# Patient Record
Sex: FEMALE | Race: WHITE | NOT HISPANIC OR LATINO | Employment: UNEMPLOYED | URBAN - METROPOLITAN AREA
[De-identification: names, ages, dates, MRNs, and addresses within clinical notes are randomized per-mention and may not be internally consistent; named-entity substitution may affect disease eponyms.]

---

## 2017-01-21 ENCOUNTER — OFFICE VISIT (OUTPATIENT)
Dept: INTERNAL MEDICINE | Facility: CLINIC | Age: 57
End: 2017-01-21
Payer: COMMERCIAL

## 2017-01-21 VITALS
SYSTOLIC BLOOD PRESSURE: 171 MMHG | TEMPERATURE: 98 F | HEART RATE: 108 BPM | BODY MASS INDEX: 33.87 KG/M2 | WEIGHT: 210.75 LBS | HEIGHT: 66 IN | DIASTOLIC BLOOD PRESSURE: 83 MMHG

## 2017-01-21 DIAGNOSIS — R05.9 COUGH: ICD-10-CM

## 2017-01-21 DIAGNOSIS — R09.89 CHEST CONGESTION: ICD-10-CM

## 2017-01-21 DIAGNOSIS — J40 BRONCHITIS: Primary | ICD-10-CM

## 2017-01-21 PROCEDURE — 1159F MED LIST DOCD IN RCRD: CPT | Mod: S$GLB,,, | Performed by: FAMILY MEDICINE

## 2017-01-21 PROCEDURE — 99999 PR PBB SHADOW E&M-EST. PATIENT-LVL IV: CPT | Mod: PBBFAC,,, | Performed by: FAMILY MEDICINE

## 2017-01-21 PROCEDURE — 3077F SYST BP >= 140 MM HG: CPT | Mod: S$GLB,,, | Performed by: FAMILY MEDICINE

## 2017-01-21 PROCEDURE — 3079F DIAST BP 80-89 MM HG: CPT | Mod: S$GLB,,, | Performed by: FAMILY MEDICINE

## 2017-01-21 PROCEDURE — 99213 OFFICE O/P EST LOW 20 MIN: CPT | Mod: S$GLB,,, | Performed by: FAMILY MEDICINE

## 2017-01-21 RX ORDER — AZITHROMYCIN 250 MG/1
TABLET, FILM COATED ORAL
Qty: 6 TABLET | Refills: 0 | Status: SHIPPED | OUTPATIENT
Start: 2017-01-21 | End: 2017-01-26

## 2017-01-21 RX ORDER — PROMETHAZINE HYDROCHLORIDE 6.25 MG/5ML
6.25 SYRUP ORAL EVERY 4 HOURS PRN
Qty: 240 ML | Refills: 0 | Status: SHIPPED | OUTPATIENT
Start: 2017-01-21 | End: 2017-03-14

## 2017-01-21 RX ORDER — BENZONATATE 100 MG/1
100 CAPSULE ORAL 4 TIMES DAILY PRN
Qty: 40 CAPSULE | Refills: 1 | Status: SHIPPED | OUTPATIENT
Start: 2017-01-21 | End: 2017-01-31

## 2017-01-21 NOTE — PROGRESS NOTES
"S/  Sat  clinic. PCP is Dr. Brian  Pt c/o 10 days of chest congestion, URI, sinus pressure  Others at [facility] were sick before she was    O/  Vitals:    01/21/17 1032   BP: (!) 171/83   Pulse: 108   Temp: 98.4 °F (36.9 °C)   -given BP, advised her to stop taking the sudafed she ahs been using OTC    Pharynx erythematous, some pustules, mild swelling  Neck supple no lymphadenopathy  Cor s1s2  Lungs - initially some wheezes in SHALINI but cleared after a deep breath, otherwise CTA  Abd - benign    Christal was seen today for uri.    Diagnoses and all orders for this visit:    Bronchitis w/chest congestion x 10d  -     azithromycin (Z-SAMINA) 250 MG tablet; Take 2 tablets by mouth on day 1; Take 1 tablet by mouth on days 2-5 ["allegic to erytho, but she reports this was a nausea reaction only]    Cough  -     promethazine (PHENERGAN) 6.25 mg/5 mL syrup; Take 5 mLs (6.25 mg total) by mouth every 4 (four) hours as needed (congestion/cough).  -     benzonatate (TESSALON) 100 MG capsule; Take 1 capsule (100 mg total) by mouth 4 (four) times daily as needed for Cough.  - can continue her OTC robitussin DM; allergic to codeine    Chest congestion  -     promethazine (PHENERGAN) 6.25 mg/5 mL syrup; Take 5 mLs (6.25 mg total) by mouth every 4 (four) hours as needed (congestion/cough).  - fluids rest, f/u prn          "

## 2017-01-21 NOTE — MR AVS SNAPSHOT
[facility] Medicine  [address]  [facility] [address]  Phone: [phone]  Fax: [phone]                  Christal Posey   2017 10:40 AM   Office Visit    Description:  Female : 1960   Provider:  Abhi Young MD   Department:  [facility] Internal Medicine           Reason for Visit     URI           Diagnoses this Visit        Comments    Bronchitis    -  Primary     Cough         Chest congestion                To Do List           Goals (5 Years of Data)     None       These Medications        Disp Refills Start End    azithromycin (Z-SAMINA) 250 MG tablet 6 tablet 0 2017    Take 2 tablets by mouth on day 1; Take 1 tablet by mouth on days 2-5    Pharmacy: [facility] [address] [place]  [address] [facility] Ph #: [phone]       promethazine (PHENERGAN) 6.25 mg/5 mL syrup 240 mL 0 2017     Take 5 mLs (6.25 mg total) by mouth every 4 (four) hours as needed (congestion/cough). - Oral    Pharmacy: [facility] [address] [place] [address] [place] - [address] [facility] Ph #: [phone]       benzonatate (TESSALON) 100 MG capsule 40 capsule 1 2017    Take 1 capsule (100 mg total) by mouth 4 (four) times daily as needed for Cough. - Oral    Pharmacy: [facility] [address] [place] [address] [place] [address] [facility] AT [facility] Ph #: [phone]         [facility] On Call     [facility] On Call Nurse Care Line -  Assistance  Registered nurses in the [facility] On Call Center provide clinical advisement, health education, appointment booking, and other advisory services.  Call for this free service at [phone].             Medications           Message regarding Medications     Verify the changes and/or additions to your medication regime listed below are the same as discussed with your clinician today.  If any of these changes  or additions are incorrect, please notify your healthcare provider.        START taking these NEW medications        Refills    azithromycin (Z-SAMINA) 250 MG tablet 0    Sig: Take 2 tablets by mouth on day 1; Take 1 tablet by mouth on days 2-5    Class: Normal    promethazine (PHENERGAN) 6.25 mg/5 mL syrup 0    Sig: Take 5 mLs (6.25 mg total) by mouth every 4 (four) hours as needed (congestion/cough).    Class: Normal    Route: Oral    benzonatate (TESSALON) 100 MG capsule 1    Sig: Take 1 capsule (100 mg total) by mouth 4 (four) times daily as needed for Cough.    Class: Normal    Route: Oral           Verify that the below list of medications is an accurate representation of the medications you are currently taking.  If none reported, the list may be blank. If incorrect, please contact your healthcare provider. Carry this list with you in case of emergency.           Current Medications     alprazolam (XANAX) 0.25 MG tablet Take 1 tablet (0.25 mg total) by mouth nightly as needed for Insomnia.    baclofen (LIORESAL) 10 MG tablet TAKE 1 TABLET(10 MG) BY MOUTH EVERY EVENING    butalbital-acetaminophen-caffeine -40 mg (FIORICET, ESGIC) -40 mg per tablet Take 1 tablet by mouth every 6 to 8 hours as needed.    fluticasone (FLONASE) 50 mcg/actuation nasal spray     gabapentin (NEURONTIN) 100 MG capsule Take 800 mg by mouth 3 (three) times daily.     gabapentin (NEURONTIN) 300 MG capsule Take 800 mg by mouth 3 (three) times daily. Takes 300 mg caps and 100 mg caps to make total single dose of 800 mg; takes 800 mg TID    lansoprazole (PREVACID) 30 MG capsule Take 30 mg by mouth every morning. 1 Capsule, Delayed Release(E.C.) Oral Every day    meloxicam (MOBIC) 7.5 MG tablet TAKE 1 TABLET(7.5 MG) BY MOUTH EVERY DAY    multivitamin (MULTIVITAMIN) per tablet Take 1 tablet by mouth once daily.      ondansetron (ZOFRAN) 4 MG tablet Take 1 tablet (4 mg total) by mouth every 8 (eight) hours as needed for Nausea. 1  "Tablet Oral Every 6 hours    sumatriptan (IMITREX) 20 mg/actuation nasal spray USE 1 SPRAY IN EACH NOSTRIL AT ONSET OF HEADACHE. MAY REPEAT ONCE IN 2 HOURS IF NO RELIEF. NO MORE THAN 2 SPRAYS PER 24 HOURS    vitamin D 1000 units Tab Take 185 mg by mouth every morning.     azithromycin (Z-SAMINA) 250 MG tablet Take 2 tablets by mouth on day 1; Take 1 tablet by mouth on days 2-5    benzonatate (TESSALON) 100 MG capsule Take 1 capsule (100 mg total) by mouth 4 (four) times daily as needed for Cough.    co-enzyme Q-10 30 mg capsule Take 30 mg by mouth 3 (three) times daily.    promethazine (PHENERGAN) 6.25 mg/5 mL syrup Take 5 mLs (6.25 mg total) by mouth every 4 (four) hours as needed (congestion/cough).           Clinical Reference Information           Vital Signs - Last Recorded  Most recent update: 1/21/2017 10:34 AM by Kindra Gomez MA    BP Pulse Temp Ht Wt LMP    (!) 171/83 (BP Location: Right arm, Patient Position: Sitting) 108 98.4 °F (36.9 °C) (Oral) 5' 6" (1.676 m) 95.6 kg (210 lb 12.2 oz) (LMP Unknown)    BMI                34.02 kg/m2          Blood Pressure          Most Recent Value    BP  (!)  171/83      Allergies as of 1/21/2017     Pravastatin    Amitriptyline    Codeine    Doxycycline    Erythromycin    Iodine    Macrobid  [Nitrofurantoin Monohyd/m-cryst]    Verapamil      Immunizations Administered on Date of Encounter - 1/21/2017     None      "

## 2017-02-03 ENCOUNTER — OFFICE VISIT (OUTPATIENT)
Dept: INTERNAL MEDICINE | Facility: CLINIC | Age: 57
End: 2017-02-03
Payer: COMMERCIAL

## 2017-02-03 ENCOUNTER — HOSPITAL ENCOUNTER (OUTPATIENT)
Dept: RADIOLOGY | Facility: HOSPITAL | Age: 57
Discharge: HOME | End: 2017-02-03
Attending: INTERNAL MEDICINE
Payer: COMMERCIAL

## 2017-02-03 ENCOUNTER — TELEPHONE (OUTPATIENT)
Dept: INTERNAL MEDICINE | Facility: CLINIC | Age: 57
End: 2017-02-03

## 2017-02-03 VITALS
WEIGHT: 215.19 LBS | HEART RATE: 73 BPM | HEIGHT: 66 IN | SYSTOLIC BLOOD PRESSURE: 141 MMHG | DIASTOLIC BLOOD PRESSURE: 73 MMHG | BODY MASS INDEX: 34.58 KG/M2

## 2017-02-03 DIAGNOSIS — J42 CHRONIC BRONCHITIS, UNSPECIFIED CHRONIC BRONCHITIS TYPE: ICD-10-CM

## 2017-02-03 DIAGNOSIS — R05.9 COUGH: ICD-10-CM

## 2017-02-03 DIAGNOSIS — R05.9 COUGH: Primary | ICD-10-CM

## 2017-02-03 PROCEDURE — 99999 PR PBB SHADOW E&M-EST. PATIENT-LVL III: CPT | Mod: PBBFAC,,, | Performed by: INTERNAL MEDICINE

## 2017-02-03 PROCEDURE — 71020 XR CHEST PA AND LATERAL: CPT | Mod: 26,,, | Performed by: RADIOLOGY

## 2017-02-03 PROCEDURE — 71020 XR CHEST PA AND LATERAL: CPT | Mod: TC

## 2017-02-03 PROCEDURE — 3077F SYST BP >= 140 MM HG: CPT | Mod: S$GLB,,, | Performed by: INTERNAL MEDICINE

## 2017-02-03 PROCEDURE — 3078F DIAST BP <80 MM HG: CPT | Mod: S$GLB,,, | Performed by: INTERNAL MEDICINE

## 2017-02-03 PROCEDURE — 99213 OFFICE O/P EST LOW 20 MIN: CPT | Mod: S$GLB,,, | Performed by: INTERNAL MEDICINE

## 2017-02-03 RX ORDER — FLUTICASONE PROPIONATE 110 UG/1
1 AEROSOL, METERED RESPIRATORY (INHALATION) 2 TIMES DAILY
Qty: 12 G | Refills: 0 | Status: SHIPPED | OUTPATIENT
Start: 2017-02-03 | End: 2018-02-03

## 2017-02-03 NOTE — PROGRESS NOTES
"Subjective:       Patient ID: Christal Posey is a 57 y.o. female.    Chief Complaint: Follow-up (cough, losing voice, weezing trouble laying down to sleep at night, fatigue)    hospitals    First visit with me. Patient of Blane Brian MD, here today for Urgent Care visit. PMH of hiatal hernia, IBS, migraine, DCIS (L), Rheumatoid Arthritis. Seen by Internal Medicine for bronchitis 2 weeks ago, given antihistamine cough syrup, Tessalon, and Zpak.    SX improved initially. Still with cough. Sometimes heavy spasms of cough. Still productive of mucus. Hoarseness. At night can't lay flat, has to sit up. "I can't catch my breath laying down". At times occasionally shortness of breath. Still fatigued. No muscle aches. No fever. No nausea or vomiting. occasionally headache. Cough syrup not much help but Tessalon was good. Using Flonase and Mucinex.     Review of Systems    As per HPI    Objective:      Physical Exam   Constitutional: She is oriented to person, place, and time. No distress.    woman whose Body mass index is 34.73 kg/(m^2).    HENT:   Right Ear: Tympanic membrane normal.   Left Ear: Tympanic membrane normal.   Nose: Right sinus exhibits no maxillary sinus tenderness and no frontal sinus tenderness. Left sinus exhibits no maxillary sinus tenderness and no frontal sinus tenderness.   Mouth/Throat: Oropharynx is clear and moist. No oropharyngeal exudate.   Neck: Normal range of motion. No thyromegaly present.   Cardiovascular: Normal rate, regular rhythm and normal heart sounds.  Exam reveals no gallop and no friction rub.    No murmur heard.  Pulmonary/Chest: Effort normal and breath sounds normal. No stridor. She has no wheezes. She has no rales.   occasionally congested cough during exam   Lymphadenopathy:     She has no cervical adenopathy.   Neurological: She is alert and oriented to person, place, and time.   Skin: She is not diaphoretic.   Nursing note and vitals reviewed.      Vitals:    02/03/17 " "1333   BP: (!) 141/73   BP Location: Left arm   Patient Position: Sitting   BP Method: Manual   Pulse: 73   Weight: 97.6 kg (215 lb 2.7 oz)   Height: 5' 6" (1.676 m)     Body mass index is 34.73 kg/(m^2).    Assessment:       1. Cough    2. Chronic bronchitis, unspecified chronic bronchitis type        Plan:   Christal was seen today for follow-up.    Diagnoses and all orders for this visit:    Cough:  No apparent active infection at this time. CXR today to rule out any pneumonia. Continue Tessalon, add on over-the-counter Delsym.  -     X-Ray Chest PA And Lateral; Future    Chronic bronchitis, unspecified chronic bronchitis type:  Not resolving, avoid systemic steroids given asthma, start inhaled steroids for 1 week two times a day, then as needed.  -     fluticasone (FLOVENT HFA) 110 mcg/actuation inhaler; Inhale 1 puff into the lungs 2 (two) times daily.    Keep regular follow up appointments with Blane Brian MD.     Ministerio Quiroz MD  Internal Medicine    Portions of this note were completed using Cartesian dictation software. Please excuse typographical or syntax errors.   "

## 2017-02-03 NOTE — MR AVS SNAPSHOT
[facility] - [facility] Medicine  [address]  [facility] [address]  Phone: [phone]  Fax: [phone]                  Christal Posey   2/3/2017 1:40 PM   Office Visit    Description:  Female : 1960   Provider:  Ministerio Quiroz MD   Department:  [facility] - Internal Medicine           Reason for Visit     Follow-up           Diagnoses this Visit        Comments    Cough    -  Primary     Chronic bronchitis, unspecified chronic bronchitis type                To Do List           Future Appointments        Provider Department Dept Phone    2/3/2017 2:30 PM [facility] [id number] [facility] [phone]      Goals (5 Years of Data)     None      Follow-Up and Disposition     Follow-up and Disposition History       These Medications        Disp Refills Start End    fluticasone (FLOVENT HFA) 110 mcg/actuation inhaler 12 g 0 2/3/2017 2/3/2018    Inhale 1 puff into the lungs 2 (two) times daily. - Inhalation    Pharmacy: [facility] [address] [place] [address] [address] [facility]  [address] Ph #: [phone]         [facility]     [facility] Care [facility] -  Assistance  Registered nurses in the [facility] Center provide clinical advisement, health education, appointment booking, and other advisory services.  Call for this free service at [phone].             Medications           Message regarding Medications     Verify the changes and/or additions to your medication regime listed below are the same as discussed with your clinician today.  If any of these changes or additions are incorrect, please notify your healthcare provider.        START taking these NEW medications        Refills    fluticasone (FLOVENT HFA) 110 mcg/actuation inhaler 0    Sig: Inhale 1 puff into the lungs 2 (two) times daily.    Class: Normal    Route: Inhalation           Verify that the below list of medications is an accurate  representation of the medications you are currently taking.  If none reported, the list may be blank. If incorrect, please contact your healthcare provider. Carry this list with you in case of emergency.           Current Medications     alprazolam (XANAX) 0.25 MG tablet Take 1 tablet (0.25 mg total) by mouth nightly as needed for Insomnia.    baclofen (LIORESAL) 10 MG tablet TAKE 1 TABLET(10 MG) BY MOUTH EVERY EVENING    butalbital-acetaminophen-caffeine -40 mg (FIORICET, ESGIC) -40 mg per tablet Take 1 tablet by mouth every 6 to 8 hours as needed.    co-enzyme Q-10 30 mg capsule Take 30 mg by mouth 3 (three) times daily.    gabapentin (NEURONTIN) 100 MG capsule Take 800 mg by mouth 3 (three) times daily.     gabapentin (NEURONTIN) 300 MG capsule Take 800 mg by mouth 3 (three) times daily. Takes 300 mg caps and 100 mg caps to make total single dose of 800 mg; takes 800 mg TID    lansoprazole (PREVACID) 30 MG capsule Take 30 mg by mouth every morning. 1 Capsule, Delayed Release(E.C.) Oral Every day    meloxicam (MOBIC) 7.5 MG tablet TAKE 1 TABLET(7.5 MG) BY MOUTH EVERY DAY    multivitamin (MULTIVITAMIN) per tablet Take 1 tablet by mouth once daily.      ondansetron (ZOFRAN) 4 MG tablet Take 1 tablet (4 mg total) by mouth every 8 (eight) hours as needed for Nausea. 1 Tablet Oral Every 6 hours    promethazine (PHENERGAN) 6.25 mg/5 mL syrup Take 5 mLs (6.25 mg total) by mouth every 4 (four) hours as needed (congestion/cough).    sumatriptan (IMITREX) 20 mg/actuation nasal spray USE 1 SPRAY IN EACH NOSTRIL AT ONSET OF HEADACHE. MAY REPEAT ONCE IN 2 HOURS IF NO RELIEF. NO MORE THAN 2 SPRAYS PER 24 HOURS    vitamin D 1000 units Tab Take 185 mg by mouth every morning.     fluticasone (FLONASE) 50 mcg/actuation nasal spray     fluticasone (FLOVENT HFA) 110 mcg/actuation inhaler Inhale 1 puff into the lungs 2 (two) times daily.           Clinical Reference Information           Your Vitals Were     BP Pulse  "Height Weight Last Period BMI    141/73 (BP Location: Left arm, Patient Position: Sitting, BP Method: Manual) 73 5' 6" (1.676 m) 97.6 kg (215 lb 2.7 oz) (LMP Unknown) 34.73 kg/m2      Blood Pressure          Most Recent Value    BP  (!)  141/73      Allergies as of 2/3/2017     Pravastatin    Amitriptyline    Codeine    Doxycycline    Erythromycin    Iodine    Macrobid  [Nitrofurantoin Monohyd/m-cryst]    Verapamil      Immunizations Administered on Date of Encounter - 2/3/2017     None      Orders Placed During Today's Visit     Future Labs/Procedures Expected by Expires    X-Ray Chest PA And Lateral  2/3/2017 2/3/2018      Language Assistance Services     ATTENTION: Language assistance services are available, free of charge. Please call [phone].      ATENCIÓN: Si jamshidla milo, tiene a mckoy disposición servicios gratuitos de asistencia lingüística. Llame al [phone].     CHÚ Ý: N?u b?n nói Ti?ng Vi?t, có các d?ch v? h? tr? ngôn ng? mi?n phí dành cho b?n. G?i s? [phone].         [facility] complies with applicable Federal civil rights laws and does not discriminate on the basis of race, color, national origin, age, disability, or sex.        "

## 2017-03-14 ENCOUNTER — OFFICE VISIT (OUTPATIENT)
Dept: INTERNAL MEDICINE | Facility: CLINIC | Age: 57
End: 2017-03-14
Payer: COMMERCIAL

## 2017-03-14 VITALS
HEART RATE: 71 BPM | HEIGHT: 66 IN | WEIGHT: 212.31 LBS | BODY MASS INDEX: 34.12 KG/M2 | SYSTOLIC BLOOD PRESSURE: 124 MMHG | OXYGEN SATURATION: 97 % | DIASTOLIC BLOOD PRESSURE: 84 MMHG

## 2017-03-14 DIAGNOSIS — J45.20 ASTHMATIC BRONCHITIS, MILD INTERMITTENT, UNCOMPLICATED: Primary | ICD-10-CM

## 2017-03-14 PROCEDURE — 3079F DIAST BP 80-89 MM HG: CPT | Mod: S$GLB,,, | Performed by: INTERNAL MEDICINE

## 2017-03-14 PROCEDURE — 3074F SYST BP LT 130 MM HG: CPT | Mod: S$GLB,,, | Performed by: INTERNAL MEDICINE

## 2017-03-14 PROCEDURE — 99999 PR PBB SHADOW E&M-EST. PATIENT-LVL III: CPT | Mod: PBBFAC,,, | Performed by: INTERNAL MEDICINE

## 2017-03-14 PROCEDURE — 1160F RVW MEDS BY RX/DR IN RCRD: CPT | Mod: S$GLB,,, | Performed by: INTERNAL MEDICINE

## 2017-03-14 PROCEDURE — 99213 OFFICE O/P EST LOW 20 MIN: CPT | Mod: S$GLB,,, | Performed by: INTERNAL MEDICINE

## 2017-03-14 RX ORDER — ALBUTEROL SULFATE 90 UG/1
2 AEROSOL, METERED RESPIRATORY (INHALATION) EVERY 6 HOURS PRN
Qty: 18 G | Refills: 0 | Status: SHIPPED | OUTPATIENT
Start: 2017-03-14 | End: 2017-09-23 | Stop reason: SDUPTHER

## 2017-03-14 NOTE — MR AVS SNAPSHOT
[facility]  [address]  [facility] [address]  Phone: [phone]  Fax: [phone]                  Christal Posey   3/14/2017 8:00 AM   Office Visit    Description:  Female : 1960   Provider:  Blane Brian MD   Department:  [facility]           Reason for Visit     Cough           Diagnoses this Visit        Comments    Asthmatic bronchitis, mild intermittent, uncomplicated    -  Primary            To Do List           Future Appointments        Provider Department Dept Phone    4/10/2017 9:00 AM Adarsh Vanegas DO [facility] - Rheumatology [phone]    2017 8:30 AM [id number] [phone]    2017 9:30 AM [facility] [phone]      Goals (5 Years of Data)     None       These Medications        Disp Refills Start End    albuterol 90 mcg/actuation inhaler 18 g 0 3/14/2017 3/14/2018    Inhale 2 puffs into the lungs every 6 (six) hours as needed for Wheezing. Rescue - Inhalation    Pharmacy: [facility] [address] [place] [address] [place] [address] [facility]  [address] Ph #: [phone]         [facility]     [facility] Nurse Care Line - 24/7 Assistance  Registered nurses in the [facility] Center provide clinical advisement, health education, appointment booking, and other advisory services.  Call for this free service at [phone].             Medications           Message regarding Medications     Verify the changes and/or additions to your medication regime listed below are the same as discussed with your clinician today.  If any of these changes or additions are incorrect, please notify your healthcare provider.        START taking these NEW medications        Refills    albuterol 90 mcg/actuation inhaler 0    Sig: Inhale 2 puffs into the lungs every 6 (six) hours as needed for Wheezing. Rescue    Class:  Normal    Route: Inhalation      STOP taking these medications     co-enzyme Q-10 30 mg capsule Take 30 mg by mouth 3 (three) times daily.    promethazine (PHENERGAN) 6.25 mg/5 mL syrup Take 5 mLs (6.25 mg total) by mouth every 4 (four) hours as needed (congestion/cough).           Verify that the below list of medications is an accurate representation of the medications you are currently taking.  If none reported, the list may be blank. If incorrect, please contact your healthcare provider. Carry this list with you in case of emergency.           Current Medications     alprazolam (XANAX) 0.25 MG tablet Take 1 tablet (0.25 mg total) by mouth nightly as needed for Insomnia.    baclofen (LIORESAL) 10 MG tablet TAKE 1 TABLET(10 MG) BY MOUTH EVERY EVENING    butalbital-acetaminophen-caffeine -40 mg (FIORICET, ESGIC) -40 mg per tablet Take 1 tablet by mouth every 6 to 8 hours as needed.    fluticasone (FLONASE) 50 mcg/actuation nasal spray     fluticasone (FLOVENT HFA) 110 mcg/actuation inhaler Inhale 1 puff into the lungs 2 (two) times daily.    gabapentin (NEURONTIN) 100 MG capsule Take 800 mg by mouth 3 (three) times daily.     gabapentin (NEURONTIN) 300 MG capsule Take 800 mg by mouth 3 (three) times daily. Takes 300 mg caps and 100 mg caps to make total single dose of 800 mg; takes 800 mg TID    lansoprazole (PREVACID) 30 MG capsule Take 30 mg by mouth every morning. 1 Capsule, Delayed Release(E.C.) Oral Every day    meloxicam (MOBIC) 7.5 MG tablet TAKE 1 TABLET(7.5 MG) BY MOUTH EVERY DAY    multivitamin (MULTIVITAMIN) per tablet Take 1 tablet by mouth once daily.      ondansetron (ZOFRAN) 4 MG tablet Take 1 tablet (4 mg total) by mouth every 8 (eight) hours as needed for Nausea. 1 Tablet Oral Every 6 hours    sumatriptan (IMITREX) 20 mg/actuation nasal spray USE 1 SPRAY IN EACH NOSTRIL AT ONSET OF HEADACHE. MAY REPEAT ONCE IN 2 HOURS IF NO RELIEF. NO MORE THAN 2 SPRAYS PER 24 HOURS    vitamin D 1000  "units Tab Take 185 mg by mouth every morning.     albuterol 90 mcg/actuation inhaler Inhale 2 puffs into the lungs every 6 (six) hours as needed for Wheezing. Rescue           Clinical Reference Information           Your Vitals Were     BP Pulse Height Weight Last Period SpO2    124/84 (BP Location: Right arm, Patient Position: Sitting, BP Method: Manual) 71 5' 6" (1.676 m) 96.3 kg (212 lb 4.9 oz) (LMP Unknown) 97%    BMI                34.27 kg/m2          Blood Pressure          Most Recent Value    BP  124/84      Allergies as of 3/14/2017     Pravastatin    Amitriptyline    Codeine    Doxycycline    Erythromycin    Iodine    Macrobid  [Nitrofurantoin Monohyd/m-cryst]    Verapamil      Immunizations Administered on Date of Encounter - 3/14/2017     None      Language Assistance Services     ATTENTION: Language assistance services are available, free of charge. Please call [phone].      ATENCIÓN: Si habla español, tiene a mckoy disposición servicios gratuitos de asistencia lingüística. Llame al [phone].     SAVANNA Ý: N?u b?n nói Ti?ng Vi?t, có các d?ch v? h? tr? ngôn ng? mi?n phí dành cho b?n. G?i s? [phone].         [facility] complies with applicable Federal civil rights laws and does not discriminate on the basis of race, color, national origin, age, disability, or sex.        "

## 2017-03-15 NOTE — PROGRESS NOTES
CHIEF COMPLAINT:  Cough, plus shortness of breath.    HISTORY OF PRESENT ILLNESS:  The patient is a 57-year-old female, who we see for   reflux, hyperlipidemia, who comes in today for followup of cough.  The patient   reports she had seen Dr. Lucia in Urgent Care in January with complaints of   cough 10 days prior to presentation.  The patient at that time was treated with   Phenergan cough syrup and Tessalon Perles.  The patient reports that she was   seen again at this time by Dr. Quiroz on 02/03/2017 with complaints again of   cough, losing her voice, wheezing, trouble breathing.  At this time, the patient   was diagnosed with chronic bronchitis.  A chest x-ray had been done, which   showed no pneumonia.  She was prescribed Flovent.  The patient is here today for   followup.  The patient reports she is still having her cough, some wheezing.    Saturday, she woke up, feeling like she could not get a breath in.    REVIEW OF SYSTEMS:  The patient reports some postnasal drip.  The patient is   using Flonase.  She reports no sinus pain or pressure.    PHYSICAL EXAMINATION:  GENERAL APPEARANCE:  No acute distress.  HEENT:  Conjunctivae clear.  Pupils are equal and reactive.  TMs are clear.    Nasal septum is midline without discharge.  Oropharynx is without erythema.  PULMONARY:  Good inspiratory, expiratory breath sounds are heard.  Lungs are   clear to auscultation.  CARDIOVASCULAR:  S1, S2.  Pulse ox on room air is 97%.  EXTREMITIES:  Without edema.  ABDOMEN:  Nontender, nondistended, without hepatosplenomegaly.    Repeat blood pressure was 124/80.    Chest x-ray from 02/03/2017 was reviewed with the patient.  The pictures were   brought up, is showing to her in clinic here.    ASSESSMENT:  Asthmatic bronchitis.    PLAN:  The patient is to continue to use her Flovent.  We will also prescribe   albuterol metered-dose inhaler.  She is to use two puffs every 6 hours as   needed.  She is to contact us if her symptoms do  not improve.      ASHLEY/IN  dd: 03/15/2017 14:54:02 (CDT)  td: 03/16/2017 05:30:07 (CDT)  Doc ID   #[id number]  Job ID #[id number]    CC:

## 2017-03-28 DIAGNOSIS — J42 CHRONIC BRONCHITIS, UNSPECIFIED CHRONIC BRONCHITIS TYPE: ICD-10-CM

## 2017-03-29 RX ORDER — DEXAMETHASONE 4 MG/1
TABLET ORAL
Qty: 12 G | Refills: 0 | OUTPATIENT
Start: 2017-03-29

## 2017-04-06 ENCOUNTER — LAB VISIT (OUTPATIENT)
Dept: LAB | Facility: HOSPITAL | Age: 57
End: 2017-04-06
Attending: INTERNAL MEDICINE
Payer: COMMERCIAL

## 2017-04-06 ENCOUNTER — OFFICE VISIT (OUTPATIENT)
Dept: HEMATOLOGY/ONCOLOGY | Facility: CLINIC | Age: 57
End: 2017-04-06
Payer: COMMERCIAL

## 2017-04-06 VITALS
HEART RATE: 72 BPM | RESPIRATION RATE: 20 BRPM | TEMPERATURE: 98 F | DIASTOLIC BLOOD PRESSURE: 82 MMHG | SYSTOLIC BLOOD PRESSURE: 169 MMHG | BODY MASS INDEX: 34.02 KG/M2 | WEIGHT: 210.75 LBS | OXYGEN SATURATION: 99 %

## 2017-04-06 DIAGNOSIS — D05.12 DUCTAL CARCINOMA IN SITU (DCIS) OF LEFT BREAST: Primary | ICD-10-CM

## 2017-04-06 DIAGNOSIS — D05.12 DUCTAL CARCINOMA IN SITU (DCIS) OF LEFT BREAST: ICD-10-CM

## 2017-04-06 LAB
ALBUMIN SERPL BCP-MCNC: 3.7 G/DL
ALP SERPL-CCNC: 106 U/L
ALT SERPL W/O P-5'-P-CCNC: 20 U/L
ANION GAP SERPL CALC-SCNC: 8 MMOL/L
AST SERPL-CCNC: 19 U/L
BILIRUB SERPL-MCNC: 0.5 MG/DL
BUN SERPL-MCNC: 14 MG/DL
CALCIUM SERPL-MCNC: 9.6 MG/DL
CHLORIDE SERPL-SCNC: 105 MMOL/L
CO2 SERPL-SCNC: 29 MMOL/L
CREAT SERPL-MCNC: 1.1 MG/DL
ERYTHROCYTE [DISTWIDTH] IN BLOOD BY AUTOMATED COUNT: 13.4 %
EST. GFR  (AFRICAN AMERICAN): >60 ML/MIN/1.73 M^2
EST. GFR  (NON AFRICAN AMERICAN): 56.7 ML/MIN/1.73 M^2
GLUCOSE SERPL-MCNC: 93 MG/DL
HCT VFR BLD AUTO: 40.3 %
HGB BLD-MCNC: 13.8 G/DL
MCH RBC QN AUTO: 29 PG
MCHC RBC AUTO-ENTMCNC: 34.2 %
MCV RBC AUTO: 85 FL
NEUTROPHILS # BLD AUTO: 4.1 K/UL
PLATELET # BLD AUTO: 298 K/UL
PMV BLD AUTO: 10.4 FL
POTASSIUM SERPL-SCNC: 4 MMOL/L
PROT SERPL-MCNC: 7.7 G/DL
RBC # BLD AUTO: 4.76 M/UL
SODIUM SERPL-SCNC: 142 MMOL/L
WBC # BLD AUTO: 6.9 K/UL

## 2017-04-06 PROCEDURE — 99214 OFFICE O/P EST MOD 30 MIN: CPT | Mod: S$GLB,,, | Performed by: INTERNAL MEDICINE

## 2017-04-06 PROCEDURE — 80053 COMPREHEN METABOLIC PANEL: CPT

## 2017-04-06 PROCEDURE — 99999 PR PBB SHADOW E&M-EST. PATIENT-LVL III: CPT | Mod: PBBFAC,,, | Performed by: INTERNAL MEDICINE

## 2017-04-06 PROCEDURE — 85027 COMPLETE CBC AUTOMATED: CPT

## 2017-04-06 PROCEDURE — 36415 COLL VENOUS BLD VENIPUNCTURE: CPT

## 2017-04-06 PROCEDURE — 3077F SYST BP >= 140 MM HG: CPT | Mod: S$GLB,,, | Performed by: INTERNAL MEDICINE

## 2017-04-06 PROCEDURE — 1160F RVW MEDS BY RX/DR IN RCRD: CPT | Mod: S$GLB,,, | Performed by: INTERNAL MEDICINE

## 2017-04-06 PROCEDURE — 3079F DIAST BP 80-89 MM HG: CPT | Mod: S$GLB,,, | Performed by: INTERNAL MEDICINE

## 2017-04-06 NOTE — PROGRESS NOTES
Subjective:       Patient ID: Christal Posey is a 57 y.o. female.    Chief Complaint: Follow-up    HPI     Mrs. Posey presents for follow-up if DCIS  Monitored by surveillance- opted out of adjuvant endocrine with low volume disease  Doing well      DCIS History:  5/16/16 Screening mammography:  Calcifications in the left breast require additional evaluation.    ACR BI-RADS Category 0: Incomplete: Need Additional Imaging Evaluation  - 5/17/16 Diagnostic mammogram  Calcifications in the left breast are suspicious.  Histology using core biopsy  is recommended.  ACR BI-RADS Category 4: Suspicious Abnormality  - 5/20/16 biopsy  1, 2. LEFT BREAST, WITH CALCIFICATIONS AND WITHOUT CALCIFICATIONS, UPPER INNER QUADRANT  (NEEDLE BIOPSY):  -Low-grade ductal carcinoma in situ (DCIS)  -Nuclear grade 1 out of 3  -Cribriform pattern  -Associated with microcalcifications  %, % positive  - 6/10/2016 Lumpectomy  Pathology:  Procedure - Excision with wire guided localization  Lymph node sampling - Not sampled  Specimen laterality - Left  Tumor site - Not specified  Size of DCIS - 2mm, including findings from the biopsy report  Histologic type - Ductal carcinoma in situ  Architectural pattern - Solid and cribriforming  Nuclear grade - Low  Necrosis - Absent  Margins - Negative, inferior is 2mm  Pathologc staging - p Tis Nx Mx  Hormone receptors (See Breast Biopsy report; MS16 - [id number])  Estrogen receptor - Positive, strong, 100%  Progesterone receptors - Positive, strong, 100%    Review of Systems   Constitutional: Positive for unexpected weight change. Negative for appetite change.   HENT: Negative for congestion, dental problem, hearing loss, rhinorrhea and sore throat.    Eyes: Negative for visual disturbance.   Respiratory: Positive for cough (recent bronchitis, asthma) and shortness of breath. Negative for chest tightness.    Cardiovascular: Negative for chest pain.   Gastrointestinal: Negative for abdominal  distention, abdominal pain, blood in stool, constipation, diarrhea, nausea and vomiting.   Genitourinary: Negative for decreased urine volume, difficulty urinating, dysuria, frequency and urgency.   Musculoskeletal: Negative for arthralgias, back pain and myalgias.   Skin: Negative for rash.   Neurological: Negative for weakness, numbness and headaches.   Hematological: Negative for adenopathy.   Psychiatric/Behavioral: Negative for dysphoric mood. The patient is not nervous/anxious.    All other systems reviewed and are negative.      Objective:      Physical Exam   Constitutional: She is oriented to person, place, and time. She appears well-developed and well-nourished. No distress.   HENT:   Head: Normocephalic and atraumatic.   Right Ear: External ear normal.   Left Ear: External ear normal.   Mouth/Throat: Oropharynx is clear and moist. No oropharyngeal exudate.   Eyes: Conjunctivae and EOM are normal. Pupils are equal, round, and reactive to light. Right eye exhibits no discharge. Left eye exhibits no discharge. No scleral icterus. Right pupil is round and reactive. Left pupil is round and reactive.   Neck: Normal range of motion. Neck supple. No JVD present. No tracheal deviation present. No thyromegaly present.   Cardiovascular: Normal rate, regular rhythm, normal heart sounds and intact distal pulses.  Exam reveals no gallop and no friction rub.    No murmur heard.  Pulmonary/Chest: Effort normal and breath sounds normal. No respiratory distress. She has no wheezes. She has no rales.   Abdominal: Soft. Bowel sounds are normal. She exhibits no distension and no mass. There is no hepatosplenomegaly. There is no tenderness. There is no rebound and no guarding.   Musculoskeletal: Normal range of motion. She exhibits no edema or tenderness.   Lymphadenopathy:        Head (right side): No submandibular adenopathy present.        Head (left side): No submandibular adenopathy present.     She has no cervical  adenopathy.        Right cervical: No superficial cervical, no deep cervical and no posterior cervical adenopathy present.       Left cervical: No superficial cervical, no deep cervical and no posterior cervical adenopathy present.        Right: No inguinal and no supraclavicular adenopathy present.        Left: No inguinal and no supraclavicular adenopathy present.   Neurological: She is alert and oriented to person, place, and time. She has normal strength and normal reflexes. No cranial nerve deficit or sensory deficit. Coordination normal.   Skin: Skin is warm and dry. No bruising, no lesion, no petechiae and no rash noted. No erythema. No pallor.   Psychiatric: She has a normal mood and affect. Her behavior is normal. Judgment and thought content normal. Her mood appears not anxious. She does not exhibit a depressed mood.   Nursing note and vitals reviewed.      Assessment:       1. Ductal carcinoma in situ (DCIS) of left breast        Plan:     1. EMIR  Opted for surveillance  Next mammogram due 5/2017  Knows to call for any issues.

## 2017-04-10 ENCOUNTER — OFFICE VISIT (OUTPATIENT)
Dept: RHEUMATOLOGY | Facility: CLINIC | Age: 57
End: 2017-04-10
Payer: COMMERCIAL

## 2017-04-10 VITALS
BODY MASS INDEX: 34.05 KG/M2 | DIASTOLIC BLOOD PRESSURE: 91 MMHG | SYSTOLIC BLOOD PRESSURE: 141 MMHG | HEIGHT: 66 IN | HEART RATE: 74 BPM | WEIGHT: 211.88 LBS

## 2017-04-10 DIAGNOSIS — E66.9 OBESITY (BMI 30-39.9): ICD-10-CM

## 2017-04-10 DIAGNOSIS — M15.0 PRIMARY GENERALIZED (OSTEO)ARTHRITIS: ICD-10-CM

## 2017-04-10 DIAGNOSIS — R76.8 RHEUMATOID FACTOR POSITIVE: Primary | ICD-10-CM

## 2017-04-10 PROCEDURE — 1160F RVW MEDS BY RX/DR IN RCRD: CPT | Mod: S$GLB,,, | Performed by: INTERNAL MEDICINE

## 2017-04-10 PROCEDURE — 99999 PR PBB SHADOW E&M-EST. PATIENT-LVL IV: CPT | Mod: PBBFAC,,, | Performed by: INTERNAL MEDICINE

## 2017-04-10 PROCEDURE — 3080F DIAST BP >= 90 MM HG: CPT | Mod: S$GLB,,, | Performed by: INTERNAL MEDICINE

## 2017-04-10 PROCEDURE — 3077F SYST BP >= 140 MM HG: CPT | Mod: S$GLB,,, | Performed by: INTERNAL MEDICINE

## 2017-04-10 PROCEDURE — 99214 OFFICE O/P EST MOD 30 MIN: CPT | Mod: S$GLB,,, | Performed by: INTERNAL MEDICINE

## 2017-04-10 ASSESSMENT — ROUTINE ASSESSMENT OF PATIENT INDEX DATA (RAPID3)
AM STIFFNESS SCORE: 1, YES
PSYCHOLOGICAL DISTRESS SCORE: 1.1
PATIENT GLOBAL ASSESSMENT SCORE: 2.5
TOTAL RAPID3 SCORE: 1.72
PAIN SCORE: 2
MDHAQ FUNCTION SCORE: .2
FATIGUE SCORE: 1.5
WHEN YOU AWAKENED IN THE MORNING OVER THE LAST WEEK, PLEASE INDICATE THE AMOUNT OF TIME IT TAKES UNTIL YOU ARE AS LIMBER AS YOU WILL BE FOR THE DAY: 30 MIN

## 2017-04-10 NOTE — PROGRESS NOTES
57 year-old female with PMH of hyperlipidemia,   Migraines, left breast cancer status post lumpectomy (june 2016) here for evaluation of joint pain and +RF.  No synovitis on exam. Inflammatory markers are negative.  Patient controlled on aleve. Told her we will continue to monitor her.  She does not have RA at this time.

## 2017-04-10 NOTE — PATIENT INSTRUCTIONS
Continue aleve 2 tabs 1-2 times daily     Follow up in 1 year but call if you have any issues in the meantime

## 2017-04-10 NOTE — MR AVS SNAPSHOT
[facility] - Rheumatology  [address]  [place] [address]  Phone: [phone]  Fax: [phone]                  Christal Posey   4/10/2017 9:00 AM   Office Visit    Description:  Female : 1960   Provider:  Adarsh Vanegas DO   Department:  [facility]           Reason for Visit     Follow-up           Diagnoses this Visit        Comments    Rheumatoid arthritis involving multiple sites with positive rheumatoid factor    -  Primary     Rheumatoid factor positive         Obesity (BMI 30-39.9)         Ductal carcinoma in situ (DCIS) of left breast                To Do List           Future Appointments        Provider Department Dept Phone    5/10/2017 9:00 AM LAB, APPOINTMENT [place] [facility] [phone]    2017 8:30 AM NOM [id number] DX [facility] [phone]    2017 9:30 AM MELVA Mena [facility] [phone]      Goals (5 Years of Data)     None      Follow-Up and Disposition     Return in about 1 year (around 4/10/2018).      [facility]     [facility] -  Assistance  Unless otherwise directed by your provider, please contact [facility], our nurse care line that is available for  assistance.     Registered nurses in the [facility] provide: appointment scheduling, clinical advisement, health education, and other advisory services.  Call: [phone] (toll free)               Medications           Message regarding Medications     Verify the changes and/or additions to your medication regime listed below are the same as discussed with your clinician today.  If any of these changes or additions are incorrect, please notify your healthcare provider.             Verify that the below list of medications is an accurate representation of the medications you are currently taking.  If none reported, the list may be blank. If incorrect, please  "contact your healthcare provider. Carry this list with you in case of emergency.           Current Medications     albuterol 90 mcg/actuation inhaler Inhale 2 puffs into the lungs every 6 (six) hours as needed for Wheezing. Rescue    alprazolam (XANAX) 0.25 MG tablet Take 1 tablet (0.25 mg total) by mouth nightly as needed for Insomnia.    baclofen (LIORESAL) 10 MG tablet TAKE 1 TABLET(10 MG) BY MOUTH EVERY EVENING    butalbital-acetaminophen-caffeine -40 mg (FIORICET, ESGIC) -40 mg per tablet Take 1 tablet by mouth every 6 to 8 hours as needed.    fluticasone (FLONASE) 50 mcg/actuation nasal spray     fluticasone (FLOVENT HFA) 110 mcg/actuation inhaler Inhale 1 puff into the lungs 2 (two) times daily.    gabapentin (NEURONTIN) 100 MG capsule Take 800 mg by mouth 3 (three) times daily.     gabapentin (NEURONTIN) 300 MG capsule Take 800 mg by mouth 3 (three) times daily. Takes 300 mg caps and 100 mg caps to make total single dose of 800 mg; takes 800 mg TID    lansoprazole (PREVACID) 30 MG capsule Take 30 mg by mouth every morning. 1 Capsule, Delayed Release(E.C.) Oral Every day    meloxicam (MOBIC) 7.5 MG tablet TAKE 1 TABLET(7.5 MG) BY MOUTH EVERY DAY    multivitamin (MULTIVITAMIN) per tablet Take 1 tablet by mouth once daily.      ondansetron (ZOFRAN) 4 MG tablet Take 1 tablet (4 mg total) by mouth every 8 (eight) hours as needed for Nausea. 1 Tablet Oral Every 6 hours    sumatriptan (IMITREX) 20 mg/actuation nasal spray USE 1 SPRAY IN EACH NOSTRIL AT ONSET OF HEADACHE. MAY REPEAT ONCE IN 2 HOURS IF NO RELIEF. NO MORE THAN 2 SPRAYS PER 24 HOURS    vitamin D 1000 units Tab Take 185 mg by mouth every morning.            Clinical Reference Information           Your Vitals Were     BP Pulse Height Weight Last Period BMI    141/91 (BP Location: Left arm, Patient Position: Sitting, BP Method: Automatic) 74 5' 6" (1.676 m) 96.1 kg (211 lb 14.4 oz) (LMP Unknown) 34.2 kg/m2      Blood Pressure          Most " Recent Value    BP  (!)  141/91      Allergies as of 4/10/2017     Pravastatin    Amitriptyline    Codeine    Doxycycline    Erythromycin    Iodine    Macrobid  [Nitrofurantoin Monohyd/m-cryst]    Verapamil      Immunizations Administered on Date of Encounter - 4/10/2017     None      Orders Placed During Today's Visit     Future Labs/Procedures Expected by Expires    BASIC METABOLIC PANEL  4/10/2017 6/9/2018      Instructions    Continue aleve 2 tabs 1-2 times daily     Follow up in 1 year but call if you have any issues in the meantime         Language Assistance Services     ATTENTION: Language assistance services are available, free of charge. Please call [phone].      ATENCIÓN: Si habla español, tiene a mckoy disposición servicios gratuitos de asistencia lingüística. Llame al [phone].     CHÚ Ý: N?u b?n nói Ti?ng Vi?t, có các d?ch v? h? tr? ngôn ng? mi?n phí dành cho b?n. G?i s? [phone].         [facility] complies with applicable Federal civil rights laws and does not discriminate on the basis of race, color, national origin, age, disability, or sex.

## 2017-04-10 NOTE — PROGRESS NOTES
"Subjective:       Patient ID: Christal Posey is a 57 y.o. female.    Chief Complaint: Follow-up    HPI::   Interval history: 58 yo F with PMH Migraines, IBS, GERD, breast CA s/p lumpectomy 6/2016 seen in follow up for RF+ positive, non erosive RA. We felt she had early RA at last visit with +RF and synovitis of R 5th PIP, but also FMS, OA and sleep disturbance. We put her on meloxicam once daily however she found that it would wear off later in the day so she stopped it and is currently taking OTC aleve 2 tabs once daily and then a second time in the evening only when needed. She feels she is doing well on this regimen and denies any swelling. She feels stiff for less than 30 minutes in the AM. She has pain of the L 1st CMC joint and neck stiffness, otherwise feels well. She has no extra-articular manifestations. We also started her on baclofen at night which she has not needed because she is now sleeping well.    Her CCP is negative, and inflammatory markers have been normal. Her last CMP showed a GFR of 56.7, but all previous have been normal.    Initial history: 55 yo F with PMH Migraines, IBS, GERD, breast CA s/p lumpectomy 6/2016 seen in consult for joint pain and swelling with positive RF. She states that her hand pain began about a years ago. It is mostly in the PIPs and she does sometimes report some swelling. She feels stiff for about 1 hour in the AM. She also has pain in the joints in her feet for about the last year as well. She has some knee pain and "cracking" without swelling that is more chronic. She does not sleep well and is fatigued. She has had a dry cough for about a year as well, but no other extra-articular manifestations. She was previously on Premarin that was stopped when the breast CA was found and thinks her symptoms are worse since stopping it. She uses Aleve which helps her pain as does ibuprofen though not quite as much, tylenol does not help. She has never smoked. Her paternal uncle " "has "arthritis." She took prednisone once is the past for sciatica and it made her "sick" where she felt very hot and just "not good."    RF 49, THANH negative, ESR 11, HCV negative        Review of Systems   Constitutional: Negative for activity change, chills, fatigue and fever.   HENT: Negative for mouth sores and trouble swallowing.    Eyes: Negative for pain and redness.   Respiratory: Negative for cough, chest tightness and shortness of breath.    Cardiovascular: Negative for chest pain.   Gastrointestinal: Negative for abdominal pain, diarrhea and nausea.   Genitourinary: Negative for dysuria.   Musculoskeletal: Positive for arthralgias and neck stiffness. Negative for joint swelling, myalgias and neck pain.   Skin: Negative for color change and rash.   Neurological: Negative for weakness and numbness.   Hematological: Negative for adenopathy.   Psychiatric/Behavioral: Negative for sleep disturbance.         Objective:     BP (!) 141/91 (BP Location: Left arm, Patient Position: Sitting, BP Method: Automatic)  Pulse 74  Ht 5' 6" (1.676 m)  Wt 96.1 kg (211 lb 14.4 oz)  LMP  (LMP Unknown)  BMI 34.2 kg/m2     Physical Exam   Constitutional: She is oriented to person, place, and time and well-developed, well-nourished, and in no distress. No distress.   HENT:   Head: Normocephalic and atraumatic.   Mouth/Throat: No oropharyngeal exudate.   Eyes: Conjunctivae are normal. No scleral icterus.   Neck: Normal range of motion. Neck supple. No thyromegaly present.   Cardiovascular: Normal rate, regular rhythm and normal heart sounds.    Pulmonary/Chest: Effort normal and breath sounds normal.   Abdominal: Soft. There is no tenderness.       Right Side Rheumatological Exam     Examination finds the shoulder, elbow, wrist, knee, 1st PIP, 1st MCP, 2nd PIP, 2nd MCP, 3rd PIP, 3rd MCP, 4th PIP, 4th MCP, 5th PIP and 5th MCP normal.    Left Side Rheumatological Exam     Examination finds the shoulder, elbow, wrist, knee, 1st " PIP, 1st MCP, 2nd PIP, 2nd MCP, 3rd PIP, 3rd MCP, 4th PIP, 4th MCP, 5th PIP and 5th MCP normal.      Lymphadenopathy:     She has no cervical adenopathy.   Neurological: She is alert and oriented to person, place, and time.   Skin: Skin is warm and dry. No rash noted.     Musculoskeletal: Normal range of motion. She exhibits no edema or tenderness.           Assessment:       1. Rheumatoid arthritis involving multiple sites with positive rheumatoid factor    2. Rheumatoid factor positive    3. Obesity (BMI 30-39.9)    4. Ductal carcinoma in situ (DCIS) of left breast        56 yo F with h/o breast CA s/p lumpectomy here for follow up of RF. She may have  nonerosive early RA vs OA. Symptomswell controlled on NSAIDs. She does have some first CMC pain on the L c/w OA as well as some OA in her neck. Her last GFR was 56.7, but all previous have been normal and she has been taking NSAIDs for many years.    Plan:       1. Continue Aleve 2 tabs BID prn  2. Check BMP in 1 month  3. Take baclofen 10mg qhs prn for neck stiffness  4. Recommended weight loss  5. Follow up in 1 year with repeat labs and arthritis survey, but call for sooner appt if any issues in the meantime    Adarsh Vanegas, DO  Rheumatology fellow  [id number]

## 2017-04-25 ENCOUNTER — PATIENT MESSAGE (OUTPATIENT)
Dept: OBSTETRICS AND GYNECOLOGY | Facility: CLINIC | Age: 57
End: 2017-04-25

## 2017-04-26 ENCOUNTER — TELEPHONE (OUTPATIENT)
Dept: OBSTETRICS AND GYNECOLOGY | Facility: CLINIC | Age: 57
End: 2017-04-26

## 2017-04-26 DIAGNOSIS — N95.2 VAGINAL ATROPHY: Primary | ICD-10-CM

## 2017-05-10 ENCOUNTER — TELEPHONE (OUTPATIENT)
Dept: RHEUMATOLOGY | Facility: CLINIC | Age: 57
End: 2017-05-10

## 2017-05-10 ENCOUNTER — LAB VISIT (OUTPATIENT)
Dept: LAB | Facility: HOSPITAL | Age: 57
End: 2017-05-10
Payer: COMMERCIAL

## 2017-05-10 DIAGNOSIS — E66.9 OBESITY (BMI 30-39.9): ICD-10-CM

## 2017-05-10 DIAGNOSIS — R76.8 RHEUMATOID FACTOR POSITIVE: ICD-10-CM

## 2017-05-10 LAB
ANION GAP SERPL CALC-SCNC: 9 MMOL/L
BUN SERPL-MCNC: 15 MG/DL
CALCIUM SERPL-MCNC: 9.4 MG/DL
CHLORIDE SERPL-SCNC: 103 MMOL/L
CO2 SERPL-SCNC: 29 MMOL/L
CREAT SERPL-MCNC: 1.1 MG/DL
EST. GFR  (AFRICAN AMERICAN): >60 ML/MIN/1.73 M^2
EST. GFR  (NON AFRICAN AMERICAN): 56.7 ML/MIN/1.73 M^2
GLUCOSE SERPL-MCNC: 91 MG/DL
POTASSIUM SERPL-SCNC: 3.9 MMOL/L
SODIUM SERPL-SCNC: 141 MMOL/L

## 2017-05-10 PROCEDURE — 36415 COLL VENOUS BLD VENIPUNCTURE: CPT

## 2017-05-10 PROCEDURE — 80048 BASIC METABOLIC PNL TOTAL CA: CPT

## 2017-05-11 ENCOUNTER — TELEPHONE (OUTPATIENT)
Dept: RHEUMATOLOGY | Facility: CLINIC | Age: 57
End: 2017-05-11

## 2017-05-22 ENCOUNTER — OFFICE VISIT (OUTPATIENT)
Dept: SURGERY | Facility: CLINIC | Age: 57
End: 2017-05-22
Payer: COMMERCIAL

## 2017-05-22 ENCOUNTER — HOSPITAL ENCOUNTER (OUTPATIENT)
Dept: RADIOLOGY | Facility: HOSPITAL | Age: 57
Discharge: HOME | End: 2017-05-22
Attending: NURSE PRACTITIONER
Payer: COMMERCIAL

## 2017-05-22 ENCOUNTER — TELEPHONE (OUTPATIENT)
Dept: OBSTETRICS AND GYNECOLOGY | Facility: CLINIC | Age: 57
End: 2017-05-22

## 2017-05-22 VITALS
WEIGHT: 211 LBS | HEART RATE: 73 BPM | BODY MASS INDEX: 33.91 KG/M2 | HEIGHT: 66 IN | TEMPERATURE: 98 F | SYSTOLIC BLOOD PRESSURE: 152 MMHG | DIASTOLIC BLOOD PRESSURE: 88 MMHG

## 2017-05-22 DIAGNOSIS — Z85.3 PERSONAL HISTORY OF BREAST CANCER: Primary | ICD-10-CM

## 2017-05-22 DIAGNOSIS — Z85.3 PERSONAL HISTORY OF BREAST CANCER: ICD-10-CM

## 2017-05-22 PROCEDURE — 99213 OFFICE O/P EST LOW 20 MIN: CPT | Mod: S$GLB,,, | Performed by: NURSE PRACTITIONER

## 2017-05-22 PROCEDURE — 1160F RVW MEDS BY RX/DR IN RCRD: CPT | Mod: S$GLB,,, | Performed by: NURSE PRACTITIONER

## 2017-05-22 PROCEDURE — 99999 PR PBB SHADOW E&M-EST. PATIENT-LVL IV: CPT | Mod: PBBFAC,,, | Performed by: NURSE PRACTITIONER

## 2017-05-22 PROCEDURE — 3077F SYST BP >= 140 MM HG: CPT | Mod: S$GLB,,, | Performed by: NURSE PRACTITIONER

## 2017-05-22 PROCEDURE — 77062 BREAST TOMOSYNTHESIS BI: CPT | Mod: 26,,, | Performed by: RADIOLOGY

## 2017-05-22 PROCEDURE — 77066 DX MAMMO INCL CAD BI: CPT | Mod: 26,,, | Performed by: RADIOLOGY

## 2017-05-22 PROCEDURE — 77062 BREAST TOMOSYNTHESIS BI: CPT | Mod: TC

## 2017-05-22 PROCEDURE — 3079F DIAST BP 80-89 MM HG: CPT | Mod: S$GLB,,, | Performed by: NURSE PRACTITIONER

## 2017-05-22 RX ORDER — BENZONATATE 100 MG/1
CAPSULE ORAL
COMMUNITY
Start: 2017-03-14 | End: 2017-05-22

## 2017-05-22 NOTE — TELEPHONE ENCOUNTER
----- Message from Renetta Mcintyre MD sent at 5/22/2017 12:13 PM CDT -----  Pleases schedule in survivorship  ----- Message -----  From: MELVA Mena  Sent: 5/22/2017   9:34 AM  To: Renetta Mcintyre MD, Mary Alice Andrade MD    Good morning, could you please contact this patient to discuss vaginal dryness, history of DCIS 2016, thankyou!  Paz

## 2017-05-22 NOTE — PROGRESS NOTES
Subjective:      Patient ID: Christal Posey is a 57 y.o. female.    Chief Complaint: Breast Cancer Screening (CBE/Hx of Left Breast Cancer)      HPI: (PF, EPF - 1-3) (Detailed, Comp, - 4) patient presents today for breast cancer surveillance, previously seen by Dr Campos. Patient denies palpable breast mass, pain, nipple discharge, redness, increased warmth, unexplained weight loss, new onset bone pain. Continues to report vaginal dryness, was started on osphena but reported side effects and no improvement of vaginal dryness.        5- core biopsy left breast with low grade DCIS, ER/DE +  6- left lumpectomy with 1mm area of DCIS, negative margins. She met with Dr Guzmán who discussed XRT, small low grade DCIS, was in favor of hormonal therapy instead of XRT. Met with Dr Julian with no endocrine therapy. No adjuvant therapy       Review of Systems   Constitutional: Negative for appetite change and fatigue.   Respiratory: Negative for cough and shortness of breath.    Cardiovascular: Negative for chest pain.   Musculoskeletal: Negative for back pain.     Objective:   Physical Exam   Pulmonary/Chest: She exhibits no mass, no tenderness, no laceration, no edema, no deformity, no swelling and no retraction. Right breast exhibits no inverted nipple, no mass, no nipple discharge, no skin change and no tenderness. Left breast exhibits no inverted nipple, no mass, no nipple discharge, no skin change and no tenderness. Breasts are symmetrical. There is no breast swelling.   Lumpectomy scar left breast at 7 o'clock with no mass or skin changes   Lymphadenopathy:     She has no cervical adenopathy.     She has no axillary adenopathy.        Right: No supraclavicular adenopathy present.        Left: No supraclavicular adenopathy present.     Assessment:       1. Personal history of breast cancer        Plan:       mmg today, no changes or abnormality reported, clinically EMIR  Return in 6 months CBE only  She  does not need additional f/u with med onc, no hormonal therapy initiated  Refer to Dr Mcintyre for vaginal dryness  Call for any interval palpable breast mass, pain, nipple discharge, skin changes or other breast related concerns

## 2017-05-23 ENCOUNTER — TELEPHONE (OUTPATIENT)
Dept: FAMILY MEDICINE | Facility: CLINIC | Age: 57
End: 2017-05-23

## 2017-05-23 NOTE — TELEPHONE ENCOUNTER
----- Message from MELVA Mena sent at 5/22/2017  9:34 AM CDT -----  Good morning, could you please contact this patient to discuss vaginal dryness, history of DCIS 2016, thankyou!  Paz

## 2017-06-01 ENCOUNTER — OFFICE VISIT (OUTPATIENT)
Dept: OBSTETRICS AND GYNECOLOGY | Facility: CLINIC | Age: 57
End: 2017-06-01
Attending: OBSTETRICS & GYNECOLOGY
Payer: COMMERCIAL

## 2017-06-01 VITALS
DIASTOLIC BLOOD PRESSURE: 88 MMHG | BODY MASS INDEX: 34.58 KG/M2 | SYSTOLIC BLOOD PRESSURE: 124 MMHG | HEIGHT: 66 IN | WEIGHT: 215.19 LBS

## 2017-06-01 DIAGNOSIS — R63.5 WEIGHT GAIN: ICD-10-CM

## 2017-06-01 DIAGNOSIS — C50.919 MALIGNANT NEOPLASM OF FEMALE BREAST, UNSPECIFIED LATERALITY, UNSPECIFIED SITE OF BREAST: Primary | ICD-10-CM

## 2017-06-01 DIAGNOSIS — N95.1 MENOPAUSAL SYMPTOM: ICD-10-CM

## 2017-06-01 DIAGNOSIS — N95.2 VAGINAL ATROPHY: ICD-10-CM

## 2017-06-01 PROCEDURE — 99214 OFFICE O/P EST MOD 30 MIN: CPT | Mod: S$GLB,,, | Performed by: OBSTETRICS & GYNECOLOGY

## 2017-06-01 PROCEDURE — 99999 PR PBB SHADOW E&M-EST. PATIENT-LVL III: CPT | Mod: PBBFAC,,, | Performed by: OBSTETRICS & GYNECOLOGY

## 2017-06-01 RX ORDER — VENLAFAXINE HYDROCHLORIDE 37.5 MG/1
37.5 CAPSULE, EXTENDED RELEASE ORAL DAILY
Qty: 30 CAPSULE | Refills: 9 | Status: SHIPPED | OUTPATIENT
Start: 2017-06-01 | End: 2018-12-24

## 2017-06-02 ENCOUNTER — TELEPHONE (OUTPATIENT)
Dept: OBSTETRICS AND GYNECOLOGY | Facility: CLINIC | Age: 57
End: 2017-06-02

## 2017-06-02 NOTE — TELEPHONE ENCOUNTER
----- Message from Yvonne Ayon sent at 6/2/2017 11:24 AM CDT -----  Contact: Patient   _X1st Request  _  2nd Request  _  3rd Request      Who:GENET VILLA [[id number]]    Why:Patient is calling in reference to her prescription [place] DHEA it was supposed to have Testerone in it but per the pharmacy it was not called in that way     What Number to Call Back:[phone]    When to Expect a call back: (Before the end of the day)   -- if call after 3:00 call back will be tomorrow.

## 2017-06-02 NOTE — TELEPHONE ENCOUNTER
Pt called to verify she received the correct medication. Advised pt the DHEA does not have testosterone in it but it acts to testosterone scepters. Advised pt she received the correct medication.

## 2017-06-13 NOTE — PROGRESS NOTES
"SUBJECTIVE:   57 y.o. female   Presents for survivorship visit No LMP recorded (lmp unknown). Patient has had a hysterectomy..  Her breast cancer history is as foolows.   2016 core biopsy left breast with low grade DCIS, ER/VA +  6- left lumpectomy with 1mm area of DCIS, negative margins. She met with Dr Guzmán who discussed XRT, small low grade DCIS, was in favor of hormonal therapy instead of XRT. Met with Dr Julian with no endocrine therapy. No adjuvant therapy {   She was referred by Paz Phillips. She reports vaignla dryness.  She had been on ERT prior to her breast cancer diagnosis. When she was taken off she started having hot flashes. She was placed on Osphena which she stopped after 3 weeks  because it "made me have body aches and sweating." She also reports night sweats- "feel hot in bed, does not wake me up>" She used Premarin vaginal cream in the past. She reports having a hysterectomy for uterine prolapse.  She is on Gabapentin 800mg tid and this does not help with night sweats.   She is trying to lose weight.  She had some success on Weight Watchers but gained it back  She is riding the stationary bike 30 minutes daily and using hand weights.     Past Medical History:   Diagnosis Date    Allergy     Breast cancer 2016    Left breast low grade DCIS, ER/VA positive    GERD (gastroesophageal reflux disease)     Hiatal hernia     Hyperlipidemia     Irritable bowel syndrome     Migraine headache     Squamous cell carcinoma excised 14    in situ, R forearm     Past Surgical History:   Procedure Laterality Date    BREAST BIOPSY Left 2016    DCIS    ECTOPIC PREGNANCY SURGERY Right     HYSTERECTOMY      ovaries left intact    removal of ovarian cyst Right oct 1992    ovary left intact    TUBAL LIGATION      tubal reversal  1991     Social History     Social History    Marital status:      Spouse name: Parrish    Number of children: 2    Years of " education: N/A     Occupational History    Not on file.     Social History Main Topics    Smoking status: Never Smoker    Smokeless tobacco: Never Used    Alcohol use No    Drug use: No    Sexual activity: Yes     Partners: Male     Birth control/ protection: Surgical, None      Comment:  to Parrish      Other Topics Concern    Are You Pregnant Or Think You May Be? No    Breast-Feeding No     Social History Narrative    No narrative on file     Family History   Problem Relation Age of Onset    Migraines Sister     Migraines Brother     Seizures Brother     Migraines Maternal Grandmother     Seizures Maternal Grandmother     Cancer Father 77     Lung cancer    Melanoma Neg Hx     Breast cancer Neg Hx     Colon cancer Neg Hx     Ovarian cancer Neg Hx      OB History    Para Term  AB Living   3 2 2  1 2   SAB TAB Ectopic Multiple Live Births     1  2      # Outcome Date GA Lbr Garrett/2nd Weight Sex Delivery Anes PTL Lv   3 Ectopic     U       2 Term     F Vag-Spont   NILTON   1 Term     F Vag-Spont   NILTON            Current Outpatient Prescriptions   Medication Sig Dispense Refill    albuterol 90 mcg/actuation inhaler Inhale 2 puffs into the lungs every 6 (six) hours as needed for Wheezing. Rescue 18 g 0    alprazolam (XANAX) 0.25 MG tablet Take 1 tablet (0.25 mg total) by mouth nightly as needed for Insomnia. 30 tablet 0    baclofen (LIORESAL) 10 MG tablet TAKE 1 TABLET(10 MG) BY MOUTH EVERY EVENING 90 tablet 11    butalbital-acetaminophen-caffeine -40 mg (FIORICET, ESGIC) -40 mg per tablet Take 1 tablet by mouth every 6 to 8 hours as needed. 60 tablet 0    fluticasone (FLONASE) 50 mcg/actuation nasal spray       fluticasone (FLOVENT HFA) 110 mcg/actuation inhaler Inhale 1 puff into the lungs 2 (two) times daily. 12 g 0    gabapentin (NEURONTIN) 100 MG capsule Take 800 mg by mouth 3 (three) times daily.   3    gabapentin (NEURONTIN) 300 MG capsule Take 800 mg by mouth  3 (three) times daily. Takes 300 mg caps and 100 mg caps to make total single dose of 800 mg; takes 800 mg TID  2    lansoprazole (PREVACID) 30 MG capsule Take 30 mg by mouth every morning. 1 Capsule, Delayed Release(E.C.) Oral Every day      meloxicam (MOBIC) 7.5 MG tablet TAKE 1 TABLET(7.5 MG) BY MOUTH EVERY DAY 90 tablet 5    multivitamin (MULTIVITAMIN) per tablet Take 1 tablet by mouth once daily.        ondansetron (ZOFRAN) 4 MG tablet Take 1 tablet (4 mg total) by mouth every 8 (eight) hours as needed for Nausea. 1 Tablet Oral Every 6 hours 12 tablet 12    sumatriptan (IMITREX) 20 mg/actuation nasal spray USE 1 SPRAY IN EACH NOSTRIL AT ONSET OF HEADACHE. MAY REPEAT ONCE IN 2 HOURS IF NO RELIEF. NO MORE THAN 2 SPRAYS PER 24 HOURS 6 each 12    venlafaxine (EFFEXOR XR) 37.5 MG 24 hr capsule Take 1 capsule (37.5 mg total) by mouth once daily. 30 capsule 9    vitamin D 1000 units Tab Take 185 mg by mouth every morning.        No current facility-administered medications for this visit.      Allergies: Pravastatin; Amitriptyline; Codeine; Doxycycline; Erythromycin; Iodine; Macrobid  [nitrofurantoin monohyd/m-cryst]; and Verapamil     ROS:  Constitutional: no weight loss, +weight gain, fever, fatigue  Eyes:  No vision changes, glasses/contacts  ENT/Mouth: No ulcers, sinus problems, ears ringing, headache  Cardiovascular: No inability to lie flat, chest pain, exercise intolerance, swelling, heart palpitations  Respiratory: No wheezing, coughing blood, shortness of breath, or cough  Gastrointestinal: No diarrhea, bloody stool, nausea/vomiting, constipation, gas, hemorrhoids  Genitourinary: No blood in urine, painful urination, urgency of urination, frequency of urination, incomplete emptying, incontinence, abnormal bleeding, painful periods, heavy periods, vaginal discharge, vaginal odor, painful intercourse, sexual problems, bleeding after intercourse, +vaginal dryness.  Musculoskeletal: No muscle  weakness  Skin/Breast: No painful breasts, nipple discharge, masses, rash, ulcers  Neurological: No passing out, seizures, numbness, headache  Endocrine: No diabetes, hypothyroid, hyperthyroid,+ hot flashes, hair loss, abnormal hair growth, acne  Psychiatric: No depression, crying  Hematologic: No bruises, bleeding, swollen lymph nodes, anemia.      Physical Exam  deferred    ASSESSMENT:  History of breast cancer   Menopausal symptoms  Vaginal atrophy  Weight gain    PLAN:   45 minutes spent with patient - Face to face time- 100% spent in counseling and arranging follow up  Counseled patient on vaginal atrophy and safe medications. Discussed DHEA and its affects on estrogen and testosterone receptors vaginally  Discussed Effexor as alternative for hot flashes- will start at 37.5 mg and may need to increase dose  Recommend Sylk Lubricant and Lubrigyn Moisturizer  Discussed diet and exercise and positive impact this can have on relieving hot flashes  All of her questions were answered.   Follow up in 4-6 weeks

## 2017-08-08 ENCOUNTER — TELEPHONE (OUTPATIENT)
Dept: INTERNAL MEDICINE | Facility: CLINIC | Age: 57
End: 2017-08-08

## 2017-08-08 NOTE — TELEPHONE ENCOUNTER
----- Message from Marilyn Bernal sent at 8/8/2017  8:32 AM CDT -----  Contact: self/[phone]  Patient called in regards needing an excuse from Dr Brian for jury duty for 09/12/17. Please call and advise.       Thank you!!!

## 2017-08-17 ENCOUNTER — LAB VISIT (OUTPATIENT)
Dept: LAB | Facility: HOSPITAL | Age: 57
End: 2017-08-17
Payer: COMMERCIAL

## 2017-08-17 DIAGNOSIS — R76.8 RHEUMATOID FACTOR POSITIVE: ICD-10-CM

## 2017-08-17 LAB
ANION GAP SERPL CALC-SCNC: 12 MMOL/L
BUN SERPL-MCNC: 11 MG/DL
CALCIUM SERPL-MCNC: 9.5 MG/DL
CHLORIDE SERPL-SCNC: 105 MMOL/L
CO2 SERPL-SCNC: 28 MMOL/L
CREAT SERPL-MCNC: 1 MG/DL
EST. GFR  (AFRICAN AMERICAN): >60 ML/MIN/1.73 M^2
EST. GFR  (NON AFRICAN AMERICAN): >60 ML/MIN/1.73 M^2
GLUCOSE SERPL-MCNC: 92 MG/DL
POTASSIUM SERPL-SCNC: 4 MMOL/L
SODIUM SERPL-SCNC: 145 MMOL/L

## 2017-08-17 PROCEDURE — 36415 COLL VENOUS BLD VENIPUNCTURE: CPT

## 2017-08-17 PROCEDURE — 80048 BASIC METABOLIC PNL TOTAL CA: CPT

## 2017-08-18 ENCOUNTER — OFFICE VISIT (OUTPATIENT)
Dept: HEMATOLOGY/ONCOLOGY | Facility: CLINIC | Age: 57
End: 2017-08-18
Payer: COMMERCIAL

## 2017-08-18 VITALS
BODY MASS INDEX: 35.05 KG/M2 | SYSTOLIC BLOOD PRESSURE: 162 MMHG | HEART RATE: 89 BPM | OXYGEN SATURATION: 98 % | TEMPERATURE: 98 F | RESPIRATION RATE: 20 BRPM | DIASTOLIC BLOOD PRESSURE: 88 MMHG | WEIGHT: 217.13 LBS

## 2017-08-18 DIAGNOSIS — D05.12 DUCTAL CARCINOMA IN SITU (DCIS) OF LEFT BREAST: Primary | Chronic | ICD-10-CM

## 2017-08-18 PROCEDURE — 99999 PR PBB SHADOW E&M-EST. PATIENT-LVL III: CPT | Mod: PBBFAC,,, | Performed by: INTERNAL MEDICINE

## 2017-08-18 PROCEDURE — 3077F SYST BP >= 140 MM HG: CPT | Mod: S$GLB,,, | Performed by: INTERNAL MEDICINE

## 2017-08-18 PROCEDURE — 3008F BODY MASS INDEX DOCD: CPT | Mod: S$GLB,,, | Performed by: INTERNAL MEDICINE

## 2017-08-18 PROCEDURE — 99214 OFFICE O/P EST MOD 30 MIN: CPT | Mod: S$GLB,,, | Performed by: INTERNAL MEDICINE

## 2017-08-18 PROCEDURE — 3079F DIAST BP 80-89 MM HG: CPT | Mod: S$GLB,,, | Performed by: INTERNAL MEDICINE

## 2017-08-18 NOTE — PROGRESS NOTES
Subjective:       Patient ID: Christal Posey is a 57 y.o. female.    Chief Complaint: Follow-up    HPI     Mrs. Posey presents for follow-up if DCIS  Monitored by surveillance- opted out of adjuvant endocrine with low volume disease  Doing well      DCIS History:  5/16/16 Screening mammography:  Calcifications in the left breast require additional evaluation.    ACR BI-RADS Category 0: Incomplete: Need Additional Imaging Evaluation  - 5/17/16 Diagnostic mammogram  Calcifications in the left breast are suspicious.  Histology using core biopsy  is recommended.  ACR BI-RADS Category 4: Suspicious Abnormality  - 5/20/16 biopsy  1, 2. LEFT BREAST, WITH CALCIFICATIONS AND WITHOUT CALCIFICATIONS, UPPER INNER QUADRANT  (NEEDLE BIOPSY):  -Low-grade ductal carcinoma in situ (DCIS)  -Nuclear grade 1 out of 3  -Cribriform pattern  -Associated with microcalcifications  %, % positive  - 6/10/2016 Lumpectomy  Pathology:  Procedure - Excision with wire guided localization  Lymph node sampling - Not sampled  Specimen laterality - Left  Tumor site - Not specified  Size of DCIS - 2mm, including findings from the biopsy report  Histologic type - Ductal carcinoma in situ  Architectural pattern - Solid and cribriforming  Nuclear grade - Low  Necrosis - Absent  Margins - Negative, inferior is 2mm  Pathologc staging - p Tis Nx Mx  Hormone receptors (See Breast Biopsy report; MS16 - 04092)  Estrogen receptor - Positive, strong, 100%  Progesterone receptors - Positive, strong, 100%    Review of Systems   Constitutional: Negative for appetite change and unexpected weight change.   HENT: Negative for congestion, dental problem, hearing loss, rhinorrhea and sore throat.    Eyes: Negative for visual disturbance.   Respiratory: Positive for cough. Negative for chest tightness and shortness of breath.    Cardiovascular: Negative for chest pain.   Gastrointestinal: Negative for abdominal distention, abdominal pain, blood in stool,  constipation, diarrhea, nausea and vomiting.   Genitourinary: Negative for decreased urine volume, difficulty urinating, dysuria, frequency and urgency.        Vaginal dryness- has ?s about Premarin   Musculoskeletal: Negative for arthralgias, back pain and myalgias.   Skin: Negative for rash.   Neurological: Negative for weakness, numbness and headaches.   Hematological: Negative for adenopathy.   Psychiatric/Behavioral: Negative for dysphoric mood. The patient is not nervous/anxious.        Objective:      Physical Exam   Constitutional: She is oriented to person, place, and time. She appears well-developed and well-nourished. No distress.   HENT:   Head: Normocephalic and atraumatic.   Right Ear: External ear normal.   Left Ear: External ear normal.   Mouth/Throat: Oropharynx is clear and moist. No oropharyngeal exudate.   Eyes: Conjunctivae and EOM are normal. Pupils are equal, round, and reactive to light. Right eye exhibits no discharge. Left eye exhibits no discharge. No scleral icterus. Right pupil is round and reactive. Left pupil is round and reactive.   Neck: Normal range of motion. Neck supple. No JVD present. No tracheal deviation present. No thyromegaly present.   Cardiovascular: Normal rate, regular rhythm, normal heart sounds and intact distal pulses.  Exam reveals no gallop and no friction rub.    No murmur heard.  Pulmonary/Chest: Effort normal and breath sounds normal. No respiratory distress. She has no wheezes. She has no rales.   Abdominal: Soft. Bowel sounds are normal. She exhibits no distension and no mass. There is no hepatosplenomegaly. There is no tenderness. There is no rebound and no guarding.   Musculoskeletal: Normal range of motion. She exhibits no edema or tenderness.   Lymphadenopathy:        Head (right side): No submandibular adenopathy present.        Head (left side): No submandibular adenopathy present.     She has no cervical adenopathy.        Right cervical: No superficial  cervical, no deep cervical and no posterior cervical adenopathy present.       Left cervical: No superficial cervical, no deep cervical and no posterior cervical adenopathy present.        Right: No inguinal and no supraclavicular adenopathy present.        Left: No inguinal and no supraclavicular adenopathy present.   Neurological: She is alert and oriented to person, place, and time. She has normal strength and normal reflexes. No cranial nerve deficit or sensory deficit. Coordination normal.   Skin: Skin is warm and dry. No bruising, no lesion, no petechiae and no rash noted. No erythema. No pallor.   Psychiatric: She has a normal mood and affect. Her behavior is normal. Judgment and thought content normal. Her mood appears not anxious. She does not exhibit a depressed mood.   Nursing note and vitals reviewed.    Labs- reviewed  Assessment:       1. Ductal carcinoma in situ (DCIS) of left breast        Plan:     1. Mammogram due in 9/17  RTC 6 months  Knows to call for any issues

## 2017-08-24 ENCOUNTER — TELEPHONE (OUTPATIENT)
Dept: INTERNAL MEDICINE | Facility: CLINIC | Age: 57
End: 2017-08-24

## 2017-08-24 NOTE — TELEPHONE ENCOUNTER
----- Message from Kelly Brown sent at 8/24/2017 11:30 AM CDT -----  Contact: Self/ [phone]   Type: Returning a call    Who left a message? Thawanda    When did the practice call? Today     Comments: pt stated she miss a call from the office. Please call and advise     Thank you

## 2017-09-06 ENCOUNTER — OFFICE VISIT (OUTPATIENT)
Dept: OBSTETRICS AND GYNECOLOGY | Facility: CLINIC | Age: 57
End: 2017-09-06
Attending: OBSTETRICS & GYNECOLOGY
Payer: COMMERCIAL

## 2017-09-06 VITALS
WEIGHT: 217.13 LBS | HEIGHT: 65 IN | BODY MASS INDEX: 36.18 KG/M2 | DIASTOLIC BLOOD PRESSURE: 78 MMHG | SYSTOLIC BLOOD PRESSURE: 120 MMHG

## 2017-09-06 DIAGNOSIS — C50.919 MALIGNANT NEOPLASM OF BREAST IN FEMALE, ESTROGEN RECEPTOR POSITIVE, UNSPECIFIED LATERALITY, UNSPECIFIED SITE OF BREAST: ICD-10-CM

## 2017-09-06 DIAGNOSIS — Z17.0 MALIGNANT NEOPLASM OF BREAST IN FEMALE, ESTROGEN RECEPTOR POSITIVE, UNSPECIFIED LATERALITY, UNSPECIFIED SITE OF BREAST: ICD-10-CM

## 2017-09-06 DIAGNOSIS — N94.10 DYSPAREUNIA, FEMALE: ICD-10-CM

## 2017-09-06 DIAGNOSIS — N95.2 VAGINAL ATROPHY: Primary | ICD-10-CM

## 2017-09-06 PROCEDURE — 3008F BODY MASS INDEX DOCD: CPT | Mod: S$GLB,,, | Performed by: OBSTETRICS & GYNECOLOGY

## 2017-09-06 PROCEDURE — 99213 OFFICE O/P EST LOW 20 MIN: CPT | Mod: S$GLB,,, | Performed by: OBSTETRICS & GYNECOLOGY

## 2017-09-06 PROCEDURE — 99999 PR PBB SHADOW E&M-EST. PATIENT-LVL III: CPT | Mod: PBBFAC,,, | Performed by: OBSTETRICS & GYNECOLOGY

## 2017-09-06 PROCEDURE — 3078F DIAST BP <80 MM HG: CPT | Mod: S$GLB,,, | Performed by: OBSTETRICS & GYNECOLOGY

## 2017-09-06 PROCEDURE — 3074F SYST BP LT 130 MM HG: CPT | Mod: S$GLB,,, | Performed by: OBSTETRICS & GYNECOLOGY

## 2017-09-06 NOTE — PROGRESS NOTES
"SUBJECTIVE:   57 y.o. female   Presents for follow up for survivorship. No LMP recorded (lmp unknown). Patient has had a hysterectomy..  She has DCIS - ER/CT positive. She reports vaginal tissue is better but still uncomfortable. She spoke with Dr. Julian and she is "ok with me using Premarin cream" . She reports that her headaches are better on Gabapentin. She did not take the Effexor- but reports her hot flashes are better. She reports that she has trouble falling asleep but this is not new. She reports that the moisturizer and lubricant is helping .        Past Medical History:   Diagnosis Date    Allergy     Breast cancer 2016    Left breast low grade DCIS, ER/CT positive    GERD (gastroesophageal reflux disease)     Hiatal hernia     Hyperlipidemia     Irritable bowel syndrome     Migraine headache     Squamous cell carcinoma excised 14    in situ, R forearm     Past Surgical History:   Procedure Laterality Date    BREAST BIOPSY Left 2016    DCIS    ECTOPIC PREGNANCY SURGERY Right     HYSTERECTOMY      ovaries left intact    removal of ovarian cyst Right oct 1992    ovary left intact    TUBAL LIGATION      tubal reversal  1991     Social History     Social History    Marital status:      Spouse name: Parrish    Number of children: 2    Years of education: N/A     Occupational History    Not on file.     Social History Main Topics    Smoking status: Never Smoker    Smokeless tobacco: Never Used    Alcohol use No    Drug use: No    Sexual activity: Yes     Partners: Male     Birth control/ protection: Surgical, None      Comment:  to Parrish      Other Topics Concern    Are You Pregnant Or Think You May Be? No    Breast-Feeding No     Social History Narrative    No narrative on file     Family History   Problem Relation Age of Onset    Migraines Sister     Migraines Brother     Seizures Brother     Migraines Maternal Grandmother     Seizures " Maternal Grandmother     Cancer Father 77     Lung cancer    Melanoma Neg Hx     Breast cancer Neg Hx     Colon cancer Neg Hx     Ovarian cancer Neg Hx      OB History    Para Term  AB Living   3 2 2   1 2   SAB TAB Ectopic Multiple Live Births       1   2      # Outcome Date GA Lbr Garrett/2nd Weight Sex Delivery Anes PTL Lv   3 Ectopic     U       2 Term     F Vag-Spont   NILTON   1 Term     F Vag-Spont   NILTON            Current Outpatient Prescriptions   Medication Sig Dispense Refill    albuterol 90 mcg/actuation inhaler Inhale 2 puffs into the lungs every 6 (six) hours as needed for Wheezing. Rescue 18 g 0    alprazolam (XANAX) 0.25 MG tablet Take 1 tablet (0.25 mg total) by mouth nightly as needed for Insomnia. 30 tablet 0    baclofen (LIORESAL) 10 MG tablet TAKE 1 TABLET(10 MG) BY MOUTH EVERY EVENING 90 tablet 11    butalbital-acetaminophen-caffeine -40 mg (FIORICET, ESGIC) -40 mg per tablet Take 1 tablet by mouth every 6 to 8 hours as needed. 60 tablet 0    [START ON 2017] conjugated estrogens (PREMARIN) vaginal cream Place 1 g vaginally twice a week. 45 g 12    fluticasone (FLONASE) 50 mcg/actuation nasal spray       fluticasone (FLOVENT HFA) 110 mcg/actuation inhaler Inhale 1 puff into the lungs 2 (two) times daily. 12 g 0    gabapentin (NEURONTIN) 100 MG capsule Take 800 mg by mouth 3 (three) times daily.   3    gabapentin (NEURONTIN) 300 MG capsule Take 800 mg by mouth 3 (three) times daily. Takes 300 mg caps and 100 mg caps to make total single dose of 800 mg; takes 800 mg TID  2    lansoprazole (PREVACID) 30 MG capsule Take 30 mg by mouth every morning. 1 Capsule, Delayed Release(E.C.) Oral Every day      meloxicam (MOBIC) 7.5 MG tablet TAKE 1 TABLET(7.5 MG) BY MOUTH EVERY DAY 90 tablet 5    multivitamin (MULTIVITAMIN) per tablet Take 1 tablet by mouth once daily.        ondansetron (ZOFRAN) 4 MG tablet Take 1 tablet (4 mg total) by mouth every 8 (eight) hours as  needed for Nausea. 1 Tablet Oral Every 6 hours 12 tablet 12    sumatriptan (IMITREX) 20 mg/actuation nasal spray USE 1 SPRAY IN EACH NOSTRIL AT ONSET OF HEADACHE. MAY REPEAT ONCE IN 2 HOURS IF NO RELIEF. NO MORE THAN 2 SPRAYS PER 24 HOURS 6 each 12    venlafaxine (EFFEXOR XR) 37.5 MG 24 hr capsule Take 1 capsule (37.5 mg total) by mouth once daily. 30 capsule 9    vitamin D 1000 units Tab Take 185 mg by mouth every morning.        No current facility-administered medications for this visit.      Allergies: Pravastatin; Amitriptyline; Codeine; Doxycycline; Erythromycin; Iodine; Macrobid  [nitrofurantoin monohyd/m-cryst]; and Verapamil     ROS:  Constitutional: no weight loss, weight gain, fever, fatigue  Eyes:  No vision changes, glasses/contacts  ENT/Mouth: No ulcers, sinus problems, ears ringing, headache  Cardiovascular: No inability to lie flat, chest pain, exercise intolerance, swelling, heart palpitations  Respiratory: No wheezing, coughing blood, shortness of breath, or cough  Gastrointestinal: No diarrhea, bloody stool, nausea/vomiting, constipation, gas, hemorrhoids  Genitourinary: No blood in urine, painful urination, urgency of urination, frequency of urination, incomplete emptying, incontinence, abnormal bleeding, painful periods, heavy periods, vaginal discharge, vaginal odor, +painful intercourse, sexual problems, bleeding after intercourse, +vaginal dryess  Musculoskeletal: No muscle weakness  Skin/Breast: No painful breasts, nipple discharge, masses, rash, ulcers  Neurological: No passing out, seizures, numbness, headache  Endocrine: No diabetes, hypothyroid, hyperthyroid, hot flashes, hair loss, abnormal hair growth, acne  Psychiatric: No depression, crying  Hematologic: No bruises, bleeding, swollen lymph nodes, anemia.      Physical Exam  General- well developed, well nourished  Vulva- no masses, no lesions  Vagina-  no masses, no lesions, + atrophy- improved  Cervix- absent surgically  Uterus-  absent surgically  Adnexa- nontender, no masses      ASSESSMENT:   Vaginal atrophy  Dyspareunia  Breast cancer  PLAN:   Counseled patient on risks/benefits of Premarin vaginal cream- cleared by Dr. Julian  Recommend that she continue DHEA- will change dose to 6.5mg 2-3 times per week  All of her questions were answered.

## 2017-09-07 ENCOUNTER — TELEPHONE (OUTPATIENT)
Dept: OBSTETRICS AND GYNECOLOGY | Facility: CLINIC | Age: 57
End: 2017-09-07

## 2017-09-08 ENCOUNTER — TELEPHONE (OUTPATIENT)
Dept: OBSTETRICS AND GYNECOLOGY | Facility: CLINIC | Age: 57
End: 2017-09-08

## 2017-09-08 NOTE — TELEPHONE ENCOUNTER
Christal Taty returned my call from yesterday to schedule her survivorship follow up appointment.  Pt agreeable to 10/9/17 at 9am.  Informed pt that Dr. Mcintyre will be in her new location for this appointment in the [facility] [address].  Pt verbalized understanding.

## 2017-09-23 RX ORDER — ALBUTEROL SULFATE 90 UG/1
AEROSOL, METERED RESPIRATORY (INHALATION)
Qty: 18 G | Refills: 0 | Status: SHIPPED | OUTPATIENT
Start: 2017-09-23 | End: 2022-10-28 | Stop reason: SDUPTHER

## 2017-10-04 ENCOUNTER — TELEPHONE (OUTPATIENT)
Dept: RHEUMATOLOGY | Facility: CLINIC | Age: 57
End: 2017-10-04

## 2017-10-13 ENCOUNTER — LAB VISIT (OUTPATIENT)
Dept: LAB | Facility: HOSPITAL | Age: 57
End: 2017-10-13
Attending: INTERNAL MEDICINE
Payer: COMMERCIAL

## 2017-10-13 ENCOUNTER — OFFICE VISIT (OUTPATIENT)
Dept: INTERNAL MEDICINE | Facility: CLINIC | Age: 57
End: 2017-10-13
Payer: COMMERCIAL

## 2017-10-13 ENCOUNTER — IMMUNIZATION (OUTPATIENT)
Dept: INTERNAL MEDICINE | Facility: CLINIC | Age: 57
End: 2017-10-13
Payer: COMMERCIAL

## 2017-10-13 VITALS
DIASTOLIC BLOOD PRESSURE: 82 MMHG | WEIGHT: 214.06 LBS | SYSTOLIC BLOOD PRESSURE: 130 MMHG | BODY MASS INDEX: 34.4 KG/M2 | HEIGHT: 66 IN | HEART RATE: 80 BPM

## 2017-10-13 DIAGNOSIS — Z12.11 COLON CANCER SCREENING: ICD-10-CM

## 2017-10-13 DIAGNOSIS — G43.109 MIGRAINE WITH AURA AND WITHOUT STATUS MIGRAINOSUS, NOT INTRACTABLE: ICD-10-CM

## 2017-10-13 DIAGNOSIS — Z00.00 ANNUAL PHYSICAL EXAM: Primary | ICD-10-CM

## 2017-10-13 DIAGNOSIS — D05.12 DUCTAL CARCINOMA IN SITU (DCIS) OF LEFT BREAST: Chronic | ICD-10-CM

## 2017-10-13 DIAGNOSIS — R76.8 RHEUMATOID FACTOR POSITIVE: ICD-10-CM

## 2017-10-13 DIAGNOSIS — Z00.00 ANNUAL PHYSICAL EXAM: ICD-10-CM

## 2017-10-13 LAB
ALBUMIN SERPL BCP-MCNC: 3.6 G/DL
ALP SERPL-CCNC: 113 U/L
ALT SERPL W/O P-5'-P-CCNC: 26 U/L
ANION GAP SERPL CALC-SCNC: 9 MMOL/L
AST SERPL-CCNC: 20 U/L
BASOPHILS # BLD AUTO: 0.03 K/UL
BASOPHILS NFR BLD: 0.3 %
BILIRUB SERPL-MCNC: 0.6 MG/DL
BILIRUB UR QL STRIP: NEGATIVE
BUN SERPL-MCNC: 9 MG/DL
CALCIUM SERPL-MCNC: 9.2 MG/DL
CHLORIDE SERPL-SCNC: 104 MMOL/L
CHOLEST SERPL-MCNC: 222 MG/DL
CHOLEST/HDLC SERPL: 3.5 {RATIO}
CLARITY UR REFRACT.AUTO: ABNORMAL
CO2 SERPL-SCNC: 30 MMOL/L
COLOR UR AUTO: YELLOW
CREAT SERPL-MCNC: 0.9 MG/DL
DIFFERENTIAL METHOD: ABNORMAL
EOSINOPHIL # BLD AUTO: 0.2 K/UL
EOSINOPHIL NFR BLD: 1.8 %
ERYTHROCYTE [DISTWIDTH] IN BLOOD BY AUTOMATED COUNT: 13.8 %
EST. GFR  (AFRICAN AMERICAN): >60 ML/MIN/1.73 M^2
EST. GFR  (NON AFRICAN AMERICAN): >60 ML/MIN/1.73 M^2
GLUCOSE SERPL-MCNC: 101 MG/DL
GLUCOSE UR QL STRIP: NEGATIVE
HCT VFR BLD AUTO: 41.6 %
HDLC SERPL-MCNC: 63 MG/DL
HDLC SERPL: 28.4 %
HGB BLD-MCNC: 13.8 G/DL
HGB UR QL STRIP: NEGATIVE
KETONES UR QL STRIP: NEGATIVE
LDLC SERPL CALC-MCNC: 133.6 MG/DL
LEUKOCYTE ESTERASE UR QL STRIP: NEGATIVE
LYMPHOCYTES # BLD AUTO: 1.7 K/UL
LYMPHOCYTES NFR BLD: 19 %
MCH RBC QN AUTO: 29.1 PG
MCHC RBC AUTO-ENTMCNC: 33.2 G/DL
MCV RBC AUTO: 88 FL
MONOCYTES # BLD AUTO: 0.4 K/UL
MONOCYTES NFR BLD: 4.5 %
NEUTROPHILS # BLD AUTO: 6.6 K/UL
NEUTROPHILS NFR BLD: 74.2 %
NITRITE UR QL STRIP: NEGATIVE
NONHDLC SERPL-MCNC: 159 MG/DL
PH UR STRIP: 6 [PH] (ref 5–8)
PLATELET # BLD AUTO: 324 K/UL
PMV BLD AUTO: 11.2 FL
POTASSIUM SERPL-SCNC: 3.8 MMOL/L
PROT SERPL-MCNC: 7.6 G/DL
PROT UR QL STRIP: NEGATIVE
RBC # BLD AUTO: 4.75 M/UL
SODIUM SERPL-SCNC: 143 MMOL/L
SP GR UR STRIP: 1.02 (ref 1–1.03)
TRIGL SERPL-MCNC: 127 MG/DL
URN SPEC COLLECT METH UR: ABNORMAL
UROBILINOGEN UR STRIP-ACNC: NEGATIVE EU/DL
WBC # BLD AUTO: 8.89 K/UL

## 2017-10-13 PROCEDURE — 85025 COMPLETE CBC W/AUTO DIFF WBC: CPT

## 2017-10-13 PROCEDURE — 36415 COLL VENOUS BLD VENIPUNCTURE: CPT

## 2017-10-13 PROCEDURE — 99999 PR PBB SHADOW E&M-EST. PATIENT-LVL III: CPT | Mod: PBBFAC,,, | Performed by: INTERNAL MEDICINE

## 2017-10-13 PROCEDURE — 90471 IMMUNIZATION ADMIN: CPT | Mod: S$GLB,,, | Performed by: INTERNAL MEDICINE

## 2017-10-13 PROCEDURE — 80053 COMPREHEN METABOLIC PANEL: CPT

## 2017-10-13 PROCEDURE — 90686 IIV4 VACC NO PRSV 0.5 ML IM: CPT | Mod: S$GLB,,, | Performed by: INTERNAL MEDICINE

## 2017-10-13 PROCEDURE — 81003 URINALYSIS AUTO W/O SCOPE: CPT

## 2017-10-13 PROCEDURE — 80061 LIPID PANEL: CPT

## 2017-10-13 PROCEDURE — 99396 PREV VISIT EST AGE 40-64: CPT | Mod: 25,S$GLB,, | Performed by: INTERNAL MEDICINE

## 2017-10-13 NOTE — PROGRESS NOTES
CHIEF COMPLAINT:  Physical exam.    HPI:  The patient is a 57-year-old female who is currently being treated for   left breast cancer, hyperlipidemia and migraine headaches who comes in today for   annual physical exam.  The patient does see Dr. Ty Dorsey at [facility] for her headaches.  She is doing much better since   she started gabapentin.  The patient sees both the [facility] as well   as Dr. Julian for her breast cancer.  She recently had her well woman exam with   Dr. Mcintyre.  She was started on Effexor for symptoms of hot flashes, but   decided not to take the medication.    REVIEW OF SYSTEMS:  Please see the patient entered review of systems.  The   patient does report about a 2-pound weight gain since she started Weight   [employer] one week ago.  She does have an exercise bicycle, which does not affect   her joints too badly.  She is seeing Rheumatology for rheumatoid factor   positive blood test.  She does have generalized joint pain involving the hands,   knees, and feet.  She states that when she walks, it feels like she is walking   on marbles.  The patient did have a colonoscopy in 2012 with Dr. Bxater, it was   negative.  She did have a mammogram done in March of this year.    PHYSICAL EXAMINATION:  GENERAL APPEARANCE:  No acute distress.  HEENT:  Conjunctivae clear.  Pupils are equal and reactive.  TMs are clear.    Nasal septum is midline without discharge.  Oropharynx was without erythema.  NECK:  Trachea is midline without JVD, without thyromegaly.  PULMONARY:  Good inspiratory and expiratory breath sounds are heard.  Lungs are   clear to auscultation.  CARDIOVASCULAR:  S1, S2.  2+ carotid pulses without bruits.  EXTREMITIES:  With trace edema.  ABDOMEN:  Nontender, nondistended, without hepatosplenomegaly.  The patient does have a history of a skin cancer removed from her right forearm.    She does report a lesion that developed at the same spot.  She is  going to   follow up with Dr. Marrufo in December.    ASSESSMENT:  1.  Physical exam.  2.  Migraine headaches, currently under better control.  3.  Generalized joint pain in a patient who is rheumatoid factor positive.  4.  History of ductal carcinoma in situ involving the left breast.    PLAN:  We will put the patient in for CBC, CMP, lipid panel, UA, and stools x3.    The patient is also to get a flu shot today.      JDS/HN  dd: 10/13/2017 08:21:22 (CDT)  td: 10/13/2017 21:04:49 (CDT)  Doc ID   #[id number]  Job ID #[id number]    CC:     Answers for HPI/ROS submitted by the patient on 10/9/2017   activity change: No  unexpected weight change: No  neck pain: No  hearing loss: No  rhinorrhea: No  trouble swallowing: No  eye discharge: No  visual disturbance: No  chest tightness: No  wheezing: No  chest pain: No  palpitations: No  blood in stool: No  constipation: No  vomiting: No  diarrhea: No  polydipsia: No  polyuria: No  difficulty urinating: No  hematuria: No  menstrual problem: No  dysuria: No  joint swelling: No  arthralgias: Yes  headaches: No  weakness: No  confusion: No  dysphoric mood: No

## 2017-10-14 ENCOUNTER — PATIENT MESSAGE (OUTPATIENT)
Dept: INTERNAL MEDICINE | Facility: CLINIC | Age: 57
End: 2017-10-14

## 2017-10-14 DIAGNOSIS — E78.00 ELEVATED CHOLESTEROL: Primary | ICD-10-CM

## 2017-10-16 ENCOUNTER — OFFICE VISIT (OUTPATIENT)
Dept: OBSTETRICS AND GYNECOLOGY | Facility: CLINIC | Age: 57
End: 2017-10-16
Attending: OBSTETRICS & GYNECOLOGY
Payer: COMMERCIAL

## 2017-10-16 VITALS — HEIGHT: 66 IN | BODY MASS INDEX: 34.47 KG/M2 | WEIGHT: 214.5 LBS

## 2017-10-16 DIAGNOSIS — N94.10 DYSPAREUNIA IN FEMALE: ICD-10-CM

## 2017-10-16 DIAGNOSIS — N95.2 VAGINAL ATROPHY: ICD-10-CM

## 2017-10-16 DIAGNOSIS — C50.919 MALIGNANT NEOPLASM OF BREAST IN FEMALE, ESTROGEN RECEPTOR POSITIVE, UNSPECIFIED LATERALITY, UNSPECIFIED SITE OF BREAST: Primary | ICD-10-CM

## 2017-10-16 DIAGNOSIS — Z17.0 MALIGNANT NEOPLASM OF BREAST IN FEMALE, ESTROGEN RECEPTOR POSITIVE, UNSPECIFIED LATERALITY, UNSPECIFIED SITE OF BREAST: Primary | ICD-10-CM

## 2017-10-16 PROCEDURE — 99213 OFFICE O/P EST LOW 20 MIN: CPT | Mod: S$GLB,,, | Performed by: OBSTETRICS & GYNECOLOGY

## 2017-10-16 RX ORDER — SUPPOSITORY MOLD
EACH MISCELLANEOUS
COMMUNITY
Start: 2017-09-06 | End: 2018-04-16

## 2017-10-22 NOTE — PROGRESS NOTES
SUBJECTIVE:   57 y.o. female [id number] with ER/LA positive DCIS presents today  for follow up for dyspareunia and vaginal atrophy. . No LMP recorded (lmp unknown). Patient has had a hysterectomy..  She reports vaginal dryness and pain are improved on current regimen- PRemarin vaginal cream and DHEA vaginal suppositories. She also is using lubricant.and moisturizer         Past Medical History:   Diagnosis Date    Allergy     Breast cancer 2016    Left breast low grade DCIS, ER/LA positive    GERD (gastroesophageal reflux disease)     Hiatal hernia     Hyperlipidemia     Irritable bowel syndrome     Migraine headache     Squamous cell carcinoma excised 14    in situ, R forearm     Past Surgical History:   Procedure Laterality Date    BREAST BIOPSY Left 2016    DCIS    ECTOPIC PREGNANCY SURGERY Right     HYSTERECTOMY  1998    ovaries left intact    removal of ovarian cyst Right oct 1992    ovary left intact    TUBAL LIGATION      tubal reversal  1991     Social History     Social History    Marital status:      Spouse name: Parrish    Number of children: 2    Years of education: N/A     Occupational History    Not on file.     Social History Main Topics    Smoking status: Never Smoker    Smokeless tobacco: Never Used    Alcohol use No    Drug use: No    Sexual activity: Yes     Partners: Male     Birth control/ protection: Surgical, None      Comment:  to Parrish      Other Topics Concern    Are You Pregnant Or Think You May Be? No    Breast-Feeding No     Social History Narrative    No narrative on file     Family History   Problem Relation Age of Onset    Migraines Sister     Migraines Brother     Seizures Brother     Migraines Maternal Grandmother     Seizures Maternal Grandmother     Cancer Father 77     Lung cancer    Melanoma Neg Hx     Breast cancer Neg Hx     Colon cancer Neg Hx     Ovarian cancer Neg Hx      OB History    Para Term   AB Living   3 2 2   1 2   SAB TAB Ectopic Multiple Live Births       1   2      # Outcome Date GA Lbr Garrett/2nd Weight Sex Delivery Anes PTL Lv   3 Ectopic     U       2 Term     F Vag-Spont   NILTON   1 Term     F Vag-Spont   NILTON            Current Outpatient Prescriptions   Medication Sig Dispense Refill    alprazolam (XANAX) 0.25 MG tablet Take 1 tablet (0.25 mg total) by mouth nightly as needed for Insomnia. 30 tablet 0    baclofen (LIORESAL) 10 MG tablet TAKE 1 TABLET(10 MG) BY MOUTH EVERY EVENING 90 tablet 11    butalbital-acetaminophen-caffeine -40 mg (FIORICET, ESGIC) -40 mg per tablet Take 1 tablet by mouth every 6 to 8 hours as needed. 60 tablet 0    conjugated estrogens (PREMARIN) vaginal cream Place 1 g vaginally twice a week. 45 g 12    fluticasone (FLONASE) 50 mcg/actuation nasal spray       fluticasone (FLOVENT HFA) 110 mcg/actuation inhaler Inhale 1 puff into the lungs 2 (two) times daily. 12 g 0    gabapentin (NEURONTIN) 100 MG capsule Take 800 mg by mouth 3 (three) times daily.   3    gabapentin (NEURONTIN) 300 MG capsule Take 800 mg by mouth 3 (three) times daily. Takes 300 mg caps and 100 mg caps to make total single dose of 800 mg; takes 800 mg TID  2    lansoprazole (PREVACID) 30 MG capsule Take 30 mg by mouth every morning. 1 Capsule, Delayed Release(E.C.) Oral Every day      meloxicam (MOBIC) 7.5 MG tablet TAKE 1 TABLET(7.5 MG) BY MOUTH EVERY DAY 90 tablet 5    multivitamin (MULTIVITAMIN) per tablet Take 1 tablet by mouth once daily.        ondansetron (ZOFRAN) 4 MG tablet Take 1 tablet (4 mg total) by mouth every 8 (eight) hours as needed for Nausea. 1 Tablet Oral Every 6 hours 12 tablet 12    PRASTERONE, DHEA, VAGL Place 6.5 mg vaginally 3 (three) times a week.  6    sumatriptan (IMITREX) 20 mg/actuation nasal spray USE 1 SPRAY IN EACH NOSTRIL AT ONSET OF HEADACHE. MAY REPEAT ONCE IN 2 HOURS IF NO RELIEF. NO MORE THAN 2 SPRAYS PER 24 HOURS 6 each 12    SUPPOSISTRIP  Janeen       venlafaxine (EFFEXOR XR) 37.5 MG 24 hr capsule Take 1 capsule (37.5 mg total) by mouth once daily. 30 capsule 9    VENTOLIN HFA 90 mcg/actuation inhaler INHALE 2 PUFFS INTO THE LUNGS EVERY 6 HOURS AS NEEDED FOR WHEEZING. RESCUE 18 g 0    vitamin D 1000 units Tab Take 185 mg by mouth every morning.        No current facility-administered medications for this visit.      Allergies: Pravastatin; Amitriptyline; Codeine; Doxycycline; Erythromycin; Iodine; Macrobid  [nitrofurantoin monohyd/m-cryst]; and Verapamil     ROS:  Constitutional: no weight loss, weight gain, fever, fatigue  Eyes:  No vision changes, glasses/contacts  ENT/Mouth: No ulcers, sinus problems, ears ringing, headache  Cardiovascular: No inability to lie flat, chest pain, exercise intolerance, swelling, heart palpitations  Respiratory: No wheezing, coughing blood, shortness of breath, or cough  Gastrointestinal: No diarrhea, bloody stool, nausea/vomiting, constipation, gas, hemorrhoids  Genitourinary: No blood in urine, painful urination, urgency of urination, frequency of urination, incomplete emptying, incontinence, abnormal bleeding, painful periods, heavy periods, vaginal discharge, vaginal odor,+ painful intercourse- improved, sexual problems, bleeding after intercourse.  Musculoskeletal: No muscle weakness  Skin/Breast: No painful breasts, nipple discharge, masses, rash, ulcers  Neurological: No passing out, seizures, numbness, headache  Endocrine: No diabetes, hypothyroid, hyperthyroid, hot flashes, hair loss, abnormal hair growth, acne  Psychiatric: No depression, crying  Hematologic: No bruises, bleeding, swollen lymph nodes, anemia.      Physical Exam  deferred    ASSESSMENT:   Breast cancer  Dyspareunia  Vaginal atrophy    PLAN:   Continue current regimen  Follow up in 6-12 months

## 2017-10-25 ENCOUNTER — LAB VISIT (OUTPATIENT)
Dept: LAB | Facility: HOSPITAL | Age: 57
End: 2017-10-25
Attending: INTERNAL MEDICINE
Payer: COMMERCIAL

## 2017-10-25 DIAGNOSIS — Z12.11 COLON CANCER SCREENING: ICD-10-CM

## 2017-10-25 LAB — HEMOCCULT STL QL IA: NEGATIVE

## 2017-10-25 PROCEDURE — 82274 ASSAY TEST FOR BLOOD FECAL: CPT

## 2017-11-27 ENCOUNTER — OFFICE VISIT (OUTPATIENT)
Dept: SURGERY | Facility: CLINIC | Age: 57
End: 2017-11-27
Payer: COMMERCIAL

## 2017-11-27 VITALS
TEMPERATURE: 98 F | DIASTOLIC BLOOD PRESSURE: 86 MMHG | SYSTOLIC BLOOD PRESSURE: 133 MMHG | WEIGHT: 209.63 LBS | HEART RATE: 80 BPM | BODY MASS INDEX: 33.69 KG/M2 | HEIGHT: 66 IN

## 2017-11-27 DIAGNOSIS — Z85.3 PERSONAL HISTORY OF BREAST CANCER: Primary | ICD-10-CM

## 2017-11-27 PROCEDURE — 99212 OFFICE O/P EST SF 10 MIN: CPT | Mod: S$GLB,,, | Performed by: NURSE PRACTITIONER

## 2017-11-27 PROCEDURE — 99999 PR PBB SHADOW E&M-EST. PATIENT-LVL IV: CPT | Mod: PBBFAC,,, | Performed by: NURSE PRACTITIONER

## 2017-11-27 NOTE — PROGRESS NOTES
Subjective:      Patient ID: Christal Posey is a 57 y.o. female.    Chief Complaint: Follow-up (6 mth Follow up CBE)      HPI: (PF, EPF - 1-3) (Detailed, Comp, - 4)patient presents today for breast cancer surveillance. Patient denies palpable breast mass, pain, nipple discharge, redness, increased warmth, unexplained weight loss, new onset bone pain.     Last mmg 5- with no abnormality      5- core biopsy left breast with low grade DCIS, ER/[place] +  6- left lumpectomy with 1mm area of DCIS, negative margins. She met with Dr Guzmán who discussed XRT, small low grade DCIS, was in favor of hormonal therapy instead of XRT. Met with Dr Julian with no endocrine therapy. No adjuvant therapy        Review of Systems   Constitutional: Negative for appetite change, fatigue and fever.   Respiratory: Negative for cough and shortness of breath.    Cardiovascular: Negative for chest pain.   Musculoskeletal: Negative for back pain.     Objective:   Physical Exam   Pulmonary/Chest: She exhibits no mass, no tenderness, no laceration, no edema, no swelling and no retraction. Right breast exhibits no inverted nipple, no mass, no nipple discharge, no skin change and no tenderness. Left breast exhibits no inverted nipple, no mass, no nipple discharge, no skin change and no tenderness. Breasts are symmetrical. There is no breast swelling.   Lymphadenopathy:     She has no cervical adenopathy.     She has no axillary adenopathy.        Right: No supraclavicular adenopathy present.        Left: No supraclavicular adenopathy present.     Assessment:       1. Personal history of breast cancer        Plan:       Clinically [facility]  Return in 6 months CBE and diag mmg   Call for any interval palpable breast mass, pain, nipple discharge, skin changes or other breast related concerns

## 2017-12-07 ENCOUNTER — OFFICE VISIT (OUTPATIENT)
Dept: RHEUMATOLOGY | Facility: CLINIC | Age: 57
End: 2017-12-07
Payer: COMMERCIAL

## 2017-12-07 ENCOUNTER — HOSPITAL ENCOUNTER (OUTPATIENT)
Dept: RADIOLOGY | Facility: HOSPITAL | Age: 57
Discharge: HOME | End: 2017-12-07
Attending: INTERNAL MEDICINE
Payer: COMMERCIAL

## 2017-12-07 VITALS
BODY MASS INDEX: 34.36 KG/M2 | SYSTOLIC BLOOD PRESSURE: 149 MMHG | WEIGHT: 213.81 LBS | HEART RATE: 83 BPM | DIASTOLIC BLOOD PRESSURE: 86 MMHG | HEIGHT: 66 IN

## 2017-12-07 DIAGNOSIS — G43.109 MIGRAINE WITH AURA AND WITHOUT STATUS MIGRAINOSUS, NOT INTRACTABLE: ICD-10-CM

## 2017-12-07 DIAGNOSIS — M25.542 ARTHRALGIA OF BOTH HANDS: ICD-10-CM

## 2017-12-07 DIAGNOSIS — M25.541 ARTHRALGIA OF BOTH HANDS: ICD-10-CM

## 2017-12-07 DIAGNOSIS — E66.9 OBESITY (BMI 30-39.9): ICD-10-CM

## 2017-12-07 DIAGNOSIS — R76.8 RHEUMATOID FACTOR POSITIVE: ICD-10-CM

## 2017-12-07 PROCEDURE — 77077 JOINT SURVEY SINGLE VIEW: CPT | Mod: TC

## 2017-12-07 PROCEDURE — 99999 PR PBB SHADOW E&M-EST. PATIENT-LVL IV: CPT | Mod: PBBFAC,,, | Performed by: INTERNAL MEDICINE

## 2017-12-07 PROCEDURE — 99214 OFFICE O/P EST MOD 30 MIN: CPT | Mod: S$GLB,,, | Performed by: INTERNAL MEDICINE

## 2017-12-07 PROCEDURE — 77077 JOINT SURVEY SINGLE VIEW: CPT | Mod: 26,,, | Performed by: RADIOLOGY

## 2017-12-07 RX ORDER — LEVOCETIRIZINE DIHYDROCHLORIDE 5 MG/1
TABLET, FILM COATED ORAL
Refills: 5 | COMMUNITY
Start: 2017-10-18 | End: 2019-03-15

## 2017-12-07 RX ORDER — MELOXICAM 7.5 MG/1
15 TABLET ORAL DAILY
Qty: 60 TABLET | Refills: 5 | Status: SHIPPED | OUTPATIENT
Start: 2017-12-07 | End: 2017-12-07 | Stop reason: SDUPTHER

## 2017-12-07 RX ORDER — BACLOFEN 10 MG/1
TABLET ORAL
Qty: 90 TABLET | Refills: 11 | Status: SHIPPED | OUTPATIENT
Start: 2017-12-07 | End: 2018-04-09 | Stop reason: SDUPTHER

## 2017-12-07 RX ORDER — BACLOFEN 10 MG/1
10 TABLET ORAL NIGHTLY
Qty: 30 TABLET | Refills: 11 | Status: SHIPPED | OUTPATIENT
Start: 2017-12-07 | End: 2019-01-03

## 2017-12-07 RX ORDER — SULFAMETHOXAZOLE AND TRIMETHOPRIM 800; 160 MG/1; MG/1
TABLET ORAL
COMMUNITY
Start: 2017-10-31 | End: 2018-02-17

## 2017-12-07 ASSESSMENT — ROUTINE ASSESSMENT OF PATIENT INDEX DATA (RAPID3)
PATIENT GLOBAL ASSESSMENT SCORE: 4
WHEN YOU AWAKENED IN THE MORNING OVER THE LAST WEEK, PLEASE INDICATE THE AMOUNT OF TIME IT TAKES UNTIL YOU ARE AS LIMBER AS YOU WILL BE FOR THE DAY: 1 HR
MDHAQ FUNCTION SCORE: .3
AM STIFFNESS SCORE: 1, YES
FATIGUE SCORE: 4
PAIN SCORE: 4
PSYCHOLOGICAL DISTRESS SCORE: 1.1
TOTAL RAPID3 SCORE: 3

## 2017-12-07 NOTE — PROGRESS NOTES
" Patient ID: Christal Posey is a 57 y.o. female.     Chief Complaint: Follow-up     HPI::   Initial history: 57 yo F with PMH Migraines, IBS, GERD, breast CA s/p lumpectomy 6/2016 seen in consult for joint pain and swelling with positive RF. She states that her hand pain began about a years ago. It is mostly in the PIPs and she does sometimes report some swelling. She feels stiff for about 1 hour in the AM. She also has pain in the joints in her feet for about the last year as well. She has some knee pain and "cracking" without swelling that is more chronic. She does not sleep well and is fatigued. She has had a dry cough for about a year as well, but no other extra-articular manifestations. She was previously on Premarin that was stopped when the breast CA was found and thinks her symptoms are worse since stopping it. She uses Aleve which helps her pain as does ibuprofen though not quite as much, tylenol does not help. She has never smoked. Her paternal uncle has "arthritis." She took prednisone once is the past for sciatica and it made her "sick" where she felt very hot and just "not good."         Interval history:She has pain in left plantar fascia for several months. SHe has pain in knees and hands. She pain in elbows and neck.  She also has pain in feet.  She has difficulty making a fist for last couple of weeks. Reports morning stiffness all day.   She was on levaquin for 14 days. She stopped taking baclofen. She is not sleeping well.               Review of Systems   Constitutional: Negative for activity change, chills, fatigue and fever.   HENT: Negative for mouth sores and trouble swallowing.    Eyes: Negative for pain and redness.   Respiratory: Negative for cough, chest tightness and shortness of breath.    Cardiovascular: Negative for chest pain.   Gastrointestinal: Negative for abdominal pain, diarrhea and nausea.   Genitourinary: Negative for dysuria.   Musculoskeletal: Positive for arthralgias and " neck stiffness. Negative for joint swelling, myalgias and neck pain.   Skin: Negative for color change and rash.   Neurological: Negative for weakness and numbness.   Hematological: Negative for adenopathy.   Psychiatric/Behavioral: Negative for sleep disturbance.          Objective:           Physical Exam   Constitutional: She is oriented to person, place, and time and well-developed, well-nourished, and in no distress. No distress.   HENT:   Head: Normocephalic and atraumatic.   Mouth/Throat: No oropharyngeal exudate.   Eyes: Conjunctivae are normal. No scleral icterus.   Neck: Normal range of motion. Neck supple. No thyromegaly present.   Cardiovascular: Normal rate, regular rhythm and normal heart sounds.    Pulmonary/Chest: Effort normal and breath sounds normal.   Abdominal: Soft. There is no tenderness.         Right Side Rheumatological Exam     Examination finds the shoulder, elbow, wrist, knee, 1st PIP, 1st MCP, 2nd PIP, 2nd MCP, 3rd PIP, 3rd MCP, 4th PIP, 4th MCP, 5th PIP and 5th MCP normal.     Left Side Rheumatological Exam     Examination finds the shoulder, elbow, wrist, knee, 1st PIP, 1st MCP, 2nd PIP, 2nd MCP, 3rd PIP, 3rd MCP, 4th PIP, 4th MCP, 5th PIP and 5th MCP normal.       Lymphadenopathy:     She has no cervical adenopathy.   Neurological: She is alert and oriented to person, place, and time.   Skin: Skin is warm and dry. No rash noted.   Musculoskeletal: Normal range of motion. She exhibits no edema or tenderness.        labs:  Reviewed;  Tammy-negative  Ccp-negative  RF-49    Assessment:    57 old female with PMH of hyperlipidemia,   Migraines, left breast cancer status post lumpectomy (june 2016) here for evaluation of joint pain and +RF.  On exam, I do not detect synovitis. She complains of pain in left foot consistent with plantar fasciitis so will put her back on NSAID and also gave her exercises.  We will try putting her on mobic and start baclofen at bedtime to help her with diffuse  myalgias from insomnia.   Plan:       Start baclofen 10mg qhs prn for neck stiffness  Start mobic 15mg a day   Encouraged weight loss  Labs  xrays  rtc in 4 months

## 2017-12-08 RX ORDER — MELOXICAM 7.5 MG/1
TABLET ORAL
Qty: 180 TABLET | Refills: 5 | Status: SHIPPED | OUTPATIENT
Start: 2017-12-08 | End: 2018-11-16 | Stop reason: ALTCHOICE

## 2017-12-11 ENCOUNTER — OFFICE VISIT (OUTPATIENT)
Dept: DERMATOLOGY | Facility: CLINIC | Age: 57
End: 2017-12-11
Payer: COMMERCIAL

## 2017-12-11 DIAGNOSIS — L82.1 SK (SEBORRHEIC KERATOSIS): Primary | ICD-10-CM

## 2017-12-11 DIAGNOSIS — L72.0 MILIA: ICD-10-CM

## 2017-12-11 DIAGNOSIS — Z85.828 HISTORY OF NONMELANOMA SKIN CANCER: ICD-10-CM

## 2017-12-11 DIAGNOSIS — L91.8 ST (SKIN TAG): ICD-10-CM

## 2017-12-11 DIAGNOSIS — L81.4 LENTIGINES: ICD-10-CM

## 2017-12-11 DIAGNOSIS — Z12.83 SKIN CANCER SCREENING: ICD-10-CM

## 2017-12-11 PROCEDURE — 99999 PR PBB SHADOW E&M-EST. PATIENT-LVL II: CPT | Mod: PBBFAC,,, | Performed by: DERMATOLOGY

## 2017-12-11 PROCEDURE — 99213 OFFICE O/P EST LOW 20 MIN: CPT | Mod: S$GLB,,, | Performed by: DERMATOLOGY

## 2017-12-11 NOTE — PROGRESS NOTES
Subjective:       Patient ID:  Christal Posey is a 57 y.o. female who presents for   Chief Complaint   Patient presents with    Lesion     Right Forearm     Lesion  - Initial  Affected locations: right arm  Duration: 2 months  Signs / symptoms: itching and redness  Severity: moderate  Timing: constant  Aggravated by: nothing  Relieving factors/Treatments tried: OTC hydrocortisone  Improvement on treatment: no relief      Pt has a hx of SCC in situ excised from R forearm in 11/2014.    Interested in upper body skin check today.      Review of Systems   Constitutional: Positive for fever, chills and fatigue. Negative for weight loss, weight gain, night sweats and malaise.   Skin: Negative for daily sunscreen use and abscesses.   Hematologic/Lymphatic: Bruises/bleeds easily.        Objective:    Physical Exam   Constitutional: She appears well-developed and well-nourished. No distress.   Neurological: She is alert and oriented to person, place, and time. She is not disoriented.   Psychiatric: She has a normal mood and affect.   Skin:   Areas Examined (abnormalities noted in diagram):   Head / Face Inspection Performed  Neck Inspection Performed  Chest / Axilla Inspection Performed  Abdomen Inspection Performed  Back Inspection Performed  RUE Inspected  LUE Inspection Performed  RLE Inspected  LLE Inspection Performed                   Diagram Legend     Erythematous scaling macule/papule c/w actinic keratosis       Vascular papule c/w angioma      Pigmented verrucoid papule/plaque c/w seborrheic keratosis      Yellow umbilicated papule c/w sebaceous hyperplasia      Irregularly shaped tan macule c/w lentigo     1-2 mm smooth white papules consistent with Milia      Movable subcutaneous cyst with punctum c/w epidermal inclusion cyst      Subcutaneous movable cyst c/w pilar cyst      Firm pink to brown papule c/w dermatofibroma      Pedunculated fleshy papule(s) c/w skin tag(s)      Evenly pigmented macule c/w  junctional nevus     Mildly variegated pigmented, slightly irregular-bordered macule c/w mildly atypical nevus      Flesh colored to evenly pigmented papule c/w intradermal nevus       Pink pearly papule/plaque c/w basal cell carcinoma      Erythematous hyperkeratotic cursted plaque c/w SCC      Surgical scar with no sign of skin cancer recurrence      Open and closed comedones      Inflammatory papules and pustules      Verrucoid papule consistent consistent with wart     Erythematous eczematous patches and plaques     Dystrophic onycholytic nail with subungual debris c/w onychomycosis     Umbilicated papule    Erythematous-base heme-crusted tan verrucoid plaque consistent with inflamed seborrheic keratosis     Erythematous Silvery Scaling Plaque c/w Psoriasis     See annotation      Assessment / Plan:        SK (seborrheic keratosis)  These are benign inherited growths without a malignant potential. Reassurance given to patient. No treatment is necessary.   Treatment of benign, asymptomatic lesions may be considered cosmetic.  Warned about risk of hypo- or hyperpigmentation with treatment and risk of recurrence.    Lentigines  These are benign sun spots which should be monitored for changes. Patient instructed in importance of daily broad spectrum sunscreen use with spf at least 30. Sun avoidance and topical protection/protective clothing discussed.    ST (skin tag)  Reassurance given to patient. No treatment is necessary.   Treatment of benign, asymptomatic lesions may be considered cosmetic.    Milia  This is a benign cyst. Reassurance provided. No treatment is necessary unless it is symptomatic. These may resolve on their own.    Skin cancer screening and History of nonmelanoma skin cancer  Upper body skin examination performed today including at least 6 points as noted in physical examination. No lesions suspicious for malignancy noted.  Patient instructed in importance of daily broad spectrum sunscreen use  with spf at least 30. Sun avoidance and topical protection/protective clothing discussed.    Return in about 1 year (around 12/11/2018) for skin check or sooner for any concerns.

## 2017-12-11 NOTE — PATIENT INSTRUCTIONS
CRYOSURGERY      Your doctor has used a method called cryosurgery to treat your skin condition. Cryosurgery refers to the use of very cold substances to treat a variety of skin conditions such as warts, pre-skin cancers, molluscum contagiosum, sun spots, and several benign growths. The substance we use in cryosurgery is liquid nitrogen and is so cold (-195 degrees Celsius) that is burns when administered.     Following treatment in the office, the skin may immediately burn and become red. You may find the area around the lesion is affected as well. It is sometimes necessary to treat not only the lesion, but a small area of the surrounding normal skin to achieve a good response.     A blister, and even a blood filled blister, may form after treatment.   This is a normal response. If the blister is painful, it is acceptable to sterilize a needle and with rubbing alcohol and gently pop the blister. It is important that you gently wash the area with soap and warm water as the blister fluid may contain wart virus if a wart was treated. Do no remove the roof of the blister.     The area treated can take anywhere from 1-3 weeks to heal. Healing time depends on the kind skin lesion treated, the location, and how aggressively the lesion was treated. It is recommended that the areas treated are covered with Vaseline or bacitracin ointment and a band-aid. If a band-aid is not practical, just ointment applied several times per day will do. Keeping these areas moist will speed the healing time.    Treatment with liquid nitrogen can leave a scar. In dark skin, it may be a light or dark scar, in light skin it may be a white or pink scar. These will generally fade with time, but may never go away completely.     If you have any concerns after your treatment, please feel free to call the office.       [address] [facility] [place] [address]/ [phone] FAX/ [email or web address]    Summer Sun Protection      The  [facility] would like to remind you of the importance of sun protection all year round and particularly during the summer when the suns rays are the strongest. It has been proven that both acute and chronic sun exposure damages our cells and leads to skin cancer. Beyond skin cancer, the sun causes 90% of the symptoms of pre-mature skin aging, including wrinkles, lentigines (brown spots), and thin, easily bruised skin. Proper sun protection can help prevent these unwanted conditions.    Many patients report that the dont go in the sun. It has been shown that the average person receives 18 hours of incidental sun exposure per week during activities such as walking through parking lots, driving, or sitting next to windows. This accumulates to several bad sunburns per year!    In choosing sunscreen, you want one that protects against both UVA and UVB rays. It is recommended that you use one of SPF 30 or higher. It is important to apply the sunscreen about 20 minutes prior to sun exposure. Most sunscreens are chemical sunscreens and a reaction must take place in the skin so that they are effective. If they are applied and then you are immediately exposed to the sun or start sweating, this reaction has not had time to take place and you are therefore unprotected. Sunscreen needs to be reapplied every 2 hours if you are participating in water sports or sweating. We recommend Elta MD or Neutrogena Ultra Sheer Dry Touch SPF 55 for daily use; however there are many options and it is most important for you to find one that you will use on a consistent basis.    If you have sensitive skin, you may do best with a sunscreen that contains only physical blockers such as titanium dioxide or zinc oxide. These are typically thicker and harder to apply, however they afford very good protection. Neutrogena Sensitive Skin, Blue Lizard Sensitive Skin (pink top) or Neutrogena Pure and Free are popular ones.      Aside from sunscreen, clothes with UV protection, wide brimmed hats, and sunglasses are other means of sun protection that we recommend.                        [facility] - DERMATOLOG[facility]  [address]  [place] [address]  Dept: [phone]  Dept Fax: [phone]                                                                               SEBORRHEIC KERATOSES        What causes seborrheic keratoses?    Seborrheic keratoses are harmless, common skin growths that first appear during adult life.  As time goes by, more growths appear.  Some persons have a very large number of them.  Seborrheic keratoses appear on both covered and uncovered parts of the body; they are not caused by sunlight.  The tendency to develop seborrheic keratoses is inherited.    Seborrheic keratoses are harmless and never become malignant.  They begin as slightly raised, light brown spots.  Gradually they thicken and take on a rough wartlike surface.  They slowly darken and may turn black.  These color changes are harmless.  Seborrheic keratoses are superficial and look as if they were stuck on the skin.  Persons who have had several seborrheic keratoses can usually recognize this type of benign growth.  However, if you are concerned or unsure about any growth, consult me.    Treatment    Seborrheic keratoses can easily be removed in the office.  The only reason for removing a seborrheic keratosis is your wish to get rid of it.

## 2017-12-12 ENCOUNTER — PATIENT MESSAGE (OUTPATIENT)
Dept: RHEUMATOLOGY | Facility: CLINIC | Age: 57
End: 2017-12-12

## 2018-02-17 ENCOUNTER — NURSE TRIAGE (OUTPATIENT)
Dept: ADMINISTRATIVE | Facility: CLINIC | Age: 58
End: 2018-02-17

## 2018-02-17 ENCOUNTER — OFFICE VISIT (OUTPATIENT)
Dept: URGENT CARE | Facility: CLINIC | Age: 58
End: 2018-02-17
Payer: COMMERCIAL

## 2018-02-17 VITALS
TEMPERATURE: 97 F | BODY MASS INDEX: 34.55 KG/M2 | HEIGHT: 66 IN | DIASTOLIC BLOOD PRESSURE: 93 MMHG | OXYGEN SATURATION: 100 % | HEART RATE: 87 BPM | WEIGHT: 215 LBS | SYSTOLIC BLOOD PRESSURE: 148 MMHG

## 2018-02-17 DIAGNOSIS — B34.9 VIRAL SYNDROME: Primary | ICD-10-CM

## 2018-02-17 DIAGNOSIS — R50.9 FEVER, UNSPECIFIED FEVER CAUSE: ICD-10-CM

## 2018-02-17 LAB
CTP QC/QA: YES
FLUAV AG NPH QL: NEGATIVE
FLUBV AG NPH QL: NEGATIVE

## 2018-02-17 PROCEDURE — 3008F BODY MASS INDEX DOCD: CPT | Mod: S$GLB,,, | Performed by: PHYSICIAN ASSISTANT

## 2018-02-17 PROCEDURE — 87804 INFLUENZA ASSAY W/OPTIC: CPT | Mod: 59,QW,S$GLB, | Performed by: PHYSICIAN ASSISTANT

## 2018-02-17 PROCEDURE — 99214 OFFICE O/P EST MOD 30 MIN: CPT | Mod: S$GLB,,, | Performed by: PHYSICIAN ASSISTANT

## 2018-02-17 NOTE — TELEPHONE ENCOUNTER
Reason for Disposition   MODERATE weakness (i.e., interferes with work, school, normal activities) and persists > 3 days    Protocols used: ST WEAKNESS (GENERALIZED) AND FATIGUE-A-OH

## 2018-02-17 NOTE — PROGRESS NOTES
"Subjective:       Patient ID: Christal Posey is a 58 y.o. female.    Vitals:  height is 5' 6" (1.676 m) and weight is 97.5 kg (215 lb). Her temperature is 97.4 °F (36.3 °C). Her blood pressure is 148/93 (abnormal) and her pulse is 87. Her oxygen saturation is 100%.     Chief Complaint: Fever    Fever    This is a new problem. The current episode started in the past 7 days. The problem occurs intermittently. The problem has been gradually worsening. The maximum temperature noted was 100 to 100.9 F. The temperature was taken using an oral thermometer. Associated symptoms include abdominal pain, congestion, coughing, diarrhea, headaches, muscle aches, nausea and a sore throat. Pertinent negatives include no chest pain, ear pain or wheezing. Associated symptoms comments: Body aches. She has tried acetaminophen and NSAIDs for the symptoms. The treatment provided mild relief.     Review of Systems   Constitution: Positive for chills, fever and malaise/fatigue.   HENT: Positive for congestion and sore throat. Negative for ear pain and hoarse voice.    Eyes: Negative for discharge and redness.   Cardiovascular: Negative for chest pain, dyspnea on exertion and leg swelling.   Respiratory: Positive for cough. Negative for shortness of breath, sputum production and wheezing.    Musculoskeletal: Negative for myalgias.   Gastrointestinal: Positive for abdominal pain, diarrhea and nausea.   Neurological: Positive for headaches.       Objective:      Physical Exam   Constitutional: She is oriented to person, place, and time. She appears well-developed and well-nourished.   HENT:   Head: Normocephalic and atraumatic.   Right Ear: Hearing, tympanic membrane, external ear and ear canal normal.   Left Ear: Hearing, tympanic membrane, external ear and ear canal normal.   Nose: Nose normal. Right sinus exhibits no maxillary sinus tenderness and no frontal sinus tenderness. Left sinus exhibits no maxillary sinus tenderness and no frontal " sinus tenderness.   Mouth/Throat: Uvula is midline and oropharynx is clear and moist.   Eyes: Conjunctivae are normal.   Neck: Normal range of motion. Neck supple. No thyromegaly present.   Cardiovascular: Normal rate and regular rhythm.  Exam reveals no gallop and no friction rub.    No murmur heard.  Pulmonary/Chest: Effort normal and breath sounds normal. She has no wheezes. She has no rales.   Musculoskeletal: Normal range of motion.   Lymphadenopathy:     She has no cervical adenopathy.   Neurological: She is alert and oriented to person, place, and time.   Skin: Skin is warm and dry. No rash noted. No erythema.   Psychiatric: She has a normal mood and affect. Her behavior is normal. Judgment and thought content normal.   Nursing note and vitals reviewed.      Assessment:       1. Viral syndrome    2. Fever, unspecified fever cause        Plan:         Viral syndrome    Fever, unspecified fever cause  -     POCT Influenza A/B      Christal was seen today for fever.    Diagnoses and all orders for this visit:    Viral syndrome    Fever, unspecified fever cause  -     POCT Influenza A/B      Patient Instructions   - Rest.    - Drink plenty of fluids.    - Tylenol or Ibuprofen as directed as needed for fever/pain.    - Take over-the-counter claritin, zyrtec, allegra, or xyzal as directed.  - Antibiotics are not needed at this time.  - Usual course of cold symptom is 10-14 days, but longer if patient is a smoker.   - Use salt water gargle for sore throat.   - Follow up with your PCP or specialty clinic as directed in the next 1-2 weeks if not improved or as needed.  You can call [phone] to schedule an appointment with the appropriate provider.    - Go to the ED if your symptoms worsen.    Viral Syndrome (Adult)  A viral illness may cause a number of symptoms. The symptoms depend on the part of the body that the virus affects. If it settles in your nose, throat, and lungs, it may cause cough, sore throat,  "congestion, and sometimes headache. If it settles in your stomach and intestinal tract, it may cause vomiting and diarrhea. Sometimes it causes vague symptoms like "aching all over," feeling tired, loss of appetite, or fever.  A viral illness usually lasts 1 to 2 weeks, but sometimes it lasts longer. In some cases, a more serious infection can look like a viral syndrome in the first few days of the illness. You may need another exam and additional tests to know the difference. Watch for the warning signs listed below.  Home care  Follow these guidelines for taking care of yourself at home:  · If symptoms are severe, rest at home for the first 2 to 3 days.  · Stay away from cigarette smoke - both your smoke and the smoke from others.  · You may use over-the-counter acetaminophen or ibuprofen for fever, muscle aching, and headache, unless another medicine was prescribed for this. If you have chronic liver or kidney disease or ever had a stomach ulcer or GI bleeding, talk with your doctor before using these medicines. No one who is younger than 18 and ill with a fever should take aspirin. It may cause severe disease or death.  · Your appetite may be poor, so a light diet is fine. Avoid dehydration by drinking 8 to 12 8-ounce glasses of fluids each day. This may include water; orange juice; lemonade; apple, grape, and cranberry juice; clear fruit drinks; electrolyte replacement and sports drinks; and decaffeinated teas and coffee. If you have been diagnosed with a kidney disease, ask your doctor how much and what types of fluids you should drink to prevent dehydration. If you have kidney disease, drinking too much fluid can cause it build up in the your body and be dangerous to your health.  · Over-the-counter remedies won't shorten the length of the illness but may be helpful for cough, sore throat; and nasal and sinus congestion. Don't use decongestants if you have high blood pressure.  Follow-up care  Follow up with " your healthcare provider if you do not improve over the next week.  Call 911  Get emergency medical care if any of the following occur:  · Convulsion  · Feeling weak, dizzy, or like you are going to faint  · Chest pain, shortness of breath, wheezing, or difficulty breathing  When to seek medical advice  Call your healthcare provider right away if any of these occur:  · Cough with lots of colored sputum (mucus) or blood in your sputum  · Chest pain, shortness of breath, wheezing, or difficulty breathing  · Severe headache; face, neck, or ear pain  · Severe, constant pain in the lower right side of your belly (abdominal)  · Continued vomiting (cant keep liquids down)  · Frequent diarrhea (more than 5 times a day); blood (red or black color) or mucus in diarrhea  · Feeling weak, dizzy, or like you are going to faint  · Extreme thirst  · Fever of 100.4°F (38°C) or higher, or as directed by your healthcare provider  Date Last Reviewed: 9/25/2015  © 9893-3527 [employer]. [address] [place] [address], [place] [address]. All rights reserved. This information is not intended as a substitute for professional medical care. Always follow your healthcare professional's instructions.

## 2018-02-17 NOTE — PATIENT INSTRUCTIONS
"- Rest.    - Drink plenty of fluids.    - Tylenol or Ibuprofen as directed as needed for fever/pain.    - Take over-the-counter claritin, zyrtec, allegra, or xyzal as directed.  - Antibiotics are not needed at this time.  - Usual course of cold symptom is 10-14 days, but longer if patient is a smoker.   - Use salt water gargle for sore throat.   - Follow up with your PCP or specialty clinic as directed in the next 1-2 weeks if not improved or as needed.  You can call [phone] to schedule an appointment with the appropriate provider.    - Go to the ED if your symptoms worsen.    Viral Syndrome (Adult)  A viral illness may cause a number of symptoms. The symptoms depend on the part of the body that the virus affects. If it settles in your nose, throat, and lungs, it may cause cough, sore throat, congestion, and sometimes headache. If it settles in your stomach and intestinal tract, it may cause vomiting and diarrhea. Sometimes it causes vague symptoms like "aching all over," feeling tired, loss of appetite, or fever.  A viral illness usually lasts 1 to 2 weeks, but sometimes it lasts longer. In some cases, a more serious infection can look like a viral syndrome in the first few days of the illness. You may need another exam and additional tests to know the difference. Watch for the warning signs listed below.  Home care  Follow these guidelines for taking care of yourself at home:  · If symptoms are severe, rest at home for the first 2 to 3 days.  · Stay away from cigarette smoke - both your smoke and the smoke from others.  · You may use over-the-counter acetaminophen or ibuprofen for fever, muscle aching, and headache, unless another medicine was prescribed for this. If you have chronic liver or kidney disease or ever had a stomach ulcer or GI bleeding, talk with your doctor before using these medicines. No one who is younger than 18 and ill with a fever should take aspirin. It may cause severe disease or " death.  · Your appetite may be poor, so a light diet is fine. Avoid dehydration by drinking 8 to 12 8-ounce glasses of fluids each day. This may include water; orange juice; lemonade; apple, grape, and cranberry juice; clear fruit drinks; electrolyte replacement and sports drinks; and decaffeinated teas and coffee. If you have been diagnosed with a kidney disease, ask your doctor how much and what types of fluids you should drink to prevent dehydration. If you have kidney disease, drinking too much fluid can cause it build up in the your body and be dangerous to your health.  · Over-the-counter remedies won't shorten the length of the illness but may be helpful for cough, sore throat; and nasal and sinus congestion. Don't use decongestants if you have high blood pressure.  Follow-up care  Follow up with your healthcare provider if you do not improve over the next week.  Call 911  Get emergency medical care if any of the following occur:  · Convulsion  · Feeling weak, dizzy, or like you are going to faint  · Chest pain, shortness of breath, wheezing, or difficulty breathing  When to seek medical advice  Call your healthcare provider right away if any of these occur:  · Cough with lots of colored sputum (mucus) or blood in your sputum  · Chest pain, shortness of breath, wheezing, or difficulty breathing  · Severe headache; face, neck, or ear pain  · Severe, constant pain in the lower right side of your belly (abdominal)  · Continued vomiting (cant keep liquids down)  · Frequent diarrhea (more than 5 times a day); blood (red or black color) or mucus in diarrhea  · Feeling weak, dizzy, or like you are going to faint  · Extreme thirst  · Fever of 100.4°F (38°C) or higher, or as directed by your healthcare provider  Date Last Reviewed: 9/25/2015  © 8751-6204 [employer]. [address] [place] [address], [place] [address]. All rights reserved. This information is not intended as a substitute for professional medical  care. Always follow your healthcare professional's instructions.

## 2018-02-19 ENCOUNTER — OFFICE VISIT (OUTPATIENT)
Dept: OBSTETRICS AND GYNECOLOGY | Facility: CLINIC | Age: 58
End: 2018-02-19
Attending: OBSTETRICS & GYNECOLOGY
Payer: COMMERCIAL

## 2018-02-19 ENCOUNTER — HOSPITAL ENCOUNTER (OUTPATIENT)
Dept: RADIOLOGY | Facility: OTHER | Age: 58
Discharge: HOME | End: 2018-02-19
Attending: OBSTETRICS & GYNECOLOGY
Payer: COMMERCIAL

## 2018-02-19 VITALS
WEIGHT: 213.63 LBS | DIASTOLIC BLOOD PRESSURE: 80 MMHG | BODY MASS INDEX: 34.33 KG/M2 | SYSTOLIC BLOOD PRESSURE: 122 MMHG | HEIGHT: 66 IN

## 2018-02-19 DIAGNOSIS — M85.80 LOW BONE MASS: ICD-10-CM

## 2018-02-19 DIAGNOSIS — N95.2 VAGINAL ATROPHY: ICD-10-CM

## 2018-02-19 DIAGNOSIS — Z01.419 ENCOUNTER FOR GYNECOLOGICAL EXAMINATION WITHOUT ABNORMAL FINDING: Primary | ICD-10-CM

## 2018-02-19 PROCEDURE — 99396 PREV VISIT EST AGE 40-64: CPT | Mod: S$GLB,,, | Performed by: OBSTETRICS & GYNECOLOGY

## 2018-02-19 PROCEDURE — 77080 DXA BONE DENSITY AXIAL: CPT | Mod: 26,,, | Performed by: RADIOLOGY

## 2018-02-19 PROCEDURE — 77080 DXA BONE DENSITY AXIAL: CPT | Mod: TC

## 2018-02-19 RX ORDER — CONJUGATED ESTROGENS 0.62 MG/G
CREAM VAGINAL
COMMUNITY
Start: 2018-01-18 | End: 2018-04-09 | Stop reason: SDUPTHER

## 2018-02-19 RX ORDER — CONJUGATED ESTROGENS 0.62 MG/G
CREAM VAGINAL
Refills: 12 | COMMUNITY
Start: 2018-01-18 | End: 2023-03-22

## 2018-02-19 NOTE — PROGRESS NOTES
"SUBJECTIVE:   58 y.o. female   for annual routine Pap and checkup. No LMP recorded (lmp unknown). Patient has had a hysterectomy..  She has no unusual complaints and reports "doing much better". She is using DHEA and Premarin vaginal cream. .        Past Medical History:   Diagnosis Date    Allergy     Breast cancer 2016    Left breast low grade DCIS, ER/NC positive    GERD (gastroesophageal reflux disease)     Hiatal hernia     Hyperlipidemia     Irritable bowel syndrome     Migraine headache     Squamous cell carcinoma excised 14    in situ, R forearm     Past Surgical History:   Procedure Laterality Date    BREAST BIOPSY Left 2016    DCIS    ECTOPIC PREGNANCY SURGERY Right     HYSTERECTOMY  1998    ovaries left intact    removal of ovarian cyst Right oct 1992    ovary left intact    TUBAL LIGATION      tubal reversal  1991     Social History     Social History    Marital status:      Spouse name: Parrish    Number of children: 2    Years of education: N/A     Occupational History    Not on file.     Social History Main Topics    Smoking status: Never Smoker    Smokeless tobacco: Never Used    Alcohol use No    Drug use: No    Sexual activity: Yes     Partners: Male     Birth control/ protection: Surgical, None      Comment:  to Parrish      Other Topics Concern    Are You Pregnant Or Think You May Be? No    Breast-Feeding No     Social History Narrative    No narrative on file     Family History   Problem Relation Age of Onset    Migraines Sister     Migraines Brother     Seizures Brother     Migraines Maternal Grandmother     Seizures Maternal Grandmother     Cancer Father 77     Lung cancer    Cancer Paternal Uncle      lung    Melanoma Neg Hx     Breast cancer Neg Hx     Colon cancer Neg Hx     Ovarian cancer Neg Hx      OB History    Para Term  AB Living   3 2 2   1 2   SAB TAB Ectopic Multiple Live Births       1   2      # " Outcome Date GA Lbr Garrett/2nd Weight Sex Delivery Anes PTL Lv   3 Ectopic     U       2 Term     F Vag-Spont   NILTON   1 Term     F Vag-Spont   NILTON              Current Outpatient Prescriptions   Medication Sig Dispense Refill    alprazolam (XANAX) 0.25 MG tablet Take 1 tablet (0.25 mg total) by mouth nightly as needed for Insomnia. 30 tablet 0    baclofen (LIORESAL) 10 MG tablet Take 1 tablet (10 mg total) by mouth every evening. 30 tablet 11    baclofen (LIORESAL) 10 MG tablet TAKE 1 TABLET(10 MG) BY MOUTH EVERY EVENING 90 tablet 11    butalbital-acetaminophen-caffeine -40 mg (FIORICET, ESGIC) -40 mg per tablet Take 1 tablet by mouth every 6 to 8 hours as needed. 60 tablet 0    fluticasone (FLONASE) 50 mcg/actuation nasal spray       gabapentin (NEURONTIN) 100 MG capsule Take 800 mg by mouth 3 (three) times daily.   3    gabapentin (NEURONTIN) 300 MG capsule Take 800 mg by mouth 3 (three) times daily. Takes 300 mg caps and 100 mg caps to make total single dose of 800 mg; takes 800 mg TID  2    lansoprazole (PREVACID) 30 MG capsule Take 30 mg by mouth every morning. 1 Capsule, Delayed Release(E.C.) Oral Every day      levocetirizine (XYZAL) 5 MG tablet   5    meloxicam (MOBIC) 7.5 MG tablet TAKE 1 TABLET(7.5 MG) BY MOUTH EVERY DAY 90 tablet 5    meloxicam (MOBIC) 7.5 MG tablet TAKE 2 TABLETS(15 MG) BY MOUTH ONCE DAILY 180 tablet 5    multivitamin (MULTIVITAMIN) per tablet Take 1 tablet by mouth once daily.        ondansetron (ZOFRAN) 4 MG tablet Take 1 tablet (4 mg total) by mouth every 8 (eight) hours as needed for Nausea. 1 Tablet Oral Every 6 hours 12 tablet 12    prasterone, dhea, (INTRAROSA) 6.5 mg Inst Place 6.5 mg vaginally every evening. 30 each 11    PRASTERONE, DHEA, VAGL Place 6.5 mg vaginally 3 (three) times a week.  6    PREMARIN vaginal cream       PREMARIN vaginal cream PLACE 1 GRAM VAGINALLY 2 TIMES A WK  12    sumatriptan (IMITREX) 20 mg/actuation nasal spray USE 1 SPRAY  IN EACH NOSTRIL AT ONSET OF HEADACHE. MAY REPEAT ONCE IN 2 HOURS IF NO RELIEF. NO MORE THAN 2 SPRAYS PER 24 HOURS 6 each 12    SUPPOSISTRIP Janeen       venlafaxine (EFFEXOR XR) 37.5 MG 24 hr capsule Take 1 capsule (37.5 mg total) by mouth once daily. 30 capsule 9    VENTOLIN HFA 90 mcg/actuation inhaler INHALE 2 PUFFS INTO THE LUNGS EVERY 6 HOURS AS NEEDED FOR WHEEZING. RESCUE 18 g 0    vitamin D 1000 units Tab Take 185 mg by mouth every morning.        No current facility-administered medications for this visit.      Allergies: Pravastatin; Amitriptyline; Codeine; Doxycycline; Erythromycin; Iodine; Macrobid  [nitrofurantoin monohyd/m-cryst]; and Verapamil     The 10-year ASCVD risk score (Biju GARCIA Jr., et al., 2013) is: 2%    Values used to calculate the score:      Age: 58 years      Sex: Female      Is Non- : No      Diabetic: No      Tobacco smoker: No      Systolic Blood Pressure: 122 mmHg      Is BP treated: No      HDL Cholesterol: 63 mg/dL      Total Cholesterol: 222 mg/dL      ROS:  Constitutional: no weight loss, weight gain, fever, fatigue  Eyes:  No vision changes, glasses/contacts  ENT/Mouth: No ulcers, sinus problems, ears ringing, headache  Cardiovascular: No inability to lie flat, chest pain, exercise intolerance, swelling, heart palpitations  Respiratory: No wheezing, coughing blood, shortness of breath, or cough  Gastrointestinal: No diarrhea, bloody stool, nausea/vomiting, constipation, gas, hemorrhoids  Genitourinary: No blood in urine, painful urination, urgency of urination, frequency of urination, incomplete emptying, incontinence, abnormal bleeding, painful periods, heavy periods, vaginal discharge, vaginal odor, painful intercourse, sexual problems, bleeding after intercourse, +vaginal atrophy.  Musculoskeletal: No muscle weakness  Skin/Breast: No painful breasts, nipple discharge, masses, rash, ulcers  Neurological: No passing out, seizures, numbness,  headache  Endocrine: No diabetes, hypothyroid, hyperthyroid, hot flashes, hair loss, abnormal hair growth, acne  Psychiatric: No depression, crying  Hematologic: No bruises, bleeding, swollen lymph nodes, anemia.      Physical Exam:   Constitutional: She is oriented to person, place, and time. She appears well-developed and well-nourished.      Neck: Normal range of motion. No tracheal deviation present. No thyromegaly present.    Cardiovascular: Exam reveals no edema.     Pulmonary/Chest: Effort normal. She exhibits no mass, no tenderness, no deformity and no retraction. Right breast exhibits no inverted nipple, no mass, no nipple discharge, no skin change, no tenderness, presence, no bleeding and no swelling. Left breast exhibits no inverted nipple, no mass, no nipple discharge, no skin change, no tenderness, presence, no bleeding and no swelling. Breasts are symmetrical.        Abdominal: Soft. She exhibits no distension and no mass. There is no tenderness. There is no rebound and no guarding. No hernia. Hernia confirmed negative in the left inguinal area.     Genitourinary: Rectal exam shows no external hemorrhoid. There is no rash, tenderness or lesion on the right labia. There is no rash, tenderness or lesion on the left labia. Uterus is absent. No no adexnal prolapse. Right adnexum displays no mass, no tenderness and no fullness. Left adnexum displays no mass, no tenderness and no fullness. No tenderness, bleeding, rectocele, cystocele or unspecified prolapse of vaginal walls in the vagina. No vaginal discharge found. Vaginal cuff normal.Cervix exhibits absence.           Musculoskeletal: Normal range of motion and moves all extremeties. She exhibits no edema.      Lymphadenopathy:        Right: No inguinal adenopathy present.        Left: No inguinal adenopathy present.    Neurological: She is alert and oriented to person, place, and time.    Skin: No rash noted. No erythema. No pallor.    Psychiatric: She  has a normal mood and affect. Her behavior is normal. Judgment and thought content normal.         ASSESSMENT:   well woman  Breast cancer  Vaginal atrophy  PLAN:   pap smear  return annually or prn

## 2018-02-21 ENCOUNTER — OFFICE VISIT (OUTPATIENT)
Dept: HEMATOLOGY/ONCOLOGY | Facility: CLINIC | Age: 58
End: 2018-02-21
Payer: COMMERCIAL

## 2018-02-21 VITALS
BODY MASS INDEX: 34.37 KG/M2 | OXYGEN SATURATION: 99 % | RESPIRATION RATE: 20 BRPM | DIASTOLIC BLOOD PRESSURE: 82 MMHG | HEART RATE: 72 BPM | TEMPERATURE: 98 F | WEIGHT: 212.94 LBS | SYSTOLIC BLOOD PRESSURE: 155 MMHG

## 2018-02-21 DIAGNOSIS — E55.9 VITAMIN D DEFICIENCY: ICD-10-CM

## 2018-02-21 DIAGNOSIS — D05.12 DUCTAL CARCINOMA IN SITU (DCIS) OF LEFT BREAST: Primary | Chronic | ICD-10-CM

## 2018-02-21 PROCEDURE — 3008F BODY MASS INDEX DOCD: CPT | Mod: S$GLB,,, | Performed by: INTERNAL MEDICINE

## 2018-02-21 PROCEDURE — 99999 PR PBB SHADOW E&M-EST. PATIENT-LVL III: CPT | Mod: PBBFAC,,, | Performed by: INTERNAL MEDICINE

## 2018-02-21 PROCEDURE — 99214 OFFICE O/P EST MOD 30 MIN: CPT | Mod: S$GLB,,, | Performed by: INTERNAL MEDICINE

## 2018-02-21 NOTE — PROGRESS NOTES
Subjective:       Patient ID: Christal Posey is a 58 y.o. female.    Chief Complaint: Follow-up    HPI     Mrs. Posey presents for follow-up if DCIS  Monitored by surveillance- opted out of adjuvant endocrine with low volume disease  Doing well      DCIS History:  5/16/16 Screening mammography:  Calcifications in the left breast require additional evaluation.    ACR BI-RADS Category 0: Incomplete: Need Additional Imaging Evaluation  - 5/17/16 Diagnostic mammogram  Calcifications in the left breast are suspicious.  Histology using core biopsy  is recommended.  ACR BI-RADS Category 4: Suspicious Abnormality  - 5/20/16 biopsy  1, 2. LEFT BREAST, WITH CALCIFICATIONS AND WITHOUT CALCIFICATIONS, UPPER INNER QUADRANT  (NEEDLE BIOPSY):  -Low-grade ductal carcinoma in situ (DCIS)  -Nuclear grade 1 out of 3  -Cribriform pattern  -Associated with microcalcifications  %, % positive  - 6/10/2016 Lumpectomy  Pathology:  Procedure - Excision with wire guided localization  Lymph node sampling - Not sampled  Specimen laterality - Left  Tumor site - Not specified  Size of DCIS - 2mm, including findings from the biopsy report  Histologic type - Ductal carcinoma in situ  Architectural pattern - Solid and cribriforming  Nuclear grade - Low  Necrosis - Absent  Margins - Negative, inferior is 2mm  Pathologc staging - p Tis Nx Mx  Hormone receptors (See Breast Biopsy report; MS16 - [id number])  Estrogen receptor - Positive, strong, 100%  Progesterone receptors - Positive, strong, 100%    Review of Systems    Objective:      Physical Exam    Assessment:       No diagnosis found.    Plan:       ***

## 2018-02-21 NOTE — PROGRESS NOTES
Subjective:       Patient ID: Christal Posey is a 58 y.o. female.    Chief Complaint: Follow-up    HPI     Mrs. Posey presents for follow-up if DCIS  Monitored by surveillance- opted out of adjuvant endocrine with low volume disease  Doing well  Next mammogram due in 5/2018  Has a second grandchild on the way!    DCIS History:  5/16/16 Screening mammography:  Calcifications in the left breast require additional evaluation.    ACR BI-RADS Category 0: Incomplete: Need Additional Imaging Evaluation  - 5/17/16 Diagnostic mammogram  Calcifications in the left breast are suspicious.  Histology using core biopsy  is recommended.  ACR BI-RADS Category 4: Suspicious Abnormality  - 5/20/16 biopsy  1, 2. LEFT BREAST, WITH CALCIFICATIONS AND WITHOUT CALCIFICATIONS, UPPER INNER QUADRANT  (NEEDLE BIOPSY):  -Low-grade ductal carcinoma in situ (DCIS)  -Nuclear grade 1 out of 3  -Cribriform pattern  -Associated with microcalcifications  %, % positive  - 6/10/2016 Lumpectomy  Pathology:  Procedure - Excision with wire guided localization  Lymph node sampling - Not sampled  Specimen laterality - Left  Tumor site - Not specified  Size of DCIS - 2mm, including findings from the biopsy report  Histologic type - Ductal carcinoma in situ  Architectural pattern - Solid and cribriforming  Nuclear grade - Low  Necrosis - Absent  Margins - Negative, inferior is 2mm  Pathologc staging - p Tis Nx Mx  Hormone receptors (See Breast Biopsy report; MS16 - 57680)  Estrogen receptor - Positive, strong, 100%  Progesterone receptors - Positive, strong, 100%    Review of Systems   Constitutional: Negative for appetite change and unexpected weight change.   HENT: Negative for congestion, dental problem, hearing loss, rhinorrhea and sore throat.    Eyes: Negative for visual disturbance.   Respiratory: Negative for cough, chest tightness and shortness of breath.    Cardiovascular: Negative for chest pain.   Gastrointestinal: Negative for  abdominal distention, abdominal pain, blood in stool, constipation, diarrhea, nausea and vomiting.   Genitourinary: Negative for decreased urine volume, difficulty urinating, dysuria, frequency and urgency.        Vaginal dryness- has ?s about Premarin   Musculoskeletal: Negative for arthralgias, back pain and myalgias.   Skin: Negative for rash.   Neurological: Negative for weakness, numbness and headaches.   Hematological: Negative for adenopathy.   Psychiatric/Behavioral: Negative for dysphoric mood. The patient is not nervous/anxious.        Objective:      Physical Exam   Constitutional: She is oriented to person, place, and time. She appears well-developed and well-nourished. No distress.   Presents alone   HENT:   Head: Normocephalic and atraumatic.   Right Ear: External ear normal.   Left Ear: External ear normal.   Mouth/Throat: Oropharynx is clear and moist. No oropharyngeal exudate.   Eyes: Conjunctivae and EOM are normal. Pupils are equal, round, and reactive to light. Right eye exhibits no discharge. Left eye exhibits no discharge. No scleral icterus. Right pupil is round and reactive. Left pupil is round and reactive.   Neck: Normal range of motion. Neck supple. No JVD present. No tracheal deviation present. No thyromegaly present.   Cardiovascular: Normal rate, regular rhythm, normal heart sounds and intact distal pulses.  Exam reveals no gallop and no friction rub.    No murmur heard.  Pulmonary/Chest: Effort normal and breath sounds normal. No respiratory distress. She has no wheezes. She has no rales.   Abdominal: Soft. Bowel sounds are normal. She exhibits no distension and no mass. There is no hepatosplenomegaly. There is no tenderness. There is no rebound and no guarding.   Musculoskeletal: Normal range of motion. She exhibits no edema or tenderness.   Lymphadenopathy:        Head (right side): No submandibular adenopathy present.        Head (left side): No submandibular adenopathy present.      She has no cervical adenopathy.        Right cervical: No superficial cervical, no deep cervical and no posterior cervical adenopathy present.       Left cervical: No superficial cervical, no deep cervical and no posterior cervical adenopathy present.        Right: No inguinal and no supraclavicular adenopathy present.        Left: No inguinal and no supraclavicular adenopathy present.   Neurological: She is alert and oriented to person, place, and time. She has normal strength and normal reflexes. No cranial nerve deficit or sensory deficit. Coordination normal.   Skin: Skin is warm and dry. No bruising, no lesion, no petechiae and no rash noted. She is not diaphoretic. No erythema. No pallor.   Psychiatric: She has a normal mood and affect. Her behavior is normal. Judgment and thought content normal. Her mood appears not anxious. She does not exhibit a depressed mood.   Nursing note and vitals reviewed.    Labs- reviewed  Assessment:       1. Ductal carcinoma in situ (DCIS) of left breast    2. Vitamin D deficiency        Plan:     1. EMIR  Mammogram in 5/2018  RTC 6 months   Knows to call for any issues  2. Check level at next visit

## 2018-04-09 ENCOUNTER — LAB VISIT (OUTPATIENT)
Dept: LAB | Facility: HOSPITAL | Age: 58
End: 2018-04-09
Attending: INTERNAL MEDICINE
Payer: COMMERCIAL

## 2018-04-09 ENCOUNTER — OFFICE VISIT (OUTPATIENT)
Dept: INTERNAL MEDICINE | Facility: CLINIC | Age: 58
End: 2018-04-09
Payer: COMMERCIAL

## 2018-04-09 VITALS
HEIGHT: 66 IN | BODY MASS INDEX: 34.44 KG/M2 | DIASTOLIC BLOOD PRESSURE: 80 MMHG | SYSTOLIC BLOOD PRESSURE: 145 MMHG | HEART RATE: 91 BPM | OXYGEN SATURATION: 99 % | WEIGHT: 214.31 LBS

## 2018-04-09 DIAGNOSIS — R07.89 ATYPICAL CHEST PAIN: Primary | ICD-10-CM

## 2018-04-09 DIAGNOSIS — E78.00 ELEVATED CHOLESTEROL: ICD-10-CM

## 2018-04-09 LAB
CHOLEST SERPL-MCNC: 253 MG/DL
CHOLEST/HDLC SERPL: 4.2 {RATIO}
HDLC SERPL-MCNC: 60 MG/DL
HDLC SERPL: 23.7 %
LDLC SERPL CALC-MCNC: 161 MG/DL
NONHDLC SERPL-MCNC: 193 MG/DL
TRIGL SERPL-MCNC: 160 MG/DL

## 2018-04-09 PROCEDURE — 36415 COLL VENOUS BLD VENIPUNCTURE: CPT

## 2018-04-09 PROCEDURE — 93005 ELECTROCARDIOGRAM TRACING: CPT | Mod: S$GLB,,, | Performed by: INTERNAL MEDICINE

## 2018-04-09 PROCEDURE — 80061 LIPID PANEL: CPT

## 2018-04-09 PROCEDURE — 93010 ELECTROCARDIOGRAM REPORT: CPT | Mod: S$GLB,,, | Performed by: INTERNAL MEDICINE

## 2018-04-09 PROCEDURE — 3077F SYST BP >= 140 MM HG: CPT | Mod: CPTII,S$GLB,, | Performed by: INTERNAL MEDICINE

## 2018-04-09 PROCEDURE — 3079F DIAST BP 80-89 MM HG: CPT | Mod: CPTII,S$GLB,, | Performed by: INTERNAL MEDICINE

## 2018-04-09 PROCEDURE — 99214 OFFICE O/P EST MOD 30 MIN: CPT | Mod: S$GLB,,, | Performed by: INTERNAL MEDICINE

## 2018-04-09 PROCEDURE — 99999 PR PBB SHADOW E&M-EST. PATIENT-LVL III: CPT | Mod: PBBFAC,,, | Performed by: INTERNAL MEDICINE

## 2018-04-09 NOTE — PATIENT INSTRUCTIONS
Low-Salt Choices  Eating salt (sodium) can make your body retain too much water. Excess water makes your heart work harder. Canned, packaged, and frozen foods are easy to prepare, but they are often high in sodium. Here are some ideas for low-salt foods you can easily prepare yourself.    For Breakfast  · Fruit or fruit juice  · Bread or an English muffin  · Shredded wheat  · Corn tortillas  · Steamed rice, unsalted  · Hot cereal, regular (not instant) made without salt  Stay away from:  · Sausage, fallon, ham  · Flour tortillas  · Packaged muffins, pancakes, and biscuits  For Lunch and Dinner  · Fresh fish, chicken, turkey, or meat-- baked, broiled, or roasted without salt  · Dry beans, cooked without salt  · Tofu, stir-fried without salt  Stay away from:  · Lunch meat  · Cheese  · Tomato juice and catsup  · Canned vegetables, soups, fish  · Packaged gravies and sauces  · Olives, pickles, relish  · Bottled salad dressings  For Snacks and Desserts  · Yogurt  · Popcorn, air popped, unsalted  Stay away from:  · Pies  · Canned and packaged puddings  · Pretzels, chips, crackers, and nuts--unless the label says unsalted  © 4422-5501 [employer], [address] [place] [address], [place] [address]. All rights reserved. This information is not intended as a substitute for professional medical care. Always follow your healthcare professional's instructions.

## 2018-04-09 NOTE — PROGRESS NOTES
Subjective:       Patient ID: Christal Posey is a 58 y.o. female.    Chief Complaint: Chest Pain    Stabbing like chest pains on the L side, has had in past. current symptoms started 2 weeks ago for a few days. Restarted 3 days ago on and off. Lasts seconds at a time. denies chest pressure or tightness. No breast tenderness or mass. Symptoms have not been exertional. No pleuritic component, no sweating. No leg swelling.  otherwise feels fine. Last aerobic exercise was 4/5 and no symptoms.  No other new symptoms.      Review of Systems   Constitutional: Negative for activity change and unexpected weight change.   HENT: Negative for hearing loss, rhinorrhea and trouble swallowing.    Eyes: Negative for discharge and visual disturbance.   Respiratory: Negative for chest tightness and wheezing.    Cardiovascular: Positive for chest pain. Negative for palpitations.   Gastrointestinal: Negative for blood in stool, constipation, diarrhea and vomiting.        GERD well controlled and chest stabbing sense did not remind her of GERD symptoms     Endocrine: Negative for polydipsia and polyuria.   Genitourinary: Negative for difficulty urinating, dysuria, hematuria and menstrual problem.   Musculoskeletal: Positive for arthralgias. Negative for joint swelling and neck pain.   Neurological: Positive for headaches. Negative for weakness.   Psychiatric/Behavioral: Negative for confusion and dysphoric mood.       Objective:      Physical Exam   Constitutional: She is oriented to person, place, and time. She appears well-developed and well-nourished. No distress.   HENT:   Head: Normocephalic and atraumatic.   Eyes: Pupils are equal, round, and reactive to light. No scleral icterus.   Neck: Normal range of motion. No thyromegaly present.   Cardiovascular: Normal rate, regular rhythm and normal heart sounds.  Exam reveals no gallop and no friction rub.    No murmur heard.  No current chest symptoms, no pain on direct palpation.    Pulmonary/Chest: Effort normal and breath sounds normal. No respiratory distress. She has no wheezes. She has no rales.   Abdominal: Soft. Bowel sounds are normal. She exhibits no distension and no mass. There is no tenderness. There is no rebound and no guarding.   Musculoskeletal: Normal range of motion. She exhibits no edema or tenderness.   Lymphadenopathy:     She has no cervical adenopathy.   Neurological: She is alert and oriented to person, place, and time.   Skin: She is not diaphoretic.   Psychiatric: She has a normal mood and affect. Her speech is normal and behavior is normal. Cognition and memory are normal.       Assessment:       1. Atypical chest pain        Plan:       Christal was seen today for chest pain.    Diagnoses and all orders for this visit:    Atypical chest pain  -     EKG 12-lead; Future  normal  Symptoms not c/w cardiac disease, heart and lung exam normal as well. ekg is normal.  Advised to call/rtc if symptoms change, last longer than seconds, develop an exertional component.    Keep upcoming appt with Dr. Blane Brian MD       elevated BP: requested that pt check his BP twice per week at [facility]/other pharmacy, record the numbers, and call back if routinely >130/85, reiterated need for weight loss and salt restriction      cc Blane Brian MD

## 2018-04-16 ENCOUNTER — OFFICE VISIT (OUTPATIENT)
Dept: RHEUMATOLOGY | Facility: CLINIC | Age: 58
End: 2018-04-16
Payer: COMMERCIAL

## 2018-04-16 VITALS
HEART RATE: 75 BPM | WEIGHT: 216.19 LBS | SYSTOLIC BLOOD PRESSURE: 134 MMHG | RESPIRATION RATE: 18 BRPM | DIASTOLIC BLOOD PRESSURE: 93 MMHG | BODY MASS INDEX: 34.9 KG/M2

## 2018-04-16 DIAGNOSIS — R76.8 RHEUMATOID FACTOR POSITIVE: ICD-10-CM

## 2018-04-16 DIAGNOSIS — M25.50 POLYARTHRALGIA: Primary | ICD-10-CM

## 2018-04-16 PROCEDURE — 99999 PR PBB SHADOW E&M-EST. PATIENT-LVL III: CPT | Mod: PBBFAC,,, | Performed by: INTERNAL MEDICINE

## 2018-04-16 PROCEDURE — 3075F SYST BP GE 130 - 139MM HG: CPT | Mod: CPTII,S$GLB,, | Performed by: INTERNAL MEDICINE

## 2018-04-16 PROCEDURE — 99214 OFFICE O/P EST MOD 30 MIN: CPT | Mod: S$GLB,,, | Performed by: INTERNAL MEDICINE

## 2018-04-16 PROCEDURE — 3080F DIAST BP >= 90 MM HG: CPT | Mod: CPTII,S$GLB,, | Performed by: INTERNAL MEDICINE

## 2018-04-16 ASSESSMENT — ROUTINE ASSESSMENT OF PATIENT INDEX DATA (RAPID3)
WHEN YOU AWAKENED IN THE MORNING OVER THE LAST WEEK, PLEASE INDICATE THE AMOUNT OF TIME IT TAKES UNTIL YOU ARE AS LIMBER AS YOU WILL BE FOR THE DAY: 30 MIN
MDHAQ FUNCTION SCORE: .2
AM STIFFNESS SCORE: 1, YES
FATIGUE SCORE: 3
PATIENT GLOBAL ASSESSMENT SCORE: 5
TOTAL RAPID3 SCORE: 2.22
PAIN SCORE: 1
PSYCHOLOGICAL DISTRESS SCORE: 1.1

## 2018-04-16 NOTE — PROGRESS NOTES
" Patient ID: Christal Posey is a 57 y.o. female.     Chief Complaint: Follow-up     HPI::   Initial history: 57 yo F with PMH Migraines, IBS, GERD, breast CA s/p lumpectomy 6/2016 seen in consult for joint pain and swelling with positive RF. She states that her hand pain began about a years ago. It is mostly in the PIPs and she does sometimes report some swelling. She feels stiff for about 1 hour in the AM. She also has pain in the joints in her feet for about the last year as well. She has some knee pain and "cracking" without swelling that is more chronic. She does not sleep well and is fatigued. She has had a dry cough for about a year as well, but no other extra-articular manifestations. She was previously on Premarin that was stopped when the breast CA was found and thinks her symptoms are worse since stopping it. She uses Aleve which helps her pain as does ibuprofen though not quite as much, tylenol does not help. She has never smoked. Her paternal uncle has "arthritis." She took prednisone once is the past for sciatica and it made her "sick" where she felt very hot and just "not good."         Interval history: She is taking meloxicam and baclofen.  On occasion, she has pain in elbows, hands, knees, and feet. Reports she gets pain on occasion, last 3 weeks ago.  Rarely,she gets swelling. Reports stiffness in hands for 30 minutes to an hour. Denies any rashes, oral ulcers, fevers, or raynauds.  Pain level is 4/10, non-radiating, and aching. Reports meloxicam and baclofen improve her pain.      Review of Systems   Constitutional: Negative for activity change, chills, fatigue and fever.   HENT: Negative for mouth sores and trouble swallowing.    Eyes: Negative for pain and redness.   Respiratory: Negative for cough, chest tightness and shortness of breath.    Cardiovascular: Negative for chest pain.   Gastrointestinal: Negative for abdominal pain, diarrhea and nausea.   Genitourinary: Negative for dysuria. "   Musculoskeletal: Positive for arthralgias and neck stiffness. Negative for joint swelling, myalgias and neck pain.   Skin: Negative for color change and rash.   Neurological: Negative for weakness and numbness.   Hematological: Negative for adenopathy.   Psychiatric/Behavioral: Negative for sleep disturbance.          Objective:           Physical Exam   Constitutional: She is oriented to person, place, and time and well-developed, well-nourished, and in no distress. No distress.   HENT:   Head: Normocephalic and atraumatic.   Mouth/Throat: No oropharyngeal exudate.   Eyes: Conjunctivae are normal. No scleral icterus.   Neck: Normal range of motion. Neck supple. No thyromegaly present.   Cardiovascular: Normal rate, regular rhythm and normal heart sounds.    Pulmonary/Chest: Effort normal and breath sounds normal.   Abdominal: Soft. There is no tenderness.         Right Side Rheumatological Exam     Examination finds the shoulder, elbow, wrist, knee, 1st PIP, 1st MCP, 2nd PIP, 2nd MCP, 3rd PIP, 3rd MCP, 4th PIP, 4th MCP, 5th PIP and 5th MCP normal.     Left Side Rheumatological Exam     Examination finds the shoulder, elbow, wrist, knee, 1st PIP, 1st MCP, 2nd PIP, 2nd MCP, 3rd PIP, 3rd MCP, 4th PIP, 4th MCP, 5th PIP and 5th MCP normal.       Lymphadenopathy:     She has no cervical adenopathy.   Neurological: She is alert and oriented to person, place, and time.   Skin: Skin is warm and dry. No rash noted.   Musculoskeletal: Normal range of motion. She exhibits no edema or tenderness.        labs:  Reviewed;  Tammy-negative  Ccp-negative  RF-49    Assessment:    59 yo old female with PMH of hyperlipidemia, Migraines, left breast cancer status post lumpectomy (june 2016) here for evaluation of joint pain and +RF.  On exam, I do not detect synovitis. I told her that suspect her pain is from DJD. I will get xrays and labs before her next visit for further evaluation.  I told her that +RF does not indicate RA but we  will continue to monitor her.  Plan:     -continue baclofen 10mg qhs prn for neck stiffness  -continue mobic 15mg a day   Encouraged weight loss  -discussed diet that is low in fat and carbohydrates.    rtc in 3 months

## 2018-05-01 ENCOUNTER — OFFICE VISIT (OUTPATIENT)
Dept: INTERNAL MEDICINE | Facility: CLINIC | Age: 58
End: 2018-05-01
Payer: COMMERCIAL

## 2018-05-01 VITALS
HEART RATE: 84 BPM | WEIGHT: 214.5 LBS | BODY MASS INDEX: 34.47 KG/M2 | HEIGHT: 66 IN | DIASTOLIC BLOOD PRESSURE: 94 MMHG | SYSTOLIC BLOOD PRESSURE: 144 MMHG

## 2018-05-01 DIAGNOSIS — R07.89 ATYPICAL CHEST PAIN: Primary | ICD-10-CM

## 2018-05-01 DIAGNOSIS — H18.519 FUCHS' CORNEAL DYSTROPHY: ICD-10-CM

## 2018-05-01 DIAGNOSIS — E78.00 ELEVATED CHOLESTEROL: ICD-10-CM

## 2018-05-01 DIAGNOSIS — R03.0 ELEVATED BLOOD PRESSURE READING: ICD-10-CM

## 2018-05-01 PROCEDURE — 99214 OFFICE O/P EST MOD 30 MIN: CPT | Mod: S$GLB,,, | Performed by: INTERNAL MEDICINE

## 2018-05-01 PROCEDURE — 3074F SYST BP LT 130 MM HG: CPT | Mod: CPTII,S$GLB,, | Performed by: INTERNAL MEDICINE

## 2018-05-01 PROCEDURE — 3078F DIAST BP <80 MM HG: CPT | Mod: CPTII,S$GLB,, | Performed by: INTERNAL MEDICINE

## 2018-05-01 PROCEDURE — 99999 PR PBB SHADOW E&M-EST. PATIENT-LVL III: CPT | Mod: PBBFAC,,, | Performed by: INTERNAL MEDICINE

## 2018-05-01 RX ORDER — ROSUVASTATIN CALCIUM 5 MG/1
5 TABLET, COATED ORAL EVERY OTHER DAY
Qty: 45 TABLET | Refills: 3 | Status: SHIPPED | OUTPATIENT
Start: 2018-05-01 | End: 2019-11-18

## 2018-05-01 RX ORDER — LISINOPRIL 5 MG/1
5 TABLET ORAL DAILY
Qty: 90 TABLET | Refills: 3 | Status: SHIPPED | OUTPATIENT
Start: 2018-05-01 | End: 2018-11-16 | Stop reason: ALTCHOICE

## 2018-05-01 NOTE — PROGRESS NOTES
CHIEF COMPLAINT:  Followup.    HISTORY OF PRESENT ILLNESS:  The patient is a 58-year-old female who was seen   recently by Dr. Moy for atypical chest pain.  The patient reports she was   having pain in the sternal area.  It was sharp like an ice pick.  It will last   just a second, would have several stabbing pains, and it would go away.  It   might come back 10 minutes later or several hours later.  She was having four to   five episodes per day.  This lasted about a week.  She saw Dr. Moy.  Since   her visit with him, she has had two other episodes, none for the past two weeks.    The patient did have a cholesterol done, which came back showing a total   cholesterol was 253, LDL was 161.  The patient had been on pravastatin in the   past, but it gave her significant muscle ache.    REVIEW OF SYSTEMS:  The patient does report some sinus drainage as well as sinus   congestion.  She has been checking her blood pressure at home, has been ranging   anywhere from 115 to 143 systolic with diastolic between 74 and 87.  It was   143/95 this morning.  When her pressure readings were in the teens, it was in   January.  Over the past few months, it has been gradually trending up into the   140s.  The patient also reports that she slipped on some stairs hurting her left   ankle.  This is doing better, but still a little stiff.  The patient reports   she was seen by Dr. Perez and was told she had Fuchs dystrophy.  She would like   to get a second opinion about treatment.    PHYSICAL EXAMINATION:  GENERAL APPEARANCE:  She is in no acute distress.  HEENT:  Conjunctivae clear.  Pupils are equal.  TMs are clear.  Nasal septum is   midline without discharge.  Oropharynx is without erythema.  NECK:  Trachea is midline without JVD.  PULMONARY:  Good inspiratory and expiratory breath sounds are heard.  Lungs are   clear to auscultation.  CARDIOVASCULAR:  S1, S2.  EXTREMITIES:  Without edema.  ABDOMEN:  Nontender, nondistended,  without hepatosplenomegaly.  NEUROLOGIC:  The patient's left ankle was evaluated.  She had normal range of   motion.  She has some slight swelling along the lateral malleolus.    ASSESSMENT:  1.  Atypical chest pain.  2.  Elevated blood pressure.  3.  Hyperlipidemia.  4.  History of statin intolerance.  5.  Fuchs corneal dystrophy.    PLAN:  We will put the patient in to see Ophthalmology for a second opinion.  We   will also start the patient on Crestor 5 mg every other day.  We will also   start her on lisinopril 5 mg once a day.  Adverse effects including cough and   angioedema were discussed.  She is to follow up in one month for reevaluation.      JDS/HN  dd: 05/01/2018 08:31:23 (CDT)  td: 05/01/2018 13:56:32 (CDT)  Doc ID   #[id number]  Job ID #[id number]    CC:     Answers for HPI/ROS submitted by the patient on 4/28/2018   activity change: No  unexpected weight change: No  neck pain: No  hearing loss: No  rhinorrhea: No  trouble swallowing: No  eye discharge: No  visual disturbance: No  chest tightness: No  wheezing: No  chest pain: No  palpitations: No  blood in stool: No  constipation: No  vomiting: No  diarrhea: No  polydipsia: No  polyuria: No  difficulty urinating: No  hematuria: No  menstrual problem: No  dysuria: No  joint swelling: No  arthralgias: Yes  headaches: No  weakness: No  confusion: No  dysphoric mood: No

## 2018-05-22 ENCOUNTER — HOSPITAL ENCOUNTER (OUTPATIENT)
Dept: RADIOLOGY | Facility: HOSPITAL | Age: 58
Discharge: HOME | End: 2018-05-22
Attending: NURSE PRACTITIONER
Payer: COMMERCIAL

## 2018-05-22 ENCOUNTER — DOCUMENTATION ONLY (OUTPATIENT)
Dept: RADIOLOGY | Facility: HOSPITAL | Age: 58
End: 2018-05-22

## 2018-05-22 ENCOUNTER — OFFICE VISIT (OUTPATIENT)
Dept: SURGERY | Facility: CLINIC | Age: 58
End: 2018-05-22
Payer: COMMERCIAL

## 2018-05-22 VITALS
DIASTOLIC BLOOD PRESSURE: 78 MMHG | BODY MASS INDEX: 34.28 KG/M2 | SYSTOLIC BLOOD PRESSURE: 122 MMHG | WEIGHT: 213.31 LBS | TEMPERATURE: 98 F | HEIGHT: 66 IN | HEART RATE: 73 BPM | BODY MASS INDEX: 34.39 KG/M2 | HEIGHT: 66 IN | WEIGHT: 214 LBS

## 2018-05-22 DIAGNOSIS — Z85.3 PERSONAL HISTORY OF BREAST CANCER: Primary | ICD-10-CM

## 2018-05-22 DIAGNOSIS — Z85.3 PERSONAL HISTORY OF BREAST CANCER: ICD-10-CM

## 2018-05-22 PROCEDURE — 3074F SYST BP LT 130 MM HG: CPT | Mod: CPTII,S$GLB,, | Performed by: NURSE PRACTITIONER

## 2018-05-22 PROCEDURE — 99213 OFFICE O/P EST LOW 20 MIN: CPT | Mod: S$GLB,,, | Performed by: NURSE PRACTITIONER

## 2018-05-22 PROCEDURE — 77062 BREAST TOMOSYNTHESIS BI: CPT | Mod: 26,,, | Performed by: RADIOLOGY

## 2018-05-22 PROCEDURE — 99999 PR PBB SHADOW E&M-EST. PATIENT-LVL IV: CPT | Mod: PBBFAC,,, | Performed by: NURSE PRACTITIONER

## 2018-05-22 PROCEDURE — 77062 BREAST TOMOSYNTHESIS BI: CPT | Mod: TC,PO

## 2018-05-22 PROCEDURE — 77066 DX MAMMO INCL CAD BI: CPT | Mod: 26,,, | Performed by: RADIOLOGY

## 2018-05-22 PROCEDURE — 3078F DIAST BP <80 MM HG: CPT | Mod: CPTII,S$GLB,, | Performed by: NURSE PRACTITIONER

## 2018-05-22 PROCEDURE — 3008F BODY MASS INDEX DOCD: CPT | Mod: CPTII,S$GLB,, | Performed by: NURSE PRACTITIONER

## 2018-05-22 RX ORDER — MUPIROCIN 20 MG/G
OINTMENT TOPICAL
COMMUNITY
Start: 2018-05-08 | End: 2020-12-01

## 2018-05-22 RX ORDER — BUDESONIDE 1 MG/2ML
INHALANT ORAL
COMMUNITY
Start: 2018-05-08 | End: 2024-01-03

## 2018-05-22 NOTE — PROGRESS NOTES
Spoke with patient in person. Reviewed breast biopsy procedure and reviewed instructions for breast biopsy. Patient expressed understanding and all questions were answered. Provided patient with my phone number to call for any further concerns or questions.   Patient scheduled breast biopsy at the [facility] on 6/8/18.

## 2018-05-22 NOTE — PROGRESS NOTES
Subjective:      Patient ID: Christal Posey is a 58 y.o. female.    Chief Complaint: Breast Cancer Screening (CBE/Hx of Breast Cancer)      HPI: (PF, EPF - 1-3) (Detailed, Comp, - 4)patient presents today for breast cancer surveillance. Patient denies palpable breast mass, pain, nipple discharge, redness, increased warmth, unexplained weight loss, new onset bone pain.      Last mmg 5- with no abnormality      5- core biopsy left breast with low grade DCIS, ER/OK +  6- left lumpectomy with 1mm area of DCIS, negative margins. She met with Dr Guzmán who discussed XRT, small low grade DCIS, was in favor of hormonal therapy instead of XRT. Met with Dr Julian with no endocrine therapy. No adjuvant therapy        Review of Systems   Constitutional: Negative for appetite change and fatigue.   Respiratory: Negative for cough and shortness of breath.    Cardiovascular: Negative for chest pain.   Musculoskeletal: Negative for back pain.     Objective:   Physical Exam   Pulmonary/Chest: Right breast exhibits no inverted nipple, no mass, no nipple discharge, no skin change and no tenderness. Left breast exhibits no mass, no nipple discharge, no skin change and no tenderness. Breasts are symmetrical. There is no breast swelling.   Lymphadenopathy:     She has no cervical adenopathy.     She has no axillary adenopathy.        Right: No supraclavicular adenopathy present.        Left: No supraclavicular adenopathy present.     Assessment:       1. Personal history of breast cancer        Plan:       diag mmg today, new calcifications seen in area of previous lumpectomy, considered suspicious with biopsy recommended. Discussed results with patient, discussed possible post surgical changes/fat necrosis which can form calcifications, in addition to recurrence of her cancer (DCIS previously detected by calcifications on mmg). Core biopsy will be scheduled, f/u pending results

## 2018-06-01 ENCOUNTER — OFFICE VISIT (OUTPATIENT)
Dept: INTERNAL MEDICINE | Facility: CLINIC | Age: 58
End: 2018-06-01
Payer: COMMERCIAL

## 2018-06-01 ENCOUNTER — LAB VISIT (OUTPATIENT)
Dept: LAB | Facility: HOSPITAL | Age: 58
End: 2018-06-01
Attending: INTERNAL MEDICINE
Payer: COMMERCIAL

## 2018-06-01 VITALS
HEART RATE: 65 BPM | HEIGHT: 66 IN | TEMPERATURE: 98 F | SYSTOLIC BLOOD PRESSURE: 118 MMHG | DIASTOLIC BLOOD PRESSURE: 64 MMHG | WEIGHT: 211.88 LBS | BODY MASS INDEX: 34.05 KG/M2 | OXYGEN SATURATION: 96 %

## 2018-06-01 DIAGNOSIS — E78.5 HYPERLIPIDEMIA, UNSPECIFIED HYPERLIPIDEMIA TYPE: ICD-10-CM

## 2018-06-01 DIAGNOSIS — I10 ESSENTIAL HYPERTENSION: ICD-10-CM

## 2018-06-01 DIAGNOSIS — I10 ESSENTIAL HYPERTENSION: Primary | ICD-10-CM

## 2018-06-01 LAB
ALBUMIN SERPL BCP-MCNC: 3.8 G/DL
ALP SERPL-CCNC: 101 U/L
ALT SERPL W/O P-5'-P-CCNC: 25 U/L
ANION GAP SERPL CALC-SCNC: 9 MMOL/L
AST SERPL-CCNC: 18 U/L
BILIRUB SERPL-MCNC: 0.7 MG/DL
BUN SERPL-MCNC: 11 MG/DL
CALCIUM SERPL-MCNC: 9.6 MG/DL
CHLORIDE SERPL-SCNC: 106 MMOL/L
CHOLEST SERPL-MCNC: 155 MG/DL
CHOLEST/HDLC SERPL: 2.7 {RATIO}
CO2 SERPL-SCNC: 28 MMOL/L
CREAT SERPL-MCNC: 0.9 MG/DL
EST. GFR  (AFRICAN AMERICAN): >60 ML/MIN/1.73 M^2
EST. GFR  (NON AFRICAN AMERICAN): >60 ML/MIN/1.73 M^2
GLUCOSE SERPL-MCNC: 95 MG/DL
HDLC SERPL-MCNC: 57 MG/DL
HDLC SERPL: 36.8 %
LDLC SERPL CALC-MCNC: 76.8 MG/DL
NONHDLC SERPL-MCNC: 98 MG/DL
POTASSIUM SERPL-SCNC: 4.1 MMOL/L
PROT SERPL-MCNC: 7.3 G/DL
SODIUM SERPL-SCNC: 143 MMOL/L
TRIGL SERPL-MCNC: 106 MG/DL

## 2018-06-01 PROCEDURE — 3078F DIAST BP <80 MM HG: CPT | Mod: CPTII,S$GLB,, | Performed by: INTERNAL MEDICINE

## 2018-06-01 PROCEDURE — 80053 COMPREHEN METABOLIC PANEL: CPT

## 2018-06-01 PROCEDURE — 36415 COLL VENOUS BLD VENIPUNCTURE: CPT

## 2018-06-01 PROCEDURE — 99213 OFFICE O/P EST LOW 20 MIN: CPT | Mod: S$GLB,,, | Performed by: INTERNAL MEDICINE

## 2018-06-01 PROCEDURE — 80061 LIPID PANEL: CPT

## 2018-06-01 PROCEDURE — 99999 PR PBB SHADOW E&M-EST. PATIENT-LVL III: CPT | Mod: PBBFAC,,, | Performed by: INTERNAL MEDICINE

## 2018-06-01 PROCEDURE — 3008F BODY MASS INDEX DOCD: CPT | Mod: CPTII,S$GLB,, | Performed by: INTERNAL MEDICINE

## 2018-06-01 PROCEDURE — 3074F SYST BP LT 130 MM HG: CPT | Mod: CPTII,S$GLB,, | Performed by: INTERNAL MEDICINE

## 2018-06-01 NOTE — PROGRESS NOTES
CHIEF COMPLAINT:  Followup of blood pressure, plus cholesterol.    HISTORY OF PRESENT ILLNESS:  The patient is a 58-year-old female who comes in   today for followup of hypertension as well as cholesterol.  The patient was   started on lisinopril 5 mg one p.o. every day.  No adverse effects are noted   with the medication, no cough, no angioedema.  The patient has been checking her   blood.  Her systolic pressure readings have been in the 110-130 range with most   of the readings between 110 and 120.  The diastolics have been in the 70s.  The   patient was also started on Crestor 5 mg one p.o. every day.  No muscle aches   or joint pain reported so far.  The patient is fasting for a blood test today.    REVIEW OF SYSTEMS:  Please see the patient entered review of systems.  Again,   she reports no cough, no angioedema, no dizziness, no lightheadedness.    PHYSICAL EXAMINATION:  VITAL SIGNS:  Repeat blood pressure taken by myself was 110/70.  HEENT:  Trachea was midline without JVD.  PULMONARY:  Good inspiratory and expiratory breath sounds are heard.  Lungs are   clear to auscultation.  CARDIOVASCULAR:  S1, S2.  Rhythm was regular.  EXTREMITIES:  With trace edema.  ABDOMEN:  Nontender, nondistended, without hepatosplenomegaly.    ASSESSMENT:  1.  Hypertension.  2.  Hyperlipidemia.    PLAN:  We will check a CMP, lipid panel today.  She is to follow up in October   for a physical.      JDS/HN  dd: 06/01/2018 08:38:13 (CDT)  td: 06/01/2018 23:21:22 (CDT)  Doc ID   #[id number]  Job ID #[id number]    CC:     Answers for HPI/ROS submitted by the patient on 5/31/2018   activity change: No  unexpected weight change: No  neck pain: No  hearing loss: No  rhinorrhea: No  trouble swallowing: No  eye discharge: No  visual disturbance: No  chest tightness: No  wheezing: No  chest pain: No  palpitations: No  blood in stool: No  constipation: No  vomiting: No  diarrhea: No  polydipsia: No  polyuria: No  difficulty urinating: No  hematuria:  No  menstrual problem: No  dysuria: No  joint swelling: No  arthralgias: Yes  headaches: No  weakness: No  confusion: No  dysphoric mood: No

## 2018-06-08 ENCOUNTER — HOSPITAL ENCOUNTER (OUTPATIENT)
Dept: RADIOLOGY | Facility: HOSPITAL | Age: 58
Discharge: HOME | End: 2018-06-08
Attending: NURSE PRACTITIONER
Payer: COMMERCIAL

## 2018-06-08 DIAGNOSIS — R92.8 ABNORMAL MAMMOGRAM: ICD-10-CM

## 2018-06-08 PROCEDURE — 77065 DX MAMMO INCL CAD UNI: CPT | Mod: 26,LT,, | Performed by: RADIOLOGY

## 2018-06-08 PROCEDURE — 77065 DX MAMMO INCL CAD UNI: CPT | Mod: TC,PO,LT

## 2018-07-11 ENCOUNTER — LAB VISIT (OUTPATIENT)
Dept: LAB | Facility: HOSPITAL | Age: 58
End: 2018-07-11
Attending: INTERNAL MEDICINE
Payer: COMMERCIAL

## 2018-07-11 DIAGNOSIS — M25.50 POLYARTHRALGIA: ICD-10-CM

## 2018-07-11 LAB
ALBUMIN SERPL BCP-MCNC: 3.8 G/DL
ALP SERPL-CCNC: 104 U/L
ALT SERPL W/O P-5'-P-CCNC: 23 U/L
ANION GAP SERPL CALC-SCNC: 7 MMOL/L
AST SERPL-CCNC: 26 U/L
BASOPHILS # BLD AUTO: 0.03 K/UL
BASOPHILS NFR BLD: 0.4 %
BILIRUB SERPL-MCNC: 0.4 MG/DL
BUN SERPL-MCNC: 11 MG/DL
CALCIUM SERPL-MCNC: 9.9 MG/DL
CCP AB SER IA-ACNC: <0.5 U/ML
CHLORIDE SERPL-SCNC: 106 MMOL/L
CO2 SERPL-SCNC: 29 MMOL/L
CREAT SERPL-MCNC: 1 MG/DL
CRP SERPL-MCNC: 4.4 MG/L
DIFFERENTIAL METHOD: NORMAL
EOSINOPHIL # BLD AUTO: 0.3 K/UL
EOSINOPHIL NFR BLD: 3.6 %
ERYTHROCYTE [DISTWIDTH] IN BLOOD BY AUTOMATED COUNT: 14.4 %
ERYTHROCYTE [SEDIMENTATION RATE] IN BLOOD BY WESTERGREN METHOD: 12 MM/HR
EST. GFR  (AFRICAN AMERICAN): >60 ML/MIN/1.73 M^2
EST. GFR  (NON AFRICAN AMERICAN): >60 ML/MIN/1.73 M^2
GLUCOSE SERPL-MCNC: 99 MG/DL
HCT VFR BLD AUTO: 40.8 %
HGB BLD-MCNC: 13.4 G/DL
LYMPHOCYTES # BLD AUTO: 2.3 K/UL
LYMPHOCYTES NFR BLD: 28.3 %
MCH RBC QN AUTO: 29.2 PG
MCHC RBC AUTO-ENTMCNC: 32.8 G/DL
MCV RBC AUTO: 89 FL
MONOCYTES # BLD AUTO: 0.5 K/UL
MONOCYTES NFR BLD: 5.8 %
NEUTROPHILS # BLD AUTO: 5 K/UL
NEUTROPHILS NFR BLD: 61.8 %
PLATELET # BLD AUTO: 327 K/UL
PMV BLD AUTO: 10.7 FL
POTASSIUM SERPL-SCNC: 4.4 MMOL/L
PROT SERPL-MCNC: 7.5 G/DL
RBC # BLD AUTO: 4.59 M/UL
RHEUMATOID FACT SERPL-ACNC: 43 IU/ML
SODIUM SERPL-SCNC: 142 MMOL/L
WBC # BLD AUTO: 8.06 K/UL

## 2018-07-11 PROCEDURE — 85025 COMPLETE CBC W/AUTO DIFF WBC: CPT

## 2018-07-11 PROCEDURE — 80053 COMPREHEN METABOLIC PANEL: CPT

## 2018-07-11 PROCEDURE — 85651 RBC SED RATE NONAUTOMATED: CPT

## 2018-07-11 PROCEDURE — 86140 C-REACTIVE PROTEIN: CPT

## 2018-07-11 PROCEDURE — 86200 CCP ANTIBODY: CPT

## 2018-07-11 PROCEDURE — 86431 RHEUMATOID FACTOR QUANT: CPT

## 2018-07-11 PROCEDURE — 36415 COLL VENOUS BLD VENIPUNCTURE: CPT

## 2018-07-17 ENCOUNTER — OFFICE VISIT (OUTPATIENT)
Dept: RHEUMATOLOGY | Facility: CLINIC | Age: 58
End: 2018-07-17
Payer: COMMERCIAL

## 2018-07-17 VITALS
HEIGHT: 66 IN | HEART RATE: 72 BPM | SYSTOLIC BLOOD PRESSURE: 116 MMHG | BODY MASS INDEX: 34.23 KG/M2 | WEIGHT: 213 LBS | DIASTOLIC BLOOD PRESSURE: 68 MMHG

## 2018-07-17 DIAGNOSIS — R76.8 RHEUMATOID FACTOR POSITIVE: ICD-10-CM

## 2018-07-17 DIAGNOSIS — M15.9 PRIMARY OSTEOARTHRITIS INVOLVING MULTIPLE JOINTS: ICD-10-CM

## 2018-07-17 PROCEDURE — 99214 OFFICE O/P EST MOD 30 MIN: CPT | Mod: S$GLB,,, | Performed by: INTERNAL MEDICINE

## 2018-07-17 PROCEDURE — 3008F BODY MASS INDEX DOCD: CPT | Mod: CPTII,S$GLB,, | Performed by: INTERNAL MEDICINE

## 2018-07-17 PROCEDURE — 99999 PR PBB SHADOW E&M-EST. PATIENT-LVL III: CPT | Mod: PBBFAC,,, | Performed by: INTERNAL MEDICINE

## 2018-07-17 NOTE — PROGRESS NOTES
" Patient ID: Christal Posey is a 57 y.o. female.     Chief Complaint: Follow-up     HPI::   Initial history: 57 yo F with PMH Migraines, IBS, GERD, breast CA s/p lumpectomy 6/2016 seen in consult for joint pain and swelling with positive RF. She states that her hand pain began about a years ago. It is mostly in the PIPs and she does sometimes report some swelling. She feels stiff for about 1 hour in the AM. She also has pain in the joints in her feet for about the last year as well. She has some knee pain and "cracking" without swelling that is more chronic. She does not sleep well and is fatigued. She has had a dry cough for about a year as well, but no other extra-articular manifestations. She was previously on Premarin that was stopped when the breast CA was found and thinks her symptoms are worse since stopping it. She uses Aleve which helps her pain as does ibuprofen though not quite as much, tylenol does not help. She has never smoked. Her paternal uncle has "arthritis." She took prednisone once is the past for sciatica and it made her "sick" where she felt very hot and just "not good."         Interval history: She is taking meloxicam and baclofen.  On occasion, she has pain in elbows, hands, knees, and feet. Reports she gets pain on occasion, last 3 weeks ago.  Rarely,she gets swelling. Reports stiffness in hands for 30 minutes to an hour. Denies any rashes, oral ulcers, fevers, or raynauds.  Pain level is 4/10, non-radiating, and aching. Reports meloxicam and baclofen improve her pain.      Review of Systems   Constitutional: Negative for activity change, chills, fatigue and fever.   HENT: Negative for mouth sores and trouble swallowing.    Eyes: Negative for pain and redness.   Respiratory: Negative for cough, chest tightness and shortness of breath.    Cardiovascular: Negative for chest pain.   Gastrointestinal: Negative for abdominal pain, diarrhea and nausea.   Genitourinary: Negative for dysuria. "   Musculoskeletal: Positive for arthralgias and neck stiffness. Negative for joint swelling, myalgias and neck pain.   Skin: Negative for color change and rash.   Neurological: Negative for weakness and numbness.   Hematological: Negative for adenopathy.   Psychiatric/Behavioral: Negative for sleep disturbance.          Objective:           Physical Exam   Constitutional: She is oriented to person, place, and time and well-developed, well-nourished, and in no distress. No distress.   HENT:   Head: Normocephalic and atraumatic.   Mouth/Throat: No oropharyngeal exudate.   Eyes: Conjunctivae are normal. No scleral icterus.   Neck: Normal range of motion. Neck supple. No thyromegaly present.   Cardiovascular: Normal rate, regular rhythm and normal heart sounds.    Pulmonary/Chest: Effort normal and breath sounds normal.   Abdominal: Soft. There is no tenderness.         Right Side Rheumatological Exam     Examination finds the shoulder, elbow, wrist, knee, 1st PIP, 1st MCP, 2nd PIP, 2nd MCP, 3rd PIP, 3rd MCP, 4th PIP, 4th MCP, 5th PIP and 5th MCP normal.     Left Side Rheumatological Exam     Examination finds the shoulder, elbow, wrist, knee, 1st PIP, 1st MCP, 2nd PIP, 2nd MCP, 3rd PIP, 3rd MCP, 4th PIP, 4th MCP, 5th PIP and 5th MCP normal.       Lymphadenopathy:     She has no cervical adenopathy.   Neurological: She is alert and oriented to person, place, and time.   Skin: Skin is warm and dry. No rash noted.   Musculoskeletal: Normal range of motion. She exhibits no edema or tenderness.        labs:  Reviewed;  Tammy-negative  Ccp-negative  RF-49    Arthritis survey (2017): I personally reviewed;  No significant detrimental interval change since the examination of 9/26/16 is appreciated    Assessment:    59 yo old female with PMH of hyperlipidemia, Migraines, left breast cancer status post lumpectomy (june 2016) here for evaluation of joint pain and +RF.  I suspect patient has early RA as she is having more episodes  of hand stiffness.  I told her that +RF does not indicate RA but we will continue to monitor her. I discussed with her adding plaquenil but she prefers to wait.  Plan:     -continue baclofen 10mg qhs prn for neck stiffness  -continue mobic 15mg a day   Encouraged weight loss  -discussed diet that is low in fat and carbohydrates.    rtc in 3 months

## 2018-08-07 ENCOUNTER — TELEPHONE (OUTPATIENT)
Dept: HEMATOLOGY/ONCOLOGY | Facility: CLINIC | Age: 58
End: 2018-08-07

## 2018-08-07 NOTE — TELEPHONE ENCOUNTER
Returned call, spoke with patient and explained that we had her scheduled to see the NP as they assist with seeing Dr. Julian's patients when her availability is minimal. She stated she would wait for the next available with Dr. Julian. Bobby washington mailed out

## 2018-08-07 NOTE — TELEPHONE ENCOUNTER
----- Message from Silvina Deras sent at 8/7/2018  1:08 PM CDT -----  Contact: Pt  Next available with Eliel if she doesn't want to see Monica or FRITZ - she's a 6 month f/u     ----- Message -----  From: Cathryn Batista  Sent: 8/7/2018   1:03 PM  To: Eliel ROSADO Staff    Please advise   ----- Message -----  From: Andie Tristan  Sent: 8/7/2018   8:32 AM  To: Cathryn Batista    Pt called to reschedule appt 8/23 and would like to see Dr Julian only and not a NP  Callback#[phone]  Thank You  JOVANNI Tristan

## 2018-11-01 DIAGNOSIS — D05.10 DUCTAL CARCINOMA IN SITU (DCIS) OF BREAST, UNSPECIFIED LATERALITY: Primary | ICD-10-CM

## 2018-11-05 ENCOUNTER — LAB VISIT (OUTPATIENT)
Dept: LAB | Facility: HOSPITAL | Age: 58
End: 2018-11-05
Payer: COMMERCIAL

## 2018-11-05 ENCOUNTER — OFFICE VISIT (OUTPATIENT)
Dept: HEMATOLOGY/ONCOLOGY | Facility: CLINIC | Age: 58
End: 2018-11-05
Payer: COMMERCIAL

## 2018-11-05 VITALS
DIASTOLIC BLOOD PRESSURE: 81 MMHG | TEMPERATURE: 98 F | RESPIRATION RATE: 20 BRPM | SYSTOLIC BLOOD PRESSURE: 136 MMHG | OXYGEN SATURATION: 99 % | BODY MASS INDEX: 33.2 KG/M2 | HEART RATE: 71 BPM | WEIGHT: 205.69 LBS

## 2018-11-05 DIAGNOSIS — D05.10 DUCTAL CARCINOMA IN SITU (DCIS) OF BREAST, UNSPECIFIED LATERALITY: ICD-10-CM

## 2018-11-05 DIAGNOSIS — D05.12 DUCTAL CARCINOMA IN SITU (DCIS) OF LEFT BREAST: Primary | Chronic | ICD-10-CM

## 2018-11-05 LAB
ALBUMIN SERPL BCP-MCNC: 3.7 G/DL
ALP SERPL-CCNC: 94 U/L
ALT SERPL W/O P-5'-P-CCNC: 17 U/L
ANION GAP SERPL CALC-SCNC: 10 MMOL/L
AST SERPL-CCNC: 16 U/L
BILIRUB SERPL-MCNC: 0.5 MG/DL
BUN SERPL-MCNC: 10 MG/DL
CALCIUM SERPL-MCNC: 9.5 MG/DL
CHLORIDE SERPL-SCNC: 106 MMOL/L
CO2 SERPL-SCNC: 26 MMOL/L
CREAT SERPL-MCNC: 1.1 MG/DL
ERYTHROCYTE [DISTWIDTH] IN BLOOD BY AUTOMATED COUNT: 13 %
EST. GFR  (AFRICAN AMERICAN): >60 ML/MIN/1.73 M^2
EST. GFR  (NON AFRICAN AMERICAN): 56.3 ML/MIN/1.73 M^2
GLUCOSE SERPL-MCNC: 102 MG/DL
HCT VFR BLD AUTO: 45.3 %
HGB BLD-MCNC: 14.1 G/DL
IMM GRANULOCYTES # BLD AUTO: 0.02 K/UL
MCH RBC QN AUTO: 28.2 PG
MCHC RBC AUTO-ENTMCNC: 31.1 G/DL
MCV RBC AUTO: 91 FL
NEUTROPHILS # BLD AUTO: 5 K/UL
PLATELET # BLD AUTO: 297 K/UL
PMV BLD AUTO: 10.9 FL
POTASSIUM SERPL-SCNC: 4 MMOL/L
PROT SERPL-MCNC: 7.8 G/DL
RBC # BLD AUTO: 5 M/UL
SODIUM SERPL-SCNC: 142 MMOL/L
WBC # BLD AUTO: 7.85 K/UL

## 2018-11-05 PROCEDURE — 3079F DIAST BP 80-89 MM HG: CPT | Mod: CPTII,S$GLB,, | Performed by: INTERNAL MEDICINE

## 2018-11-05 PROCEDURE — 80053 COMPREHEN METABOLIC PANEL: CPT

## 2018-11-05 PROCEDURE — 99999 PR PBB SHADOW E&M-EST. PATIENT-LVL III: CPT | Mod: PBBFAC,,, | Performed by: INTERNAL MEDICINE

## 2018-11-05 PROCEDURE — 36415 COLL VENOUS BLD VENIPUNCTURE: CPT

## 2018-11-05 PROCEDURE — 3075F SYST BP GE 130 - 139MM HG: CPT | Mod: CPTII,S$GLB,, | Performed by: INTERNAL MEDICINE

## 2018-11-05 PROCEDURE — 3008F BODY MASS INDEX DOCD: CPT | Mod: CPTII,S$GLB,, | Performed by: INTERNAL MEDICINE

## 2018-11-05 PROCEDURE — 99214 OFFICE O/P EST MOD 30 MIN: CPT | Mod: S$GLB,,, | Performed by: INTERNAL MEDICINE

## 2018-11-05 PROCEDURE — 85027 COMPLETE CBC AUTOMATED: CPT

## 2018-11-05 NOTE — PROGRESS NOTES
Subjective:       Patient ID: Christal Posey is a 58 y.o. female.    Chief Complaint: Follow-up    HPI     Mrs. Posey presents for follow-up if DCIS  Monitored by surveillance- opted out of adjuvant endocrine with low volume disease  Doing well  Next mammogram due in 12/2018- her 5/18 scan had question of calcifications that were not seen when biopsy attempted    In the interval- 2 attacks with colitis- 1st attack 10/4/18 -she had to go to the ER in [facility] there 13.58  H/H 13.6/42.7  She had BRPPR  She had a CT scan revealing colitis- given Cipro and Flagyl  CT report states: no acute intra-abdominal or pelvic abnormality  Sigmoid diverticulosis, no acute diverticulitis  2nd attack 10/29/18 this was 2 days prior to planned colonoscopy  GI (Dr. Ned Wiggins at [facility])  Told she has ischemic colitis  Report:  Localized erythema, congestion, ulcerration, and petechiae  Stigma of recent bleeding    1 flat polyp removed  Ulcers and irritation noted    Reports today she is feeling much better  Fatigue slowly improving    DCIS History:  5/16/16 Screening mammography:  Calcifications in the left breast require additional evaluation.    ACR BI-RADS Category 0: Incomplete: Need Additional Imaging Evaluation  - 5/17/16 Diagnostic mammogram  Calcifications in the left breast are suspicious.  Histology using core biopsy  is recommended.  ACR BI-RADS Category 4: Suspicious Abnormality  - 5/20/16 biopsy  1, 2. LEFT BREAST, WITH CALCIFICATIONS AND WITHOUT CALCIFICATIONS, UPPER INNER QUADRANT  (NEEDLE BIOPSY):  -Low-grade ductal carcinoma in situ (DCIS)  -Nuclear grade 1 out of 3  -Cribriform pattern  -Associated with microcalcifications  %, % positive  - 6/10/2016 Lumpectomy  Pathology:  Procedure - Excision with wire guided localization  Lymph node sampling - Not sampled  Specimen laterality - Left  Tumor site - Not specified  Size of DCIS - 2mm, including findings from the biopsy report  Histologic type - Ductal  carcinoma in situ  Architectural pattern - Solid and cribriforming  Nuclear grade - Low  Necrosis - Absent  Margins - Negative, inferior is 2mm  Pathologc staging - p Tis Nx Mx  Hormone receptors (See Breast Biopsy report; MS16 - 14540)  Estrogen receptor - Positive, strong, 100%  Progesterone receptors - Positive, strong, 100%    Review of Systems   Constitutional: Negative for appetite change and unexpected weight change.   Eyes: Negative for visual disturbance.   Respiratory: Positive for cough. Negative for shortness of breath.    Cardiovascular: Negative for chest pain.   Gastrointestinal: Positive for abdominal pain and diarrhea.   Genitourinary: Negative for frequency.   Musculoskeletal: Negative for back pain.   Skin: Negative for rash.   Neurological: Positive for headaches.   Hematological: Negative for adenopathy.   Psychiatric/Behavioral: The patient is not nervous/anxious.        Objective:      Physical Exam   Constitutional: She is oriented to person, place, and time. She appears well-developed and well-nourished. No distress.   Presents alone   HENT:   Head: Normocephalic and atraumatic.   Right Ear: External ear normal.   Left Ear: External ear normal.   Mouth/Throat: Oropharynx is clear and moist. No oropharyngeal exudate.   Eyes: Conjunctivae and EOM are normal. Pupils are equal, round, and reactive to light. Right eye exhibits no discharge. Left eye exhibits no discharge. No scleral icterus. Right pupil is round and reactive. Left pupil is round and reactive.   Neck: Normal range of motion. Neck supple. No JVD present. No tracheal deviation present. No thyromegaly present.   Cardiovascular: Normal rate, regular rhythm, normal heart sounds and intact distal pulses. Exam reveals no gallop and no friction rub.   No murmur heard.  Pulmonary/Chest: Effort normal and breath sounds normal. No respiratory distress. She has no wheezes. She has no rales.   Abdominal: Soft. Bowel sounds are normal. She  exhibits no distension and no mass. There is no hepatosplenomegaly. There is no tenderness. There is no rebound and no guarding.   Musculoskeletal: Normal range of motion. She exhibits no edema or tenderness.   Lymphadenopathy:        Head (right side): No submandibular adenopathy present.        Head (left side): No submandibular adenopathy present.     She has no cervical adenopathy.        Right cervical: No superficial cervical, no deep cervical and no posterior cervical adenopathy present.       Left cervical: No superficial cervical, no deep cervical and no posterior cervical adenopathy present.        Right: No inguinal and no supraclavicular adenopathy present.        Left: No inguinal and no supraclavicular adenopathy present.   Neurological: She is alert and oriented to person, place, and time. She has normal strength and normal reflexes. No cranial nerve deficit or sensory deficit. Coordination normal.   Skin: Skin is warm and dry. No bruising, no lesion, no petechiae and no rash noted. She is not diaphoretic. No erythema. No pallor.   Psychiatric: She has a normal mood and affect. Her behavior is normal. Judgment and thought content normal. Her mood appears not anxious. She does not exhibit a depressed mood.   Nursing note and vitals reviewed.      Assessment:       1. Ductal carcinoma in situ (DCIS) of left breast        Plan:     1. Opted out of endocrine therapy  Follow up post next imaging  Knows to call for any issues    25 minutes total  Distress Screening Results: Psychosocial Distress screening score of Distress Score: 0 noted and reviewed. No intervention indicated.

## 2018-11-11 PROBLEM — K63.5 COLON POLYP: Status: ACTIVE | Noted: 2018-10-31

## 2018-11-15 ENCOUNTER — TELEPHONE (OUTPATIENT)
Dept: SURGERY | Facility: CLINIC | Age: 58
End: 2018-11-15

## 2018-11-15 NOTE — TELEPHONE ENCOUNTER
Returned the patient call regarding the message below.  The patient is scheduled to be seen on Wednesday 11/28/18 2:15 pm mammogram and 3:45 pm breats exam with Paz Phillips NP, both appointments are scheduled at [facility].  The patient voiced understanding of appointment date, time, and location.  Reminder letter mailed to the patient.

## 2018-11-15 NOTE — TELEPHONE ENCOUNTER
----- Message from Pratik Penny sent at 11/15/2018  3:48 PM CST -----  Contact: Pt  Scheduling an appt    Who called: Pt  Message: Calling to schedule annual appt as well as the visit with the provider.   Contact information: [phone]   Additional Information: N/A

## 2018-11-16 ENCOUNTER — LAB VISIT (OUTPATIENT)
Dept: LAB | Facility: HOSPITAL | Age: 58
End: 2018-11-16
Attending: INTERNAL MEDICINE
Payer: COMMERCIAL

## 2018-11-16 ENCOUNTER — OFFICE VISIT (OUTPATIENT)
Dept: INTERNAL MEDICINE | Facility: CLINIC | Age: 58
End: 2018-11-16
Payer: COMMERCIAL

## 2018-11-16 VITALS
SYSTOLIC BLOOD PRESSURE: 124 MMHG | HEART RATE: 64 BPM | TEMPERATURE: 98 F | BODY MASS INDEX: 33.59 KG/M2 | WEIGHT: 209 LBS | OXYGEN SATURATION: 97 % | DIASTOLIC BLOOD PRESSURE: 80 MMHG | HEIGHT: 66 IN

## 2018-11-16 DIAGNOSIS — Z87.19 HISTORY OF ISCHEMIC COLITIS: ICD-10-CM

## 2018-11-16 DIAGNOSIS — G43.109 MIGRAINE WITH AURA AND WITHOUT STATUS MIGRAINOSUS, NOT INTRACTABLE: ICD-10-CM

## 2018-11-16 DIAGNOSIS — Z00.00 ANNUAL PHYSICAL EXAM: Primary | ICD-10-CM

## 2018-11-16 DIAGNOSIS — E78.5 HYPERLIPIDEMIA, UNSPECIFIED HYPERLIPIDEMIA TYPE: ICD-10-CM

## 2018-11-16 DIAGNOSIS — R76.8 RHEUMATOID FACTOR POSITIVE: ICD-10-CM

## 2018-11-16 DIAGNOSIS — Z00.00 ANNUAL PHYSICAL EXAM: ICD-10-CM

## 2018-11-16 LAB
ALBUMIN SERPL BCP-MCNC: 4.2 G/DL
ALP SERPL-CCNC: 93 U/L
ALT SERPL W/O P-5'-P-CCNC: 23 U/L
ANION GAP SERPL CALC-SCNC: 8 MMOL/L
AST SERPL-CCNC: 17 U/L
BASOPHILS # BLD AUTO: 0.03 K/UL
BASOPHILS NFR BLD: 0.4 %
BILIRUB SERPL-MCNC: 0.7 MG/DL
BILIRUB UR QL STRIP: NEGATIVE
BUN SERPL-MCNC: 12 MG/DL
CALCIUM SERPL-MCNC: 10.1 MG/DL
CHLORIDE SERPL-SCNC: 104 MMOL/L
CHOLEST SERPL-MCNC: 179 MG/DL
CHOLEST/HDLC SERPL: 3.1 {RATIO}
CLARITY UR REFRACT.AUTO: ABNORMAL
CO2 SERPL-SCNC: 31 MMOL/L
COLOR UR AUTO: YELLOW
CREAT SERPL-MCNC: 1 MG/DL
DIFFERENTIAL METHOD: ABNORMAL
EOSINOPHIL # BLD AUTO: 0.2 K/UL
EOSINOPHIL NFR BLD: 3.3 %
ERYTHROCYTE [DISTWIDTH] IN BLOOD BY AUTOMATED COUNT: 13.6 %
EST. GFR  (AFRICAN AMERICAN): >60 ML/MIN/1.73 M^2
EST. GFR  (NON AFRICAN AMERICAN): >60 ML/MIN/1.73 M^2
GLUCOSE SERPL-MCNC: 100 MG/DL
GLUCOSE UR QL STRIP: NEGATIVE
HCT VFR BLD AUTO: 42.9 %
HDLC SERPL-MCNC: 58 MG/DL
HDLC SERPL: 32.4 %
HGB BLD-MCNC: 13.6 G/DL
HGB UR QL STRIP: NEGATIVE
KETONES UR QL STRIP: NEGATIVE
LDLC SERPL CALC-MCNC: 91.4 MG/DL
LEUKOCYTE ESTERASE UR QL STRIP: NEGATIVE
LYMPHOCYTES # BLD AUTO: 1.9 K/UL
LYMPHOCYTES NFR BLD: 28.4 %
MCH RBC QN AUTO: 28.6 PG
MCHC RBC AUTO-ENTMCNC: 31.7 G/DL
MCV RBC AUTO: 90 FL
MICROSCOPIC COMMENT: NORMAL
MONOCYTES # BLD AUTO: 0.3 K/UL
MONOCYTES NFR BLD: 4.9 %
NEUTROPHILS # BLD AUTO: 4.2 K/UL
NEUTROPHILS NFR BLD: 62.9 %
NITRITE UR QL STRIP: NEGATIVE
NONHDLC SERPL-MCNC: 121 MG/DL
PH UR STRIP: 7 [PH] (ref 5–8)
PLATELET # BLD AUTO: 308 K/UL
PMV BLD AUTO: 10.7 FL
POTASSIUM SERPL-SCNC: 4.3 MMOL/L
PROT SERPL-MCNC: 7.8 G/DL
PROT UR QL STRIP: NEGATIVE
RBC # BLD AUTO: 4.75 M/UL
SODIUM SERPL-SCNC: 143 MMOL/L
SP GR UR STRIP: 1.01 (ref 1–1.03)
TRIGL SERPL-MCNC: 148 MG/DL
URN SPEC COLLECT METH UR: ABNORMAL
WBC # BLD AUTO: 6.75 K/UL

## 2018-11-16 PROCEDURE — 85025 COMPLETE CBC W/AUTO DIFF WBC: CPT

## 2018-11-16 PROCEDURE — 36415 COLL VENOUS BLD VENIPUNCTURE: CPT

## 2018-11-16 PROCEDURE — 80053 COMPREHEN METABOLIC PANEL: CPT

## 2018-11-16 PROCEDURE — 99999 PR PBB SHADOW E&M-EST. PATIENT-LVL IV: CPT | Mod: PBBFAC,,, | Performed by: INTERNAL MEDICINE

## 2018-11-16 PROCEDURE — 3074F SYST BP LT 130 MM HG: CPT | Mod: CPTII,S$GLB,, | Performed by: INTERNAL MEDICINE

## 2018-11-16 PROCEDURE — 99396 PREV VISIT EST AGE 40-64: CPT | Mod: S$GLB,,, | Performed by: INTERNAL MEDICINE

## 2018-11-16 PROCEDURE — 3079F DIAST BP 80-89 MM HG: CPT | Mod: CPTII,S$GLB,, | Performed by: INTERNAL MEDICINE

## 2018-11-16 PROCEDURE — 81001 URINALYSIS AUTO W/SCOPE: CPT

## 2018-11-16 PROCEDURE — 80061 LIPID PANEL: CPT

## 2018-11-16 RX ORDER — ALPRAZOLAM 0.25 MG/1
0.25 TABLET ORAL NIGHTLY PRN
Qty: 30 TABLET | Refills: 0 | Status: SHIPPED | OUTPATIENT
Start: 2018-11-16

## 2018-11-16 NOTE — PROGRESS NOTES
CHIEF COMPLAINT:  Physical exam.    HISTORY OF PRESENT ILLNESS:  The patient is a 58-year-old female who comes in   today for annual physical exam.  The patient is currently being treated for   elevated blood pressure, hyperlipidemia, rheumatoid factor positive arthritis.    She does have a history of breast cancer involving the left breast, status post   lumpectomy.  The patient had been on lisinopril 5 mg once a day.  She started   having episodes of low blood pressure and started to feel bad.  Her pressure got   down as low as 101/65.  She decreased the tablet to half a tablet a day.  She   stopped the medication in the end of October because of low blood pressure.    Since then it has been staying in the 120s-130s.  She is feeling much better.    Since we last saw her, she also had two episodes of ischemic colitis.  The first   occurred while she was in [place] visiting her daughter.  She felt flushed,   lightheaded.  She developed abdominal cramps and diarrhea, which became bloody.    She was seen in the Emergency Department.  There, a CAT scan was performed,   which showed diverticulosis.  No evidence of diverticulitis.  Her white blood   cell count was a little bit elevated.  She was treated with Cipro and Flagyl and   released.  Her symptoms resolved.  She did contact Dr. Ned Wiggins.  Prior to   being seen by him for a colonoscopy, she developed another attack.  The   colonoscopy was performed.  It showed erythema, congestion, ulceration with   stigmata of recent bleeding.  The findings were compatible with ischemic   colitis.  They also found a single polyp, which was removed.  Since then, her   symptoms have resolved.    REVIEW OF SYSTEMS:  Please see the patient entered review of systems.  In   addition, the patient does report losing about nine pounds with her recent   illness.  The patient reports her diarrhea has resolved.  Her bowels are back to   normal.  She is having joint pain because she stopped  taking Mobic.  She has   been taking Aleve on occasion.  She has not been in contact with Rheumatology   about stopping the Mobic yet.  The patient did see OB/GYN in February 2018.  She   had an eye exam done this year.  She is not exercising currently secondary to   joint pain.    PHYSICAL EXAMINATION:  GENERAL APPEARANCE:  No acute distress.  HEENT:  Conjunctivae clear.  Pupils are equal and reactive.  TMs are clear.    Nasal septum is midline without discharge.  Oropharynx is without erythema.  NECK:  Trachea is midline without JVD, without thyromegaly.  PULMONARY:  Good inspiratory, expiratory breath sounds are heard.  Lungs are   clear to auscultation.  CARDIOVASCULAR:  S1, S2.  EXTREMITIES:  Without edema.  ABDOMEN:  Nontender, nondistended, without hepatosplenomegaly.    ASSESSMENT:  1.  Annual exam.  2.  Hypertension, now with hypotension, currently off of lisinopril.  3.  Hyperlipidemia.  4.  Ischemic colitis.  5.  Migraine headaches, currently stable.  6.  Rheumatoid factor positive arthritis.    PLAN:  The patient was counseled on not using any NSAIDs due to ischemic colitis   and to contact Rheumatology.  She does have a followup with them around January though.  We will check a CBC, CMP, lipid panel, UA today.  The patient is to   follow up with us pending results of labs.  We will stop lisinopril.      JDS/HN  dd: 11/16/2018 09:13:58 (CST)  td: 11/17/2018 04:10:34 (CST)  Doc ID   #[id number]  Job ID #[id number]    CC:     Answers for HPI/ROS submitted by the patient on 11/15/2018   activity change: No  unexpected weight change: No  neck pain: Yes  hearing loss: No  rhinorrhea: No  trouble swallowing: No  eye discharge: No  visual disturbance: No  chest tightness: No  wheezing: No  chest pain: No  palpitations: No  blood in stool: Yes  constipation: No  vomiting: No  diarrhea: Yes  polydipsia: No  polyuria: No  difficulty urinating: No  hematuria: No  menstrual problem: No  dysuria: No  joint swelling:  Yes  arthralgias: Yes  headaches: Yes  weakness: No  confusion: No  dysphoric mood: No

## 2018-11-28 ENCOUNTER — OFFICE VISIT (OUTPATIENT)
Dept: SURGERY | Facility: CLINIC | Age: 58
End: 2018-11-28
Payer: COMMERCIAL

## 2018-11-28 ENCOUNTER — HOSPITAL ENCOUNTER (OUTPATIENT)
Dept: RADIOLOGY | Facility: HOSPITAL | Age: 58
Discharge: HOME | End: 2018-11-28
Attending: NURSE PRACTITIONER
Payer: COMMERCIAL

## 2018-11-28 VITALS
SYSTOLIC BLOOD PRESSURE: 170 MMHG | HEART RATE: 67 BPM | WEIGHT: 206 LBS | DIASTOLIC BLOOD PRESSURE: 90 MMHG | TEMPERATURE: 98 F | HEIGHT: 66 IN | BODY MASS INDEX: 33.11 KG/M2

## 2018-11-28 DIAGNOSIS — R92.8 ABNORMAL MAMMOGRAM: ICD-10-CM

## 2018-11-28 DIAGNOSIS — Z85.3 PERSONAL HISTORY OF BREAST CANCER: Primary | ICD-10-CM

## 2018-11-28 PROCEDURE — 3077F SYST BP >= 140 MM HG: CPT | Mod: CPTII,S$GLB,, | Performed by: NURSE PRACTITIONER

## 2018-11-28 PROCEDURE — 99212 OFFICE O/P EST SF 10 MIN: CPT | Mod: S$GLB,,, | Performed by: NURSE PRACTITIONER

## 2018-11-28 PROCEDURE — 3080F DIAST BP >= 90 MM HG: CPT | Mod: CPTII,S$GLB,, | Performed by: NURSE PRACTITIONER

## 2018-11-28 PROCEDURE — 3008F BODY MASS INDEX DOCD: CPT | Mod: CPTII,S$GLB,, | Performed by: NURSE PRACTITIONER

## 2018-11-28 PROCEDURE — 77065 DX MAMMO INCL CAD UNI: CPT | Mod: TC,PO,LT

## 2018-11-28 PROCEDURE — 99999 PR PBB SHADOW E&M-EST. PATIENT-LVL V: CPT | Mod: PBBFAC,,, | Performed by: NURSE PRACTITIONER

## 2018-11-28 PROCEDURE — 77065 DX MAMMO INCL CAD UNI: CPT | Mod: 26,LT,, | Performed by: RADIOLOGY

## 2018-11-28 PROCEDURE — 77061 BREAST TOMOSYNTHESIS UNI: CPT | Mod: TC,PO,LT

## 2018-11-28 PROCEDURE — 77061 BREAST TOMOSYNTHESIS UNI: CPT | Mod: 26,LT,, | Performed by: RADIOLOGY

## 2018-11-28 NOTE — PROGRESS NOTES
Subjective:      Patient ID: Christal Posey is a 58 y.o. female.    Chief Complaint: Breast Cancer Screening (CBE/Hx of Breast Cancer)      HPI: (PF, EPF - 1-3) (Detailed, Comp, - 4) returning patient presents today for 6 month f/u, core biopsy was scheduled for calcifications left breast in June however the biopsy was cancelled secondary to not being able to localize the calcifications, see report for details. Patient denies palpable breast mass, pain, nipple discharge, redness, increased warmth, unexplained weight loss, new onset bone pain.     5- last bilat mmg, see report for details, calcifications left breast with biopsy recommended, however on day of biopsy this was cancelled secondary to not being able to isolate this area for biopsy, see reports for details     5- core biopsy left breast with low grade DCIS, ER/AL +  6- left lumpectomy with 1mm area of DCIS, negative margins. She met with Dr Guzmán who discussed XRT, small low grade DCIS, was in favor of hormonal therapy instead of XRT. Met with Dr Julian with no endocrine therapy. No adjuvant therapy         Review of Systems  Objective:   Physical Exam   Pulmonary/Chest: Right breast exhibits no inverted nipple, no mass, no nipple discharge, no skin change and no tenderness. Left breast exhibits no inverted nipple, no mass, no nipple discharge, no skin change and no tenderness. Breasts are symmetrical. There is no breast swelling.   Lymphadenopathy:     She has no cervical adenopathy.     She has no axillary adenopathy.        Right: No supraclavicular adenopathy present.        Left: No supraclavicular adenopathy present.     Assessment:       1. Personal history of breast cancer    2. Abnormal mammogram        Plan:       Left diag mmg today,There are 9 mm amorphous calcifications in a grouped distribution seen in the inner central region of the left breast in the posterior depth. Compared to the previous study, there are no  significant changes.   Again discussed with this patient stable appearing calcifications over the past 6 months, likely benign origin, however she can also consider surgical removal. She desires to continue with clinical surveillance and will return in 6 months to see Dr Campos with [facility].   Informed patient I will be leaving [facility] in December, she was also informed to call with any interval changes or concerns

## 2018-12-24 ENCOUNTER — OFFICE VISIT (OUTPATIENT)
Dept: INTERNAL MEDICINE | Facility: CLINIC | Age: 58
End: 2018-12-24
Payer: COMMERCIAL

## 2018-12-24 VITALS
HEIGHT: 65 IN | SYSTOLIC BLOOD PRESSURE: 122 MMHG | BODY MASS INDEX: 35.16 KG/M2 | HEART RATE: 76 BPM | DIASTOLIC BLOOD PRESSURE: 80 MMHG | TEMPERATURE: 99 F | OXYGEN SATURATION: 98 % | WEIGHT: 211 LBS

## 2018-12-24 DIAGNOSIS — B96.89 SINUSITIS, BACTERIAL: Primary | ICD-10-CM

## 2018-12-24 DIAGNOSIS — J32.9 SINUSITIS, BACTERIAL: Primary | ICD-10-CM

## 2018-12-24 PROCEDURE — 99213 OFFICE O/P EST LOW 20 MIN: CPT | Mod: S$GLB,,, | Performed by: INTERNAL MEDICINE

## 2018-12-24 PROCEDURE — 3079F DIAST BP 80-89 MM HG: CPT | Mod: CPTII,S$GLB,, | Performed by: INTERNAL MEDICINE

## 2018-12-24 PROCEDURE — 99999 PR PBB SHADOW E&M-EST. PATIENT-LVL IV: CPT | Mod: PBBFAC,,, | Performed by: INTERNAL MEDICINE

## 2018-12-24 PROCEDURE — 3008F BODY MASS INDEX DOCD: CPT | Mod: CPTII,S$GLB,, | Performed by: INTERNAL MEDICINE

## 2018-12-24 PROCEDURE — 3074F SYST BP LT 130 MM HG: CPT | Mod: CPTII,S$GLB,, | Performed by: INTERNAL MEDICINE

## 2018-12-24 RX ORDER — AMOXICILLIN 500 MG/1
500 TABLET, FILM COATED ORAL EVERY 12 HOURS
Qty: 20 TABLET | Refills: 0 | Status: SHIPPED | OUTPATIENT
Start: 2018-12-24 | End: 2019-01-03

## 2018-12-25 NOTE — PROGRESS NOTES
CHIEF COMPLAINT:  Sinus infection.    HISTORY OF PRESENT ILLNESS:  The patient is a 58-year-old female who is   currently being treated for hyperlipidemia, irritable bowel syndrome, migraine   headaches as well as left breast cancer who comes in today with complaints of   postnasal drip and sore throat.  Symptoms started a few weeks ago and have now   progressed to where she has pain and pressure over the maxillary and frontal   sinuses.  She has a bloody green nasal discharge.  The patient has been using   Mucinex without much success.    REVIEW OF SYSTEMS:  Please see patient entered review of systems.  The headache   she refers to now appears to be sinus headache.    PHYSICAL EXAMINATION:  GENERAL APPEARANCE:  No acute distress.  HEENT:  Conjunctivae are clear.  Pupils are equal.  She had no photophobia.  TMs   were clear.  The patient did have pain over the frontal and maxillary sinuses   on pressure.  PULMONARY:  Good inspiratory and expiratory breath sounds are heard.  Lungs are   clear to auscultation.  CARDIOVASCULAR:  S1, S2.  EXTREMITIES:  Without edema.  ABDOMEN:  Nontender and nondistended without hepatosplenomegaly.    ASSESSMENT:  Bacterial sinusitis.    PLAN:  We will put the patient on amoxicillin 500 mg t.i.d.  The patient is to   continue with Mucinex.  She is to call for any worsening of symptoms or problems   with the medications.      JDS/HN  dd: 12/24/2018 19:22:52 (CST)  td: 12/25/2018 01:52:27 (CST)  Doc ID   #[id number]  Job ID #[id number]    CC:     Answers for HPI/ROS submitted by the patient on 12/24/2018   activity change: No  unexpected weight change: No  neck pain: No  hearing loss: No  rhinorrhea: No  trouble swallowing: No  eye discharge: No  visual disturbance: No  chest tightness: No  wheezing: No  chest pain: No  palpitations: No  blood in stool: No  constipation: No  vomiting: No  diarrhea: No  polydipsia: No  polyuria: No  difficulty urinating: No  hematuria: No  menstrual problem:  No  dysuria: No  joint swelling: No  arthralgias: No  headaches: Yes  weakness: No  confusion: No  dysphoric mood: No

## 2019-01-01 ENCOUNTER — PATIENT MESSAGE (OUTPATIENT)
Dept: INTERNAL MEDICINE | Facility: CLINIC | Age: 59
End: 2019-01-01

## 2019-01-02 ENCOUNTER — TELEPHONE (OUTPATIENT)
Dept: INTERNAL MEDICINE | Facility: CLINIC | Age: 59
End: 2019-01-02

## 2019-01-02 NOTE — TELEPHONE ENCOUNTER
----- Message from Valerie Reed sent at 1/2/2019  3:22 PM CST -----  Contact: [phone]  Patient is requesting if the doctor can call in some type of antifungal for her to take internally for the thrush in her mouth.    Please advise, thank you

## 2019-01-03 ENCOUNTER — OFFICE VISIT (OUTPATIENT)
Dept: INTERNAL MEDICINE | Facility: CLINIC | Age: 59
End: 2019-01-03
Payer: COMMERCIAL

## 2019-01-03 VITALS
OXYGEN SATURATION: 99 % | SYSTOLIC BLOOD PRESSURE: 140 MMHG | DIASTOLIC BLOOD PRESSURE: 90 MMHG | WEIGHT: 213.38 LBS | HEIGHT: 66 IN | BODY MASS INDEX: 34.29 KG/M2 | HEART RATE: 81 BPM

## 2019-01-03 DIAGNOSIS — B37.0 ORAL THRUSH: Primary | ICD-10-CM

## 2019-01-03 PROCEDURE — 3077F PR MOST RECENT SYSTOLIC BLOOD PRESSURE >= 140 MM HG: ICD-10-PCS | Mod: CPTII,S$GLB,, | Performed by: NURSE PRACTITIONER

## 2019-01-03 PROCEDURE — 99213 PR OFFICE/OUTPT VISIT, EST, LEVL III, 20-29 MIN: ICD-10-PCS | Mod: S$GLB,,, | Performed by: NURSE PRACTITIONER

## 2019-01-03 PROCEDURE — 3008F PR BODY MASS INDEX (BMI) DOCUMENTED: ICD-10-PCS | Mod: CPTII,S$GLB,, | Performed by: NURSE PRACTITIONER

## 2019-01-03 PROCEDURE — 99999 PR PBB SHADOW E&M-EST. PATIENT-LVL V: CPT | Mod: PBBFAC,,, | Performed by: NURSE PRACTITIONER

## 2019-01-03 PROCEDURE — 3080F PR MOST RECENT DIASTOLIC BLOOD PRESSURE >= 90 MM HG: ICD-10-PCS | Mod: CPTII,S$GLB,, | Performed by: NURSE PRACTITIONER

## 2019-01-03 PROCEDURE — 3008F BODY MASS INDEX DOCD: CPT | Mod: CPTII,S$GLB,, | Performed by: NURSE PRACTITIONER

## 2019-01-03 PROCEDURE — 99213 OFFICE O/P EST LOW 20 MIN: CPT | Mod: S$GLB,,, | Performed by: NURSE PRACTITIONER

## 2019-01-03 PROCEDURE — 3077F SYST BP >= 140 MM HG: CPT | Mod: CPTII,S$GLB,, | Performed by: NURSE PRACTITIONER

## 2019-01-03 PROCEDURE — 3080F DIAST BP >= 90 MM HG: CPT | Mod: CPTII,S$GLB,, | Performed by: NURSE PRACTITIONER

## 2019-01-03 PROCEDURE — 99999 PR PBB SHADOW E&M-EST. PATIENT-LVL V: ICD-10-PCS | Mod: PBBFAC,,, | Performed by: NURSE PRACTITIONER

## 2019-01-03 RX ORDER — NYSTATIN 100000 [USP'U]/ML
4 SUSPENSION ORAL 4 TIMES DAILY
Qty: 473 ML | Refills: 0 | Status: SHIPPED | OUTPATIENT
Start: 2019-01-03 | End: 2019-01-03 | Stop reason: SDUPTHER

## 2019-01-03 RX ORDER — NYSTATIN 100000 [USP'U]/ML
4 SUSPENSION ORAL 4 TIMES DAILY
Qty: 473 ML | Refills: 0 | Status: SHIPPED | OUTPATIENT
Start: 2019-01-03 | End: 2019-01-13

## 2019-01-03 NOTE — PROGRESS NOTES
Subjective:       Patient ID: Christal Posey is a 58 y.o. female.    Chief Complaint: Thrush    HPI:  57 yo female that presents to clinic today with concerns for oral thrush.    States that she just completed a course of amoxicillin and noticed white patches and mouth and throat irritation over the past 3 days.  States that she has gotten this at least two times before after completing course of antibiotics.    Denies any fever, SOB, chest pain, n/v or dizziness.    Review of Systems   Constitutional: Negative for activity change, appetite change, fatigue, fever and unexpected weight change.   HENT: Negative for congestion, ear pain, hearing loss, rhinorrhea, sinus pressure, sinus pain and trouble swallowing.         Admits thrush   Eyes: Negative for discharge and visual disturbance.   Respiratory: Negative for chest tightness and wheezing.    Cardiovascular: Negative for chest pain and palpitations.   Gastrointestinal: Negative for blood in stool, constipation, diarrhea and vomiting.   Endocrine: Negative for polydipsia and polyuria.   Genitourinary: Negative for difficulty urinating, dysuria, hematuria and menstrual problem.   Musculoskeletal: Negative for arthralgias, joint swelling and neck pain.   Neurological: Negative for weakness and headaches.   Psychiatric/Behavioral: Negative for confusion and dysphoric mood.       Objective:      Physical Exam   Constitutional: She is oriented to person, place, and time. She appears well-developed and well-nourished. No distress.   HENT:   Head: Normocephalic and atraumatic.   Right Ear: External ear normal.   Left Ear: External ear normal.   Mouth/Throat: No oral lesions. No dental abscesses. Posterior oropharyngeal erythema present. No tonsillar abscesses.   Erythema noted to oral mucosa with sporadic white patches consistent with oral thrush.   Cardiovascular: Normal rate, regular rhythm, normal heart sounds and intact distal pulses.   No murmur  heard.  Pulmonary/Chest: Effort normal and breath sounds normal. No stridor. No respiratory distress. She has no wheezes. She has no rales.   Abdominal: Soft. Bowel sounds are normal. She exhibits no distension and no mass.   Neurological: She is alert and oriented to person, place, and time. No cranial nerve deficit or sensory deficit.   Psychiatric: Her behavior is normal.       Assessment:       1. Oral thrush        Plan:       1. Oral thrush    -Vitals are stable in clinic.  -Will prescribe oral nystatin mouthwash to use qid x 10 days.

## 2019-03-12 ENCOUNTER — TELEPHONE (OUTPATIENT)
Dept: SURGERY | Facility: CLINIC | Age: 59
End: 2019-03-12

## 2019-03-12 NOTE — TELEPHONE ENCOUNTER
Returned call to  regarding message left Needs follow up for Breast Exam & MMG. Scheduled apt for 5/23/19 for 9 am as requested by patient . Patient was informed of apt date & time mailed out apt slip.

## 2019-03-15 ENCOUNTER — OFFICE VISIT (OUTPATIENT)
Dept: OBSTETRICS AND GYNECOLOGY | Facility: CLINIC | Age: 59
End: 2019-03-15
Attending: OBSTETRICS & GYNECOLOGY
Payer: COMMERCIAL

## 2019-03-15 ENCOUNTER — LAB VISIT (OUTPATIENT)
Dept: LAB | Facility: OTHER | Age: 59
End: 2019-03-15
Attending: OBSTETRICS & GYNECOLOGY
Payer: COMMERCIAL

## 2019-03-15 VITALS
BODY MASS INDEX: 33.66 KG/M2 | DIASTOLIC BLOOD PRESSURE: 88 MMHG | HEIGHT: 66 IN | WEIGHT: 209.44 LBS | SYSTOLIC BLOOD PRESSURE: 140 MMHG

## 2019-03-15 DIAGNOSIS — N89.8 VAGINAL DRYNESS: ICD-10-CM

## 2019-03-15 DIAGNOSIS — Z01.419 ENCOUNTER FOR GYNECOLOGICAL EXAMINATION WITHOUT ABNORMAL FINDING: ICD-10-CM

## 2019-03-15 DIAGNOSIS — Z85.3 HISTORY OF BREAST CANCER: ICD-10-CM

## 2019-03-15 DIAGNOSIS — N89.8 VAGINAL DRYNESS: Primary | ICD-10-CM

## 2019-03-15 LAB — ESTRADIOL SERPL-MCNC: 15 PG/ML

## 2019-03-15 PROCEDURE — 3079F DIAST BP 80-89 MM HG: CPT | Mod: CPTII,S$GLB,, | Performed by: OBSTETRICS & GYNECOLOGY

## 2019-03-15 PROCEDURE — 3079F PR MOST RECENT DIASTOLIC BLOOD PRESSURE 80-89 MM HG: ICD-10-PCS | Mod: CPTII,S$GLB,, | Performed by: OBSTETRICS & GYNECOLOGY

## 2019-03-15 PROCEDURE — 36415 COLL VENOUS BLD VENIPUNCTURE: CPT

## 2019-03-15 PROCEDURE — 82670 ASSAY OF TOTAL ESTRADIOL: CPT

## 2019-03-15 PROCEDURE — 3077F PR MOST RECENT SYSTOLIC BLOOD PRESSURE >= 140 MM HG: ICD-10-PCS | Mod: CPTII,S$GLB,, | Performed by: OBSTETRICS & GYNECOLOGY

## 2019-03-15 PROCEDURE — 99396 PREV VISIT EST AGE 40-64: CPT | Mod: S$GLB,,, | Performed by: OBSTETRICS & GYNECOLOGY

## 2019-03-15 PROCEDURE — 99396 PR PREVENTIVE VISIT,EST,40-64: ICD-10-PCS | Mod: S$GLB,,, | Performed by: OBSTETRICS & GYNECOLOGY

## 2019-03-15 PROCEDURE — 3077F SYST BP >= 140 MM HG: CPT | Mod: CPTII,S$GLB,, | Performed by: OBSTETRICS & GYNECOLOGY

## 2019-03-15 NOTE — PROGRESS NOTES
SUBJECTIVE:   59 y.o. female   for annual routine checkup. No LMP recorded (lmp unknown). Patient has had a hysterectomy..  She has a history of breast cancer.She reports that she is doing well on Intrarosa and Premarin vaginal cream 1 time per week- she desires to continue        Past Medical History:   Diagnosis Date    Allergy     Breast cancer 2016    Left breast low grade DCIS, ER/[place] positive    Colon polyp 10/31/2018    Ischemic colitis suspected as well ( area biopsied).    GERD (gastroesophageal reflux disease)     Hiatal hernia     Hyperlipidemia     Irritable bowel syndrome     Migraine headache     Squamous cell carcinoma excised 14    in situ, R forearm     Past Surgical History:   Procedure Laterality Date    BREAST BIOPSY Left 2016    DCIS    BREAST LUMPECTOMY      ECTOPIC PREGNANCY SURGERY Right     HYSTERECTOMY      ovaries left intact    LUMPECTOMY-BREAST- with wire loc Left 6/10/2016    Performed by [facility]pos [facility]    removal of ovarian cyst Right oct 1992    ovary left intact    TUBAL LIGATION      tubal reversal  1991     Social History     Socioeconomic History    Marital status:      Spouse name: Parrish    Number of children: 2    Years of education: Not on file    Highest education level: Not on file   Social Needs    Financial resource strain: Not on file    Food insecurity - worry: Not on file    Food insecurity - inability: Not on file    Transportation needs - medical: Not on file    Transportation needs - non-medical: Not on file   Occupational History    Not on file   Tobacco Use    Smoking status: Never Smoker    Smokeless tobacco: Never Used   Substance and Sexual Activity    Alcohol use: No     Alcohol/week: 0.0 oz    Drug use: No    Sexual activity: Yes     Partners: Male     Birth control/protection: Surgical, None     Comment:  to Parrish    Other Topics Concern    Are you pregnant or  think you may be? No    Breast-feeding No   Social History Narrative    Not on file     Family History   Problem Relation Age of Onset    Migraines Sister     Migraines Brother     Seizures Brother     Migraines Maternal Grandmother     Seizures Maternal Grandmother     Cancer Father 77        Lung cancer    Cancer Paternal Uncle         lung    Melanoma Neg Hx     Breast cancer Neg Hx     Colon cancer Neg Hx     Ovarian cancer Neg Hx      OB History    Para Term  AB Living   3 2 2   1 2   SAB TAB Ectopic Multiple Live Births       1   2      # Outcome Date GA Lbr Garrett/2nd Weight Sex Delivery Anes PTL Lv   3 Ectopic     U       2 Term     F Vag-Spont   NILTON   1 Term     F Vag-Spont   NILTON              Current Outpatient Medications   Medication Sig Dispense Refill    ALPRAZolam (XANAX) 0.25 MG tablet Take 1 tablet (0.25 mg total) by mouth nightly as needed for Insomnia. 30 tablet 0    baclofen (LIORESAL) 10 MG tablet Take 1 tablet (10 mg total) by mouth every evening. 30 tablet 11    budesonide 1 mg/2 mL NbSp       butalbital-acetaminophen-caffeine -40 mg (FIORICET, ESGIC) -40 mg per tablet Take 1 tablet by mouth every 6 to 8 hours as needed. 60 tablet 0    fluticasone (FLONASE) 50 mcg/actuation nasal spray       gabapentin (NEURONTIN) 100 MG capsule Take 800 mg by mouth 3 (three) times daily.   3    gabapentin (NEURONTIN) 300 MG capsule Take 800 mg by mouth 3 (three) times daily. Takes 300 mg caps and 100 mg caps to make total single dose of 800 mg; takes 800 mg TID  2    lansoprazole (PREVACID) 30 MG capsule Take 30 mg by mouth every morning. 1 Capsule, Delayed Release(E.C.) Oral Every day      multivitamin (MULTIVITAMIN) per tablet Take 1 tablet by mouth once daily.        mupirocin (BACTROBAN) 2 % ointment       ondansetron (ZOFRAN) 4 MG tablet Take 1 tablet (4 mg total) by mouth every 8 (eight) hours as needed for Nausea. 1 Tablet Oral Every 6 hours 12 tablet 12     prasterone, dhea, (INTRAROSA) 6.5 mg Inst Place 6.5 mg vaginally every evening. 30 each 11    PREMARIN vaginal cream PLACE 1 GRAM VAGINALLY 2 TIMES A WK  12    rosuvastatin (CRESTOR) 5 MG tablet Take 1 tablet (5 mg total) by mouth every other day. 45 tablet 3    sumatriptan (IMITREX) 20 mg/actuation nasal spray USE 1 SPRAY IN EACH NOSTRIL AT ONSET OF HEADACHE. MAY REPEAT ONCE IN 2 HOURS IF NO RELIEF. NO MORE THAN 2 SPRAYS PER 24 HOURS 6 each 12    VENTOLIN HFA 90 mcg/actuation inhaler INHALE 2 PUFFS INTO THE LUNGS EVERY 6 HOURS AS NEEDED FOR WHEEZING. RESCUE 18 g 0    vitamin D 1000 units Tab Take 185 mg by mouth every morning.        No current facility-administered medications for this visit.      Allergies: Pravastatin; Amitriptyline; Codeine; Doxycycline; Erythromycin; Iodine; Macrobid  [nitrofurantoin monohyd/m-cryst]; and Verapamil     The 10-year ASCVD risk score (Biju GARCIA Jr., et al., 2013) is: 2.5%    Values used to calculate the score:      Age: 59 years      Sex: Female      Is Non- : No      Diabetic: No      Tobacco smoker: No      Systolic Blood Pressure: 140 mmHg      Is BP treated: No      HDL Cholesterol: 58 mg/dL      Total Cholesterol: 179 mg/dL      ROS:  Constitutional: no weight loss, weight gain, fever, fatigue  Eyes:  No vision changes, glasses/contacts  ENT/Mouth: No ulcers, sinus problems, ears ringing, headache  Cardiovascular: No inability to lie flat, chest pain, exercise intolerance, swelling, heart palpitations  Respiratory: No wheezing, coughing blood, shortness of breath, or cough  Gastrointestinal: No diarrhea, bloody stool, nausea/vomiting, constipation, gas, hemorrhoids  Genitourinary: No blood in urine, painful urination, urgency of urination, frequency of urination, incomplete emptying, incontinence, abnormal bleeding, painful periods, heavy periods, vaginal discharge, vaginal odor, painful intercourse, sexual problems, bleeding after  intercourse.  Musculoskeletal: No muscle weakness  Skin/Breast: No painful breasts, nipple discharge, masses, rash, ulcers  Neurological: No passing out, seizures, numbness, headache  Endocrine: No diabetes, hypothyroid, hyperthyroid, hot flashes, hair loss, abnormal hair growth, acne  Psychiatric: No depression, crying  Hematologic: No bruises, bleeding, swollen lymph nodes, anemia.      Physical Exam:   Constitutional: She is oriented to person, place, and time. She appears well-developed and well-nourished.      Neck: Normal range of motion. No tracheal deviation present. No thyromegaly present.    Cardiovascular: Exam reveals no edema.     Pulmonary/Chest: Effort normal. She exhibits no mass, no tenderness, no deformity and no retraction. Right breast exhibits no inverted nipple, no mass, no nipple discharge, no skin change, no tenderness, presence, no bleeding and no swelling. Left breast exhibits no inverted nipple, no mass, no nipple discharge, no skin change, no tenderness, presence, no bleeding and no swelling. Breasts are symmetrical.        Abdominal: Soft. She exhibits no distension and no mass. There is no tenderness. There is no rebound and no guarding. No hernia. Hernia confirmed negative in the left inguinal area.     Genitourinary: Rectal exam shows no external hemorrhoid. There is no rash, tenderness or lesion on the right labia. There is no rash, tenderness or lesion on the left labia. Uterus is absent. No no adexnal prolapse. Right adnexum displays no mass, no tenderness and no fullness. Left adnexum displays no mass, no tenderness and no fullness. No tenderness, bleeding, rectocele, cystocele or unspecified prolapse of vaginal walls in the vagina. No vaginal discharge found. Vaginal cuff normal.Cervix exhibits absence.           Musculoskeletal: Normal range of motion and moves all extremeties. She exhibits no edema.      Lymphadenopathy:        Right: No inguinal adenopathy present.         Left: No inguinal adenopathy present.    Neurological: She is alert and oriented to person, place, and time.    Skin: No rash noted. No erythema. No pallor.    Psychiatric: She has a normal mood and affect. Her behavior is normal. Judgment and thought content normal.         ASSESSMENT:   well woman  Vaginal atrophy  History of breast cancer  PLAN:   return annually or prn  Continue Intrarosa and Premarin vaginal cream  Estradiol level

## 2019-05-22 DIAGNOSIS — Z85.3 PERSONAL HISTORY OF BREAST CANCER: ICD-10-CM

## 2019-05-22 DIAGNOSIS — Z12.39 BREAST CANCER SCREENING: Primary | ICD-10-CM

## 2019-05-23 ENCOUNTER — OFFICE VISIT (OUTPATIENT)
Dept: SURGERY | Facility: CLINIC | Age: 59
End: 2019-05-23
Payer: COMMERCIAL

## 2019-05-23 ENCOUNTER — HOSPITAL ENCOUNTER (OUTPATIENT)
Dept: RADIOLOGY | Facility: HOSPITAL | Age: 59
Discharge: HOME | End: 2019-05-23
Attending: NURSE PRACTITIONER
Payer: COMMERCIAL

## 2019-05-23 VITALS
SYSTOLIC BLOOD PRESSURE: 147 MMHG | WEIGHT: 211 LBS | TEMPERATURE: 99 F | HEIGHT: 66 IN | HEART RATE: 81 BPM | BODY MASS INDEX: 33.91 KG/M2 | DIASTOLIC BLOOD PRESSURE: 77 MMHG

## 2019-05-23 DIAGNOSIS — N63.0 BREAST LUMP: ICD-10-CM

## 2019-05-23 DIAGNOSIS — Z85.3 PERSONAL HISTORY OF BREAST CANCER: Primary | ICD-10-CM

## 2019-05-23 DIAGNOSIS — Z12.39 BREAST CANCER SCREENING: ICD-10-CM

## 2019-05-23 DIAGNOSIS — R92.8 ABNORMAL FINDING ON BREAST IMAGING: ICD-10-CM

## 2019-05-23 DIAGNOSIS — Z85.3 PERSONAL HISTORY OF BREAST CANCER: ICD-10-CM

## 2019-05-23 DIAGNOSIS — R22.1 NECK SWELLING: ICD-10-CM

## 2019-05-23 PROCEDURE — 3078F DIAST BP <80 MM HG: CPT | Mod: CPTII,S$GLB,, | Performed by: NURSE PRACTITIONER

## 2019-05-23 PROCEDURE — 99999 PR PBB SHADOW E&M-EST. PATIENT-LVL III: ICD-10-PCS | Mod: PBBFAC,,, | Performed by: NURSE PRACTITIONER

## 2019-05-23 PROCEDURE — 3077F SYST BP >= 140 MM HG: CPT | Mod: CPTII,S$GLB,, | Performed by: NURSE PRACTITIONER

## 2019-05-23 PROCEDURE — 3078F PR MOST RECENT DIASTOLIC BLOOD PRESSURE < 80 MM HG: ICD-10-PCS | Mod: CPTII,S$GLB,, | Performed by: NURSE PRACTITIONER

## 2019-05-23 PROCEDURE — 77066 MAMMO DIGITAL DIAGNOSTIC BILAT WITH TOMOSYNTHESIS_CAD: ICD-10-PCS | Mod: 26,,, | Performed by: RADIOLOGY

## 2019-05-23 PROCEDURE — 77062 MAMMO DIGITAL DIAGNOSTIC BILAT WITH TOMOSYNTHESIS_CAD: ICD-10-PCS | Mod: 26,,, | Performed by: RADIOLOGY

## 2019-05-23 PROCEDURE — 77062 BREAST TOMOSYNTHESIS BI: CPT | Mod: 26,,, | Performed by: RADIOLOGY

## 2019-05-23 PROCEDURE — 99999 PR PBB SHADOW E&M-EST. PATIENT-LVL III: CPT | Mod: PBBFAC,,, | Performed by: NURSE PRACTITIONER

## 2019-05-23 PROCEDURE — 3008F BODY MASS INDEX DOCD: CPT | Mod: CPTII,S$GLB,, | Performed by: NURSE PRACTITIONER

## 2019-05-23 PROCEDURE — 3008F PR BODY MASS INDEX (BMI) DOCUMENTED: ICD-10-PCS | Mod: CPTII,S$GLB,, | Performed by: NURSE PRACTITIONER

## 2019-05-23 PROCEDURE — 77062 BREAST TOMOSYNTHESIS BI: CPT | Mod: TC,PO

## 2019-05-23 PROCEDURE — 76642 ULTRASOUND BREAST LIMITED: CPT | Mod: TC,PO,RT

## 2019-05-23 PROCEDURE — 77066 DX MAMMO INCL CAD BI: CPT | Mod: 26,,, | Performed by: RADIOLOGY

## 2019-05-23 PROCEDURE — 99213 PR OFFICE/OUTPT VISIT, EST, LEVL III, 20-29 MIN: ICD-10-PCS | Mod: S$GLB,,, | Performed by: NURSE PRACTITIONER

## 2019-05-23 PROCEDURE — 99213 OFFICE O/P EST LOW 20 MIN: CPT | Mod: S$GLB,,, | Performed by: NURSE PRACTITIONER

## 2019-05-23 PROCEDURE — 3077F PR MOST RECENT SYSTOLIC BLOOD PRESSURE >= 140 MM HG: ICD-10-PCS | Mod: CPTII,S$GLB,, | Performed by: NURSE PRACTITIONER

## 2019-05-23 PROCEDURE — 76642 ULTRASOUND BREAST LIMITED: CPT | Mod: 26,RT,, | Performed by: RADIOLOGY

## 2019-05-23 PROCEDURE — 76642 US BREAST RIGHT LIMITED: ICD-10-PCS | Mod: 26,RT,, | Performed by: RADIOLOGY

## 2019-05-23 RX ORDER — BACLOFEN 10 MG/1
10 TABLET ORAL 3 TIMES DAILY
COMMUNITY
End: 2022-11-02 | Stop reason: SDUPTHER

## 2019-05-23 NOTE — PROGRESS NOTES
Patient ID: Christal Posey is a 59 y.o. female.    Chief Complaint: Follow-up (Follow up CBE & MMG)        HPI:  Established patient of the Department of Breast Surgery, previously seen by MATTHEW Mena, and new to me, presents today for breast cancer surveillance.    Breast History & Imaging    5/20/2016 core biopsy left breast with low grade DCIS, ER/[place] +  6/10/2016 left lumpectomy with 1mm area of DCIS, negative margins. She met with Dr Guzmán who discussed XRT, small low grade DCIS, was in favor of hormonal therapy instead of XRT. Met with Dr Julian with no endocrine therapy. No adjuvant therapy.    5/22/18 bilat mmg report reviewed:  The breasts are heterogeneously dense, which may obscure small masses.    Left  There are fine pleomorphic calcifications in a grouped distribution seen in the inner central region of the left breast in the posterior depth. This is a new finding. These calcifications are in region of prior lumpectomy.   Right  There is no evidence of suspicious masses, calcifications, or other abnormal findings.   Impression:  Left  Calcifications: Left breast calcifications at the inner central posterior position. Assessment: 4 - Suspicious finding. Biopsy is recommended.   Right  There is no mammographic evidence of malignancy.   BI-RADS Category:   Overall: 4 - Suspicious   Recommendation:  Biopsy is recommended.     6/8/18 L diag mmg report reviewed:  Findings:  The patient presented today for left breast biopsy. The calcifications were not readily reproducible on today's examination. My initial biopsy attempt was from the medial-lateral projection. After several optimal positioning attempts, I could not reproduce the calcifications. A full CC was performed and did show 2-3 punctate calcifications in the medial breast. They were approximately 25 mm from the inferior breast. This location would cause a stroke margin issue with attempting a CC from above approach. These calcifications  would be below the resolution of the Hologic biopsy table. I explained to the patient that I cannot biopsy a finding without a target. She understood. I explained all of the steps that had been performed to locate the calcifications. I also explained that we would proceed with a 6 month follow-up of these calcifications. She did not have many questions but the question she did have were answered.    Impression:  Left  Calcifications: Left breast calcifications at the inner central posterior position. Assessment: 3 - Probably benign. Short Interval Follow-Up in 6 Months is recommended.    BI-RADS Category:   Left: 3 - Probably Benign  Overall: 3 - Probably Benign   Recommendation:  Short interval follow-up is recommended in 6 Months.    11/28/18 L diag mmg report reviewed:  There are 9 mm amorphous calcifications in a grouped distribution seen in the inner central region of the left breast in the posterior depth. Compared to the previous study, there are no significant changes.    Impression:  Left  Calcifications: Left breast 9 mm calcifications at the inner central posterior position. Assessment: 3 - Probably benign. Short Interval Follow-Up in 6 Months is recommended.   BI-RADS Category:   Left: 3 - Probably Benign  Overall: 3 - Probably Benign   Recommendation:  Short interval follow-up is recommended in 6 Months.     Personal history of breast biopsy:  None other than abovementioned     Interval Breast History    Pt reports noticing a lump associated with soreness in the inframammary fold of her right breast, onset 1 week ago.    Pt reports noticing swelling in the lower right anterior region in her neck intermittently for the past month, sometimes associated with itchiness, no difficulty talking/breathing/swallowing.    Pt denies breast-related skin changes, nipple discharge and nipple retraction.  No other breast concerns.    GYN History:  HRT usage:  only used prior to breast cancer diagnosis    Other  Oncologic History:  Personal history of other cancer not abovementioned:  Non-melanoma skin cancer  Personal history of genetic testing:  no    Family Oncologic History:    [employer] ancestry:  no  History of genetic testing in relatives:  no    Family History   Problem Relation Age of Onset    Cancer Father 77        Lung cancer    Cancer Paternal Uncle         lung    Cancer Maternal Uncle         prostate ca    Breast cancer Neg Hx     Ovarian cancer Neg Hx          Review of Systems - See HPI for breast ROS.  Constitutional:  Negative for unexplained weight loss, unexplained fatigue, or recurrent infections.  Respiratory:  Negative for hemoptysis or dyspnea.  Musculoskeletal:  Negative for new, worsening, or changing bone pain.    Neurological:  Negative for new, worsening, or changing headaches.  Gastrointestinal:  Negative for new, worsening, or changing abdominal pain.      Objective:   Physical Exam   Pulmonary/Chest: Effort normal. No respiratory distress. She exhibits no mass, no edema and no deformity. Right breast exhibits no inverted nipple, no mass, no nipple discharge, no skin change and no tenderness. Left breast exhibits no inverted nipple, no mass, no nipple discharge, no skin change and no tenderness. No breast swelling. Breasts are symmetrical.   No mass or skin change appreciated in area of pt's palpable concern, R IMF 4-5oc about 6-7cmfn.   Genitourinary: No breast swelling.   Lymphadenopathy:     She has no cervical adenopathy.     She has no axillary adenopathy.        Right: No supraclavicular adenopathy present.        Left: No supraclavicular adenopathy present.   No swelling or LAD appreciated in neck.  Neck nontender.     Assessment:       1. Personal history of breast cancer    2. Abnormal finding on breast imaging    3. Breast cancer screening    4. Breast lump    5. Neck swelling          Plan:     Clinically EMIR today.    Pt is due for left breast BI-RADS 3 short-interval  follow-up imaging today.  Also due for annual bilat mmg.  Bilat diag mmg today.    Pt reports noticing a lump associated with soreness in the inframammary fold of her right breast, onset 1 week ago.  No mass or skin change appreciated in area of pt's palpable concern, R IMF region 4-5oc about 6-7cmfn.  Not suggestive of pathology.  R breast ltd US today.  Given location being just slightly below the IMF, pt to f/u with PCP regarding this if breast imaging negative/benign.    Pt reports noticing swelling in the lower right anterior region in her neck intermittently for the past month, sometimes associated with itchiness, no difficulty talking/breathing/swallowing.  No swelling or LAD appreciated in neck.  Neck nontender.  Neck US.  Pt to f/u with PCP regarding this if breast imaging negative/benign.    Offered pt referral to [facility] for genetic counseling.  Pt desires to proceed.  Paperwork initiated.    RTC in 6 mos for follow-up.    Encouraged breast awareness, including monthly breast self-exams.  Advised patient to RTC with any interval changes or concerns.  Questions were encouraged and answered to patient's satisfaction, and patient verbalized understanding of information and agreement with the plan.

## 2019-05-24 ENCOUNTER — OFFICE VISIT (OUTPATIENT)
Dept: DERMATOLOGY | Facility: CLINIC | Age: 59
End: 2019-05-24
Payer: COMMERCIAL

## 2019-05-24 DIAGNOSIS — Z12.83 SKIN CANCER SCREENING: ICD-10-CM

## 2019-05-24 DIAGNOSIS — D48.5 NEOPLASM OF UNCERTAIN BEHAVIOR OF SKIN: Primary | ICD-10-CM

## 2019-05-24 DIAGNOSIS — L91.8 ST (SKIN TAG): ICD-10-CM

## 2019-05-24 DIAGNOSIS — L82.1 SK (SEBORRHEIC KERATOSIS): ICD-10-CM

## 2019-05-24 PROCEDURE — 99999 PR PBB SHADOW E&M-EST. PATIENT-LVL III: ICD-10-PCS | Mod: PBBFAC,,, | Performed by: DERMATOLOGY

## 2019-05-24 PROCEDURE — 99213 PR OFFICE/OUTPT VISIT, EST, LEVL III, 20-29 MIN: ICD-10-PCS | Mod: 25,S$GLB,, | Performed by: DERMATOLOGY

## 2019-05-24 PROCEDURE — 11102 TANGNTL BX SKIN SINGLE LES: CPT | Mod: S$GLB,,, | Performed by: DERMATOLOGY

## 2019-05-24 PROCEDURE — 88305 TISSUE SPECIMEN TO PATHOLOGY, DERMATOLOGY: ICD-10-PCS | Mod: 26,,, | Performed by: PATHOLOGY

## 2019-05-24 PROCEDURE — 99213 OFFICE O/P EST LOW 20 MIN: CPT | Mod: 25,S$GLB,, | Performed by: DERMATOLOGY

## 2019-05-24 PROCEDURE — 11102 PR TANGENTIAL BIOPSY, SKIN, SINGLE LESION: ICD-10-PCS | Mod: S$GLB,,, | Performed by: DERMATOLOGY

## 2019-05-24 PROCEDURE — 88305 TISSUE EXAM BY PATHOLOGIST: CPT | Performed by: PATHOLOGY

## 2019-05-24 PROCEDURE — 99999 PR PBB SHADOW E&M-EST. PATIENT-LVL III: CPT | Mod: PBBFAC,,, | Performed by: DERMATOLOGY

## 2019-05-24 NOTE — PROGRESS NOTES
Subjective:       Patient ID:  Christal Posey is a 59 y.o. female who presents for   Chief Complaint   Patient presents with    Skin Check     Pt here today for UBSE h/o SCC    Rash     Pt have rash in mouth she is concerned about, 4+ month, white patches on tongue. Tx. Nystatin     HPI  Interested in upper body skin check today.  Hx of SCCIS excised from R forearm in 11/2014.    Has 2 new growths on forehead since last visit. Asymptomatic and no prior treatment.    Also c/o thrush in mouth since taking antibiotic (PCN) in Dec 2018. Has been dealing with thrush since then. Tongue was sore initially and was started on nystatin swish Carlos Eduardo 3 which she was also prescribed again on 5/20. Has also done diflucan 125mg x 7 days, then 150mg x 10 days, clotrimazole lozenges, diflucan 150mg x 14 days, chlorhexidine rinse, diflucan 150mg x 14 days (all from Jan - May). States the soreness is gone, but the white patches remain. Has been seeing Dr. Bell for the thrush.      Review of Systems   Constitutional: Negative for fever, chills, weight loss, weight gain, fatigue, night sweats and malaise.   Skin: Negative for daily sunscreen use, activity-related sunscreen use and recent sunburn.   Hematologic/Lymphatic: Does not bruise/bleed easily.        Objective:    Physical Exam   Constitutional: She appears well-developed and well-nourished. No distress.   HENT:   Mouth/Throat:       Neurological: She is alert and oriented to person, place, and time. She is not disoriented.   Psychiatric: She has a normal mood and affect.   Skin:   Areas Examined (abnormalities noted in diagram):   Head / Face Inspection Performed  Neck Inspection Performed  Chest / Axilla Inspection Performed  Back Inspection Performed  RUE Inspected  LUE Inspection Performed                       Diagram Legend     Erythematous scaling macule/papule c/w actinic keratosis       Vascular papule c/w angioma      Pigmented verrucoid papule/plaque c/w seborrheic  keratosis      Yellow umbilicated papule c/w sebaceous hyperplasia      Irregularly shaped tan macule c/w lentigo     1-2 mm smooth white papules consistent with Milia      Movable subcutaneous cyst with punctum c/w epidermal inclusion cyst      Subcutaneous movable cyst c/w pilar cyst      Firm pink to brown papule c/w dermatofibroma      Pedunculated fleshy papule(s) c/w skin tag(s)      Evenly pigmented macule c/w junctional nevus     Mildly variegated pigmented, slightly irregular-bordered macule c/w mildly atypical nevus      Flesh colored to evenly pigmented papule c/w intradermal nevus       Pink pearly papule/plaque c/w basal cell carcinoma      Erythematous hyperkeratotic cursted plaque c/w SCC      Surgical scar with no sign of skin cancer recurrence      Open and closed comedones      Inflammatory papules and pustules      Verrucoid papule consistent consistent with wart     Erythematous eczematous patches and plaques     Dystrophic onycholytic nail with subungual debris c/w onychomycosis     Umbilicated papule    Erythematous-base heme-crusted tan verrucoid plaque consistent with inflamed seborrheic keratosis     Erythematous Silvery Scaling Plaque c/w Psoriasis     See annotation      Assessment / Plan:      Neoplasm of uncertain behavior of skin  Shave biopsy procedure note:    Shave biopsy performed after verbal consent including risk of infection, scar, recurrence, need for additional treatment of site. Area prepped with alcohol, anesthetized with approximately 1.0cc of 1% lidocaine with epinephrine. Lesional tissue shaved with razor blade. Hemostasis achieved with application of aluminum chloride followed by hyfrecation. No complications. Dressing applied. Wound care explained.  -     Tissue Specimen To Pathology, Dermatology  Pathology Orders:     Normal Orders This Visit    Tissue Specimen To Pathology, Dermatology     Questions:    Directional Terms:  Other(comment)    Clinical Information:  r/o  verruca vs. SK vs. SCC vs. AK vs. other Comment - shave    Specific Site:  R lateral forehead          SK (seborrheic keratosis)  These are benign inherited growths without a malignant potential. Reassurance given to patient. No treatment is necessary.   Treatment of benign, asymptomatic lesions may be considered cosmetic.  Warned about risk of hypo- or hyperpigmentation with treatment and risk of recurrence.    ST (skin tag)  Reassurance given to patient. No treatment is necessary.   Treatment of benign, asymptomatic lesions may be considered cosmetic.    Skin cancer screening  Upper body skin examination performed today including at least 6 points as noted in physical examination. Suspicious lesions noted above.  Patient instructed in importance of daily broad spectrum sunscreen use with spf at least 30. Sun avoidance and topical protection/protective clothing discussed.      Counseled pt that her tongue and oral cavity appear WNL. No signs of thrush on exam today.      Follow up in about 1 year (around 5/24/2020) for skin check or sooner pending biopsy results.

## 2019-05-24 NOTE — PATIENT INSTRUCTIONS
Summer Sun Protection      The [facility] would like to remind you of the importance of sun protection all year round and particularly during the summer when the suns rays are the strongest. It has been proven that both acute and chronic sun exposure damages our cells and leads to skin cancer. Beyond skin cancer, the sun causes 90% of the symptoms of pre-mature skin aging, including wrinkles, lentigines (brown spots), and thin, easily bruised skin. Proper sun protection can help prevent these unwanted conditions.    Many patients report that the dont go in the sun. It has been shown that the average person receives 18 hours of incidental sun exposure per week during activities such as walking through parking lots, driving, or sitting next to windows. This accumulates to several bad sunburns per year!    In choosing sunscreen, you want one that protects against both UVA and UVB rays. It is recommended that you use one of SPF 30 or higher. It is important to apply the sunscreen about 20 minutes prior to sun exposure. Most sunscreens are chemical sunscreens and a reaction must take place in the skin so that they are effective. If they are applied and then you are immediately exposed to the sun or start sweating, this reaction has not had time to take place and you are therefore unprotected. Sunscreen needs to be reapplied every 2 hours if you are participating in water sports or sweating. We recommend Elta MD or Neutrogena Ultra Sheer Dry Touch SPF 55 for daily use; however there are many options and it is most important for you to find one that you will use on a consistent basis.    If you have sensitive skin, you may do best with a sunscreen that contains only physical blockers such as titanium dioxide or zinc oxide. These are typically thicker and harder to apply, however they afford very good protection. Neutrogena Sensitive Skin, Blue Lizard Sensitive Skin (pink top) or Neutrogena Pure  "and Free are popular ones.     Aside from sunscreen, clothes with UV protection, wide brimmed hats, and sunglasses are other means of sun protection that we recommend.                        [facility] - DERMATOLOGY  [address]  [place] [address]  Dept: [phone]  Dept Fax: [phone]                                                                                Shave Biopsy Wound Care    Your doctor has performed a shave biopsy today.  A band aid and vaseline ointment has been placed over the site.  This should remain in place for 24 hours.  It is recommended that you keep the area dry for the first 24 hours.  After 24 hours, you may remove the band aid and wash the area with warm soap and water and apply Vaseline jelly.  Many patients prefer to use Neosporin or Bacitracin ointment.  This is acceptable; however, know that you can develop an allergy to this medication even if you have used it safely for years.  It is important to keep the area moist.  Letting it dry out and get air slows healing time, and will worsen the scar.  Band aid is optional after first 24 hours.      If you notice increasing redness, tenderness, pain, or yellow drainage at the biopsy site, please notify your doctor.  These are signs of an infection.    If your biopsy site is bleeding, apply firm pressure for 15 minutes straight.  Repeat for another 15 minutes, if it is still bleeding.   If the surgical site continues to bleed, then please contact your doctor.      For [facility] users:   You will receive a [facility] notification after the pathologist has finished reviewing your biopsy specimen. Pathology results, however, will not be released online so you will see a "no content" message. Once your dermatologist reviews and clinically correlates your biopsy results, you will either receive a letter in the mail with the results of a phone call from your doctor's office if further explanation or " treatment is warranted.       [address] [place] [address]/ [phone] FAX/ [email or web address]

## 2019-05-30 ENCOUNTER — HOSPITAL ENCOUNTER (OUTPATIENT)
Dept: RADIOLOGY | Facility: HOSPITAL | Age: 59
Discharge: HOME | End: 2019-05-30
Attending: INTERNAL MEDICINE
Payer: COMMERCIAL

## 2019-05-30 ENCOUNTER — OFFICE VISIT (OUTPATIENT)
Dept: RHEUMATOLOGY | Facility: CLINIC | Age: 59
End: 2019-05-30
Payer: COMMERCIAL

## 2019-05-30 ENCOUNTER — PATIENT MESSAGE (OUTPATIENT)
Dept: RHEUMATOLOGY | Facility: CLINIC | Age: 59
End: 2019-05-30

## 2019-05-30 VITALS
SYSTOLIC BLOOD PRESSURE: 138 MMHG | WEIGHT: 218.5 LBS | HEART RATE: 86 BPM | HEIGHT: 65 IN | DIASTOLIC BLOOD PRESSURE: 85 MMHG | BODY MASS INDEX: 36.4 KG/M2

## 2019-05-30 DIAGNOSIS — M25.50 POLYARTHRALGIA: ICD-10-CM

## 2019-05-30 DIAGNOSIS — R76.8 RHEUMATOID FACTOR POSITIVE: ICD-10-CM

## 2019-05-30 DIAGNOSIS — M25.50 POLYARTHRALGIA: Primary | ICD-10-CM

## 2019-05-30 PROCEDURE — 99214 PR OFFICE/OUTPT VISIT, EST, LEVL IV, 30-39 MIN: ICD-10-PCS | Mod: S$GLB,,, | Performed by: INTERNAL MEDICINE

## 2019-05-30 PROCEDURE — 3008F BODY MASS INDEX DOCD: CPT | Mod: CPTII,S$GLB,, | Performed by: INTERNAL MEDICINE

## 2019-05-30 PROCEDURE — 3079F DIAST BP 80-89 MM HG: CPT | Mod: CPTII,S$GLB,, | Performed by: INTERNAL MEDICINE

## 2019-05-30 PROCEDURE — 77077 JOINT SURVEY SINGLE VIEW: CPT | Mod: 26,,, | Performed by: RADIOLOGY

## 2019-05-30 PROCEDURE — 99999 PR PBB SHADOW E&M-EST. PATIENT-LVL IV: ICD-10-PCS | Mod: PBBFAC,,, | Performed by: INTERNAL MEDICINE

## 2019-05-30 PROCEDURE — 99999 PR PBB SHADOW E&M-EST. PATIENT-LVL IV: CPT | Mod: PBBFAC,,, | Performed by: INTERNAL MEDICINE

## 2019-05-30 PROCEDURE — 77077 XR ARTHRITIS SURVEY: ICD-10-PCS | Mod: 26,,, | Performed by: RADIOLOGY

## 2019-05-30 PROCEDURE — 3075F PR MOST RECENT SYSTOLIC BLOOD PRESS GE 130-139MM HG: ICD-10-PCS | Mod: CPTII,S$GLB,, | Performed by: INTERNAL MEDICINE

## 2019-05-30 PROCEDURE — 77077 JOINT SURVEY SINGLE VIEW: CPT | Mod: TC

## 2019-05-30 PROCEDURE — 3079F PR MOST RECENT DIASTOLIC BLOOD PRESSURE 80-89 MM HG: ICD-10-PCS | Mod: CPTII,S$GLB,, | Performed by: INTERNAL MEDICINE

## 2019-05-30 PROCEDURE — 3008F PR BODY MASS INDEX (BMI) DOCUMENTED: ICD-10-PCS | Mod: CPTII,S$GLB,, | Performed by: INTERNAL MEDICINE

## 2019-05-30 PROCEDURE — 3075F SYST BP GE 130 - 139MM HG: CPT | Mod: CPTII,S$GLB,, | Performed by: INTERNAL MEDICINE

## 2019-05-30 PROCEDURE — 99214 OFFICE O/P EST MOD 30 MIN: CPT | Mod: S$GLB,,, | Performed by: INTERNAL MEDICINE

## 2019-05-30 NOTE — PROGRESS NOTES
" Patient ID: Christal Posey is a 57 y.o. female.     Chief Complaint: Follow-up     HPI::   Initial history: 55 yo F with PMH Migraines, IBS, GERD, breast CA s/p lumpectomy 6/2016 seen in consult for joint pain and swelling with positive RF. She states that her hand pain began about a years ago. It is mostly in the PIPs and she does sometimes report some swelling. She feels stiff for about 1 hour in the AM. She also has pain in the joints in her feet for about the last year as well. She has some knee pain and "cracking" without swelling that is more chronic. She does not sleep well and is fatigued. She has had a dry cough for about a year as well, but no other extra-articular manifestations. She was previously on Premarin that was stopped when the breast CA was found and thinks her symptoms are worse since stopping it. She uses Aleve which helps her pain as does ibuprofen though not quite as much, tylenol does not help. She has never smoked. Her paternal uncle has "arthritis." She took prednisone once is the past for sciatica and it made her "sick" where she felt very hot and just "not good."         Interval history: She is taking meloxicam and baclofen.  She had episode of ischemic colitis in October 2018. She has outside gastroenterologist. Denies history of blood clot or miscarriage. On occasion, she has pain in elbows, hands, knees, and feet. Reports she gets pain on occasion, last 3 weeks ago.  Rarely,she gets swelling. Reports stiffness in hands for 30 minutes. Denies any rashes, oral ulcers, fevers, or raynauds.  Pain level is 4/10, non-radiating, and aching. Reports meloxicam and baclofen improve her pain.      Review of Systems   Constitutional: Negative for activity change, chills, fatigue and fever.   HENT: Negative for mouth sores and trouble swallowing.    Eyes: Negative for pain and redness.   Respiratory: Negative for cough, chest tightness and shortness of breath.    Cardiovascular: Negative for " chest pain.   Gastrointestinal: Negative for abdominal pain, diarrhea and nausea.   Genitourinary: Negative for dysuria.   Musculoskeletal: Positive for arthralgias and neck stiffness. Negative for joint swelling, myalgias and neck pain.   Skin: Negative for color change and rash.   Neurological: Negative for weakness and numbness.   Hematological: Negative for adenopathy.   Psychiatric/Behavioral: Negative for sleep disturbance.          Objective:           Physical Exam   Constitutional: She is oriented to person, place, and time and well-developed, well-nourished, and in no distress. No distress.   HENT:   Head: Normocephalic and atraumatic.   Mouth/Throat: No oropharyngeal exudate.   Eyes: Conjunctivae are normal. No scleral icterus.   Neck: Normal range of motion. Neck supple. No thyromegaly present.   Cardiovascular: Normal rate, regular rhythm and normal heart sounds.    Pulmonary/Chest: Effort normal and breath sounds normal.   Abdominal: Soft. There is no tenderness.         Right Side Rheumatological Exam     Examination finds the shoulder, elbow, wrist, knee, 1st PIP, 1st MCP, 2nd PIP, 2nd MCP, 3rd PIP, 3rd MCP, 4th PIP, 4th MCP, 5th PIP and 5th MCP normal.     Left Side Rheumatological Exam     Examination finds the shoulder, elbow, wrist, knee, 1st PIP, 1st MCP, 2nd PIP, 2nd MCP, 3rd PIP, 3rd MCP, 4th PIP, 4th MCP, 5th PIP and 5th MCP normal.       Lymphadenopathy:     She has no cervical adenopathy.   Neurological: She is alert and oriented to person, place, and time.   Skin: Skin is warm and dry. No rash noted.   Musculoskeletal: Normal range of motion. She exhibits no edema or tenderness.        labs:  Reviewed;  Tammy-negative  Ccp-negative  RF-49    Arthritis survey (2017): I personally reviewed;  No significant detrimental interval change since the examination of 9/26/16 is appreciated    Assessment:    60 yo old female with PMH of hyperlipidemia, Migraines, left breast cancer status post  lumpectomy (june 2016) here for evaluation of joint pain and +RF.  I suspect patient has early RA as she is having more episodes of hand stiffness.  I told her that +RF does not indicate RA but we will continue to monitor her. I discussed with her adding plaquenil but she prefers to wait.  Plan:     -continue baclofen 10mg qhs prn for neck stiffness  -continue mobic 15mg a day   -arthritis survey  Encouraged weight loss  -discussed diet that is low in fat and carbohydrates.    rtc in 6- 12 months

## 2019-06-14 ENCOUNTER — DOCUMENTATION ONLY (OUTPATIENT)
Dept: SURGERY | Facility: CLINIC | Age: 59
End: 2019-06-14

## 2019-06-14 NOTE — PROGRESS NOTES
Received fax from [employer] advising that pt has not returned [facility]'s multiple phone calls to schedule genetic counseling appt and that pt can contact their Patient Care team at [phone] if she wishes to initiate services again.  See Media for full notice.    Afterward, received fax from [employer] advising that pt has elected not to schedule a genetic counseling session with [facility].  See media.

## 2019-06-24 ENCOUNTER — PATIENT MESSAGE (OUTPATIENT)
Dept: RHEUMATOLOGY | Facility: CLINIC | Age: 59
End: 2019-06-24

## 2019-06-26 DIAGNOSIS — M54.2 NECK PAIN: Primary | ICD-10-CM

## 2019-08-04 ENCOUNTER — HOSPITAL ENCOUNTER (EMERGENCY)
Facility: HOSPITAL | Age: 59
Discharge: HOME | End: 2019-08-04
Attending: EMERGENCY MEDICINE
Payer: COMMERCIAL

## 2019-08-04 VITALS
OXYGEN SATURATION: 95 % | SYSTOLIC BLOOD PRESSURE: 141 MMHG | BODY MASS INDEX: 32.95 KG/M2 | HEART RATE: 60 BPM | RESPIRATION RATE: 16 BRPM | HEIGHT: 66 IN | DIASTOLIC BLOOD PRESSURE: 80 MMHG | WEIGHT: 205 LBS | TEMPERATURE: 98 F

## 2019-08-04 DIAGNOSIS — S39.012A STRAIN OF LUMBAR REGION, INITIAL ENCOUNTER: Primary | ICD-10-CM

## 2019-08-04 LAB
ALBUMIN SERPL BCP-MCNC: 4.1 G/DL (ref 3.5–5.2)
ALP SERPL-CCNC: 105 U/L (ref 55–135)
ALT SERPL W/O P-5'-P-CCNC: 32 U/L (ref 10–44)
ANION GAP SERPL CALC-SCNC: 12 MMOL/L (ref 8–16)
AST SERPL-CCNC: 24 U/L (ref 10–40)
BASOPHILS # BLD AUTO: 0.06 K/UL (ref 0–0.2)
BASOPHILS NFR BLD: 0.7 % (ref 0–1.9)
BILIRUB SERPL-MCNC: 0.7 MG/DL (ref 0.1–1)
BILIRUB UR QL STRIP: NEGATIVE
BUN SERPL-MCNC: 10 MG/DL (ref 6–20)
CALCIUM SERPL-MCNC: 9.8 MG/DL (ref 8.7–10.5)
CHLORIDE SERPL-SCNC: 103 MMOL/L (ref 95–110)
CLARITY UR REFRACT.AUTO: CLEAR
CO2 SERPL-SCNC: 27 MMOL/L (ref 23–29)
COLOR UR AUTO: YELLOW
CREAT SERPL-MCNC: 1.1 MG/DL (ref 0.5–1.4)
DIFFERENTIAL METHOD: NORMAL
EOSINOPHIL # BLD AUTO: 0.2 K/UL (ref 0–0.5)
EOSINOPHIL NFR BLD: 1.9 % (ref 0–8)
ERYTHROCYTE [DISTWIDTH] IN BLOOD BY AUTOMATED COUNT: 13.5 % (ref 11.5–14.5)
EST. GFR  (AFRICAN AMERICAN): >60 ML/MIN/1.73 M^2
EST. GFR  (NON AFRICAN AMERICAN): 55.9 ML/MIN/1.73 M^2
GLUCOSE SERPL-MCNC: 101 MG/DL (ref 70–110)
GLUCOSE UR QL STRIP: NEGATIVE
HCT VFR BLD AUTO: 43.5 % (ref 37–48.5)
HGB BLD-MCNC: 14.6 G/DL (ref 12–16)
HGB UR QL STRIP: NEGATIVE
IMM GRANULOCYTES # BLD AUTO: 0.02 K/UL (ref 0–0.04)
IMM GRANULOCYTES NFR BLD AUTO: 0.2 % (ref 0–0.5)
KETONES UR QL STRIP: NEGATIVE
LEUKOCYTE ESTERASE UR QL STRIP: NEGATIVE
LYMPHOCYTES # BLD AUTO: 2.4 K/UL (ref 1–4.8)
LYMPHOCYTES NFR BLD: 28.9 % (ref 18–48)
MCH RBC QN AUTO: 29.1 PG (ref 27–31)
MCHC RBC AUTO-ENTMCNC: 33.6 G/DL (ref 32–36)
MCV RBC AUTO: 87 FL (ref 82–98)
MONOCYTES # BLD AUTO: 0.5 K/UL (ref 0.3–1)
MONOCYTES NFR BLD: 5.3 % (ref 4–15)
NEUTROPHILS # BLD AUTO: 5.3 K/UL (ref 1.8–7.7)
NEUTROPHILS NFR BLD: 63 % (ref 38–73)
NITRITE UR QL STRIP: NEGATIVE
NRBC BLD-RTO: 0 /100 WBC
PH UR STRIP: 5 [PH] (ref 5–8)
PLATELET # BLD AUTO: 324 K/UL (ref 150–350)
PMV BLD AUTO: 10.4 FL (ref 9.2–12.9)
POTASSIUM SERPL-SCNC: 3.6 MMOL/L (ref 3.5–5.1)
PROT SERPL-MCNC: 8.1 G/DL (ref 6–8.4)
PROT UR QL STRIP: NEGATIVE
RBC # BLD AUTO: 5.01 M/UL (ref 4–5.4)
SODIUM SERPL-SCNC: 142 MMOL/L (ref 136–145)
SP GR UR STRIP: 1.01 (ref 1–1.03)
URN SPEC COLLECT METH UR: NORMAL
WBC # BLD AUTO: 8.45 K/UL (ref 3.9–12.7)

## 2019-08-04 PROCEDURE — 85025 COMPLETE CBC W/AUTO DIFF WBC: CPT

## 2019-08-04 PROCEDURE — 81003 URINALYSIS AUTO W/O SCOPE: CPT

## 2019-08-04 PROCEDURE — 99284 PR EMERGENCY DEPT VISIT,LEVEL IV: ICD-10-PCS | Mod: ,,, | Performed by: PHYSICIAN ASSISTANT

## 2019-08-04 PROCEDURE — 80053 COMPREHEN METABOLIC PANEL: CPT

## 2019-08-04 PROCEDURE — 99284 EMERGENCY DEPT VISIT MOD MDM: CPT | Mod: ,,, | Performed by: PHYSICIAN ASSISTANT

## 2019-08-04 PROCEDURE — 25000003 PHARM REV CODE 250: Performed by: PHYSICIAN ASSISTANT

## 2019-08-04 PROCEDURE — 99283 EMERGENCY DEPT VISIT LOW MDM: CPT

## 2019-08-04 RX ORDER — LIDOCAINE 50 MG/G
1 PATCH TOPICAL
Status: DISCONTINUED | OUTPATIENT
Start: 2019-08-04 | End: 2019-08-04 | Stop reason: HOSPADM

## 2019-08-04 RX ORDER — KETOROLAC TROMETHAMINE 10 MG/1
10 TABLET, FILM COATED ORAL
Status: COMPLETED | OUTPATIENT
Start: 2019-08-04 | End: 2019-08-04

## 2019-08-04 RX ORDER — ACETAMINOPHEN 500 MG
1000 TABLET ORAL
Status: COMPLETED | OUTPATIENT
Start: 2019-08-04 | End: 2019-08-04

## 2019-08-04 RX ADMIN — ACETAMINOPHEN 1000 MG: 500 TABLET ORAL at 10:08

## 2019-08-04 RX ADMIN — LIDOCAINE 1 PATCH: 50 PATCH TOPICAL at 10:08

## 2019-08-04 RX ADMIN — KETOROLAC TROMETHAMINE 10 MG: 10 TABLET, FILM COATED ORAL at 08:08

## 2019-08-04 NOTE — ED PROVIDER NOTES
Encounter Date: 8/4/2019       History     Chief Complaint   Patient presents with    Flank Pain     Ms Posey is a 59yoF who presents for right-sided back pain; pertinent PMHx history DCIS left breast, GERD, rheumatoid factor positive, HLD.  Patient endorses onset of intermittent right low back pain several days ago that subsided then returned this morning.  She awoke with the pain which is been constant since this morning.  Pain is located in right lower lumbar region and has no associated symptoms. Denies trauma to area, denies history kidney stone.  She has not taken anything for symptoms. Denies dysuria, hematuria, fever/chills, nausea/vomiting, abdominal pain, chest pain, shortness of breath, dizziness, weakness.  The patients available PMH, PSH, Social History, medications, allergies, and triage vital signs were reviewed just prior to their medical evaluation.  A ten point review of systems was completed and is negative except as documented above.  Patient denies any other acute medical complaint.    Please be advised this text was dictated with [employer] software and may contain errors due to translation.           Review of patient's allergies indicates:   Allergen Reactions    Pravastatin Other (See Comments)     Severe muscle pain.    Amitriptyline      Other reaction(s): dizzy    Codeine      Other reaction(s): Unknown    Doxycycline      Other reaction(s): Nausea    Erythromycin      Other reaction(s): Unknown    Iodine      Other reaction(s): Swelling  Other reaction(s): Unknown    Macrobid  [nitrofurantoin monohyd/m-cryst]      Other reaction(s): Unknown    Verapamil      Other reaction(s): Headache     Past Medical History:   Diagnosis Date    Allergy     Breast cancer 05/2016    Left breast low grade DCIS, ER/MA positive    Colon polyp 10/31/2018    Ischemic colitis suspected as well ( area biopsied).    GERD (gastroesophageal reflux disease)     Hiatal hernia     Hyperlipidemia      Irritable bowel syndrome     Migraine headache     Squamous cell carcinoma excised 11/12/14    in situ, R forearm     Past Surgical History:   Procedure Laterality Date    BREAST BIOPSY Left 05/2016    DCIS    BREAST LUMPECTOMY      ECTOPIC PREGNANCY SURGERY Right     HYSTERECTOMY  1998    ovaries left intact    LUMPECTOMY-BREAST- with wire loc Left 6/10/2016    [facility]    removal of ovarian cyst Right oct 1992    ovary left intact    TUBAL LIGATION      tubal reversal  july 1991     Family History   Problem Relation Age of Onset    Migraines Sister     Migraines Brother     Seizures Brother     Migraines Maternal Grandmother     Seizures Maternal Grandmother     Cancer Father 77        Lung cancer    Cancer Paternal Uncle         lung    Cancer Maternal Uncle         prostate ca    Melanoma Neg Hx     Breast cancer Neg Hx     Colon cancer Neg Hx     Ovarian cancer Neg Hx      Social History     Tobacco Use    Smoking status: Never Smoker    Smokeless tobacco: Never Used   Substance Use Topics    Alcohol use: No     Alcohol/week: 0.0 oz    Drug use: No     Review of Systems   Constitutional: Negative for chills and fever.   HENT: Negative for sore throat and trouble swallowing.    Eyes: Negative for photophobia.   Respiratory: Negative for shortness of breath.    Cardiovascular: Negative for chest pain.   Gastrointestinal: Negative for abdominal pain, constipation, diarrhea, nausea and vomiting.   Genitourinary: Negative for decreased urine volume, difficulty urinating, dysuria, flank pain, frequency, hematuria, menstrual problem, pelvic pain, vaginal bleeding and vaginal discharge.   Musculoskeletal: Positive for back pain. Negative for gait problem, joint swelling, myalgias, neck pain and neck stiffness.   Skin: Negative for pallor and rash.   Neurological: Negative for dizziness, weakness, light-headedness, numbness and headaches.    Psychiatric/Behavioral: Negative for confusion.       Physical Exam     Initial Vitals [08/04/19 0733]   BP Pulse Resp Temp SpO2   (!) 145/64 80 16 97.9 °F (36.6 °C) 97 %      MAP       --         Physical Exam    Vitals reviewed.  Constitutional: She appears well-developed and well-nourished. She is not diaphoretic. No distress.   Well-appearing female in NAD, VSS, afebrile, 95% on RA.     HENT:   Head: Normocephalic and atraumatic.   Right Ear: External ear normal.   Left Ear: External ear normal.   Nose: Nose normal.   Mouth/Throat: Oropharynx is clear and moist. No oropharyngeal exudate.   Eyes: Conjunctivae and EOM are normal. Pupils are equal, round, and reactive to light.   Neck: Normal range of motion. Neck supple.   Cardiovascular: Normal rate, regular rhythm and intact distal pulses.   Pulmonary/Chest: Breath sounds normal.   Abdominal: Soft. There is no tenderness.   Musculoskeletal: She exhibits tenderness. She exhibits no edema.   Tender over right paraspinal superior lumbar musculature, no CVA tenderness, no flank tenderness, no abdominal tenderness, FROM back   Neurological: She is alert and oriented to person, place, and time. She has normal strength. No cranial nerve deficit or sensory deficit.   Skin: Skin is warm and dry. Capillary refill takes less than 2 seconds. No rash noted. No erythema. No pallor.   No overlying skin changes, skin is nontender   Psychiatric: She has a normal mood and affect. Her behavior is normal. Judgment and thought content normal.         ED Course   Procedures  Labs Reviewed   COMPREHENSIVE METABOLIC PANEL - Abnormal; Notable for the following components:       Result Value    eGFR if non  55.9 (*)     All other components within normal limits   URINALYSIS, REFLEX TO URINE CULTURE    Narrative:     Preferred Collection Type->Urine, Clean Catch   CBC W/ AUTO DIFFERENTIAL          Imaging Results    None          Medical Decision Making:   History:   Old  Medical Records: I decided to obtain old medical records.  Old Records Summarized: records from clinic visits and records from previous admission(s).  Initial Assessment:   Patient presents with new right lower back pain, no urinary symptoms, remainder of exam benign, VSS, afebrile  Differential Diagnosis:   DDx lumbar strain, renal colic. Physical exam and history taking lower clinical suspicion for AAA, pyelonephritis, cystitis, ovarian torsion, acute abdomen, mesenteric ischemia, ureterolithiasis.  Clinical Tests:   Lab Tests: Ordered and Reviewed  ED Management:  Given Toradol, Tylenol and lidocaine patch.  Exam is not consistent with pyelonephritis or ureterolithiasis given distribution of tenderness. Labs unremarkable, UA negative for any sign of infection or hematuria.  Clinically and based on UA, I do not suspect pyelonephritis or ureterolithiasis at this time.  Will treat as musculoskeletal strain at this time with close PCP follow-up. Patient agreed to plan of care and voiced understanding. Discharged in stable condition with strict ED return precautions.    Zenaida Luu PA-C  08/05/2019    I discussed the following case, diagnosis and plan of care with attending physician.                        Clinical Impression:       ICD-10-CM ICD-9-CM   1. Strain of lumbar region, initial encounter S39.012A 847.2         Disposition:   Disposition: Discharged  Condition: Stable                        Zenaida Luu PA-C  08/05/19 1028

## 2019-08-04 NOTE — DISCHARGE INSTRUCTIONS
Take Aleve and Tylenol over the next few days for back pain. Use icy Hot with lidocaine patches.  Return to the emergency department immediately if you develop severely worsening pain, fever, uncontrolled vomiting or urinary symptoms.    Our goal in the emergency department is to always give you outstanding care and exceptional service. You may receive a survey by mail or e-mail in the next week regarding your experience in our ED. We would greatly appreciate your completing and returning the survey. Your feedback provides us with a way to recognize our staff who give very good care and it helps us learn how to improve when your experience was below our aspiration of excellence.

## 2019-08-04 NOTE — ED TRIAGE NOTES
Christal Posey, a 59 y.o. female presents to the ED w/ complaint of low back pain since Friday. Denies any urinary symptoms and states that she has taken nsaids with no relief Patient reports unable to sleep last night the pain was unbearable.     Triage note:  Chief Complaint   Patient presents with    Flank Pain     Review of patient's allergies indicates:   Allergen Reactions    Pravastatin Other (See Comments)     Severe muscle pain.    Amitriptyline      Other reaction(s): dizzy    Codeine      Other reaction(s): Unknown    Doxycycline      Other reaction(s): Nausea    Erythromycin      Other reaction(s): Unknown    Iodine      Other reaction(s): Swelling  Other reaction(s): Unknown    Macrobid  [nitrofurantoin monohyd/m-cryst]      Other reaction(s): Unknown    Verapamil      Other reaction(s): Headache     Past Medical History:   Diagnosis Date    Allergy     Breast cancer 05/2016    Left breast low grade DCIS, ER/DE positive    Colon polyp 10/31/2018    Ischemic colitis suspected as well ( area biopsied).    GERD (gastroesophageal reflux disease)     Hiatal hernia     Hyperlipidemia     Irritable bowel syndrome     Migraine headache     Squamous cell carcinoma excised 11/12/14    in situ, R forearm     LOC: Patient name and date of birth verified. The patient is awake, alert and aware of environment with an appropriate affect, the patient is oriented x 3 and speaking appropriately.   APPEARANCE: Patient resting comfortably, patient is clean and well groomed, patient's clothing is properly fastened.  SKIN: The skin is warm and dry, color consistent with ethnicity, patient has normal skin turgor and moist mucus membranes, skin intact, no breakdown or bruising noted.  MUSCULOSKELETAL: Patient moving all extremities well, no obvious swelling or deformities noted. Back pain reported since Friday, denies urinary symptoms  RESPIRATORY: Respirations are spontaneous, patient has a normal effort and  rate, no accessory muscle use noted.  CARDIAC: Patient has a normal rate and rhythm, no periphreal edema noted, capillary refill < 3 seconds.  ABDOMEN: Soft and non tender to palpation, no distention noted. Bowel sounds present in all four quadrants.  NEUROLOGIC: Eyes open spontaneously, behavior appropriate to situation, follows commands, facial expression symmetrical, bilateral hand grasp equal and even, purposeful motor response noted, normal sensation in all extremities when touched with a finger.

## 2019-08-08 ENCOUNTER — DOCUMENTATION ONLY (OUTPATIENT)
Dept: SURGERY | Facility: CLINIC | Age: 59
End: 2019-08-08

## 2019-08-08 NOTE — PROGRESS NOTES
Received notice from [employer] that patient has elected not to schedule a genetic counseling session with [facility].  See media for full notice.

## 2019-08-20 ENCOUNTER — TELEPHONE (OUTPATIENT)
Dept: OBSTETRICS AND GYNECOLOGY | Facility: CLINIC | Age: 59
End: 2019-08-20

## 2019-08-20 ENCOUNTER — PATIENT MESSAGE (OUTPATIENT)
Dept: OBSTETRICS AND GYNECOLOGY | Facility: CLINIC | Age: 59
End: 2019-08-20

## 2019-08-20 NOTE — TELEPHONE ENCOUNTER
----- Message from Vj Jacobo sent at 8/20/2019 10:40 AM CDT -----  PT IS HAVING BACK PAINS NEEDS TO BE SEEN ASAP NOTHING AVAILABLE THAT I CAN SCHEDULE [phone]

## 2019-08-30 ENCOUNTER — OFFICE VISIT (OUTPATIENT)
Dept: INTERNAL MEDICINE | Facility: CLINIC | Age: 59
End: 2019-08-30
Payer: COMMERCIAL

## 2019-08-30 ENCOUNTER — HOSPITAL ENCOUNTER (OUTPATIENT)
Dept: RADIOLOGY | Facility: HOSPITAL | Age: 59
Discharge: HOME | End: 2019-08-30
Attending: INTERNAL MEDICINE
Payer: COMMERCIAL

## 2019-08-30 VITALS
HEIGHT: 66 IN | HEART RATE: 70 BPM | BODY MASS INDEX: 34.33 KG/M2 | SYSTOLIC BLOOD PRESSURE: 122 MMHG | OXYGEN SATURATION: 96 % | TEMPERATURE: 98 F | DIASTOLIC BLOOD PRESSURE: 74 MMHG | WEIGHT: 213.63 LBS

## 2019-08-30 DIAGNOSIS — E78.5 HYPERLIPIDEMIA, UNSPECIFIED HYPERLIPIDEMIA TYPE: ICD-10-CM

## 2019-08-30 DIAGNOSIS — M54.50 ACUTE RIGHT-SIDED LOW BACK PAIN WITHOUT SCIATICA: ICD-10-CM

## 2019-08-30 DIAGNOSIS — G89.29 CHRONIC RIGHT-SIDED LOW BACK PAIN WITHOUT SCIATICA: Primary | ICD-10-CM

## 2019-08-30 DIAGNOSIS — G43.109 MIGRAINE WITH AURA AND WITHOUT STATUS MIGRAINOSUS, NOT INTRACTABLE: ICD-10-CM

## 2019-08-30 DIAGNOSIS — M54.50 CHRONIC RIGHT-SIDED LOW BACK PAIN WITHOUT SCIATICA: Primary | ICD-10-CM

## 2019-08-30 PROCEDURE — 72100 XR LUMBAR SPINE AP AND LATERAL: ICD-10-PCS | Mod: 26,,, | Performed by: RADIOLOGY

## 2019-08-30 PROCEDURE — 3078F PR MOST RECENT DIASTOLIC BLOOD PRESSURE < 80 MM HG: ICD-10-PCS | Mod: CPTII,S$GLB,, | Performed by: INTERNAL MEDICINE

## 2019-08-30 PROCEDURE — 99999 PR PBB SHADOW E&M-EST. PATIENT-LVL IV: CPT | Mod: PBBFAC,,, | Performed by: INTERNAL MEDICINE

## 2019-08-30 PROCEDURE — 72100 X-RAY EXAM L-S SPINE 2/3 VWS: CPT | Mod: 26,,, | Performed by: RADIOLOGY

## 2019-08-30 PROCEDURE — 3074F PR MOST RECENT SYSTOLIC BLOOD PRESSURE < 130 MM HG: ICD-10-PCS | Mod: CPTII,S$GLB,, | Performed by: INTERNAL MEDICINE

## 2019-08-30 PROCEDURE — 3008F BODY MASS INDEX DOCD: CPT | Mod: CPTII,S$GLB,, | Performed by: INTERNAL MEDICINE

## 2019-08-30 PROCEDURE — 3078F DIAST BP <80 MM HG: CPT | Mod: CPTII,S$GLB,, | Performed by: INTERNAL MEDICINE

## 2019-08-30 PROCEDURE — 72100 X-RAY EXAM L-S SPINE 2/3 VWS: CPT | Mod: TC

## 2019-08-30 PROCEDURE — 99999 PR PBB SHADOW E&M-EST. PATIENT-LVL IV: ICD-10-PCS | Mod: PBBFAC,,, | Performed by: INTERNAL MEDICINE

## 2019-08-30 PROCEDURE — 99214 PR OFFICE/OUTPT VISIT, EST, LEVL IV, 30-39 MIN: ICD-10-PCS | Mod: S$GLB,,, | Performed by: INTERNAL MEDICINE

## 2019-08-30 PROCEDURE — 3074F SYST BP LT 130 MM HG: CPT | Mod: CPTII,S$GLB,, | Performed by: INTERNAL MEDICINE

## 2019-08-30 PROCEDURE — 99214 OFFICE O/P EST MOD 30 MIN: CPT | Mod: S$GLB,,, | Performed by: INTERNAL MEDICINE

## 2019-08-30 PROCEDURE — 3008F PR BODY MASS INDEX (BMI) DOCUMENTED: ICD-10-PCS | Mod: CPTII,S$GLB,, | Performed by: INTERNAL MEDICINE

## 2019-08-30 RX ORDER — MELOXICAM 15 MG/1
15 TABLET ORAL DAILY
Qty: 30 TABLET | Refills: 0 | Status: SHIPPED | OUTPATIENT
Start: 2019-08-30 | End: 2022-08-03

## 2019-08-30 NOTE — PROGRESS NOTES
CHIEF COMPLAINT:  Low back pain.    HISTORY OF PRESENT ILLNESS:  The patient is a 59-year-old female who is   currently being treated for irritable bowel syndrome, reflux, hyperlipidemia and   migraine headaches, who presents today with complaints of low back pain for the   past month.  The patient reports her symptoms are in the right lower back.    They do tend to radiate to the front.  She is concerned about appendix or   ovarian cyst.  The patient was seen in the Emergency Department for this and   released.  Blood tests were done, which were negative.  The patient reports her   pain is better when she was in the Emergency Department.  It was 7/10, now it is   2 to 3/10.  The pain does go away with Aleve, but does come back.  No change in   bowel habits.  No bladder changes.    REVIEW OF SYSTEMS:  Please see patient entered review of systems.    PHYSICAL EXAMINATION:  GENERAL APPEARANCE:  No acute distress.  HEENT:  Trachea is midline without JVD.  PULMONARY:  Good inspiratory and expiratory breath sounds are heard.  Lungs   clear to auscultation.  CARDIOVASCULAR:  S1, S2.  EXTREMITIES:  Without edema.  ABDOMEN:  Nontender, nondistended without hepatosplenomegaly.  The patient had   no rebound, no guarding.  NEUROMUSCULAR:  An L-spine exam was performed.  The patient had fairly good   range of motion with flexion, extension and lateral bending.  The patient had   negative leg raises.    ASSESSMENT:  1.  Right low back pain.  2.  Migraine headaches, currently stable, currently on gabapentin.  3.  Hyperlipidemia.  The patient reports she has not been taking her Crestor   consistently.  4.  History of breast cancer.    PLAN:  We will put the patient on Mobic 15 mg once a day instead of an   anti-inflammatory.  We will check a lumbar spine film.  She is to call for any   problems with the medication or worsening of symptoms.      JDS/HN  dd: 08/30/2019 09:36:54 (CDT)  td: 08/31/2019 01:18:46 (CDT)  Doc ID   #[id number]   Job ID #[id number]    CC:     Answers for HPI/ROS submitted by the patient on 8/29/2019   Back pain  Chronicity: new  Onset: 1 to 4 weeks ago  Frequency: daily  Progression since onset: gradually improving  Pain location: lumbar spine  Pain quality: aching, burning  Pain - numeric: 3/10  Pain is: the same all the time  Stiffness is present: all day  abdominal pain: Yes  bladder incontinence: No  bowel incontinence: No  chest pain: No  dysuria: No  fever: No  headaches: No  leg pain: No  numbness: No  paresis: No  paresthesias: No  pelvic pain: Yes  perianal numbness: No  tingling: No  weakness: No  weight loss: No  genital pain: No  hematuria: No  Risk factors: history of cancer, lack of exercise, menopause, obesity, pregnancy, sedentary lifestyle  Pain severity: mild  Treatments tried: NSAIDs, heat  Improvement on treatment: mild

## 2019-09-01 ENCOUNTER — PATIENT MESSAGE (OUTPATIENT)
Dept: INTERNAL MEDICINE | Facility: CLINIC | Age: 59
End: 2019-09-01

## 2019-10-14 ENCOUNTER — TELEPHONE (OUTPATIENT)
Dept: SURGERY | Facility: CLINIC | Age: 59
End: 2019-10-14

## 2019-10-14 NOTE — TELEPHONE ENCOUNTER
Left message for pt to cll back and schedule her follow up appointment from recall letter she received ----- Message from Linda Winston RN sent at 10/11/2019  5:30 PM CDT -----  Contact: [phone]      ----- Message -----  From: Ermelinda Potts MA  Sent: 10/11/2019   1:44 PM CDT  To: Raman Singh Staff    Pt has a recall for a 6 month follow up that's due in November that she would like to schedule. She is not sure if she needs a mammogram as well      I do not have access to book on this providers schedule.

## 2019-10-21 ENCOUNTER — TELEPHONE (OUTPATIENT)
Dept: SURGERY | Facility: CLINIC | Age: 59
End: 2019-10-21

## 2019-10-21 NOTE — TELEPHONE ENCOUNTER
Spoke with pt about getting her CBE set up w/ roger since jeferson is leaving ----- Message from Holden Irene sent at 10/21/2019 12:03 PM CDT -----  Contact: Pt  Type:  Needs Medical Advice    Who Called: The caller is a Pt of Jeferson Camargo and would like to be seen for her 6 month F/U since Jeferson Camargo is leaving before the 11/19/19 expected recall date.  Please contact the Pt to schedule it.    Best Call Back Number: [phone]

## 2019-10-24 ENCOUNTER — TELEPHONE (OUTPATIENT)
Dept: INTERNAL MEDICINE | Facility: CLINIC | Age: 59
End: 2019-10-24

## 2019-10-24 NOTE — TELEPHONE ENCOUNTER
----- Message from Cindy Diaz sent at 10/24/2019  3:19 PM CDT -----  Contact: Patient [phone]  Patient needs to have an excuse from the office to excuse her from Jury Duty that's on 12/05/2019. The letter needs to states that she IDS and migraines. Patient's daughter will come to the office to pick it up on sometime next week after 3:30 pm.     Please call and advise.    Thank You

## 2019-10-29 ENCOUNTER — TELEPHONE (OUTPATIENT)
Dept: INTERNAL MEDICINE | Facility: CLINIC | Age: 59
End: 2019-10-29

## 2019-10-29 NOTE — TELEPHONE ENCOUNTER
----- Message from Tyree Keita sent at 10/29/2019 11:36 AM CDT -----  Contact: self/[phone]  Pt is calling about Jury Duty letter. Please advise.      Thank You

## 2019-10-30 ENCOUNTER — PATIENT MESSAGE (OUTPATIENT)
Dept: INTERNAL MEDICINE | Facility: CLINIC | Age: 59
End: 2019-10-30

## 2019-10-31 ENCOUNTER — TELEPHONE (OUTPATIENT)
Dept: INTERNAL MEDICINE | Facility: CLINIC | Age: 59
End: 2019-10-31

## 2019-10-31 NOTE — TELEPHONE ENCOUNTER
----- Message from Sloane Cross sent at 10/31/2019  1:33 PM CDT -----  Contact: [phone]  Patient is requesting a letter stating she cannot make Jury Duty, due to chronic IBS and migraines.  Please advise, thanks

## 2019-11-01 ENCOUNTER — PATIENT MESSAGE (OUTPATIENT)
Dept: INTERNAL MEDICINE | Facility: CLINIC | Age: 59
End: 2019-11-01

## 2019-11-04 ENCOUNTER — TELEPHONE (OUTPATIENT)
Dept: INTERNAL MEDICINE | Facility: CLINIC | Age: 59
End: 2019-11-04

## 2019-11-04 ENCOUNTER — PATIENT OUTREACH (OUTPATIENT)
Dept: ADMINISTRATIVE | Facility: HOSPITAL | Age: 59
End: 2019-11-04

## 2019-11-04 ENCOUNTER — TELEPHONE (OUTPATIENT)
Dept: ADMINISTRATIVE | Facility: HOSPITAL | Age: 59
End: 2019-11-04

## 2019-11-04 DIAGNOSIS — Z00.00 ANNUAL PHYSICAL EXAM: Primary | ICD-10-CM

## 2019-11-05 ENCOUNTER — LAB VISIT (OUTPATIENT)
Dept: LAB | Facility: HOSPITAL | Age: 59
End: 2019-11-05
Attending: INTERNAL MEDICINE
Payer: COMMERCIAL

## 2019-11-05 DIAGNOSIS — Z00.00 ANNUAL PHYSICAL EXAM: ICD-10-CM

## 2019-11-05 LAB
ALBUMIN SERPL BCP-MCNC: 3.7 G/DL (ref 3.5–5.2)
ALP SERPL-CCNC: 92 U/L (ref 55–135)
ALT SERPL W/O P-5'-P-CCNC: 26 U/L (ref 10–44)
ANION GAP SERPL CALC-SCNC: 8 MMOL/L (ref 8–16)
AST SERPL-CCNC: 20 U/L (ref 10–40)
BILIRUB SERPL-MCNC: 0.5 MG/DL (ref 0.1–1)
BUN SERPL-MCNC: 11 MG/DL (ref 6–20)
CALCIUM SERPL-MCNC: 9.2 MG/DL (ref 8.7–10.5)
CHLORIDE SERPL-SCNC: 106 MMOL/L (ref 95–110)
CHOLEST SERPL-MCNC: 222 MG/DL (ref 120–199)
CHOLEST/HDLC SERPL: 3.9 {RATIO} (ref 2–5)
CO2 SERPL-SCNC: 29 MMOL/L (ref 23–29)
CREAT SERPL-MCNC: 1 MG/DL (ref 0.5–1.4)
EST. GFR  (AFRICAN AMERICAN): >60 ML/MIN/1.73 M^2
EST. GFR  (NON AFRICAN AMERICAN): >60 ML/MIN/1.73 M^2
GLUCOSE SERPL-MCNC: 95 MG/DL (ref 70–110)
HDLC SERPL-MCNC: 57 MG/DL (ref 40–75)
HDLC SERPL: 25.7 % (ref 20–50)
LDLC SERPL CALC-MCNC: 138 MG/DL (ref 63–159)
NONHDLC SERPL-MCNC: 165 MG/DL
POTASSIUM SERPL-SCNC: 3.9 MMOL/L (ref 3.5–5.1)
PROT SERPL-MCNC: 7.1 G/DL (ref 6–8.4)
SODIUM SERPL-SCNC: 143 MMOL/L (ref 136–145)
TRIGL SERPL-MCNC: 135 MG/DL (ref 30–150)

## 2019-11-05 PROCEDURE — 36415 COLL VENOUS BLD VENIPUNCTURE: CPT

## 2019-11-05 PROCEDURE — 80061 LIPID PANEL: CPT

## 2019-11-05 PROCEDURE — 80053 COMPREHEN METABOLIC PANEL: CPT

## 2019-11-06 RX ORDER — ATORVASTATIN CALCIUM 10 MG/1
10 TABLET, FILM COATED ORAL EVERY OTHER DAY
Qty: 45 TABLET | Refills: 3 | Status: SHIPPED | OUTPATIENT
Start: 2019-11-06 | End: 2020-02-20 | Stop reason: CLARIF

## 2019-11-16 ENCOUNTER — PATIENT OUTREACH (OUTPATIENT)
Dept: ADMINISTRATIVE | Facility: OTHER | Age: 59
End: 2019-11-16

## 2019-11-18 ENCOUNTER — OFFICE VISIT (OUTPATIENT)
Dept: INTERNAL MEDICINE | Facility: CLINIC | Age: 59
End: 2019-11-18
Payer: COMMERCIAL

## 2019-11-18 ENCOUNTER — HOSPITAL ENCOUNTER (OUTPATIENT)
Dept: RADIOLOGY | Facility: HOSPITAL | Age: 59
Discharge: HOME | End: 2019-11-18
Attending: INTERNAL MEDICINE
Payer: COMMERCIAL

## 2019-11-18 VITALS
HEART RATE: 84 BPM | OXYGEN SATURATION: 97 % | BODY MASS INDEX: 35.01 KG/M2 | TEMPERATURE: 98 F | SYSTOLIC BLOOD PRESSURE: 134 MMHG | WEIGHT: 217.81 LBS | DIASTOLIC BLOOD PRESSURE: 86 MMHG | HEIGHT: 66 IN

## 2019-11-18 DIAGNOSIS — G43.109 MIGRAINE WITH AURA AND WITHOUT STATUS MIGRAINOSUS, NOT INTRACTABLE: ICD-10-CM

## 2019-11-18 DIAGNOSIS — Z00.00 ANNUAL PHYSICAL EXAM: Primary | ICD-10-CM

## 2019-11-18 DIAGNOSIS — M06.9 RHEUMATOID ARTHRITIS, INVOLVING UNSPECIFIED SITE, UNSPECIFIED RHEUMATOID FACTOR PRESENCE: ICD-10-CM

## 2019-11-18 DIAGNOSIS — R10.11 RUQ PAIN: ICD-10-CM

## 2019-11-18 DIAGNOSIS — E78.5 HYPERLIPIDEMIA, UNSPECIFIED HYPERLIPIDEMIA TYPE: ICD-10-CM

## 2019-11-18 PROCEDURE — 3079F DIAST BP 80-89 MM HG: CPT | Mod: CPTII,S$GLB,, | Performed by: INTERNAL MEDICINE

## 2019-11-18 PROCEDURE — 76700 US ABDOMEN COMPLETE: ICD-10-PCS | Mod: 26,,, | Performed by: RADIOLOGY

## 2019-11-18 PROCEDURE — 76700 US EXAM ABDOM COMPLETE: CPT | Mod: TC

## 2019-11-18 PROCEDURE — 3075F PR MOST RECENT SYSTOLIC BLOOD PRESS GE 130-139MM HG: ICD-10-PCS | Mod: CPTII,S$GLB,, | Performed by: INTERNAL MEDICINE

## 2019-11-18 PROCEDURE — 76700 US EXAM ABDOM COMPLETE: CPT | Mod: 26,,, | Performed by: RADIOLOGY

## 2019-11-18 PROCEDURE — 99396 PREV VISIT EST AGE 40-64: CPT | Mod: S$GLB,,, | Performed by: INTERNAL MEDICINE

## 2019-11-18 PROCEDURE — 3075F SYST BP GE 130 - 139MM HG: CPT | Mod: CPTII,S$GLB,, | Performed by: INTERNAL MEDICINE

## 2019-11-18 PROCEDURE — 99999 PR PBB SHADOW E&M-EST. PATIENT-LVL IV: ICD-10-PCS | Mod: PBBFAC,,, | Performed by: INTERNAL MEDICINE

## 2019-11-18 PROCEDURE — 99999 PR PBB SHADOW E&M-EST. PATIENT-LVL IV: CPT | Mod: PBBFAC,,, | Performed by: INTERNAL MEDICINE

## 2019-11-18 PROCEDURE — 99396 PR PREVENTIVE VISIT,EST,40-64: ICD-10-PCS | Mod: S$GLB,,, | Performed by: INTERNAL MEDICINE

## 2019-11-18 PROCEDURE — 3079F PR MOST RECENT DIASTOLIC BLOOD PRESSURE 80-89 MM HG: ICD-10-PCS | Mod: CPTII,S$GLB,, | Performed by: INTERNAL MEDICINE

## 2019-11-18 NOTE — PROGRESS NOTES
CC:  Exam.    HPI:  Patient is a 59-year-old female who is currently being treated for rheumatoid arthritis, left breast cancer, migraine headaches, reflux, colon polyps and irritable bowel syndrome presents today for annual checkup.  In regards to her migraines, colon.  She is on gabapentin as a preventative.  She still has about 1 headache a week.  She still uses Fioricet on occasion.  Imitrex does clear up her headaches but in flames her irritable bowel syndrome.  She is on baclofen and Mobic for her rheumatoid arthritis.  She does have a follow-up with Rheumatology very near future.  The patient is on Prevacid for reflux.  On occasion she will have to take a Pepcid.  She does report having episodes of pain in the right upper quadrant which can last 1-2 days.  Issues associated with eating.  When the symptoms occur, she will switch to a bland diet.  The patient does have elevated cholesterol.  She was on Crestor but this caused her to have muscle aches and nausea.  She just restarted a statin.  She was placed on Lipitor 10 mg every other day.  She has been on this medication now for about 1 week and so far no problems.    ROS:  Please see patient and review of systems.Answers for HPI/ROS submitted by the patient on 11/16/2019   activity change: No  unexpected weight change: No  neck pain: No  hearing loss: No  rhinorrhea: No  trouble swallowing: No  eye discharge: No  visual disturbance: No  chest tightness: No  wheezing: No  chest pain: No  palpitations: No  blood in stool: No  constipation: No  vomiting: No  diarrhea: No  polydipsia: No  polyuria: No  difficulty urinating: No  hematuria: No  menstrual problem: No  dysuria: No  joint swelling: No  arthralgias: No  headaches: No  weakness: No  confusion: No  dysphoric mood: No  In addition, the patient had a colonoscopy in 2018 with Dr. Gonzalez.  It did show some polyps.  His no states she has a follow-up in 5 years.  Patient did have a mammogram May  year.    Physical exam:  General appearance:  No acute distress.  HEENT:  Conjunctiva is clear.  Pupils reactive.  TMs are clear.  Nasal septum is midline without discharge. Oropharynx is without erythema.  Trachea is midline without JVD.  Without thyromegaly.  Pulmonary:  Good inspiratory, expiratory breath sounds heard.  Lungs clear to auscultation.  Cardiovascular:  S1-S2, rhythm was normal.  2+ carotid pulses without bruits.  Extremities without edema.  GI:  Abdomen was nontender, nondistended, without hepatosplenomegaly, without rebound or guarding.    Assessment:  1.  Annual exam  2.  Hyperlipidemia now on Lipitor  3.  Right upper quadrant pain  4.  Rheumatoid arthritis  5.  Irritable bowel syndrome  6.  Migraine headaches  7.  Reflux.    Plan:  1.  Will schedule a CMP and lipid panel be done in 6 months.  2.  Will schedule ultrasound the abdomen.  It was discussed that if her ultrasound was normal, then I will schedule a HIDA scan.

## 2019-11-19 ENCOUNTER — OFFICE VISIT (OUTPATIENT)
Dept: SURGERY | Facility: CLINIC | Age: 59
End: 2019-11-19
Payer: COMMERCIAL

## 2019-11-19 VITALS
DIASTOLIC BLOOD PRESSURE: 88 MMHG | WEIGHT: 215.75 LBS | SYSTOLIC BLOOD PRESSURE: 145 MMHG | HEART RATE: 87 BPM | BODY MASS INDEX: 34.67 KG/M2 | TEMPERATURE: 98 F | HEIGHT: 66 IN

## 2019-11-19 DIAGNOSIS — R10.11 RUQ PAIN: ICD-10-CM

## 2019-11-19 DIAGNOSIS — R92.1 BREAST CALCIFICATIONS: Primary | ICD-10-CM

## 2019-11-19 DIAGNOSIS — Z85.3 HISTORY OF BREAST CANCER: ICD-10-CM

## 2019-11-19 PROCEDURE — 3008F BODY MASS INDEX DOCD: CPT | Mod: CPTII,S$GLB,, | Performed by: PHYSICIAN ASSISTANT

## 2019-11-19 PROCEDURE — 99999 PR PBB SHADOW E&M-EST. PATIENT-LVL III: CPT | Mod: PBBFAC,,, | Performed by: PHYSICIAN ASSISTANT

## 2019-11-19 PROCEDURE — 3079F PR MOST RECENT DIASTOLIC BLOOD PRESSURE 80-89 MM HG: ICD-10-PCS | Mod: CPTII,S$GLB,, | Performed by: PHYSICIAN ASSISTANT

## 2019-11-19 PROCEDURE — 99999 PR PBB SHADOW E&M-EST. PATIENT-LVL III: ICD-10-PCS | Mod: PBBFAC,,, | Performed by: PHYSICIAN ASSISTANT

## 2019-11-19 PROCEDURE — 3077F SYST BP >= 140 MM HG: CPT | Mod: CPTII,S$GLB,, | Performed by: PHYSICIAN ASSISTANT

## 2019-11-19 PROCEDURE — 99213 OFFICE O/P EST LOW 20 MIN: CPT | Mod: S$GLB,,, | Performed by: PHYSICIAN ASSISTANT

## 2019-11-19 PROCEDURE — 3077F PR MOST RECENT SYSTOLIC BLOOD PRESSURE >= 140 MM HG: ICD-10-PCS | Mod: CPTII,S$GLB,, | Performed by: PHYSICIAN ASSISTANT

## 2019-11-19 PROCEDURE — 3008F PR BODY MASS INDEX (BMI) DOCUMENTED: ICD-10-PCS | Mod: CPTII,S$GLB,, | Performed by: PHYSICIAN ASSISTANT

## 2019-11-19 PROCEDURE — 3079F DIAST BP 80-89 MM HG: CPT | Mod: CPTII,S$GLB,, | Performed by: PHYSICIAN ASSISTANT

## 2019-11-19 PROCEDURE — 99213 PR OFFICE/OUTPT VISIT, EST, LEVL III, 20-29 MIN: ICD-10-PCS | Mod: S$GLB,,, | Performed by: PHYSICIAN ASSISTANT

## 2019-11-19 NOTE — PROGRESS NOTES
[facility]  [facility]  11/19/2019      SUBJECTIVE:   Ms. Christal Posey is a 59 y.o. female with a history of LEFT breast cancer who presents today for follow up breast cancer screening.      History of Present Illness: Patient was previously seen by KENDELL Phillips and Francisco Camargo.    5/20/2016 core biopsy left breast with low grade DCIS, ER/IA +  6/10/2016 left lumpectomy with 1mm area of DCIS, negative margins. She met with Dr Guzmán who discussed XRT, small low grade DCIS, was in favor of hormonal therapy instead of XRT. Met with Dr Julian with no endocrine therapy.   No adjuvant therapy received.    Interval History:  Patient states she is doing well. She denies any unexplained weight loss, changes in vision, or recent headaches.  She denies any bone pain.  She denies palpating any masses at her breasts.      She had a bilateral diagnostic mammogram and right breast ultrasound on 5/23/19 that showed unchanged 8 mm area of calcifications at the central left breast and no evidence of malignancy.  This area has been followed without any changes so it was read as BIRADS 3, and follow up in 1 year was recommended.    Review of Systems: Denies any chest pain or shortness of breath.  Denies any fever or chills.  See HPI/ Interval History for other systems reviewed.    OBJECTIVE:   Vitals:    11/19/19 0944   BP: (!) 145/88   Pulse: 87   Temp: 98 °F (36.7 °C)        Physical Exam:  HEENT: Normocephalic, atraumatic.    General: alert and oriented; no acute distress.  Breast/Chest: Well healed scar at lower inner left breast. No masses present bilaterally.  No erythema or edema.  No nipple inversion or discharge bilaterally.    Lymph: No palpable adjacent axillary lymph nodes.  No cervical or supraclavicular lymphadenopathy.      ASSESSMENT:  Ms. Christal Posey is a 59 y.o. female with a history of LEFT breast cancer who presents today for follow up breast cancer screening.      PLAN:   We  discussed that there was nothing concerning on clinical breast exam today.  Since she is 3 years out from her surgery, she can proceed with annual clinical breast exams instead of every 6 months.  She will need a bilateral diagnostic  mammogram in May, and I will see her back in one year.      ~Keira Hinkle PA-C      Surgical Oncology            11/19/2019

## 2019-11-20 ENCOUNTER — TELEPHONE (OUTPATIENT)
Dept: INTERNAL MEDICINE | Facility: CLINIC | Age: 59
End: 2019-11-20

## 2019-11-20 NOTE — TELEPHONE ENCOUNTER
----- Message from Blane Brian MD sent at 11/19/2019  4:16 PM CST -----  Please contact patient. Her ultrasound looked good. Please schedule a HIDA scan. Orders are in.

## 2019-11-25 ENCOUNTER — PATIENT OUTREACH (OUTPATIENT)
Dept: ADMINISTRATIVE | Facility: OTHER | Age: 59
End: 2019-11-25

## 2019-11-26 ENCOUNTER — OFFICE VISIT (OUTPATIENT)
Dept: RHEUMATOLOGY | Facility: CLINIC | Age: 59
End: 2019-11-26
Payer: COMMERCIAL

## 2019-11-26 VITALS
DIASTOLIC BLOOD PRESSURE: 84 MMHG | SYSTOLIC BLOOD PRESSURE: 147 MMHG | WEIGHT: 217.63 LBS | BODY MASS INDEX: 34.98 KG/M2 | HEIGHT: 66 IN | HEART RATE: 80 BPM

## 2019-11-26 DIAGNOSIS — R76.8 RHEUMATOID FACTOR POSITIVE: ICD-10-CM

## 2019-11-26 DIAGNOSIS — M15.9 PRIMARY OSTEOARTHRITIS INVOLVING MULTIPLE JOINTS: ICD-10-CM

## 2019-11-26 PROCEDURE — 3079F PR MOST RECENT DIASTOLIC BLOOD PRESSURE 80-89 MM HG: ICD-10-PCS | Mod: CPTII,S$GLB,, | Performed by: INTERNAL MEDICINE

## 2019-11-26 PROCEDURE — 3077F SYST BP >= 140 MM HG: CPT | Mod: CPTII,S$GLB,, | Performed by: INTERNAL MEDICINE

## 2019-11-26 PROCEDURE — 99999 PR PBB SHADOW E&M-EST. PATIENT-LVL IV: ICD-10-PCS | Mod: PBBFAC,,, | Performed by: INTERNAL MEDICINE

## 2019-11-26 PROCEDURE — 3079F DIAST BP 80-89 MM HG: CPT | Mod: CPTII,S$GLB,, | Performed by: INTERNAL MEDICINE

## 2019-11-26 PROCEDURE — 99214 PR OFFICE/OUTPT VISIT, EST, LEVL IV, 30-39 MIN: ICD-10-PCS | Mod: S$GLB,,, | Performed by: INTERNAL MEDICINE

## 2019-11-26 PROCEDURE — 3077F PR MOST RECENT SYSTOLIC BLOOD PRESSURE >= 140 MM HG: ICD-10-PCS | Mod: CPTII,S$GLB,, | Performed by: INTERNAL MEDICINE

## 2019-11-26 PROCEDURE — 99214 OFFICE O/P EST MOD 30 MIN: CPT | Mod: S$GLB,,, | Performed by: INTERNAL MEDICINE

## 2019-11-26 PROCEDURE — 3008F BODY MASS INDEX DOCD: CPT | Mod: CPTII,S$GLB,, | Performed by: INTERNAL MEDICINE

## 2019-11-26 PROCEDURE — 99999 PR PBB SHADOW E&M-EST. PATIENT-LVL IV: CPT | Mod: PBBFAC,,, | Performed by: INTERNAL MEDICINE

## 2019-11-26 PROCEDURE — 3008F PR BODY MASS INDEX (BMI) DOCUMENTED: ICD-10-PCS | Mod: CPTII,S$GLB,, | Performed by: INTERNAL MEDICINE

## 2019-11-26 ASSESSMENT — ROUTINE ASSESSMENT OF PATIENT INDEX DATA (RAPID3)
PSYCHOLOGICAL DISTRESS SCORE: 1.1
PAIN SCORE: 3
PATIENT GLOBAL ASSESSMENT SCORE: 5
FATIGUE SCORE: 1
AM STIFFNESS SCORE: 1, YES
TOTAL RAPID3 SCORE: 3.22
MDHAQ FUNCTION SCORE: .5
WHEN YOU AWAKENED IN THE MORNING OVER THE LAST WEEK, PLEASE INDICATE THE AMOUNT OF TIME IT TAKES UNTIL YOU ARE AS LIMBER AS YOU WILL BE FOR THE DAY: 1HR

## 2019-11-26 NOTE — PROGRESS NOTES
" Patient ID: Christal Posey is a 57 y.o. female.     Chief Complaint: Follow-up     HPI::   Initial history: 55 yo F with PMH Migraines, IBS, GERD, breast CA s/p lumpectomy 6/2016 seen in consult for joint pain and swelling with positive RF. She states that her hand pain began about a years ago. It is mostly in the PIPs and she does sometimes report some swelling. She feels stiff for about 1 hour in the AM. She also has pain in the joints in her feet for about the last year as well. She has some knee pain and "cracking" without swelling that is more chronic. She does not sleep well and is fatigued. She has had a dry cough for about a year as well, but no other extra-articular manifestations. She was previously on Premarin that was stopped when the breast CA was found and thinks her symptoms are worse since stopping it. She uses Aleve which helps her pain as does ibuprofen though not quite as much, tylenol does not help. She has never smoked. Her paternal uncle has "arthritis." She took prednisone once is the past for sciatica and it made her "sick" where she felt very hot and just "not good."         Interval history: She is taking meloxicam and baclofen. She reports that she gets more relief from aleve.  She had episode of diffuse arthralgias for a few days and took aleve and it improved it.  Reports some pain around her shoulder blades.She gets stiffness in lower back, feet, and hands for an hour in the morning.    Denies any rashes, oral ulcers, fevers, or raynauds.  Pain level is 4/10, non-radiating, and aching.     Review of Systems   Constitutional: Negative for activity change, chills, fatigue and fever.   HENT: Negative for mouth sores and trouble swallowing.    Eyes: Negative for pain and redness.   Respiratory: Negative for cough, chest tightness and shortness of breath.    Cardiovascular: Negative for chest pain.   Gastrointestinal: Negative for abdominal pain, diarrhea and nausea.   Genitourinary: " Negative for dysuria.   Musculoskeletal: Positive for arthralgias and neck stiffness. Negative for joint swelling, myalgias and neck pain.   Skin: Negative for color change and rash.   Neurological: Negative for weakness and numbness.   Hematological: Negative for adenopathy.   Psychiatric/Behavioral: Negative for sleep disturbance.          Objective:           Physical Exam   Constitutional: She is oriented to person, place, and time and well-developed, well-nourished, and in no distress. No distress.   HENT:   Head: Normocephalic and atraumatic.   Mouth/Throat: No oropharyngeal exudate.   Eyes: Conjunctivae are normal. No scleral icterus.   Neck: Normal range of motion. Neck supple. No thyromegaly present.   Cardiovascular: Normal rate, regular rhythm and normal heart sounds.    Pulmonary/Chest: Effort normal and breath sounds normal.   Abdominal: Soft. There is no tenderness.         Right Side Rheumatological Exam     Examination finds the shoulder, elbow, wrist, knee, 1st PIP, 1st MCP, 2nd PIP, 2nd MCP, 3rd PIP, 3rd MCP, 4th PIP, 4th MCP, 5th PIP and 5th MCP normal.     Left Side Rheumatological Exam     Examination finds the shoulder, elbow, wrist, knee, 1st PIP, 1st MCP, 2nd PIP, 2nd MCP, 3rd PIP, 3rd MCP, 4th PIP, 4th MCP, 5th PIP and 5th MCP normal.       Lymphadenopathy:     She has no cervical adenopathy.   Neurological: She is alert and oriented to person, place, and time.   Skin: Skin is warm and dry. No rash noted.   Musculoskeletal: Normal range of motion. She exhibits no edema or tenderness.        labs:  Reviewed;  Tammy-negative  Ccp-negative  RF-49    Arthritis survey (2017): I personally reviewed;  No significant detrimental interval change since the examination of 9/26/16 is appreciated    Assessment:    60 yo old female with PMH of hyperlipidemia, Migraines, left breast cancer status post lumpectomy (june 2016) here for evaluation of joint pain and +RF.  I suspect patient has early RA as she is  having more episodes of hand stiffness.  I told her that +RF does not indicate RA but we will continue to monitor her. I discussed with her adding plaquenil but she prefers to wait.  Patient reports having more improvement in her pain from aleve than mobic.  I told her she is to choose one and not take both.  Plan:     -continue baclofen 10mg qhs prn for neck stiffness  -continue mobic 15mg a day   Encouraged weight loss  -discussed diet that is low in fat and carbohydrates.    rtc in 6- 12 months

## 2019-11-26 NOTE — PROGRESS NOTES
Rapid3 Question Responses and Scores 11/25/2019   MDHAQ Score 0.5   Psychologic Score 1.1   Pain Score 3   When you awakened in the morning OVER THE LAST WEEK, did you feel stiff? Yes   If Yes, please indicate the number of hours until you are as limber as you will be for the day 1   Fatigue Score 1   Global Health Score 5   RAPID3 Score 3.22

## 2020-01-17 ENCOUNTER — PATIENT MESSAGE (OUTPATIENT)
Dept: INTERNAL MEDICINE | Facility: CLINIC | Age: 60
End: 2020-01-17

## 2020-02-20 ENCOUNTER — HOSPITAL ENCOUNTER (EMERGENCY)
Facility: HOSPITAL | Age: 60
Discharge: HOME | End: 2020-02-20
Attending: EMERGENCY MEDICINE
Payer: COMMERCIAL

## 2020-02-20 ENCOUNTER — PATIENT MESSAGE (OUTPATIENT)
Dept: INTERNAL MEDICINE | Facility: CLINIC | Age: 60
End: 2020-02-20

## 2020-02-20 VITALS
BODY MASS INDEX: 34.23 KG/M2 | SYSTOLIC BLOOD PRESSURE: 179 MMHG | RESPIRATION RATE: 18 BRPM | DIASTOLIC BLOOD PRESSURE: 96 MMHG | OXYGEN SATURATION: 99 % | HEART RATE: 76 BPM | HEIGHT: 66 IN | WEIGHT: 213 LBS | TEMPERATURE: 98 F

## 2020-02-20 DIAGNOSIS — R07.89 CHEST DISCOMFORT: ICD-10-CM

## 2020-02-20 DIAGNOSIS — E78.5 HYPERLIPIDEMIA, UNSPECIFIED HYPERLIPIDEMIA TYPE: Primary | ICD-10-CM

## 2020-02-20 DIAGNOSIS — I10 HYPERTENSION, UNSPECIFIED TYPE: ICD-10-CM

## 2020-02-20 DIAGNOSIS — Z78.9 STATIN INTOLERANCE: ICD-10-CM

## 2020-02-20 DIAGNOSIS — R07.9 CHEST PAIN: Primary | ICD-10-CM

## 2020-02-20 LAB
ALBUMIN SERPL BCP-MCNC: 4 G/DL (ref 3.5–5.2)
ALP SERPL-CCNC: 103 U/L (ref 55–135)
ALT SERPL W/O P-5'-P-CCNC: 27 U/L (ref 10–44)
ANION GAP SERPL CALC-SCNC: 13 MMOL/L (ref 8–16)
AST SERPL-CCNC: 25 U/L (ref 10–40)
BASOPHILS # BLD AUTO: 0.04 K/UL (ref 0–0.2)
BASOPHILS NFR BLD: 0.5 % (ref 0–1.9)
BILIRUB SERPL-MCNC: 0.3 MG/DL (ref 0.1–1)
BNP SERPL-MCNC: 12 PG/ML (ref 0–99)
BUN SERPL-MCNC: 10 MG/DL (ref 6–20)
CALCIUM SERPL-MCNC: 9.3 MG/DL (ref 8.7–10.5)
CHLORIDE SERPL-SCNC: 107 MMOL/L (ref 95–110)
CO2 SERPL-SCNC: 26 MMOL/L (ref 23–29)
CREAT SERPL-MCNC: 1 MG/DL (ref 0.5–1.4)
DIFFERENTIAL METHOD: ABNORMAL
EOSINOPHIL # BLD AUTO: 0.3 K/UL (ref 0–0.5)
EOSINOPHIL NFR BLD: 3.8 % (ref 0–8)
ERYTHROCYTE [DISTWIDTH] IN BLOOD BY AUTOMATED COUNT: 13.9 % (ref 11.5–14.5)
EST. GFR  (AFRICAN AMERICAN): >60 ML/MIN/1.73 M^2
EST. GFR  (NON AFRICAN AMERICAN): >60 ML/MIN/1.73 M^2
GLUCOSE SERPL-MCNC: 100 MG/DL (ref 70–110)
HCT VFR BLD AUTO: 45.1 % (ref 37–48.5)
HGB BLD-MCNC: 14.3 G/DL (ref 12–16)
IMM GRANULOCYTES # BLD AUTO: 0.02 K/UL (ref 0–0.04)
IMM GRANULOCYTES NFR BLD AUTO: 0.2 % (ref 0–0.5)
LYMPHOCYTES # BLD AUTO: 2.7 K/UL (ref 1–4.8)
LYMPHOCYTES NFR BLD: 31.4 % (ref 18–48)
MCH RBC QN AUTO: 28.9 PG (ref 27–31)
MCHC RBC AUTO-ENTMCNC: 31.7 G/DL (ref 32–36)
MCV RBC AUTO: 91 FL (ref 82–98)
MONOCYTES # BLD AUTO: 0.5 K/UL (ref 0.3–1)
MONOCYTES NFR BLD: 5.2 % (ref 4–15)
NEUTROPHILS # BLD AUTO: 5.1 K/UL (ref 1.8–7.7)
NEUTROPHILS NFR BLD: 58.9 % (ref 38–73)
NRBC BLD-RTO: 0 /100 WBC
PLATELET # BLD AUTO: 361 K/UL (ref 150–350)
PMV BLD AUTO: 11.5 FL (ref 9.2–12.9)
POTASSIUM SERPL-SCNC: 3.9 MMOL/L (ref 3.5–5.1)
PROT SERPL-MCNC: 8.1 G/DL (ref 6–8.4)
RBC # BLD AUTO: 4.95 M/UL (ref 4–5.4)
SODIUM SERPL-SCNC: 146 MMOL/L (ref 136–145)
TROPONIN I SERPL DL<=0.01 NG/ML-MCNC: 0.01 NG/ML (ref 0–0.03)
TROPONIN I SERPL DL<=0.01 NG/ML-MCNC: <0.006 NG/ML (ref 0–0.03)
WBC # BLD AUTO: 8.61 K/UL (ref 3.9–12.7)

## 2020-02-20 PROCEDURE — 93005 ELECTROCARDIOGRAM TRACING: CPT

## 2020-02-20 PROCEDURE — 93010 ELECTROCARDIOGRAM REPORT: CPT | Mod: ,,, | Performed by: INTERNAL MEDICINE

## 2020-02-20 PROCEDURE — 80053 COMPREHEN METABOLIC PANEL: CPT

## 2020-02-20 PROCEDURE — 25000003 PHARM REV CODE 250: Performed by: EMERGENCY MEDICINE

## 2020-02-20 PROCEDURE — 84484 ASSAY OF TROPONIN QUANT: CPT

## 2020-02-20 PROCEDURE — 83880 ASSAY OF NATRIURETIC PEPTIDE: CPT

## 2020-02-20 PROCEDURE — 85025 COMPLETE CBC W/AUTO DIFF WBC: CPT

## 2020-02-20 PROCEDURE — 99285 EMERGENCY DEPT VISIT HI MDM: CPT | Mod: 25

## 2020-02-20 PROCEDURE — 99285 EMERGENCY DEPT VISIT HI MDM: CPT | Mod: ,,, | Performed by: EMERGENCY MEDICINE

## 2020-02-20 PROCEDURE — 99285 PR EMERGENCY DEPT VISIT,LEVEL V: ICD-10-PCS | Mod: ,,, | Performed by: EMERGENCY MEDICINE

## 2020-02-20 PROCEDURE — 93010 EKG 12-LEAD: ICD-10-PCS | Mod: ,,, | Performed by: INTERNAL MEDICINE

## 2020-02-20 RX ORDER — ASPIRIN 325 MG
325 TABLET ORAL
Status: COMPLETED | OUTPATIENT
Start: 2020-02-20 | End: 2020-02-20

## 2020-02-20 RX ADMIN — ASPIRIN 325 MG ORAL TABLET 325 MG: 325 PILL ORAL at 05:02

## 2020-02-20 NOTE — ED TRIAGE NOTES
Patient presents to the ED with hypertension. Pt reports feeling fatigued and weak over the past couple of days.

## 2020-02-20 NOTE — ED PROVIDER NOTES
SCRIBE #1 NOTE: I, Kamala White, am scribing for, and in the presence of,  Dr. Bowen. I have scribed the following portions of the note - Other sections scribed: HPI.        Source of History:  Patient    Chief complaint:  Fatigue (weakness, tired with chest heaviness X 2 days; denies cardiac hx)    HPI:  Christal Posey is a 60 y.o. female with history of GERD, IBS, ischemic colitis, hyperlipidemia, rheumatoid arthritis.     Patient reports for the past few days she has been feeling fatigued and lightheaded. She states she thinks she may have a viral illness. She also reports her arms feel heavy, but notes she cuts grass a few days ago and her sensation may be muscular. She took aleve with minimal relief. She denies numbness or weakness to upper extremities. She denies fever, runny nose chest pain, SOB, abdominal pain, diarrhea, blood in stool. She states her temperature has been low at 97. She notes chronic cough, but no new changes. She denies history of HTN. Patient is not a smoker. She denies drug use. No family history of MI.     ROS: As per HPI and below:   Review of Systems   Constitutional: Positive for malaise/fatigue. Negative for fever.   HENT: Negative for sore throat.    Eyes: Negative for double vision.   Respiratory: Negative for cough and shortness of breath.    Cardiovascular: Negative for chest pain.   Gastrointestinal: Negative for abdominal pain and vomiting.   Genitourinary: Negative for dysuria.   Musculoskeletal: Negative for falls.   Skin: Negative for rash.   Neurological: Negative for headaches.     Review of patient's allergies indicates:   Allergen Reactions    Pravastatin Other (See Comments)     Severe muscle pain.    Amitriptyline      Other reaction(s): dizzy    Codeine      Other reaction(s): Unknown    Doxycycline      Other reaction(s): Nausea    Erythromycin      Other reaction(s): Unknown    Iodine      Other reaction(s): Swelling  Other reaction(s): Unknown    Macrobid   [nitrofurantoin monohyd/m-cryst]      Other reaction(s): Unknown    Verapamil      Other reaction(s): Headache       No current facility-administered medications on file prior to encounter.      Current Outpatient Medications on File Prior to Encounter   Medication Sig Dispense Refill    ALPRAZolam (XANAX) 0.25 MG tablet Take 1 tablet (0.25 mg total) by mouth nightly as needed for Insomnia. 30 tablet 0    butalbital-acetaminophen-caffeine -40 mg (FIORICET, ESGIC) -40 mg per tablet Take 1 tablet by mouth every 6 to 8 hours as needed. 60 tablet 0    gabapentin (NEURONTIN) 100 MG capsule Take 800 mg by mouth 3 (three) times daily.   3    gabapentin (NEURONTIN) 300 MG capsule Take 800 mg by mouth 3 (three) times daily. Takes 300 mg caps and 100 mg caps to make total single dose of 800 mg; takes 800 mg TID  2    meloxicam (MOBIC) 15 MG tablet Take 1 tablet (15 mg total) by mouth once daily. 30 tablet 0    ondansetron (ZOFRAN) 4 MG tablet Take 1 tablet (4 mg total) by mouth every 8 (eight) hours as needed for Nausea. 1 Tablet Oral Every 6 hours 12 tablet 12    baclofen (LIORESAL) 10 MG tablet Take 10 mg by mouth 3 (three) times daily.      budesonide 1 mg/2 mL NbSp       evolocumab (REPATHA SURECLICK) 140 mg/mL PnIj Inject 1 mL (140 mg total) into the skin every 14 (fourteen) days. 2 mL 0    fluticasone (FLONASE) 50 mcg/actuation nasal spray       lansoprazole (PREVACID) 30 MG capsule Take 30 mg by mouth every morning. 1 Capsule, Delayed Release(E.C.) Oral Every day      multivitamin (MULTIVITAMIN) per tablet Take 1 tablet by mouth once daily.        mupirocin (BACTROBAN) 2 % ointment       prasterone, dhea, (INTRAROSA) 6.5 mg Inst Place 6.5 mg vaginally every evening. 30 each 11    PREMARIN vaginal cream PLACE 1 GRAM VAGINALLY 2 TIMES A WK  12    sumatriptan (IMITREX) 20 mg/actuation nasal spray USE 1 SPRAY IN EACH NOSTRIL AT ONSET OF HEADACHE. MAY REPEAT ONCE IN 2 HOURS IF NO RELIEF. NO MORE  THAN 2 SPRAYS PER 24 HOURS 6 each 12    VENTOLIN HFA 90 mcg/actuation inhaler INHALE 2 PUFFS INTO THE LUNGS EVERY 6 HOURS AS NEEDED FOR WHEEZING. RESCUE 18 g 0    vitamin D 1000 units Tab Take 185 mg by mouth every morning.       [DISCONTINUED] atorvastatin (LIPITOR) 10 MG tablet Take 1 tablet (10 mg total) by mouth every other day. 45 tablet 3       PMH:  As per HPI and below:  Past Medical History:   Diagnosis Date    Allergy     Breast cancer 05/2016    Left breast low grade DCIS, ER/[place] positive    Colon polyp 10/31/2018    Ischemic colitis suspected as well ( area biopsied).    GERD (gastroesophageal reflux disease)     Hiatal hernia     Hyperlipidemia     Irritable bowel syndrome     Migraine headache     RA (rheumatoid arthritis)     Squamous cell carcinoma excised 11/12/14    in situ, R forearm     Past Surgical History:   Procedure Laterality Date    BREAST BIOPSY Left 05/2016    DCIS    BREAST LUMPECTOMY      ECTOPIC PREGNANCY SURGERY Right     HYSTERECTOMY  1998    ovaries left intact    removal of ovarian cyst Right oct 1992    ovary left intact    TUBAL LIGATION      tubal reversal  july 1991       Social History     Socioeconomic History    Marital status:      Spouse name: Parrish    Number of children: 2    Years of education: Not on file    Highest education level: Not on file   Occupational History    Not on file   Social Needs    Financial resource strain: Not on file    Food insecurity:     Worry: Not on file     Inability: Not on file    Transportation needs:     Medical: Not on file     Non-medical: Not on file   Tobacco Use    Smoking status: Never Smoker    Smokeless tobacco: Never Used   Substance and Sexual Activity    Alcohol use: No     Alcohol/week: 0.0 standard drinks     Frequency: Never     Binge frequency: Never    Drug use: No    Sexual activity: Yes     Partners: Male     Birth control/protection: Surgical, None     Comment:  to Parrish     Lifestyle    Physical activity:     Days per week: 2 days     Minutes per session: 60 min    Stress: Not on file   Relationships    Social connections:     Talks on phone: Not on file     Gets together: Not on file     Attends Temple service: Not on file     Active member of club or organization: Not on file     Attends meetings of clubs or organizations: Not on file     Relationship status: Not on file   Other Topics Concern    Are you pregnant or think you may be? No    Breast-feeding No   Social History Narrative    Not on file       Family History   Problem Relation Age of Onset    Migraines Sister     Migraines Brother     Seizures Brother     Migraines Maternal Grandmother     Seizures Maternal Grandmother     Cancer Father 77        Lung cancer    Cancer Paternal Uncle         lung    Cancer Maternal Uncle         prostate ca    Melanoma Neg Hx     Breast cancer Neg Hx     Colon cancer Neg Hx     Ovarian cancer Neg Hx        Physical Exam:    Vitals:    02/20/20 2114   BP: (!) 179/96   Pulse: 76   Resp: 18   Temp:      Gen: No acute distress.  Mental Status:  Alert and oriented x 3.  Appropriate, conversant.  Skin: Warm, dry. No rashes seen.  Eyes: No conjunctival injection.  ENT: Oropharynx clear.    Pulm: Clear to auscultation bilaterally.  Good air movement.  No wheezes. No increased work of breathing.  CV: Regular rate. Regular rhythm. Normal peripheral perfusion. No lower extremity edema.  Abd: Soft.  Not distended.  Nontender.  No guarding.  No rebound.  MSK: Good range of motion all joints.  No deformities.    Neck supple.  No meningismus.  Neuro: Awake. Speech normal. No focal neuro deficit observed. Cranial nerves intact. Motor grossly intact.  Sensation grossly intact.  Cerebellar intact.      Laboratory Studies:  Labs Reviewed   CBC W/ AUTO DIFFERENTIAL - Abnormal; Notable for the following components:       Result Value    Mean Corpuscular Hemoglobin Conc 31.7 (*)      Platelets 361 (*)     All other components within normal limits   COMPREHENSIVE METABOLIC PANEL - Abnormal; Notable for the following components:    Sodium 146 (*)     All other components within normal limits   TROPONIN I   TROPONIN I   B-TYPE NATRIURETIC PEPTIDE     EKG (independently interpreted by me):  Normal sinus rhythm, rate 94.  No ST elevation or depression.  Incomplete right bundle-branch block present.    X-rays (independently interpreted by me):  No acute abnormality    Chart reviewed.     Imaging Results          X-Ray Chest PA And Lateral (Final result)  Result time 02/20/20 18:13:53    Final result by Peterson Recinos MD (02/20/20 18:13:53)                 Impression:      No acute process.      Electronically signed by: Peterson Recinos MD  Date:    02/20/2020  Time:    18:13             Narrative:    EXAMINATION:  XR CHEST PA AND LATERAL    CLINICAL HISTORY:  Chest Pain;    TECHNIQUE:  PA and lateral views of the chest were performed.    COMPARISON:  02/03/2017.    FINDINGS:  Monitoring EKG leads are present.  The trachea is unremarkable.  The cardiomediastinal silhouette is within normal limits.  The hilar structures are unremarkable.  The hemidiaphragms are within normal limits.  There is no evidence of free air beneath the hemidiaphragms.  There are no pleural effusions.  There is no evidence of a pneumothorax.  There is no evidence of pneumomediastinum.  No airspace opacity is present.  There are degenerative changes in the osseous structures.                                Medications Given:  Medications   aspirin tablet 325 mg (325 mg Oral Given 2/20/20 1707)     MDM:    Presentation with the above described chest pain.     Differential diagnosis includes but is not limited to: ACS, pneumonia, PE, pneumothorax, pulmonary edema/CHF, aortic dissection, pericarditis, intra-abdominal process.     - EKG and history do not support the diagnosis of pericarditis.   - There is no evidence of pneumothorax or  infiltrate on CXR.   - Aortic dissection considered, but presenting symptoms felt uncharacteristic, chest X-ray shows no evidence of mediastinal widening and there are strong, equal and symmetric pulses. Given the current presentation, aortic dissection is felt unlikely.  - History, CXR, and exam do not suggest pulmonary edema/congestive heart failure   - Pulmonary embolism was considered but felt unlikely due to the compendium of presenting elements leading to a low pre-test probability followed by a negative PERC rule. All 8 of the rule out PERC criteria were present including: Age<50, HR <100, O2 sat > 94%, no recent trauma/surgery, no hemoptysis, no exogenous hormone use, no clinical signs suggestive of DVT, no hx DVT/PE. Given the above, there is a <2% risk of PE and I feel that no further diagnostic testing is needed.  - Intra-abdominal pathology felt unlikely given benign/non tender abdominal exam.   - Acute coronary syndrome considered but there are negative serial biomarkers, no acute ischemic EKG changes, and the patient has a low HEART score (4). Based on this, I feel that there is low risk for short-term major adverse cardiac event.     I have discussed this with the patient and reviewed options for inpatient and outpatient management. The patient verbalizes an excellent understanding of the above including presence of small risk of short-term major adverse cardiac event even in the setting of low HEART score, negative cardiac biomarker, and compendium of elements of this presentation. The patient wishes to pursue further workup on as an outpatient.     Diagnostic Impression:    1. Chest pain    2. Chest discomfort    3. Hypertension, unspecified type         ED Disposition Condition    Discharge Stable        Patient and/or family understands the plan and is in agreement, verbalized understanding, questions answered    Silvina Bowen MD  Emergency Medicine          Silvina Bowen MD  02/20/20  [id number]

## 2020-02-21 NOTE — DISCHARGE INSTRUCTIONS
Diagnosis: chest pain    Tests you had showed: EKG and labs did not show a heart attack.    Home Care Instructions:  - Continue taking your home medications as prescribed  - Your blood pressure was elevated in the emergency department.  Please follow up with your doctor so that they can select the appropriate high blood pressure medicine for you.    Follow-Up Plan:  - Follow-up with: Primary care doctor within 3 - 5 days  - Follow-up for additional testing and/or evaluation as directed by your primary doctor    Return to the Emergency Department for symptoms including but not limited to: worsening symptoms, shortness of breath or chest pain, vomiting with inability to hold down fluids, fevers greater than 100.4°F, dizziness, passing out/fainting/unconsciousness, or other concerning symptoms.

## 2020-02-24 ENCOUNTER — PATIENT MESSAGE (OUTPATIENT)
Dept: INTERNAL MEDICINE | Facility: CLINIC | Age: 60
End: 2020-02-24

## 2020-02-24 ENCOUNTER — TELEPHONE (OUTPATIENT)
Dept: PHARMACY | Facility: CLINIC | Age: 60
End: 2020-02-24

## 2020-02-26 ENCOUNTER — PATIENT MESSAGE (OUTPATIENT)
Dept: INTERNAL MEDICINE | Facility: CLINIC | Age: 60
End: 2020-02-26

## 2020-02-26 NOTE — TELEPHONE ENCOUNTER
Rescheduled ED f/u appt and sent pt portal msg.  
The patient is a 53y Female complaining of vaginal bleeding.

## 2020-03-05 ENCOUNTER — OFFICE VISIT (OUTPATIENT)
Dept: INTERNAL MEDICINE | Facility: CLINIC | Age: 60
End: 2020-03-05
Payer: COMMERCIAL

## 2020-03-05 VITALS
TEMPERATURE: 98 F | HEIGHT: 66 IN | BODY MASS INDEX: 34.4 KG/M2 | OXYGEN SATURATION: 96 % | WEIGHT: 214.06 LBS | HEART RATE: 67 BPM | SYSTOLIC BLOOD PRESSURE: 146 MMHG | DIASTOLIC BLOOD PRESSURE: 98 MMHG

## 2020-03-05 DIAGNOSIS — I45.10 NEW ONSET RIGHT BUNDLE BRANCH BLOCK (RBBB): ICD-10-CM

## 2020-03-05 DIAGNOSIS — Z78.9 STATIN INTOLERANCE: ICD-10-CM

## 2020-03-05 DIAGNOSIS — M54.2 NECK PAIN: ICD-10-CM

## 2020-03-05 DIAGNOSIS — I10 HYPERTENSION, UNSPECIFIED TYPE: Primary | ICD-10-CM

## 2020-03-05 PROCEDURE — 3077F SYST BP >= 140 MM HG: CPT | Mod: CPTII,S$GLB,, | Performed by: INTERNAL MEDICINE

## 2020-03-05 PROCEDURE — 3080F PR MOST RECENT DIASTOLIC BLOOD PRESSURE >= 90 MM HG: ICD-10-PCS | Mod: CPTII,S$GLB,, | Performed by: INTERNAL MEDICINE

## 2020-03-05 PROCEDURE — 99214 OFFICE O/P EST MOD 30 MIN: CPT | Mod: S$GLB,,, | Performed by: INTERNAL MEDICINE

## 2020-03-05 PROCEDURE — 3008F PR BODY MASS INDEX (BMI) DOCUMENTED: ICD-10-PCS | Mod: CPTII,S$GLB,, | Performed by: INTERNAL MEDICINE

## 2020-03-05 PROCEDURE — 3077F PR MOST RECENT SYSTOLIC BLOOD PRESSURE >= 140 MM HG: ICD-10-PCS | Mod: CPTII,S$GLB,, | Performed by: INTERNAL MEDICINE

## 2020-03-05 PROCEDURE — 3008F BODY MASS INDEX DOCD: CPT | Mod: CPTII,S$GLB,, | Performed by: INTERNAL MEDICINE

## 2020-03-05 PROCEDURE — 99999 PR PBB SHADOW E&M-EST. PATIENT-LVL IV: ICD-10-PCS | Mod: PBBFAC,,, | Performed by: INTERNAL MEDICINE

## 2020-03-05 PROCEDURE — 3080F DIAST BP >= 90 MM HG: CPT | Mod: CPTII,S$GLB,, | Performed by: INTERNAL MEDICINE

## 2020-03-05 PROCEDURE — 99214 PR OFFICE/OUTPT VISIT, EST, LEVL IV, 30-39 MIN: ICD-10-PCS | Mod: S$GLB,,, | Performed by: INTERNAL MEDICINE

## 2020-03-05 PROCEDURE — 99999 PR PBB SHADOW E&M-EST. PATIENT-LVL IV: CPT | Mod: PBBFAC,,, | Performed by: INTERNAL MEDICINE

## 2020-03-05 RX ORDER — DICLOFENAC SODIUM 10 MG/G
2 GEL TOPICAL 3 TIMES DAILY
Qty: 100 G | Refills: 3 | Status: SHIPPED | OUTPATIENT
Start: 2020-03-05 | End: 2020-09-10

## 2020-03-05 RX ORDER — GABAPENTIN 800 MG/1
800 TABLET ORAL 3 TIMES DAILY
COMMUNITY
Start: 2020-01-28

## 2020-03-05 RX ORDER — LOSARTAN POTASSIUM 25 MG/1
25 TABLET ORAL DAILY
Qty: 90 TABLET | Refills: 3 | Status: SHIPPED | OUTPATIENT
Start: 2020-03-05 | End: 2021-02-15

## 2020-03-05 NOTE — PROGRESS NOTES
CC:  ER follow-up    HPI:  The patient is a 60-year-old female with reflux, hiatal hernia, hyperlipidemia, rheumatoid arthritis, migraine headaches and history of left breast cancer presents today as an ER follow-up hypertension.  The patient reports 2 days prior to going to the emergency room, was feeling very fatigued, sleeping up to 12 hr.  Arms felt very heavy.  She was lightheaded.  She went to the emergency department.  Her blood pressure at that time was 216/101.  By the time she was discharged, he came down to 179/96.  She was not placed on any medication.  She had been on lisinopril in the past but this was discontinued secondary to very low blood pressure readings.    ROS:  Please see patient and review of [employer] for HPI/ROS submitted by the patient on 3/4/2020   Hypertension  Chronicity: new  Onset: 1 to 4 weeks ago  Progression since onset: gradually improving  anxiety: Yes  blurred vision: Yes  chest pain: No  headaches: Yes  malaise/fatigue: Yes  neck pain: Yes  orthopnea: No  palpitations: No  peripheral edema: No  PND: No  shortness of breath: No  sweats: No  Agents associated with hypertension: NSAIDs  CAD risks: dyslipidemia, obesity, post-menopausal state, sedentary lifestyle, stress  She reports that she is being treated for problems with the cornea.  She does have a cough which with her up at night.  She is currently on Prevacid twice a day.  She will follow up with Dr. Baxter next month.  She does report some pain in the back of the neck for which she takes Aleve twice a day.  She is not taking Mobic at this time.    Physical exam:  General appearance no acute distress.  HEENT:  Trachea is midline without JVD.  Pulmonary:  Good inspiratory, expiratory breath sounds are heard.  Lungs are clear auscultation.  Cardiovascular:  S1-S2, rhythm is normal.  Extremities without edema.  GI:  Abdomen was nontender, nondistended without hepatosplenomegaly.  Comments:  The patient's lab results and  EKG from the ER were reviewed.  Patient did have a new right bundle branch block.  Also discussed patient about starting an ARB.  Will start losartan 25 mg 1/2 tablet a day.  The prescription will say 1 a day    Assessment:  1.  Hypertension  2.  New right bundle branch block  3.  Hyperlipidemia currently not tolerating status    Plan:  1.  We will refer the patient to our health  for lifestyle modification  2.  Will start the patient on losartan 25 mg 1/2 tablet a day  3.  The patient is to follow up in 1 month for re-evaluation.

## 2020-03-24 ENCOUNTER — TELEPHONE (OUTPATIENT)
Dept: INTERNAL MEDICINE | Facility: CLINIC | Age: 60
End: 2020-03-24

## 2020-03-24 NOTE — TELEPHONE ENCOUNTER
----- Message from Alina Bautista MA sent at 3/24/2020 12:23 PM CDT -----  Regarding: FW: Updated Lipid Panel      ----- Message -----  From: Felicia Parry  Sent: 3/24/2020  11:49 AM CDT  To: Blane Brian MD, [facility] Staff  Subject: Updated Lipid Panel                              Dr. Brian,     We received a denial for the Repatha for . Her insurance is requiring an up to date Lipid Panel from the past 90 days. If you could please schedule that with the PT so we can resubmit.     Thank You,  Felicia Parry  Patient Care Advocate  [facility]

## 2020-03-24 NOTE — TELEPHONE ENCOUNTER
Dr. Brian,     We received a denial for the Repatha for . Her insurance is requiring an up to date Lipid Panel from the past 90 days. If you could please schedule that with the PT so we can resubmit.     Thank You,  Felicia Parry  Patient Care Advocate  [facility]

## 2020-03-25 ENCOUNTER — PATIENT MESSAGE (OUTPATIENT)
Dept: INTERNAL MEDICINE | Facility: CLINIC | Age: 60
End: 2020-03-25

## 2020-04-02 NOTE — TELEPHONE ENCOUNTER
Dr. Brian,     Touching base on this!    We received a denial for the Repatha for . Her insurance is requiring an up to date Lipid Panel from the past 90 days. If you could please schedule that with the PT so we can resubmit.     Thank You,   Felicia Parry   Patient Care Advocate   [facility]

## 2020-04-06 ENCOUNTER — OFFICE VISIT (OUTPATIENT)
Dept: INTERNAL MEDICINE | Facility: CLINIC | Age: 60
End: 2020-04-06
Payer: COMMERCIAL

## 2020-04-06 ENCOUNTER — LAB VISIT (OUTPATIENT)
Dept: LAB | Facility: HOSPITAL | Age: 60
End: 2020-04-06
Attending: INTERNAL MEDICINE
Payer: COMMERCIAL

## 2020-04-06 VITALS
TEMPERATURE: 98 F | HEART RATE: 78 BPM | OXYGEN SATURATION: 98 % | WEIGHT: 212.06 LBS | DIASTOLIC BLOOD PRESSURE: 84 MMHG | BODY MASS INDEX: 34.08 KG/M2 | HEIGHT: 66 IN | SYSTOLIC BLOOD PRESSURE: 118 MMHG

## 2020-04-06 DIAGNOSIS — I10 HYPERTENSION, UNSPECIFIED TYPE: Primary | ICD-10-CM

## 2020-04-06 DIAGNOSIS — E78.5 HYPERLIPIDEMIA, UNSPECIFIED HYPERLIPIDEMIA TYPE: ICD-10-CM

## 2020-04-06 DIAGNOSIS — Z78.9 STATIN INTOLERANCE: ICD-10-CM

## 2020-04-06 LAB
CHOLEST SERPL-MCNC: 244 MG/DL (ref 120–199)
CHOLEST/HDLC SERPL: 4.4 {RATIO} (ref 2–5)
HDLC SERPL-MCNC: 56 MG/DL (ref 40–75)
HDLC SERPL: 23 % (ref 20–50)
LDLC SERPL CALC-MCNC: 161.4 MG/DL (ref 63–159)
NONHDLC SERPL-MCNC: 188 MG/DL
TRIGL SERPL-MCNC: 133 MG/DL (ref 30–150)

## 2020-04-06 PROCEDURE — 3074F SYST BP LT 130 MM HG: CPT | Mod: CPTII,S$GLB,, | Performed by: INTERNAL MEDICINE

## 2020-04-06 PROCEDURE — 99213 OFFICE O/P EST LOW 20 MIN: CPT | Mod: S$GLB,,, | Performed by: INTERNAL MEDICINE

## 2020-04-06 PROCEDURE — 80061 LIPID PANEL: CPT

## 2020-04-06 PROCEDURE — 3079F PR MOST RECENT DIASTOLIC BLOOD PRESSURE 80-89 MM HG: ICD-10-PCS | Mod: CPTII,S$GLB,, | Performed by: INTERNAL MEDICINE

## 2020-04-06 PROCEDURE — 99213 PR OFFICE/OUTPT VISIT, EST, LEVL III, 20-29 MIN: ICD-10-PCS | Mod: S$GLB,,, | Performed by: INTERNAL MEDICINE

## 2020-04-06 PROCEDURE — 99999 PR PBB SHADOW E&M-EST. PATIENT-LVL IV: ICD-10-PCS | Mod: PBBFAC,,, | Performed by: INTERNAL MEDICINE

## 2020-04-06 PROCEDURE — 36415 COLL VENOUS BLD VENIPUNCTURE: CPT

## 2020-04-06 PROCEDURE — 99999 PR PBB SHADOW E&M-EST. PATIENT-LVL IV: CPT | Mod: PBBFAC,,, | Performed by: INTERNAL MEDICINE

## 2020-04-06 PROCEDURE — 3079F DIAST BP 80-89 MM HG: CPT | Mod: CPTII,S$GLB,, | Performed by: INTERNAL MEDICINE

## 2020-04-06 PROCEDURE — 3008F BODY MASS INDEX DOCD: CPT | Mod: CPTII,S$GLB,, | Performed by: INTERNAL MEDICINE

## 2020-04-06 PROCEDURE — 3074F PR MOST RECENT SYSTOLIC BLOOD PRESSURE < 130 MM HG: ICD-10-PCS | Mod: CPTII,S$GLB,, | Performed by: INTERNAL MEDICINE

## 2020-04-06 PROCEDURE — 3008F PR BODY MASS INDEX (BMI) DOCUMENTED: ICD-10-PCS | Mod: CPTII,S$GLB,, | Performed by: INTERNAL MEDICINE

## 2020-04-06 NOTE — PROGRESS NOTES
CC:  Follow-up of hypertension    HPI:  The patient is a 60-year-old female with hypertension, left breast ductal carcinoma in situ, ischemic colitis, colon polyp, GERD, hyperlipidemia presents today for follow-up of hypertension.  Patient was started on losartan 25 mg 1/2 tab per day.  She has been checking her blood pressures on a daily basis.  The been ranging anywhere from a low of 105 systolic to 142 over 70 diastolic to 85.  Most the readings were in the 120s or less.  She does report 2 episodes of being a little lightheaded but not as bad as when she went to the emergency room.  Her last blood pressure reading was 110/70 on April 5th.  Patient also has a history statin intolerance.  She needs a lipid panel today for her prior authorization for Repatha.    ROS:  Please see patient and review of [employer] for HPI/ROS submitted by the patient on 4/3/2020   Hypertension  Chronicity: recurrent  Onset: more than 1 month ago  Progression since onset: gradually improving  Condition status: controlled  anxiety: No  blurred vision: No  chest pain: No  headaches: No  malaise/fatigue: Yes  neck pain: No  orthopnea: No  palpitations: No  peripheral edema: No  PND: No  shortness of breath: No  sweats: No  Agents associated with hypertension: NSAIDs  CAD risks: dyslipidemia, obesity, post-menopausal state  Compliance problems: no compliance problems  Improvement on treatment: moderate  Patient reports weight is staying stable.  No problems with cough.  No problems with edema.    Physical exam:  General appearance:  No acute distress.  HEENT:  Trachea is midline without JVD.  Pulmonary:  Good inspiratory, expiratory breath sounds are heard.  Lungs are clear to auscultation.  Cardiovascular:  S1-S2, rhythm is normal.  Extremities without edema.  GI:  Abdomen was nontender, nondistended without hepatosplenomegaly.  Comments:  Did discuss with patient about her medication.  She appears to be satisfied with it so  far.    Assessment:  Hypertension currently better with losartan  Hyperlipidemia-patient needs a lipid panel today    Plan:  1.  No change in medications at this time  2.  Will schedule lipid panel today  3.  The patient has a follow-up in 4 months.

## 2020-04-09 ENCOUNTER — TELEPHONE (OUTPATIENT)
Dept: INTERNAL MEDICINE | Facility: CLINIC | Age: 60
End: 2020-04-09

## 2020-04-09 NOTE — TELEPHONE ENCOUNTER
----- Message from Blane Brian MD sent at 4/8/2020  4:57 PM CDT -----  Please call the patient regarding her abnormal result. Vanessa, I would like to see about getting Repatha for the patient. It was rejected because she needed a recent Lipid profile. Can you resubmit it with her lipid panel results?

## 2020-04-09 NOTE — TELEPHONE ENCOUNTER
I called the [facility] and they are working on the approval. They submitted the current cholesterol information yesterday.

## 2020-05-11 ENCOUNTER — TELEPHONE (OUTPATIENT)
Dept: INTERNAL MEDICINE | Facility: CLINIC | Age: 60
End: 2020-05-11

## 2020-05-11 NOTE — TELEPHONE ENCOUNTER
----- Message from Meche Boyd sent at 5/11/2020 11:51 AM CDT -----  Contact: [employer] prior [facility] dept [phone]  Type: Rx Prior Authorization    Medication: evolocumab (REPATHA SURECLICK) 140 mg/mL [facility]    Insurance Pharmacy Authorization Phone Number: [employer]    Are any other medications covered by the insurance?    Comments: urgent appeal was received and it will reviewed within 48 hrs, so a phone calls needs to be return quickly for the peer to peer reviewed    Thank you

## 2020-05-25 ENCOUNTER — HOSPITAL ENCOUNTER (OUTPATIENT)
Dept: RADIOLOGY | Facility: HOSPITAL | Age: 60
Discharge: HOME | End: 2020-05-25
Attending: PHYSICIAN ASSISTANT
Payer: COMMERCIAL

## 2020-05-25 DIAGNOSIS — Z85.3 HISTORY OF BREAST CANCER: ICD-10-CM

## 2020-05-25 DIAGNOSIS — R92.1 BREAST CALCIFICATIONS: ICD-10-CM

## 2020-05-25 PROCEDURE — 77062 MAMMO DIGITAL DIAGNOSTIC BILAT WITH TOMOSYNTHESIS_CAD: ICD-10-PCS | Mod: 26,,, | Performed by: RADIOLOGY

## 2020-05-25 PROCEDURE — 77066 MAMMO DIGITAL DIAGNOSTIC BILAT WITH TOMOSYNTHESIS_CAD: ICD-10-PCS | Mod: 26,,, | Performed by: RADIOLOGY

## 2020-05-25 PROCEDURE — 77066 DX MAMMO INCL CAD BI: CPT | Mod: 26,,, | Performed by: RADIOLOGY

## 2020-05-25 PROCEDURE — 77062 BREAST TOMOSYNTHESIS BI: CPT | Mod: TC,PO

## 2020-05-25 PROCEDURE — 77062 BREAST TOMOSYNTHESIS BI: CPT | Mod: 26,,, | Performed by: RADIOLOGY

## 2020-05-25 NOTE — TELEPHONE ENCOUNTER
NO Prior Authorization has been obtained for the patients Repatha.Both Appeals have been Denied. We will be assisting the patient in applying to [employer] Patient Assistance Program. Will send message to Dr Blane Brian and staff that we will be faxing the Doctor portion of application for required signature.

## 2020-06-03 ENCOUNTER — OFFICE VISIT (OUTPATIENT)
Dept: OBSTETRICS AND GYNECOLOGY | Facility: CLINIC | Age: 60
End: 2020-06-03
Attending: OBSTETRICS & GYNECOLOGY
Payer: COMMERCIAL

## 2020-06-03 VITALS
BODY MASS INDEX: 34.05 KG/M2 | DIASTOLIC BLOOD PRESSURE: 92 MMHG | SYSTOLIC BLOOD PRESSURE: 142 MMHG | HEIGHT: 66 IN | WEIGHT: 211.88 LBS

## 2020-06-03 DIAGNOSIS — Z85.3 HISTORY OF BREAST CANCER: ICD-10-CM

## 2020-06-03 DIAGNOSIS — Z01.419 ENCOUNTER FOR GYNECOLOGICAL EXAMINATION WITHOUT ABNORMAL FINDING: Primary | ICD-10-CM

## 2020-06-03 PROCEDURE — 3077F SYST BP >= 140 MM HG: CPT | Mod: CPTII,S$GLB,, | Performed by: OBSTETRICS & GYNECOLOGY

## 2020-06-03 PROCEDURE — 3080F PR MOST RECENT DIASTOLIC BLOOD PRESSURE >= 90 MM HG: ICD-10-PCS | Mod: CPTII,S$GLB,, | Performed by: OBSTETRICS & GYNECOLOGY

## 2020-06-03 PROCEDURE — 3080F DIAST BP >= 90 MM HG: CPT | Mod: CPTII,S$GLB,, | Performed by: OBSTETRICS & GYNECOLOGY

## 2020-06-03 PROCEDURE — 3077F PR MOST RECENT SYSTOLIC BLOOD PRESSURE >= 140 MM HG: ICD-10-PCS | Mod: CPTII,S$GLB,, | Performed by: OBSTETRICS & GYNECOLOGY

## 2020-06-03 PROCEDURE — 99396 PREV VISIT EST AGE 40-64: CPT | Mod: S$GLB,,, | Performed by: OBSTETRICS & GYNECOLOGY

## 2020-06-03 PROCEDURE — 99396 PR PREVENTIVE VISIT,EST,40-64: ICD-10-PCS | Mod: S$GLB,,, | Performed by: OBSTETRICS & GYNECOLOGY

## 2020-06-03 RX ORDER — TRIAMCINOLONE ACETONIDE 1 MG/G
CREAM TOPICAL 2 TIMES DAILY
Qty: 15 G | Refills: 1 | Status: SHIPPED | OUTPATIENT
Start: 2020-06-03 | End: 2022-08-03

## 2020-06-03 NOTE — PROGRESS NOTES
SUBJECTIVE:   60 y.o. female   for annual routine checkup. No LMP recorded (lmp unknown). Patient has had a hysterectomy..  She reports occasional itching in the groin area and request Kenalog cream which she has used in the past  She desires to continue Premarin vaginal cream twice weekly  She has a history of DCIS and had a normal diagnostic mammogram in May 2020.        Past Medical History:   Diagnosis Date    Allergy     Breast cancer 2016    Left breast low grade DCIS, ER/OK positive    Colon polyp 10/31/2018    Ischemic colitis suspected as well ( area biopsied).    GERD (gastroesophageal reflux disease)     Hiatal hernia     Hyperlipidemia     Irritable bowel syndrome     Migraine headache     RA (rheumatoid arthritis)     Squamous cell carcinoma excised 14    in situ, R forearm     Past Surgical History:   Procedure Laterality Date    BREAST BIOPSY Left 2016    DCIS    BREAST LUMPECTOMY      ECTOPIC PREGNANCY SURGERY Right     HYSTERECTOMY      ovaries left intact    removal of ovarian cyst Right oct 1992    ovary left intact    TUBAL LIGATION      tubal reversal  1991     Social History     Socioeconomic History    Marital status:      Spouse name: Parrish    Number of children: 2    Years of education: Not on file    Highest education level: Not on file   Occupational History    Not on file   Social Needs    Financial resource strain: Not on file    Food insecurity:     Worry: Not on file     Inability: Not on file    Transportation needs:     Medical: Not on file     Non-medical: Not on file   Tobacco Use    Smoking status: Never Smoker    Smokeless tobacco: Never Used   Substance and Sexual Activity    Alcohol use: No     Alcohol/week: 0.0 standard drinks     Frequency: Never     Binge frequency: Never    Drug use: No    Sexual activity: Yes     Partners: Male     Birth control/protection: Surgical, None     Comment:  to Parrish     Lifestyle    Physical activity:     Days per week: 2 days     Minutes per session: 60 min    Stress: Not on file   Relationships    Social connections:     Talks on phone: Not on file     Gets together: Not on file     Attends Oriental orthodox service: Not on file     Active member of club or organization: Not on file     Attends meetings of clubs or organizations: Not on file     Relationship status: Not on file   Other Topics Concern    Are you pregnant or think you may be? No    Breast-feeding No   Social History Narrative    Not on file     Family History   Problem Relation Age of Onset    Migraines Sister     Migraines Brother     Seizures Brother     Migraines Maternal Grandmother     Seizures Maternal Grandmother     Cancer Father 77        Lung cancer    Cancer Paternal Uncle         lung    Cancer Maternal Uncle         prostate ca    Melanoma Neg Hx     Breast cancer Neg Hx     Colon cancer Neg Hx     Ovarian cancer Neg Hx      OB History    Para Term  AB Living   3 2 2   1 2   SAB TAB Ectopic Multiple Live Births       1   2      # Outcome Date GA Lbr Garrett/2nd Weight Sex Delivery Anes PTL Lv   3 Ectopic     U       2 Term     F Vag-Spont   NILTON   1 Term     F Vag-Spont   NILTON           Current Outpatient Medications   Medication Sig Dispense Refill    ALPRAZolam (XANAX) 0.25 MG tablet Take 1 tablet (0.25 mg total) by mouth nightly as needed for Insomnia. 30 tablet 0    baclofen (LIORESAL) 10 MG tablet Take 10 mg by mouth 3 (three) times daily.      budesonide 1 mg/2 mL NbSp       butalbital-acetaminophen-caffeine -40 mg (FIORICET, ESGIC) -40 mg per tablet Take 1 tablet by mouth every 6 to 8 hours as needed. 60 tablet 0    diclofenac sodium (VOLTAREN) 1 % Gel Apply 2 g topically 3 (three) times daily. 100 g 3    evolocumab (REPATHA SURECLICK) 140 mg/mL PnIj Inject 1 mL (140 mg total) into the skin every 14 (fourteen) days. 2 mL 0    fluticasone (FLONASE) 50  mcg/actuation nasal spray       gabapentin (NEURONTIN) 800 MG tablet Take 800 mg by mouth 3 (three) times daily.      lansoprazole (PREVACID) 30 MG capsule Take 30 mg by mouth every morning. 1 Capsule, Delayed Release(E.C.) Oral Every day      losartan (COZAAR) 25 MG tablet Take 1 tablet (25 mg total) by mouth once daily. 90 tablet 3    meloxicam (MOBIC) 15 MG tablet Take 1 tablet (15 mg total) by mouth once daily. 30 tablet 0    multivitamin (MULTIVITAMIN) per tablet Take 1 tablet by mouth once daily.        mupirocin (BACTROBAN) 2 % ointment       ondansetron (ZOFRAN) 4 MG tablet Take 1 tablet (4 mg total) by mouth every 8 (eight) hours as needed for Nausea. 1 Tablet Oral Every 6 hours 12 tablet 12    prasterone, dhea, (INTRAROSA) 6.5 mg Inst Place 6.5 mg vaginally every evening. 30 each 11    PREMARIN vaginal cream PLACE 1 GRAM VAGINALLY 2 TIMES A WK  12    sumatriptan (IMITREX) 20 mg/actuation nasal spray USE 1 SPRAY IN EACH NOSTRIL AT ONSET OF HEADACHE. MAY REPEAT ONCE IN 2 HOURS IF NO RELIEF. NO MORE THAN 2 SPRAYS PER 24 HOURS 6 each 12    VENTOLIN HFA 90 mcg/actuation inhaler INHALE 2 PUFFS INTO THE LUNGS EVERY 6 HOURS AS NEEDED FOR WHEEZING. RESCUE 18 g 0    vitamin D 1000 units Tab Take 185 mg by mouth every morning.       [START ON 6/4/2020] conjugated estrogens (PREMARIN) vaginal cream Place 1 g vaginally twice a week. 45 g 12    triamcinolone acetonide 0.1% (KENALOG) 0.1 % cream Apply topically 2 (two) times daily. 15 g 1     No current facility-administered medications for this visit.      Allergies: Pravastatin; Amitriptyline; Codeine; Doxycycline; Erythromycin; Iodine; Macrobid  [nitrofurantoin monohyd/m-cryst]; and Verapamil     The 10-year ASCVD risk score (Bijuradha GARCIA Jr., et al., 2013) is: 5.2%    Values used to calculate the score:      Age: 60 years      Sex: Female      Is Non- : No      Diabetic: No      Tobacco smoker: No      Systolic Blood Pressure: 142  mmHg      Is BP treated: Yes      HDL Cholesterol: 56 mg/dL      Total Cholesterol: 244 mg/dL      ROS:  Constitutional: no weight loss, weight gain, fever, fatigue  Eyes:  No vision changes, glasses/contacts  ENT/Mouth: No ulcers, sinus problems, ears ringing, headache  Cardiovascular: No inability to lie flat, chest pain, exercise intolerance, swelling, heart palpitations  Respiratory: No wheezing, coughing blood, shortness of breath, or cough  Gastrointestinal: No diarrhea, bloody stool, nausea/vomiting, constipation, gas, hemorrhoids  Genitourinary: No blood in urine, painful urination, urgency of urination, frequency of urination, incomplete emptying, incontinence, abnormal bleeding, painful periods, heavy periods, vaginal discharge, vaginal odor, painful intercourse, sexual problems, bleeding after intercourse, +vaginal dryness.  Musculoskeletal: No muscle weakness  Skin/Breast: +breast cancer  Neurological: No passing out, seizures, numbness, headache  Endocrine: No diabetes, hypothyroid, hyperthyroid, hot flashes, hair loss, abnormal hair growth, acne  Psychiatric: No depression, crying  Hematologic: No bruises, bleeding, swollen lymph nodes, anemia.      Physical Exam:   Constitutional: She is oriented to person, place, and time. She appears well-developed and well-nourished.      Neck: Normal range of motion. No tracheal deviation present. No thyromegaly present.    Cardiovascular: Exam reveals no edema.     Pulmonary/Chest: Effort normal. She exhibits no mass, no tenderness, no deformity and no retraction. Right breast exhibits no inverted nipple, no mass, no nipple discharge, no skin change, no tenderness, presence, no bleeding and no swelling. Left breast exhibits no inverted nipple, no mass, no nipple discharge, no skin change, no tenderness, presence, no bleeding and no swelling. Breasts are symmetrical.        Abdominal: Soft. She exhibits no distension and no mass. There is no tenderness. There is  no rebound and no guarding. No hernia. Hernia confirmed negative in the left inguinal area.     Genitourinary: Rectal exam shows no external hemorrhoid. There is no rash, tenderness or lesion on the right labia. There is no rash, tenderness or lesion on the left labia. Uterus is absent. No no adexnal prolapse. Right adnexum displays no mass, no tenderness and no fullness. Left adnexum displays no mass, no tenderness and no fullness. No tenderness, bleeding, rectocele, cystocele or unspecified prolapse of vaginal walls in the vagina. No vaginal discharge found. Vaginal cuff normal.Cervix exhibits absence.           Musculoskeletal: Normal range of motion and moves all extremeties. She exhibits no edema.      Lymphadenopathy:        Right: No inguinal adenopathy present.        Left: No inguinal adenopathy present.    Neurological: She is alert and oriented to person, place, and time.    Skin: No rash noted. No erythema. No pallor.    Psychiatric: She has a normal mood and affect. Her behavior is normal. Judgment and thought content normal.         ASSESSMENT:   well woman  History of breast cancer  PLAN:   Mammogram  Continue Premarin vaginal cream  return annually or prn

## 2020-07-06 ENCOUNTER — OFFICE VISIT (OUTPATIENT)
Dept: RHEUMATOLOGY | Facility: CLINIC | Age: 60
End: 2020-07-06
Payer: COMMERCIAL

## 2020-07-06 VITALS
HEART RATE: 69 BPM | DIASTOLIC BLOOD PRESSURE: 83 MMHG | BODY MASS INDEX: 35.19 KG/M2 | HEIGHT: 66 IN | SYSTOLIC BLOOD PRESSURE: 144 MMHG | TEMPERATURE: 98 F | WEIGHT: 218.94 LBS

## 2020-07-06 DIAGNOSIS — M25.50 POLYARTHRALGIA: Primary | ICD-10-CM

## 2020-07-06 PROCEDURE — 3077F PR MOST RECENT SYSTOLIC BLOOD PRESSURE >= 140 MM HG: ICD-10-PCS | Mod: CPTII,S$GLB,, | Performed by: INTERNAL MEDICINE

## 2020-07-06 PROCEDURE — 99214 PR OFFICE/OUTPT VISIT, EST, LEVL IV, 30-39 MIN: ICD-10-PCS | Mod: S$GLB,,, | Performed by: INTERNAL MEDICINE

## 2020-07-06 PROCEDURE — 99999 PR PBB SHADOW E&M-EST. PATIENT-LVL IV: CPT | Mod: PBBFAC,,, | Performed by: INTERNAL MEDICINE

## 2020-07-06 PROCEDURE — 3008F BODY MASS INDEX DOCD: CPT | Mod: CPTII,S$GLB,, | Performed by: INTERNAL MEDICINE

## 2020-07-06 PROCEDURE — 3077F SYST BP >= 140 MM HG: CPT | Mod: CPTII,S$GLB,, | Performed by: INTERNAL MEDICINE

## 2020-07-06 PROCEDURE — 99214 OFFICE O/P EST MOD 30 MIN: CPT | Mod: S$GLB,,, | Performed by: INTERNAL MEDICINE

## 2020-07-06 PROCEDURE — 3079F DIAST BP 80-89 MM HG: CPT | Mod: CPTII,S$GLB,, | Performed by: INTERNAL MEDICINE

## 2020-07-06 PROCEDURE — 3008F PR BODY MASS INDEX (BMI) DOCUMENTED: ICD-10-PCS | Mod: CPTII,S$GLB,, | Performed by: INTERNAL MEDICINE

## 2020-07-06 PROCEDURE — 99999 PR PBB SHADOW E&M-EST. PATIENT-LVL IV: ICD-10-PCS | Mod: PBBFAC,,, | Performed by: INTERNAL MEDICINE

## 2020-07-06 PROCEDURE — 3079F PR MOST RECENT DIASTOLIC BLOOD PRESSURE 80-89 MM HG: ICD-10-PCS | Mod: CPTII,S$GLB,, | Performed by: INTERNAL MEDICINE

## 2020-07-06 NOTE — PROGRESS NOTES
" Patient ID: Christal Posey is a 57 y.o. female.     Chief Complaint: Follow-up     HPI::   Initial history: 57 yo F with PMH Migraines, IBS, GERD, breast CA s/p lumpectomy 6/2016 seen in consult for joint pain and swelling with positive RF. She states that her hand pain began about a years ago. It is mostly in the PIPs and she does sometimes report some swelling. She feels stiff for about 1 hour in the AM. She also has pain in the joints in her feet for about the last year as well. She has some knee pain and "cracking" without swelling that is more chronic. She does not sleep well and is fatigued. She has had a dry cough for about a year as well, but no other extra-articular manifestations. She was previously on Premarin that was stopped when the breast CA was found and thinks her symptoms are worse since stopping it. She uses Aleve which helps her pain as does ibuprofen though not quite as much, tylenol does not help. She has never smoked. Her paternal uncle has "arthritis." She took prednisone once is the past for sciatica and it made her "sick" where she felt very hot and just "not good."         Interval history: she was recently diagnosed with HTN.   She is taking meloxicam and baclofen. She reports that she gets more relief from aleve.  She had episode of diffuse arthralgias for a few days and took aleve and it improved it.  Reports some pain around her shoulder blades.She gets stiffness in lower back, feet, and hands for an hour in the morning.    Denies any rashes, oral ulcers, fevers, or raynauds.  Pain level is 4/10, non-radiating, and aching.     Review of Systems   Constitutional: Negative for activity change, chills, fatigue and fever.   HENT: Negative for mouth sores and trouble swallowing.    Eyes: Negative for pain and redness.   Respiratory: Negative for cough, chest tightness and shortness of breath.    Cardiovascular: Negative for chest pain.   Gastrointestinal: Negative for abdominal pain, " diarrhea and nausea.   Genitourinary: Negative for dysuria.   Musculoskeletal: Positive for arthralgias and neck stiffness. Negative for joint swelling, myalgias and neck pain.   Skin: Negative for color change and rash.   Neurological: Negative for weakness and numbness.   Hematological: Negative for adenopathy.   Psychiatric/Behavioral: Negative for sleep disturbance.          Objective:           Physical Exam   Constitutional: She is oriented to person, place, and time and well-developed, well-nourished, and in no distress. No distress.   HENT:   Head: Normocephalic and atraumatic.   Mouth/Throat: No oropharyngeal exudate.   Eyes: Conjunctivae are normal. No scleral icterus.   Neck: Normal range of motion. Neck supple. No thyromegaly present.   Cardiovascular: Normal rate, regular rhythm and normal heart sounds.    Pulmonary/Chest: Effort normal and breath sounds normal.   Abdominal: Soft. There is no tenderness.         Right Side Rheumatological Exam     Examination finds the shoulder, elbow, wrist, knee, 1st PIP, 1st MCP, 2nd PIP, 2nd MCP, 3rd PIP, 3rd MCP, 4th PIP, 4th MCP, 5th PIP and 5th MCP normal.     Left Side Rheumatological Exam     Examination finds the shoulder, elbow, wrist, knee, 1st PIP, 1st MCP, 2nd PIP, 2nd MCP, 3rd PIP, 3rd MCP, 4th PIP, 4th MCP, 5th PIP and 5th MCP normal.       Lymphadenopathy:     She has no cervical adenopathy.   Neurological: She is alert and oriented to person, place, and time.   Skin: Skin is warm and dry. No rash noted.   Musculoskeletal: Normal range of motion. She exhibits no edema or tenderness.        labs:  Reviewed;  Tammy-negative  Ccp-negative  RF-49    Arthritis survey (2017): I personally reviewed;  No significant detrimental interval change since the examination of 9/26/16 is appreciated    Assessment:    61 yo old female with PMH of hyperlipidemia, Migraines, left breast cancer status post lumpectomy (june 2016), HTN here for evaluation of joint pain and  +RF.  I suspect patient has early RA as she is having more episodes of hand stiffness.  I told her that +RF does not indicate RA but we will continue to monitor her. I discussed with her adding plaquenil but she prefers to wait.  Patient reports having more improvement in her pain from aleve than mobic.  I told her she is to choose one and not take both.  Plan:     -continue baclofen 10mg qhs prn for neck stiffness  Start voltaren gel QID PRN  -continue aleve 2 a day prn  Encouraged weight loss  -discussed diet that is low in fat and carbohydrates.    rtc in 6 months

## 2020-08-10 ENCOUNTER — TELEPHONE (OUTPATIENT)
Dept: INTERNAL MEDICINE | Facility: CLINIC | Age: 60
End: 2020-08-10

## 2020-08-10 NOTE — TELEPHONE ENCOUNTER
----- Message from Bonita Daniel sent at 8/10/2020  8:23 AM CDT -----  Regarding: Pt self Mobile [phone]  Patient is calling in regards to her wanting for you to write her a letter to [employer]  excusing her from jury duty. Patient said that she was asked to go in for jury duty on 08/24/2020 but she have chronic migraines, IBS, and arthritis and she do not want to attend jury duty because of these reasons. Patient would like to come in to pick this letter up on tomorrow please and she would like for you to call her to let her know when its a good time to come in.

## 2020-09-09 DIAGNOSIS — M54.2 NECK PAIN: ICD-10-CM

## 2020-09-09 NOTE — PROGRESS NOTES
Refill Routing Note   Medication(s) are not appropriate for processing by [facility]:       - Outside of protocol           Medication reconciliation completed: No      Automatic Epic Generated Protocol Data:        Requested Prescriptions   Pending Prescriptions Disp Refills    diclofenac sodium (VOLTAREN) 1 % Gel [Pharmacy Med Name: DICLOFENAC SODIUM 1% GEL] 100 g 3     Sig: APPLY 2 GRAMS TOPICALLY 3 (THREE) TIMES DAILY.       There is no refill protocol information for this order           Appointments  past 12m or future 3m with PCP    Date Provider   Last Visit   4/6/2020 Blane Brian MD   Next Visit   11/19/2020 Blane Brian MD   ED visits in past 90 days: 0     Note composed:7:46 AM 09/09/2020

## 2020-09-10 RX ORDER — DICLOFENAC SODIUM 10 MG/G
GEL TOPICAL
Qty: 100 G | Refills: 3 | Status: SHIPPED | OUTPATIENT
Start: 2020-09-10

## 2020-11-03 ENCOUNTER — TELEPHONE (OUTPATIENT)
Dept: SURGERY | Facility: CLINIC | Age: 60
End: 2020-11-03

## 2020-11-03 NOTE — TELEPHONE ENCOUNTER
Contacted the patient regarding message sent to cancel appointment.  The patient did not answer, message left with my name and direct number for patient to contact back.

## 2020-11-19 ENCOUNTER — LAB VISIT (OUTPATIENT)
Dept: LAB | Facility: HOSPITAL | Age: 60
End: 2020-11-19
Attending: INTERNAL MEDICINE
Payer: COMMERCIAL

## 2020-11-19 ENCOUNTER — OFFICE VISIT (OUTPATIENT)
Dept: INTERNAL MEDICINE | Facility: CLINIC | Age: 60
End: 2020-11-19
Payer: COMMERCIAL

## 2020-11-19 VITALS
OXYGEN SATURATION: 97 % | HEART RATE: 82 BPM | DIASTOLIC BLOOD PRESSURE: 86 MMHG | SYSTOLIC BLOOD PRESSURE: 126 MMHG | BODY MASS INDEX: 34.9 KG/M2 | HEIGHT: 66 IN | WEIGHT: 217.13 LBS

## 2020-11-19 DIAGNOSIS — G43.109 MIGRAINE WITH AURA AND WITHOUT STATUS MIGRAINOSUS, NOT INTRACTABLE: ICD-10-CM

## 2020-11-19 DIAGNOSIS — E78.5 HYPERLIPIDEMIA, UNSPECIFIED HYPERLIPIDEMIA TYPE: ICD-10-CM

## 2020-11-19 DIAGNOSIS — Z00.00 ANNUAL PHYSICAL EXAM: Primary | ICD-10-CM

## 2020-11-19 DIAGNOSIS — Z12.11 COLON CANCER SCREENING: ICD-10-CM

## 2020-11-19 DIAGNOSIS — I10 HYPERTENSION, UNSPECIFIED TYPE: ICD-10-CM

## 2020-11-19 DIAGNOSIS — Z86.000 HISTORY OF DUCTAL CARCINOMA IN SITU (DCIS) OF BREAST: ICD-10-CM

## 2020-11-19 LAB
ALBUMIN SERPL BCP-MCNC: 3.9 G/DL (ref 3.5–5.2)
ALP SERPL-CCNC: 98 U/L (ref 55–135)
ALT SERPL W/O P-5'-P-CCNC: 31 U/L (ref 10–44)
ANION GAP SERPL CALC-SCNC: 8 MMOL/L (ref 8–16)
AST SERPL-CCNC: 26 U/L (ref 10–40)
BASOPHILS # BLD AUTO: 0.04 K/UL (ref 0–0.2)
BASOPHILS NFR BLD: 0.5 % (ref 0–1.9)
BILIRUB SERPL-MCNC: 0.6 MG/DL (ref 0.1–1)
BUN SERPL-MCNC: 12 MG/DL (ref 6–20)
CALCIUM SERPL-MCNC: 9.3 MG/DL (ref 8.7–10.5)
CHLORIDE SERPL-SCNC: 103 MMOL/L (ref 95–110)
CHOLEST SERPL-MCNC: 243 MG/DL (ref 120–199)
CHOLEST/HDLC SERPL: 4.1 {RATIO} (ref 2–5)
CO2 SERPL-SCNC: 31 MMOL/L (ref 23–29)
CREAT SERPL-MCNC: 1 MG/DL (ref 0.5–1.4)
DIFFERENTIAL METHOD: ABNORMAL
EOSINOPHIL # BLD AUTO: 0.2 K/UL (ref 0–0.5)
EOSINOPHIL NFR BLD: 3.2 % (ref 0–8)
ERYTHROCYTE [DISTWIDTH] IN BLOOD BY AUTOMATED COUNT: 13.1 % (ref 11.5–14.5)
EST. GFR  (AFRICAN AMERICAN): >60 ML/MIN/1.73 M^2
EST. GFR  (NON AFRICAN AMERICAN): >60 ML/MIN/1.73 M^2
GLUCOSE SERPL-MCNC: 97 MG/DL (ref 70–110)
HCT VFR BLD AUTO: 43.6 % (ref 37–48.5)
HDLC SERPL-MCNC: 59 MG/DL (ref 40–75)
HDLC SERPL: 24.3 % (ref 20–50)
HGB BLD-MCNC: 13.8 G/DL (ref 12–16)
IMM GRANULOCYTES # BLD AUTO: 0.03 K/UL (ref 0–0.04)
IMM GRANULOCYTES NFR BLD AUTO: 0.4 % (ref 0–0.5)
LDLC SERPL CALC-MCNC: 144.2 MG/DL (ref 63–159)
LYMPHOCYTES # BLD AUTO: 2.2 K/UL (ref 1–4.8)
LYMPHOCYTES NFR BLD: 29.2 % (ref 18–48)
MCH RBC QN AUTO: 29.4 PG (ref 27–31)
MCHC RBC AUTO-ENTMCNC: 31.7 G/DL (ref 32–36)
MCV RBC AUTO: 93 FL (ref 82–98)
MONOCYTES # BLD AUTO: 0.4 K/UL (ref 0.3–1)
MONOCYTES NFR BLD: 5.8 % (ref 4–15)
NEUTROPHILS # BLD AUTO: 4.5 K/UL (ref 1.8–7.7)
NEUTROPHILS NFR BLD: 60.9 % (ref 38–73)
NONHDLC SERPL-MCNC: 184 MG/DL
NRBC BLD-RTO: 0 /100 WBC
PLATELET # BLD AUTO: 305 K/UL (ref 150–350)
PMV BLD AUTO: 10.7 FL (ref 9.2–12.9)
POTASSIUM SERPL-SCNC: 3.9 MMOL/L (ref 3.5–5.1)
PROT SERPL-MCNC: 7.7 G/DL (ref 6–8.4)
RBC # BLD AUTO: 4.7 M/UL (ref 4–5.4)
SODIUM SERPL-SCNC: 142 MMOL/L (ref 136–145)
TRIGL SERPL-MCNC: 199 MG/DL (ref 30–150)
WBC # BLD AUTO: 7.46 K/UL (ref 3.9–12.7)

## 2020-11-19 PROCEDURE — 3079F PR MOST RECENT DIASTOLIC BLOOD PRESSURE 80-89 MM HG: ICD-10-PCS | Mod: CPTII,S$GLB,, | Performed by: INTERNAL MEDICINE

## 2020-11-19 PROCEDURE — 1126F PR PAIN SEVERITY QUANTIFIED, NO PAIN PRESENT: ICD-10-PCS | Mod: S$GLB,,, | Performed by: INTERNAL MEDICINE

## 2020-11-19 PROCEDURE — 3074F SYST BP LT 130 MM HG: CPT | Mod: CPTII,S$GLB,, | Performed by: INTERNAL MEDICINE

## 2020-11-19 PROCEDURE — 85025 COMPLETE CBC W/AUTO DIFF WBC: CPT

## 2020-11-19 PROCEDURE — 3008F BODY MASS INDEX DOCD: CPT | Mod: CPTII,S$GLB,, | Performed by: INTERNAL MEDICINE

## 2020-11-19 PROCEDURE — 36415 COLL VENOUS BLD VENIPUNCTURE: CPT

## 2020-11-19 PROCEDURE — 3074F PR MOST RECENT SYSTOLIC BLOOD PRESSURE < 130 MM HG: ICD-10-PCS | Mod: CPTII,S$GLB,, | Performed by: INTERNAL MEDICINE

## 2020-11-19 PROCEDURE — 80053 COMPREHEN METABOLIC PANEL: CPT

## 2020-11-19 PROCEDURE — 99396 PREV VISIT EST AGE 40-64: CPT | Mod: S$GLB,,, | Performed by: INTERNAL MEDICINE

## 2020-11-19 PROCEDURE — 99396 PR PREVENTIVE VISIT,EST,40-64: ICD-10-PCS | Mod: S$GLB,,, | Performed by: INTERNAL MEDICINE

## 2020-11-19 PROCEDURE — 3008F PR BODY MASS INDEX (BMI) DOCUMENTED: ICD-10-PCS | Mod: CPTII,S$GLB,, | Performed by: INTERNAL MEDICINE

## 2020-11-19 PROCEDURE — 1126F AMNT PAIN NOTED NONE PRSNT: CPT | Mod: S$GLB,,, | Performed by: INTERNAL MEDICINE

## 2020-11-19 PROCEDURE — 99999 PR PBB SHADOW E&M-EST. PATIENT-LVL V: ICD-10-PCS | Mod: PBBFAC,,, | Performed by: INTERNAL MEDICINE

## 2020-11-19 PROCEDURE — 80061 LIPID PANEL: CPT

## 2020-11-19 PROCEDURE — 3079F DIAST BP 80-89 MM HG: CPT | Mod: CPTII,S$GLB,, | Performed by: INTERNAL MEDICINE

## 2020-11-19 PROCEDURE — 99999 PR PBB SHADOW E&M-EST. PATIENT-LVL V: CPT | Mod: PBBFAC,,, | Performed by: INTERNAL MEDICINE

## 2020-11-19 RX ORDER — TRAZODONE HYDROCHLORIDE 50 MG/1
50 TABLET ORAL NIGHTLY PRN
Qty: 30 TABLET | Refills: 11 | Status: SHIPPED | OUTPATIENT
Start: 2020-11-19 | End: 2021-09-27

## 2020-11-19 NOTE — PROGRESS NOTES
CC:  Annual exam    HPI:  The patient is a 60-year-old female with rheumatoid arthritis, left breast DCIS migraine headaches, reflux, colon polyps irritable bowel syndrome ischemic colitis and hypertension presents today for annual exam.  The patient is currently on losartan 25 mg 1/2 tablet daily.  Her systolic blood pressure has ranged from 121-147.  Her diastolic has ranged from 79-86.  The patient does report she has random feelings of fatigue and flushing.  When they occur she will check her blood pressure.  Her pressures remain normal.  Perhaps a lie down for few minutes or take a nap.  She does report that she does not sleep well at night.  She does snore but not very loud.  She does not feel rested during the day.    ROS:  Please see patient entered review of [employer] for HPI/ROS submitted by the patient on 11/17/2020   activity change: No  unexpected weight change: No  neck pain: No  hearing loss: No  rhinorrhea: No  trouble swallowing: No  eye discharge: No  visual disturbance: No  chest tightness: No  wheezing: No  chest pain: No  palpitations: No  blood in stool: No  constipation: No  vomiting: No  diarrhea: No  polydipsia: No  polyuria: No  difficulty urinating: No  hematuria: No  menstrual problem: No  dysuria: No  joint swelling: Yes  arthralgias: Yes  headaches: No  weakness: No  confusion: No  dysphoric mood: No  The patient reports he still has annoying cough.  This has been going on for 3 years.  Her gastroenterologist had her start Pepcid AC or Pepcid complete at night.  This did not help.  She is not exercising currently.  She had a colonoscopy in 2018 which showed 1 polyp as well as ischemic colitis    Physical exam:  General appearance:  No acute distress  HEENT:  Conjunctiva is clear.  Pupils equal reactive.  TMs are clear.  Nasal septum is midline without discharge.  Oropharynx without erythema.  Trachea is midline without JVD or thyromegaly.  Pulmonary:  Good inspiratory, expert or E  breath sounds are heard.  Lungs are clear auscultation.  Cardiovascular:  S1-S2, rhythm is normal.  Extremities without edema.  GI:  Abdomen was nontender, nondistended without hepatosplenomegaly  Lymphatics:  No cervical, or axillary adenopathy  Comments:  Did discuss trazodone with the patient to help with sleep.  Also discussed with her about increasing her losartan to 1 whole tablet a day.    Assessment:  1.  Annual exam  2.  Hypertension not optimally controlled  3.  History of left breast cancer  4.  Migraine headaches  5.  Hyperlipidemia  6.  Trouble sleeping  7.  Chronic cough  A.  Reflux    Plan:  1.  Will schedule a CBC, CMP, lipid panel, UA, stools for occult blood  2.  The patient is to take losartan 25 mg daily  3.  The patient start trazodone to help with sleep  4.  Will chart review in regards to chronic cough.

## 2020-11-23 ENCOUNTER — TELEPHONE (OUTPATIENT)
Dept: INTERNAL MEDICINE | Facility: CLINIC | Age: 60
End: 2020-11-23

## 2020-11-23 NOTE — TELEPHONE ENCOUNTER
----- Message from Blane Brian MD sent at 11/21/2020  3:14 PM CST -----  Please call the patient regarding her abnormal result.Her cholesterol was elevated. Please see if she would like to see our Health .

## 2020-11-23 NOTE — TELEPHONE ENCOUNTER
Pt informed that her cholesterol was elevated, understanding verbalized. Stated she didn't want to see the health  and will try watching her diet.

## 2020-11-24 ENCOUNTER — TELEPHONE (OUTPATIENT)
Dept: SURGERY | Facility: CLINIC | Age: 60
End: 2020-11-24

## 2020-11-24 NOTE — TELEPHONE ENCOUNTER
Called and spoke with the patient. She is scheduled to see Valerie Jang on Tuesday 12/1 for 1300. She voiced thanks and understanding of her appt date, time, and location.    ----- Message from Jazlyn Baez MA sent at 11/18/2020 12:36 PM CST -----  Contact: self    ----- Message -----  From: Macho Aly LPN  Sent: 11/18/2020  11:46 AM CST  To: Jazlyn Baez MA    Patient of roger, she is calling to schedule her follow up.   ----- Message -----  From: Junaid Castaneda  Sent: 11/18/2020  11:32 AM CST  To: [facility]    Pt MRN [id number]  calling for yearly f/u  was pt of Manan PARMAR      Please Call     Contact  [phone]

## 2020-11-28 ENCOUNTER — PATIENT OUTREACH (OUTPATIENT)
Dept: ADMINISTRATIVE | Facility: OTHER | Age: 60
End: 2020-11-28

## 2020-11-28 NOTE — PROGRESS NOTES
LINKS immunization registry updated  Care Everywhere updated  Health Maintenance updated  Chart reviewed for overdue Proactive [facility] Encounters ([facility]) health maintenance testing (CRS, Breast Ca, Diabetic Eye Exam)   Orders entered:N/A

## 2020-11-29 DIAGNOSIS — R05.3 CHRONIC COUGH: Primary | ICD-10-CM

## 2020-11-30 ENCOUNTER — TELEPHONE (OUTPATIENT)
Dept: INTERNAL MEDICINE | Facility: CLINIC | Age: 60
End: 2020-11-30

## 2020-11-30 NOTE — TELEPHONE ENCOUNTER
----- Message from Blane Brian MD sent at 11/29/2020  8:51 AM CST -----  Please contact patient. I would like to get a plain chest x-ray foe evaluation of her chronic cough. An order is in.

## 2020-12-01 ENCOUNTER — HOSPITAL ENCOUNTER (OUTPATIENT)
Dept: RADIOLOGY | Facility: HOSPITAL | Age: 60
Discharge: HOME | End: 2020-12-01
Attending: INTERNAL MEDICINE
Payer: COMMERCIAL

## 2020-12-01 ENCOUNTER — OFFICE VISIT (OUTPATIENT)
Dept: SURGERY | Facility: CLINIC | Age: 60
End: 2020-12-01
Payer: COMMERCIAL

## 2020-12-01 VITALS
BODY MASS INDEX: 34.93 KG/M2 | DIASTOLIC BLOOD PRESSURE: 83 MMHG | SYSTOLIC BLOOD PRESSURE: 179 MMHG | HEIGHT: 66 IN | HEART RATE: 84 BPM | WEIGHT: 217.38 LBS

## 2020-12-01 DIAGNOSIS — R05.3 CHRONIC COUGH: ICD-10-CM

## 2020-12-01 DIAGNOSIS — Z86.000 HISTORY OF DUCTAL CARCINOMA IN SITU (DCIS) OF BREAST: ICD-10-CM

## 2020-12-01 DIAGNOSIS — Z12.31 ENCOUNTER FOR SCREENING MAMMOGRAM FOR MALIGNANT NEOPLASM OF BREAST: Primary | ICD-10-CM

## 2020-12-01 PROCEDURE — 3008F BODY MASS INDEX DOCD: CPT | Mod: CPTII,S$GLB,, | Performed by: PHYSICIAN ASSISTANT

## 2020-12-01 PROCEDURE — 71046 X-RAY EXAM CHEST 2 VIEWS: CPT | Mod: 26,,, | Performed by: RADIOLOGY

## 2020-12-01 PROCEDURE — 71046 X-RAY EXAM CHEST 2 VIEWS: CPT | Mod: TC

## 2020-12-01 PROCEDURE — 3077F PR MOST RECENT SYSTOLIC BLOOD PRESSURE >= 140 MM HG: ICD-10-PCS | Mod: CPTII,S$GLB,, | Performed by: PHYSICIAN ASSISTANT

## 2020-12-01 PROCEDURE — 3079F PR MOST RECENT DIASTOLIC BLOOD PRESSURE 80-89 MM HG: ICD-10-PCS | Mod: CPTII,S$GLB,, | Performed by: PHYSICIAN ASSISTANT

## 2020-12-01 PROCEDURE — 1126F PR PAIN SEVERITY QUANTIFIED, NO PAIN PRESENT: ICD-10-PCS | Mod: S$GLB,,, | Performed by: PHYSICIAN ASSISTANT

## 2020-12-01 PROCEDURE — 3077F SYST BP >= 140 MM HG: CPT | Mod: CPTII,S$GLB,, | Performed by: PHYSICIAN ASSISTANT

## 2020-12-01 PROCEDURE — 3079F DIAST BP 80-89 MM HG: CPT | Mod: CPTII,S$GLB,, | Performed by: PHYSICIAN ASSISTANT

## 2020-12-01 PROCEDURE — 99213 PR OFFICE/OUTPT VISIT, EST, LEVL III, 20-29 MIN: ICD-10-PCS | Mod: S$GLB,,, | Performed by: PHYSICIAN ASSISTANT

## 2020-12-01 PROCEDURE — 71046 XR CHEST PA AND LATERAL: ICD-10-PCS | Mod: 26,,, | Performed by: RADIOLOGY

## 2020-12-01 PROCEDURE — 1126F AMNT PAIN NOTED NONE PRSNT: CPT | Mod: S$GLB,,, | Performed by: PHYSICIAN ASSISTANT

## 2020-12-01 PROCEDURE — 99999 PR PBB SHADOW E&M-EST. PATIENT-LVL IV: ICD-10-PCS | Mod: PBBFAC,,, | Performed by: PHYSICIAN ASSISTANT

## 2020-12-01 PROCEDURE — 99213 OFFICE O/P EST LOW 20 MIN: CPT | Mod: S$GLB,,, | Performed by: PHYSICIAN ASSISTANT

## 2020-12-01 PROCEDURE — 3008F PR BODY MASS INDEX (BMI) DOCUMENTED: ICD-10-PCS | Mod: CPTII,S$GLB,, | Performed by: PHYSICIAN ASSISTANT

## 2020-12-01 PROCEDURE — 99999 PR PBB SHADOW E&M-EST. PATIENT-LVL IV: CPT | Mod: PBBFAC,,, | Performed by: PHYSICIAN ASSISTANT

## 2020-12-01 NOTE — LETTER
December 1, 2020      Blane Brian MD  [address]  [facility] [address]           [facility] entry  [address]  [facility] [address]  Phone: [phone]  Fax: [phone]          Patient: Christal Posey   MR Number: [id number]   YOB: 1960   Date of Visit: 12/1/2020       Dear Dr. Blane Brian:    Thank you for referring Christal Posey to me for evaluation. Attached you will find relevant portions of my assessment and plan of care.    If you have questions, please do not hesitate to call me. I look forward to following Christal Posey along with you.    Sincerely,    Valerie Mcdowell PA-C    Enclosure  CC:  No Recipients    If you would like to receive this communication electronically, please contact [email or web address] or [phone] to request more information on [employer] access.    For providers and/or their staff who would like to refer a patient to [facility], please contact us through our one-stop-shop provider referral line, [facility] [place], at [phone].    If you feel you have received this communication in error or would no longer like to receive these types of communications, please e-mail [email or web address]

## 2020-12-01 NOTE — PROGRESS NOTES
[facility]  [facility] [place]  12/1/2020      SUBJECTIVE:   Ms. Christal Posey is a 60 y.o. female with a history of LEFT breast cancer who presents today for follow up breast cancer screening.      History of Present Illness:  Patient is a 59 yo female with a history of ER/MA+ low grade DCIS of the left breast s/p lumpectomy with 1 mm area of DCIS, negative margins. She met with Dr Guzmán who discussed XRT, small low grade DCIS, was in favor of hormonal therapy instead of XRT. Met with Dr Julian with no endocrine therapy.   No adjuvant therapy received.    Interval History:  Previous patient of Keira Hinkle PA-C. Last seen November 2019. States she has been doing well since that time. She denies any unexplained weight loss, changes in vision, or recent headaches.  She denies any new back or bone pain.  She denies palpating any masses at her breasts.      Her last mammogram was on 5/25/20, this was read as BIRADS 1    Review of Systems: Denies any cough, chest pain or shortness of breath.  Denies any fever or chills.  See HPI/ Interval History for other systems reviewed.    OBJECTIVE:   Vitals:    12/01/20 1308   BP: (!) 179/83   Pulse: 84        Physical Exam:  HEENT: Normocephalic, atraumatic.    General: alert and oriented; no acute distress.  Breast/Chest: No masses present bilaterally.  No erythema or edema.  No skin changes bilaterally. No nipple inversion or discharge bilaterally.   Lymph: No palpable adjacent axillary lymph nodes.  No cervical or supraclavicular lymphadenopathy.    Neurologic Exam: A&O x 3.  There are no focal neurologic deficits.    ASSESSMENT:  Ms. Christal Posey is a 60 y.o. year old female with a history of LEFT breast cancer who presents today for follow up breast cancer screening.      PLAN:   We discussed that there was nothing concerning on CBE. Continue SBE and notify of any concerns. Plan mammogram in may 2021. Follow up in 1 year. Notify of any concerns  prior to then.      Valerie Mcdowell PA-C      Surgical Oncology            12/1/2020

## 2020-12-03 DIAGNOSIS — R05.3 CHRONIC COUGH: Primary | ICD-10-CM

## 2020-12-04 ENCOUNTER — PATIENT MESSAGE (OUTPATIENT)
Dept: INTERNAL MEDICINE | Facility: CLINIC | Age: 60
End: 2020-12-04

## 2020-12-04 ENCOUNTER — TELEPHONE (OUTPATIENT)
Dept: INTERNAL MEDICINE | Facility: CLINIC | Age: 60
End: 2020-12-04

## 2020-12-04 NOTE — TELEPHONE ENCOUNTER
----- Message from Blane Brian MD sent at 12/3/2020  7:10 PM CST -----  Please contact patient. Her chest x-ray was normal. I would liek to get a cat scan of her chest. Orders are in.

## 2020-12-07 ENCOUNTER — HOSPITAL ENCOUNTER (OUTPATIENT)
Dept: RADIOLOGY | Facility: HOSPITAL | Age: 60
Discharge: HOME | End: 2020-12-07
Attending: INTERNAL MEDICINE
Payer: COMMERCIAL

## 2020-12-07 DIAGNOSIS — R05.3 CHRONIC COUGH: ICD-10-CM

## 2020-12-07 PROCEDURE — 71250 CT THORAX DX C-: CPT | Mod: 26,,, | Performed by: RADIOLOGY

## 2020-12-07 PROCEDURE — 71250 CT THORAX DX C-: CPT | Mod: TC

## 2020-12-07 PROCEDURE — 71250 CT CHEST WITHOUT CONTRAST: ICD-10-PCS | Mod: 26,,, | Performed by: RADIOLOGY

## 2021-02-08 ENCOUNTER — PATIENT OUTREACH (OUTPATIENT)
Dept: ADMINISTRATIVE | Facility: OTHER | Age: 61
End: 2021-02-08

## 2021-02-09 ENCOUNTER — OFFICE VISIT (OUTPATIENT)
Dept: RHEUMATOLOGY | Facility: CLINIC | Age: 61
End: 2021-02-09
Payer: COMMERCIAL

## 2021-02-09 ENCOUNTER — LAB VISIT (OUTPATIENT)
Dept: LAB | Facility: HOSPITAL | Age: 61
End: 2021-02-09
Attending: INTERNAL MEDICINE
Payer: COMMERCIAL

## 2021-02-09 VITALS
DIASTOLIC BLOOD PRESSURE: 91 MMHG | WEIGHT: 223.56 LBS | BODY MASS INDEX: 35.93 KG/M2 | HEART RATE: 83 BPM | SYSTOLIC BLOOD PRESSURE: 142 MMHG | HEIGHT: 66 IN

## 2021-02-09 DIAGNOSIS — M79.89 SWELLING OF LEFT FOOT: Primary | ICD-10-CM

## 2021-02-09 DIAGNOSIS — M79.672 CHRONIC FOOT PAIN, LEFT: ICD-10-CM

## 2021-02-09 DIAGNOSIS — G89.29 CHRONIC FOOT PAIN, LEFT: ICD-10-CM

## 2021-02-09 DIAGNOSIS — R76.8 RHEUMATOID FACTOR POSITIVE: ICD-10-CM

## 2021-02-09 DIAGNOSIS — M79.89 SWELLING OF LEFT FOOT: ICD-10-CM

## 2021-02-09 LAB
ALBUMIN SERPL BCP-MCNC: 4 G/DL (ref 3.5–5.2)
ALP SERPL-CCNC: 106 U/L (ref 55–135)
ALT SERPL W/O P-5'-P-CCNC: 29 U/L (ref 10–44)
ANION GAP SERPL CALC-SCNC: 9 MMOL/L (ref 8–16)
AST SERPL-CCNC: 22 U/L (ref 10–40)
BILIRUB SERPL-MCNC: 0.4 MG/DL (ref 0.1–1)
BUN SERPL-MCNC: 16 MG/DL (ref 6–20)
CALCIUM SERPL-MCNC: 9.2 MG/DL (ref 8.7–10.5)
CHLORIDE SERPL-SCNC: 104 MMOL/L (ref 95–110)
CO2 SERPL-SCNC: 29 MMOL/L (ref 23–29)
CREAT SERPL-MCNC: 1 MG/DL (ref 0.5–1.4)
EST. GFR  (AFRICAN AMERICAN): >60 ML/MIN/1.73 M^2
EST. GFR  (NON AFRICAN AMERICAN): >60 ML/MIN/1.73 M^2
GLUCOSE SERPL-MCNC: 90 MG/DL (ref 70–110)
POTASSIUM SERPL-SCNC: 4 MMOL/L (ref 3.5–5.1)
PROT SERPL-MCNC: 7.8 G/DL (ref 6–8.4)
SODIUM SERPL-SCNC: 142 MMOL/L (ref 136–145)
URATE SERPL-MCNC: 6.9 MG/DL (ref 2.4–5.7)

## 2021-02-09 PROCEDURE — 3080F DIAST BP >= 90 MM HG: CPT | Mod: CPTII,S$GLB,, | Performed by: INTERNAL MEDICINE

## 2021-02-09 PROCEDURE — 1125F AMNT PAIN NOTED PAIN PRSNT: CPT | Mod: S$GLB,,, | Performed by: INTERNAL MEDICINE

## 2021-02-09 PROCEDURE — 99999 PR PBB SHADOW E&M-EST. PATIENT-LVL IV: ICD-10-PCS | Mod: PBBFAC,,, | Performed by: INTERNAL MEDICINE

## 2021-02-09 PROCEDURE — 1125F PR PAIN SEVERITY QUANTIFIED, PAIN PRESENT: ICD-10-PCS | Mod: S$GLB,,, | Performed by: INTERNAL MEDICINE

## 2021-02-09 PROCEDURE — 80053 COMPREHEN METABOLIC PANEL: CPT

## 2021-02-09 PROCEDURE — 99999 PR PBB SHADOW E&M-EST. PATIENT-LVL IV: CPT | Mod: PBBFAC,,, | Performed by: INTERNAL MEDICINE

## 2021-02-09 PROCEDURE — 3008F BODY MASS INDEX DOCD: CPT | Mod: CPTII,S$GLB,, | Performed by: INTERNAL MEDICINE

## 2021-02-09 PROCEDURE — 99214 OFFICE O/P EST MOD 30 MIN: CPT | Mod: S$GLB,,, | Performed by: INTERNAL MEDICINE

## 2021-02-09 PROCEDURE — 36415 COLL VENOUS BLD VENIPUNCTURE: CPT

## 2021-02-09 PROCEDURE — 3080F PR MOST RECENT DIASTOLIC BLOOD PRESSURE >= 90 MM HG: ICD-10-PCS | Mod: CPTII,S$GLB,, | Performed by: INTERNAL MEDICINE

## 2021-02-09 PROCEDURE — 84550 ASSAY OF BLOOD/URIC ACID: CPT

## 2021-02-09 PROCEDURE — 99214 PR OFFICE/OUTPT VISIT, EST, LEVL IV, 30-39 MIN: ICD-10-PCS | Mod: S$GLB,,, | Performed by: INTERNAL MEDICINE

## 2021-02-09 PROCEDURE — 3077F SYST BP >= 140 MM HG: CPT | Mod: CPTII,S$GLB,, | Performed by: INTERNAL MEDICINE

## 2021-02-09 PROCEDURE — 3008F PR BODY MASS INDEX (BMI) DOCUMENTED: ICD-10-PCS | Mod: CPTII,S$GLB,, | Performed by: INTERNAL MEDICINE

## 2021-02-09 PROCEDURE — 3077F PR MOST RECENT SYSTOLIC BLOOD PRESSURE >= 140 MM HG: ICD-10-PCS | Mod: CPTII,S$GLB,, | Performed by: INTERNAL MEDICINE

## 2021-02-09 ASSESSMENT — ROUTINE ASSESSMENT OF PATIENT INDEX DATA (RAPID3)
PAIN SCORE: 4
TOTAL RAPID3 SCORE: 2.67
PSYCHOLOGICAL DISTRESS SCORE: 1.1
FATIGUE SCORE: 2
WHEN YOU AWAKENED IN THE MORNING OVER THE LAST WEEK, PLEASE INDICATE THE AMOUNT OF TIME IT TAKES UNTIL YOU ARE AS LIMBER AS YOU WILL BE FOR THE DAY: 2 HOURS
PATIENT GLOBAL ASSESSMENT SCORE: 2
MDHAQ FUNCTION SCORE: 0.6
AM STIFFNESS SCORE: 1, YES

## 2021-02-10 ENCOUNTER — PATIENT MESSAGE (OUTPATIENT)
Dept: RHEUMATOLOGY | Facility: CLINIC | Age: 61
End: 2021-02-10

## 2021-02-14 DIAGNOSIS — I10 HYPERTENSION, UNSPECIFIED TYPE: ICD-10-CM

## 2021-02-17 RX ORDER — LOSARTAN POTASSIUM 25 MG/1
TABLET ORAL
Qty: 90 TABLET | Refills: 0 | Status: SHIPPED | OUTPATIENT
Start: 2021-02-17 | End: 2021-05-13

## 2021-02-23 ENCOUNTER — PATIENT MESSAGE (OUTPATIENT)
Dept: RHEUMATOLOGY | Facility: CLINIC | Age: 61
End: 2021-02-23

## 2021-05-10 DIAGNOSIS — I10 HYPERTENSION, UNSPECIFIED TYPE: ICD-10-CM

## 2021-05-14 RX ORDER — LOSARTAN POTASSIUM 25 MG/1
25 TABLET ORAL DAILY
Qty: 90 TABLET | Refills: 1 | Status: SHIPPED | OUTPATIENT
Start: 2021-05-14 | End: 2021-05-14 | Stop reason: SDUPTHER

## 2021-06-10 ENCOUNTER — HOSPITAL ENCOUNTER (OUTPATIENT)
Dept: RADIOLOGY | Facility: HOSPITAL | Age: 61
Discharge: HOME | End: 2021-06-10
Attending: PHYSICIAN ASSISTANT
Payer: COMMERCIAL

## 2021-06-10 DIAGNOSIS — Z86.000 HISTORY OF DUCTAL CARCINOMA IN SITU (DCIS) OF BREAST: ICD-10-CM

## 2021-06-10 DIAGNOSIS — Z12.31 ENCOUNTER FOR SCREENING MAMMOGRAM FOR MALIGNANT NEOPLASM OF BREAST: ICD-10-CM

## 2021-06-10 PROCEDURE — 77067 SCR MAMMO BI INCL CAD: CPT | Mod: TC

## 2021-06-10 PROCEDURE — 77063 MAMMO DIGITAL SCREENING BILAT WITH TOMO: ICD-10-PCS | Mod: 26,,, | Performed by: RADIOLOGY

## 2021-06-10 PROCEDURE — 77067 MAMMO DIGITAL SCREENING BILAT WITH TOMO: ICD-10-PCS | Mod: 26,,, | Performed by: RADIOLOGY

## 2021-06-10 PROCEDURE — 77063 BREAST TOMOSYNTHESIS BI: CPT | Mod: 26,,, | Performed by: RADIOLOGY

## 2021-06-10 PROCEDURE — 77067 SCR MAMMO BI INCL CAD: CPT | Mod: 26,,, | Performed by: RADIOLOGY

## 2021-07-24 ENCOUNTER — PATIENT OUTREACH (OUTPATIENT)
Dept: ADMINISTRATIVE | Facility: OTHER | Age: 61
End: 2021-07-24

## 2021-09-14 ENCOUNTER — PATIENT MESSAGE (OUTPATIENT)
Dept: INTERNAL MEDICINE | Facility: CLINIC | Age: 61
End: 2021-09-14

## 2021-09-27 ENCOUNTER — OFFICE VISIT (OUTPATIENT)
Dept: INTERNAL MEDICINE | Facility: CLINIC | Age: 61
End: 2021-09-27
Payer: COMMERCIAL

## 2021-09-27 VITALS
WEIGHT: 215.38 LBS | BODY MASS INDEX: 34.61 KG/M2 | DIASTOLIC BLOOD PRESSURE: 88 MMHG | HEART RATE: 72 BPM | HEIGHT: 66 IN | SYSTOLIC BLOOD PRESSURE: 128 MMHG | TEMPERATURE: 99 F

## 2021-09-27 DIAGNOSIS — U07.1 COVID-19 VIRUS INFECTION: Primary | ICD-10-CM

## 2021-09-27 DIAGNOSIS — R05.3 CHRONIC COUGH: ICD-10-CM

## 2021-09-27 DIAGNOSIS — I10 HYPERTENSION, UNSPECIFIED TYPE: ICD-10-CM

## 2021-09-27 DIAGNOSIS — G43.109 MIGRAINE WITH AURA AND WITHOUT STATUS MIGRAINOSUS, NOT INTRACTABLE: ICD-10-CM

## 2021-09-27 PROCEDURE — 99999 PR PBB SHADOW E&M-EST. PATIENT-LVL V: ICD-10-PCS | Mod: PBBFAC,,, | Performed by: INTERNAL MEDICINE

## 2021-09-27 PROCEDURE — 1159F MED LIST DOCD IN RCRD: CPT | Mod: CPTII,S$GLB,, | Performed by: INTERNAL MEDICINE

## 2021-09-27 PROCEDURE — 99214 OFFICE O/P EST MOD 30 MIN: CPT | Mod: S$GLB,,, | Performed by: INTERNAL MEDICINE

## 2021-09-27 PROCEDURE — 3008F BODY MASS INDEX DOCD: CPT | Mod: CPTII,S$GLB,, | Performed by: INTERNAL MEDICINE

## 2021-09-27 PROCEDURE — 4010F PR ACE/ARB THEARPY RXD/TAKEN: ICD-10-PCS | Mod: CPTII,S$GLB,, | Performed by: INTERNAL MEDICINE

## 2021-09-27 PROCEDURE — 99214 PR OFFICE/OUTPT VISIT, EST, LEVL IV, 30-39 MIN: ICD-10-PCS | Mod: S$GLB,,, | Performed by: INTERNAL MEDICINE

## 2021-09-27 PROCEDURE — 3079F PR MOST RECENT DIASTOLIC BLOOD PRESSURE 80-89 MM HG: ICD-10-PCS | Mod: CPTII,S$GLB,, | Performed by: INTERNAL MEDICINE

## 2021-09-27 PROCEDURE — 99999 PR PBB SHADOW E&M-EST. PATIENT-LVL V: CPT | Mod: PBBFAC,,, | Performed by: INTERNAL MEDICINE

## 2021-09-27 PROCEDURE — 3074F SYST BP LT 130 MM HG: CPT | Mod: CPTII,S$GLB,, | Performed by: INTERNAL MEDICINE

## 2021-09-27 PROCEDURE — 3008F PR BODY MASS INDEX (BMI) DOCUMENTED: ICD-10-PCS | Mod: CPTII,S$GLB,, | Performed by: INTERNAL MEDICINE

## 2021-09-27 PROCEDURE — 3074F PR MOST RECENT SYSTOLIC BLOOD PRESSURE < 130 MM HG: ICD-10-PCS | Mod: CPTII,S$GLB,, | Performed by: INTERNAL MEDICINE

## 2021-09-27 PROCEDURE — 1159F PR MEDICATION LIST DOCUMENTED IN MEDICAL RECORD: ICD-10-PCS | Mod: CPTII,S$GLB,, | Performed by: INTERNAL MEDICINE

## 2021-09-27 PROCEDURE — 3079F DIAST BP 80-89 MM HG: CPT | Mod: CPTII,S$GLB,, | Performed by: INTERNAL MEDICINE

## 2021-09-27 PROCEDURE — 4010F ACE/ARB THERAPY RXD/TAKEN: CPT | Mod: CPTII,S$GLB,, | Performed by: INTERNAL MEDICINE

## 2021-09-27 RX ORDER — CHOLESTYRAMINE LIGHT 4 G/5.7G
1 POWDER, FOR SUSPENSION ORAL DAILY
COMMUNITY
Start: 2021-07-12 | End: 2023-03-22

## 2021-09-27 RX ORDER — BUDESONIDE 0.5 MG/2ML
INHALANT ORAL
Status: ON HOLD | COMMUNITY
Start: 2021-06-22 | End: 2022-11-28 | Stop reason: HOSPADM

## 2021-09-27 RX ORDER — AZITHROMYCIN 250 MG/1
TABLET, FILM COATED ORAL
Qty: 6 TABLET | Refills: 0 | Status: SHIPPED | OUTPATIENT
Start: 2021-09-27 | End: 2021-10-02

## 2021-09-27 RX ORDER — BENZONATATE 100 MG/1
100 CAPSULE ORAL 3 TIMES DAILY PRN
Qty: 30 CAPSULE | Refills: 1 | Status: SHIPPED | OUTPATIENT
Start: 2021-09-27 | End: 2021-10-07

## 2021-10-10 ENCOUNTER — PATIENT OUTREACH (OUTPATIENT)
Dept: ADMINISTRATIVE | Facility: OTHER | Age: 61
End: 2021-10-10

## 2021-10-11 ENCOUNTER — OFFICE VISIT (OUTPATIENT)
Dept: OBSTETRICS AND GYNECOLOGY | Facility: CLINIC | Age: 61
End: 2021-10-11
Attending: OBSTETRICS & GYNECOLOGY
Payer: COMMERCIAL

## 2021-10-11 VITALS
WEIGHT: 215.81 LBS | HEIGHT: 66 IN | SYSTOLIC BLOOD PRESSURE: 150 MMHG | DIASTOLIC BLOOD PRESSURE: 89 MMHG | BODY MASS INDEX: 34.68 KG/M2

## 2021-10-11 DIAGNOSIS — Z01.419 ENCOUNTER FOR GYNECOLOGICAL EXAMINATION WITHOUT ABNORMAL FINDING: Primary | ICD-10-CM

## 2021-10-11 PROCEDURE — 4010F PR ACE/ARB THEARPY RXD/TAKEN: ICD-10-PCS | Mod: CPTII,S$GLB,, | Performed by: OBSTETRICS & GYNECOLOGY

## 2021-10-11 PROCEDURE — 3079F PR MOST RECENT DIASTOLIC BLOOD PRESSURE 80-89 MM HG: ICD-10-PCS | Mod: CPTII,S$GLB,, | Performed by: OBSTETRICS & GYNECOLOGY

## 2021-10-11 PROCEDURE — 1159F MED LIST DOCD IN RCRD: CPT | Mod: CPTII,S$GLB,, | Performed by: OBSTETRICS & GYNECOLOGY

## 2021-10-11 PROCEDURE — 4010F ACE/ARB THERAPY RXD/TAKEN: CPT | Mod: CPTII,S$GLB,, | Performed by: OBSTETRICS & GYNECOLOGY

## 2021-10-11 PROCEDURE — 99396 PR PREVENTIVE VISIT,EST,40-64: ICD-10-PCS | Mod: S$GLB,,, | Performed by: OBSTETRICS & GYNECOLOGY

## 2021-10-11 PROCEDURE — 3008F BODY MASS INDEX DOCD: CPT | Mod: CPTII,S$GLB,, | Performed by: OBSTETRICS & GYNECOLOGY

## 2021-10-11 PROCEDURE — 1160F RVW MEDS BY RX/DR IN RCRD: CPT | Mod: CPTII,S$GLB,, | Performed by: OBSTETRICS & GYNECOLOGY

## 2021-10-11 PROCEDURE — 3077F PR MOST RECENT SYSTOLIC BLOOD PRESSURE >= 140 MM HG: ICD-10-PCS | Mod: CPTII,S$GLB,, | Performed by: OBSTETRICS & GYNECOLOGY

## 2021-10-11 PROCEDURE — 1159F PR MEDICATION LIST DOCUMENTED IN MEDICAL RECORD: ICD-10-PCS | Mod: CPTII,S$GLB,, | Performed by: OBSTETRICS & GYNECOLOGY

## 2021-10-11 PROCEDURE — 3008F PR BODY MASS INDEX (BMI) DOCUMENTED: ICD-10-PCS | Mod: CPTII,S$GLB,, | Performed by: OBSTETRICS & GYNECOLOGY

## 2021-10-11 PROCEDURE — 99396 PREV VISIT EST AGE 40-64: CPT | Mod: S$GLB,,, | Performed by: OBSTETRICS & GYNECOLOGY

## 2021-10-11 PROCEDURE — 1160F PR REVIEW ALL MEDS BY PRESCRIBER/CLIN PHARMACIST DOCUMENTED: ICD-10-PCS | Mod: CPTII,S$GLB,, | Performed by: OBSTETRICS & GYNECOLOGY

## 2021-10-11 PROCEDURE — 3079F DIAST BP 80-89 MM HG: CPT | Mod: CPTII,S$GLB,, | Performed by: OBSTETRICS & GYNECOLOGY

## 2021-10-11 PROCEDURE — 3077F SYST BP >= 140 MM HG: CPT | Mod: CPTII,S$GLB,, | Performed by: OBSTETRICS & GYNECOLOGY

## 2021-10-11 RX ORDER — FLUCONAZOLE 150 MG/1
150 TABLET ORAL ONCE
Qty: 1 TABLET | Refills: 1 | Status: SHIPPED | OUTPATIENT
Start: 2021-10-11 | End: 2021-10-11

## 2021-11-15 ENCOUNTER — TELEPHONE (OUTPATIENT)
Dept: INTERNAL MEDICINE | Facility: CLINIC | Age: 61
End: 2021-11-15
Payer: COMMERCIAL

## 2021-11-15 ENCOUNTER — LAB VISIT (OUTPATIENT)
Dept: LAB | Facility: HOSPITAL | Age: 61
End: 2021-11-15
Attending: INTERNAL MEDICINE
Payer: COMMERCIAL

## 2021-11-15 ENCOUNTER — OFFICE VISIT (OUTPATIENT)
Dept: INTERNAL MEDICINE | Facility: CLINIC | Age: 61
End: 2021-11-15
Payer: COMMERCIAL

## 2021-11-15 VITALS
DIASTOLIC BLOOD PRESSURE: 78 MMHG | BODY MASS INDEX: 35.01 KG/M2 | HEIGHT: 66 IN | HEART RATE: 60 BPM | SYSTOLIC BLOOD PRESSURE: 120 MMHG | WEIGHT: 217.81 LBS

## 2021-11-15 DIAGNOSIS — I10 HYPERTENSION, UNSPECIFIED TYPE: ICD-10-CM

## 2021-11-15 DIAGNOSIS — I45.10 NEW ONSET RIGHT BUNDLE BRANCH BLOCK (RBBB): ICD-10-CM

## 2021-11-15 DIAGNOSIS — K63.5 POLYP OF COLON, UNSPECIFIED PART OF COLON, UNSPECIFIED TYPE: ICD-10-CM

## 2021-11-15 DIAGNOSIS — M25.541 ARTHRALGIA OF BOTH HANDS: ICD-10-CM

## 2021-11-15 DIAGNOSIS — Z00.00 ANNUAL PHYSICAL EXAM: Primary | ICD-10-CM

## 2021-11-15 DIAGNOSIS — E78.5 HYPERLIPIDEMIA, UNSPECIFIED HYPERLIPIDEMIA TYPE: ICD-10-CM

## 2021-11-15 DIAGNOSIS — M25.542 ARTHRALGIA OF BOTH HANDS: ICD-10-CM

## 2021-11-15 DIAGNOSIS — G43.109 MIGRAINE WITH AURA AND WITHOUT STATUS MIGRAINOSUS, NOT INTRACTABLE: ICD-10-CM

## 2021-11-15 DIAGNOSIS — D05.12 DUCTAL CARCINOMA IN SITU (DCIS) OF LEFT BREAST: ICD-10-CM

## 2021-11-15 DIAGNOSIS — Z78.9 STATIN INTOLERANCE: ICD-10-CM

## 2021-11-15 DIAGNOSIS — Z00.00 ANNUAL PHYSICAL EXAM: ICD-10-CM

## 2021-11-15 LAB
ALBUMIN SERPL BCP-MCNC: 4 G/DL (ref 3.5–5.2)
ALP SERPL-CCNC: 94 U/L (ref 55–135)
ALT SERPL W/O P-5'-P-CCNC: 41 U/L (ref 10–44)
ANION GAP SERPL CALC-SCNC: 10 MMOL/L (ref 8–16)
AST SERPL-CCNC: 35 U/L (ref 10–40)
BACTERIA #/AREA URNS AUTO: ABNORMAL /HPF
BASOPHILS # BLD AUTO: 0.05 K/UL (ref 0–0.2)
BASOPHILS NFR BLD: 0.7 % (ref 0–1.9)
BILIRUB SERPL-MCNC: 0.6 MG/DL (ref 0.1–1)
BILIRUB UR QL STRIP: NEGATIVE
BUN SERPL-MCNC: 15 MG/DL (ref 6–20)
CALCIUM SERPL-MCNC: 9.7 MG/DL (ref 8.7–10.5)
CAOX CRY UR QL COMP ASSIST: ABNORMAL
CHLORIDE SERPL-SCNC: 105 MMOL/L (ref 95–110)
CHOLEST SERPL-MCNC: 253 MG/DL (ref 120–199)
CHOLEST/HDLC SERPL: 4.1 {RATIO} (ref 2–5)
CLARITY UR REFRACT.AUTO: CLEAR
CO2 SERPL-SCNC: 28 MMOL/L (ref 23–29)
COLOR UR AUTO: YELLOW
CREAT SERPL-MCNC: 1 MG/DL (ref 0.5–1.4)
DIFFERENTIAL METHOD: ABNORMAL
EOSINOPHIL # BLD AUTO: 0.3 K/UL (ref 0–0.5)
EOSINOPHIL NFR BLD: 3.6 % (ref 0–8)
ERYTHROCYTE [DISTWIDTH] IN BLOOD BY AUTOMATED COUNT: 13.9 % (ref 11.5–14.5)
EST. GFR  (AFRICAN AMERICAN): >60 ML/MIN/1.73 M^2
EST. GFR  (NON AFRICAN AMERICAN): >60 ML/MIN/1.73 M^2
GLUCOSE SERPL-MCNC: 94 MG/DL (ref 70–110)
GLUCOSE UR QL STRIP: NEGATIVE
HCT VFR BLD AUTO: 45 % (ref 37–48.5)
HDLC SERPL-MCNC: 62 MG/DL (ref 40–75)
HDLC SERPL: 24.5 % (ref 20–50)
HGB BLD-MCNC: 14.1 G/DL (ref 12–16)
HGB UR QL STRIP: NEGATIVE
IMM GRANULOCYTES # BLD AUTO: 0.02 K/UL (ref 0–0.04)
IMM GRANULOCYTES NFR BLD AUTO: 0.3 % (ref 0–0.5)
KETONES UR QL STRIP: NEGATIVE
LDLC SERPL CALC-MCNC: 162.4 MG/DL (ref 63–159)
LEUKOCYTE ESTERASE UR QL STRIP: NEGATIVE
LYMPHOCYTES # BLD AUTO: 2.3 K/UL (ref 1–4.8)
LYMPHOCYTES NFR BLD: 30.8 % (ref 18–48)
MCH RBC QN AUTO: 29.1 PG (ref 27–31)
MCHC RBC AUTO-ENTMCNC: 31.3 G/DL (ref 32–36)
MCV RBC AUTO: 93 FL (ref 82–98)
MICROSCOPIC COMMENT: ABNORMAL
MONOCYTES # BLD AUTO: 0.4 K/UL (ref 0.3–1)
MONOCYTES NFR BLD: 5.8 % (ref 4–15)
NEUTROPHILS # BLD AUTO: 4.4 K/UL (ref 1.8–7.7)
NEUTROPHILS NFR BLD: 58.8 % (ref 38–73)
NITRITE UR QL STRIP: NEGATIVE
NONHDLC SERPL-MCNC: 191 MG/DL
NRBC BLD-RTO: 0 /100 WBC
PH UR STRIP: 5 [PH] (ref 5–8)
PLATELET # BLD AUTO: 287 K/UL (ref 150–450)
PMV BLD AUTO: 11.3 FL (ref 9.2–12.9)
POTASSIUM SERPL-SCNC: 4.8 MMOL/L (ref 3.5–5.1)
PROT SERPL-MCNC: 8.1 G/DL (ref 6–8.4)
PROT UR QL STRIP: NEGATIVE
RBC # BLD AUTO: 4.84 M/UL (ref 4–5.4)
RBC #/AREA URNS AUTO: 1 /HPF (ref 0–4)
SODIUM SERPL-SCNC: 143 MMOL/L (ref 136–145)
SP GR UR STRIP: 1.02 (ref 1–1.03)
SQUAMOUS #/AREA URNS AUTO: 1 /HPF
TRIGL SERPL-MCNC: 143 MG/DL (ref 30–150)
URN SPEC COLLECT METH UR: NORMAL
WBC # BLD AUTO: 7.4 K/UL (ref 3.9–12.7)
WBC #/AREA URNS AUTO: 1 /HPF (ref 0–5)

## 2021-11-15 PROCEDURE — 93005 EKG 12-LEAD: ICD-10-PCS | Mod: S$GLB,,, | Performed by: INTERNAL MEDICINE

## 2021-11-15 PROCEDURE — 3008F PR BODY MASS INDEX (BMI) DOCUMENTED: ICD-10-PCS | Mod: CPTII,S$GLB,, | Performed by: INTERNAL MEDICINE

## 2021-11-15 PROCEDURE — 1160F PR REVIEW ALL MEDS BY PRESCRIBER/CLIN PHARMACIST DOCUMENTED: ICD-10-PCS | Mod: CPTII,S$GLB,, | Performed by: INTERNAL MEDICINE

## 2021-11-15 PROCEDURE — 3074F PR MOST RECENT SYSTOLIC BLOOD PRESSURE < 130 MM HG: ICD-10-PCS | Mod: CPTII,S$GLB,, | Performed by: INTERNAL MEDICINE

## 2021-11-15 PROCEDURE — 85025 COMPLETE CBC W/AUTO DIFF WBC: CPT | Performed by: INTERNAL MEDICINE

## 2021-11-15 PROCEDURE — 80061 LIPID PANEL: CPT | Performed by: INTERNAL MEDICINE

## 2021-11-15 PROCEDURE — 1159F MED LIST DOCD IN RCRD: CPT | Mod: CPTII,S$GLB,, | Performed by: INTERNAL MEDICINE

## 2021-11-15 PROCEDURE — 81001 URINALYSIS AUTO W/SCOPE: CPT | Performed by: INTERNAL MEDICINE

## 2021-11-15 PROCEDURE — 4010F PR ACE/ARB THEARPY RXD/TAKEN: ICD-10-PCS | Mod: CPTII,S$GLB,, | Performed by: INTERNAL MEDICINE

## 2021-11-15 PROCEDURE — 80053 COMPREHEN METABOLIC PANEL: CPT | Performed by: INTERNAL MEDICINE

## 2021-11-15 PROCEDURE — 4010F ACE/ARB THERAPY RXD/TAKEN: CPT | Mod: CPTII,S$GLB,, | Performed by: INTERNAL MEDICINE

## 2021-11-15 PROCEDURE — 93005 ELECTROCARDIOGRAM TRACING: CPT | Mod: S$GLB,,, | Performed by: INTERNAL MEDICINE

## 2021-11-15 PROCEDURE — 1159F PR MEDICATION LIST DOCUMENTED IN MEDICAL RECORD: ICD-10-PCS | Mod: CPTII,S$GLB,, | Performed by: INTERNAL MEDICINE

## 2021-11-15 PROCEDURE — 99999 PR PBB SHADOW E&M-EST. PATIENT-LVL V: CPT | Mod: PBBFAC,,, | Performed by: INTERNAL MEDICINE

## 2021-11-15 PROCEDURE — 3008F BODY MASS INDEX DOCD: CPT | Mod: CPTII,S$GLB,, | Performed by: INTERNAL MEDICINE

## 2021-11-15 PROCEDURE — 99999 PR PBB SHADOW E&M-EST. PATIENT-LVL V: ICD-10-PCS | Mod: PBBFAC,,, | Performed by: INTERNAL MEDICINE

## 2021-11-15 PROCEDURE — 3074F SYST BP LT 130 MM HG: CPT | Mod: CPTII,S$GLB,, | Performed by: INTERNAL MEDICINE

## 2021-11-15 PROCEDURE — 3078F PR MOST RECENT DIASTOLIC BLOOD PRESSURE < 80 MM HG: ICD-10-PCS | Mod: CPTII,S$GLB,, | Performed by: INTERNAL MEDICINE

## 2021-11-15 PROCEDURE — 93010 ELECTROCARDIOGRAM REPORT: CPT | Mod: S$GLB,,, | Performed by: INTERNAL MEDICINE

## 2021-11-15 PROCEDURE — 1160F RVW MEDS BY RX/DR IN RCRD: CPT | Mod: CPTII,S$GLB,, | Performed by: INTERNAL MEDICINE

## 2021-11-15 PROCEDURE — 93010 EKG 12-LEAD: ICD-10-PCS | Mod: S$GLB,,, | Performed by: INTERNAL MEDICINE

## 2021-11-15 PROCEDURE — 99396 PREV VISIT EST AGE 40-64: CPT | Mod: S$GLB,,, | Performed by: INTERNAL MEDICINE

## 2021-11-15 PROCEDURE — 36415 COLL VENOUS BLD VENIPUNCTURE: CPT | Performed by: INTERNAL MEDICINE

## 2021-11-15 PROCEDURE — 99396 PR PREVENTIVE VISIT,EST,40-64: ICD-10-PCS | Mod: S$GLB,,, | Performed by: INTERNAL MEDICINE

## 2021-11-15 PROCEDURE — 3078F DIAST BP <80 MM HG: CPT | Mod: CPTII,S$GLB,, | Performed by: INTERNAL MEDICINE

## 2021-11-15 RX ORDER — FLUCONAZOLE 150 MG/1
150 TABLET ORAL ONCE
COMMUNITY
Start: 2021-10-11 | End: 2021-11-15

## 2021-11-15 RX ORDER — SOD CHLOR,BICARB/SQUEEZ BOTTLE
PACKET, WITH RINSE DEVICE NASAL
COMMUNITY
Start: 2021-10-05 | End: 2023-02-24

## 2021-11-16 ENCOUNTER — LAB VISIT (OUTPATIENT)
Dept: LAB | Facility: HOSPITAL | Age: 61
End: 2021-11-16
Attending: INTERNAL MEDICINE
Payer: COMMERCIAL

## 2021-11-16 ENCOUNTER — TELEPHONE (OUTPATIENT)
Dept: INTERNAL MEDICINE | Facility: CLINIC | Age: 61
End: 2021-11-16
Payer: COMMERCIAL

## 2021-11-16 DIAGNOSIS — K63.5 POLYP OF COLON, UNSPECIFIED PART OF COLON, UNSPECIFIED TYPE: ICD-10-CM

## 2021-11-16 PROCEDURE — 82274 ASSAY TEST FOR BLOOD FECAL: CPT | Performed by: INTERNAL MEDICINE

## 2021-11-16 NOTE — TELEPHONE ENCOUNTER
----- Message from Blane Brian MD sent at 11/15/2021  6:37 PM CST -----  Please call the patient regarding her abnormal result. Her cholesterol was high. There is another medication we can try called Repatha. It is not a statin; but, it does require giving yourself an injection every 2 weeks.

## 2021-11-23 LAB — HEMOCCULT STL QL IA: NEGATIVE

## 2022-01-22 ENCOUNTER — TELEPHONE (OUTPATIENT)
Dept: INTERNAL MEDICINE | Facility: CLINIC | Age: 62
End: 2022-01-22
Payer: COMMERCIAL

## 2022-02-07 ENCOUNTER — IMMUNIZATION (OUTPATIENT)
Dept: PHARMACY | Facility: CLINIC | Age: 62
End: 2022-02-07
Payer: COMMERCIAL

## 2022-02-07 DIAGNOSIS — Z23 NEED FOR VACCINATION: Primary | ICD-10-CM

## 2022-03-07 ENCOUNTER — IMMUNIZATION (OUTPATIENT)
Dept: PHARMACY | Facility: CLINIC | Age: 62
End: 2022-03-07
Payer: COMMERCIAL

## 2022-03-07 DIAGNOSIS — Z23 NEED FOR VACCINATION: Primary | ICD-10-CM

## 2022-05-11 ENCOUNTER — OFFICE VISIT (OUTPATIENT)
Dept: DERMATOLOGY | Facility: CLINIC | Age: 62
End: 2022-05-11
Payer: COMMERCIAL

## 2022-05-11 DIAGNOSIS — L24.9 IRRITANT CONTACT DERMATITIS, UNSPECIFIED TRIGGER: Primary | ICD-10-CM

## 2022-05-11 PROCEDURE — 99999 PR PBB SHADOW E&M-EST. PATIENT-LVL III: ICD-10-PCS | Mod: PBBFAC,,, | Performed by: DERMATOLOGY

## 2022-05-11 PROCEDURE — 99213 OFFICE O/P EST LOW 20 MIN: CPT | Mod: S$GLB,,, | Performed by: DERMATOLOGY

## 2022-05-11 PROCEDURE — 99999 PR PBB SHADOW E&M-EST. PATIENT-LVL III: CPT | Mod: PBBFAC,,, | Performed by: DERMATOLOGY

## 2022-05-11 PROCEDURE — 1159F PR MEDICATION LIST DOCUMENTED IN MEDICAL RECORD: ICD-10-PCS | Mod: CPTII,S$GLB,, | Performed by: DERMATOLOGY

## 2022-05-11 PROCEDURE — 99213 PR OFFICE/OUTPT VISIT, EST, LEVL III, 20-29 MIN: ICD-10-PCS | Mod: S$GLB,,, | Performed by: DERMATOLOGY

## 2022-05-11 PROCEDURE — 4010F ACE/ARB THERAPY RXD/TAKEN: CPT | Mod: CPTII,S$GLB,, | Performed by: DERMATOLOGY

## 2022-05-11 PROCEDURE — 4010F PR ACE/ARB THEARPY RXD/TAKEN: ICD-10-PCS | Mod: CPTII,S$GLB,, | Performed by: DERMATOLOGY

## 2022-05-11 PROCEDURE — 1159F MED LIST DOCD IN RCRD: CPT | Mod: CPTII,S$GLB,, | Performed by: DERMATOLOGY

## 2022-05-11 PROCEDURE — 1160F RVW MEDS BY RX/DR IN RCRD: CPT | Mod: CPTII,S$GLB,, | Performed by: DERMATOLOGY

## 2022-05-11 PROCEDURE — 1160F PR REVIEW ALL MEDS BY PRESCRIBER/CLIN PHARMACIST DOCUMENTED: ICD-10-PCS | Mod: CPTII,S$GLB,, | Performed by: DERMATOLOGY

## 2022-05-11 RX ORDER — TRIAMCINOLONE ACETONIDE 0.25 MG/G
OINTMENT TOPICAL 2 TIMES DAILY
Qty: 80 G | Refills: 2 | Status: SHIPPED | OUTPATIENT
Start: 2022-05-11

## 2022-05-11 RX ORDER — MUPIROCIN 20 MG/G
OINTMENT TOPICAL 2 TIMES DAILY
Qty: 22 G | Refills: 1 | Status: SHIPPED | OUTPATIENT
Start: 2022-05-11 | End: 2023-02-24

## 2022-05-11 NOTE — PROGRESS NOTES
Subjective:       Patient ID:  Christal Posey is a 62 y.o. female who presents for   Chief Complaint   Patient presents with    Rash     Both underarms     Rash - Initial  Affected locations: left axilla and right axilla  Duration: 6 months  Signs / symptoms: burning  Severity: mild to moderate  Timing: constant  Aggravated by: nothing  Relieving factors/Treatments tried: nothing  Improvement on treatment: no relief      Has tried cortisone cream and zinc powder with some relief, but it comes back.  Rash makes a line with scabs. Burns more than itches.    Review of Systems   Constitutional: Negative for fever and chills.   Skin: Positive for itching (burns more than itches) and rash.        Objective:    Physical Exam   Constitutional: She appears well-developed and well-nourished. No distress.   Neurological: She is alert and oriented to person, place, and time. She is not disoriented.   Psychiatric: She has a normal mood and affect.   Skin:   Areas Examined (abnormalities noted in diagram):   Chest / Axilla Inspection Performed             Diagram Legend     Erythematous scaling macule/papule c/w actinic keratosis       Vascular papule c/w angioma      Pigmented verrucoid papule/plaque c/w seborrheic keratosis      Yellow umbilicated papule c/w sebaceous hyperplasia      Irregularly shaped tan macule c/w lentigo     1-2 mm smooth white papules consistent with Milia      Movable subcutaneous cyst with punctum c/w epidermal inclusion cyst      Subcutaneous movable cyst c/w pilar cyst      Firm pink to brown papule c/w dermatofibroma      Pedunculated fleshy papule(s) c/w skin tag(s)      Evenly pigmented macule c/w junctional nevus     Mildly variegated pigmented, slightly irregular-bordered macule c/w mildly atypical nevus      Flesh colored to evenly pigmented papule c/w intradermal nevus       Pink pearly papule/plaque c/w basal cell carcinoma      Erythematous hyperkeratotic cursted plaque c/w SCC       Surgical scar with no sign of skin cancer recurrence      Open and closed comedones      Inflammatory papules and pustules      Verrucoid papule consistent consistent with wart     Erythematous eczematous patches and plaques     Dystrophic onycholytic nail with subungual debris c/w onychomycosis     Umbilicated papule    Erythematous-base heme-crusted tan verrucoid plaque consistent with inflamed seborrheic keratosis     Erythematous Silvery Scaling Plaque c/w Psoriasis     See annotation      Assessment / Plan:        Irritant contact dermatitis vs early Kika-Kika  -     triamcinolone acetonide 0.025% (KENALOG) 0.025 % Oint; Apply topically 2 (two) times daily. x 1-2 wks then prn flares only  Dispense: 80 g; Refill: 2  -     mupirocin (BACTROBAN) 2 % ointment; Apply topically 2 (two) times daily.  Dispense: 22 g; Refill: 1    Consider bx if no improvement     rtc prn

## 2022-06-02 ENCOUNTER — TELEPHONE (OUTPATIENT)
Dept: OBSTETRICS AND GYNECOLOGY | Facility: CLINIC | Age: 62
End: 2022-06-02
Payer: COMMERCIAL

## 2022-06-02 DIAGNOSIS — Z12.31 SCREENING MAMMOGRAM FOR HIGH-RISK PATIENT: Primary | ICD-10-CM

## 2022-06-02 NOTE — TELEPHONE ENCOUNTER
----- Message from Fanny Martínez sent at 6/2/2022 11:31 AM CDT -----  Pt calling to schedule mmg. No orders to schedule from.      Best contact [phone]

## 2022-06-22 ENCOUNTER — HOSPITAL ENCOUNTER (OUTPATIENT)
Dept: RADIOLOGY | Facility: HOSPITAL | Age: 62
Discharge: HOME | End: 2022-06-22
Attending: OBSTETRICS & GYNECOLOGY
Payer: COMMERCIAL

## 2022-06-22 VITALS — BODY MASS INDEX: 34.87 KG/M2 | HEIGHT: 66 IN | WEIGHT: 217 LBS

## 2022-06-22 DIAGNOSIS — Z12.31 SCREENING MAMMOGRAM FOR HIGH-RISK PATIENT: ICD-10-CM

## 2022-06-22 PROCEDURE — 77067 SCR MAMMO BI INCL CAD: CPT | Mod: 26,,, | Performed by: RADIOLOGY

## 2022-06-22 PROCEDURE — 77063 BREAST TOMOSYNTHESIS BI: CPT | Mod: 26,,, | Performed by: RADIOLOGY

## 2022-06-22 PROCEDURE — 77063 MAMMO DIGITAL SCREENING BILAT WITH TOMO: ICD-10-PCS | Mod: 26,,, | Performed by: RADIOLOGY

## 2022-06-22 PROCEDURE — 77067 MAMMO DIGITAL SCREENING BILAT WITH TOMO: ICD-10-PCS | Mod: 26,,, | Performed by: RADIOLOGY

## 2022-06-22 PROCEDURE — 77067 SCR MAMMO BI INCL CAD: CPT | Mod: TC

## 2022-06-23 ENCOUNTER — OFFICE VISIT (OUTPATIENT)
Dept: RHEUMATOLOGY | Facility: CLINIC | Age: 62
End: 2022-06-23
Payer: COMMERCIAL

## 2022-06-23 ENCOUNTER — HOSPITAL ENCOUNTER (OUTPATIENT)
Dept: RADIOLOGY | Facility: HOSPITAL | Age: 62
Discharge: HOME | End: 2022-06-23
Attending: INTERNAL MEDICINE
Payer: COMMERCIAL

## 2022-06-23 VITALS
WEIGHT: 218.06 LBS | BODY MASS INDEX: 35.04 KG/M2 | DIASTOLIC BLOOD PRESSURE: 85 MMHG | SYSTOLIC BLOOD PRESSURE: 135 MMHG | HEART RATE: 67 BPM | HEIGHT: 66 IN

## 2022-06-23 DIAGNOSIS — M54.12 CERVICAL RADICULOPATHY: Primary | ICD-10-CM

## 2022-06-23 DIAGNOSIS — M54.16 LUMBAR RADICULOPATHY: ICD-10-CM

## 2022-06-23 DIAGNOSIS — M25.50 POLYARTHRALGIA: ICD-10-CM

## 2022-06-23 PROCEDURE — 77077 JOINT SURVEY SINGLE VIEW: CPT | Mod: TC

## 2022-06-23 PROCEDURE — 3008F PR BODY MASS INDEX (BMI) DOCUMENTED: ICD-10-PCS | Mod: CPTII,S$GLB,, | Performed by: INTERNAL MEDICINE

## 2022-06-23 PROCEDURE — 72100 XR LUMBAR SPINE AP AND LATERAL: ICD-10-PCS | Mod: 26,,, | Performed by: RADIOLOGY

## 2022-06-23 PROCEDURE — 3075F SYST BP GE 130 - 139MM HG: CPT | Mod: CPTII,S$GLB,, | Performed by: INTERNAL MEDICINE

## 2022-06-23 PROCEDURE — 1159F PR MEDICATION LIST DOCUMENTED IN MEDICAL RECORD: ICD-10-PCS | Mod: CPTII,S$GLB,, | Performed by: INTERNAL MEDICINE

## 2022-06-23 PROCEDURE — 72100 X-RAY EXAM L-S SPINE 2/3 VWS: CPT | Mod: 26,,, | Performed by: RADIOLOGY

## 2022-06-23 PROCEDURE — 3079F DIAST BP 80-89 MM HG: CPT | Mod: CPTII,S$GLB,, | Performed by: INTERNAL MEDICINE

## 2022-06-23 PROCEDURE — 4010F PR ACE/ARB THEARPY RXD/TAKEN: ICD-10-PCS | Mod: CPTII,S$GLB,, | Performed by: INTERNAL MEDICINE

## 2022-06-23 PROCEDURE — 3008F BODY MASS INDEX DOCD: CPT | Mod: CPTII,S$GLB,, | Performed by: INTERNAL MEDICINE

## 2022-06-23 PROCEDURE — 4010F ACE/ARB THERAPY RXD/TAKEN: CPT | Mod: CPTII,S$GLB,, | Performed by: INTERNAL MEDICINE

## 2022-06-23 PROCEDURE — 99215 PR OFFICE/OUTPT VISIT, EST, LEVL V, 40-54 MIN: ICD-10-PCS | Mod: S$GLB,,, | Performed by: INTERNAL MEDICINE

## 2022-06-23 PROCEDURE — 77077 JOINT SURVEY SINGLE VIEW: CPT | Mod: 26,,, | Performed by: RADIOLOGY

## 2022-06-23 PROCEDURE — 99999 PR PBB SHADOW E&M-EST. PATIENT-LVL V: ICD-10-PCS | Mod: PBBFAC,,, | Performed by: INTERNAL MEDICINE

## 2022-06-23 PROCEDURE — 3079F PR MOST RECENT DIASTOLIC BLOOD PRESSURE 80-89 MM HG: ICD-10-PCS | Mod: CPTII,S$GLB,, | Performed by: INTERNAL MEDICINE

## 2022-06-23 PROCEDURE — 3075F PR MOST RECENT SYSTOLIC BLOOD PRESS GE 130-139MM HG: ICD-10-PCS | Mod: CPTII,S$GLB,, | Performed by: INTERNAL MEDICINE

## 2022-06-23 PROCEDURE — 77077 XR ARTHRITIS SURVEY: ICD-10-PCS | Mod: 26,,, | Performed by: RADIOLOGY

## 2022-06-23 PROCEDURE — 72100 X-RAY EXAM L-S SPINE 2/3 VWS: CPT | Mod: TC

## 2022-06-23 PROCEDURE — 1159F MED LIST DOCD IN RCRD: CPT | Mod: CPTII,S$GLB,, | Performed by: INTERNAL MEDICINE

## 2022-06-23 PROCEDURE — 99999 PR PBB SHADOW E&M-EST. PATIENT-LVL V: CPT | Mod: PBBFAC,,, | Performed by: INTERNAL MEDICINE

## 2022-06-23 PROCEDURE — 99215 OFFICE O/P EST HI 40 MIN: CPT | Mod: S$GLB,,, | Performed by: INTERNAL MEDICINE

## 2022-06-23 RX ORDER — CHOLESTYRAMINE 4 G/9G
1 POWDER, FOR SUSPENSION ORAL DAILY
COMMUNITY
Start: 2022-05-21

## 2022-06-23 NOTE — PROGRESS NOTES
" Patient ID: Christal Posey is a 57 y.o. female.     Chief Complaint: Follow-up     HPI::   Initial history: 57 yo F with PMH Migraines, IBS, GERD, breast CA s/p lumpectomy 6/2016 seen in consult for joint pain and swelling with positive RF. She states that her hand pain began about a years ago. It is mostly in the PIPs and she does sometimes report some swelling. She feels stiff for about 1 hour in the AM. She also has pain in the joints in her feet for about the last year as well. She has some knee pain and "cracking" without swelling that is more chronic. She does not sleep well and is fatigued. She has had a dry cough for about a year as well, but no other extra-articular manifestations. She was previously on Premarin that was stopped when the breast CA was found and thinks her symptoms are worse since stopping it. She uses Aleve which helps her pain as does ibuprofen though not quite as much, tylenol does not help. She has never smoked. Her paternal uncle has "arthritis." She took prednisone once is the past for sciatica and it made her "sick" where she felt very hot and just "not good."         Interval history:  She has pain in neck and lower back. She has pain in both sides, worse on the left.  Reports pain in elbows on occasion. Sometimes, she notices mild swelling in ankles.  She has pain in left mtp.   She is taking meloxicam and baclofen. She reports that she gets more relief from aleve.  She had episode of diffuse arthralgias for a few days and took aleve and it improved it.  Reports some pain around her shoulder blades.She gets stiffness in lower back, feet, and hands for an hour in the morning.    Denies any rashes, oral ulcers, fevers, or raynauds.  Pain level is 4/10, non-radiating, and aching.     Review of Systems   Constitutional: Negative for activity change, chills, fatigue and fever.   HENT: Negative for mouth sores and trouble swallowing.    Eyes: Negative for pain and redness.   Respiratory: " Negative for cough, chest tightness and shortness of breath.    Cardiovascular: Negative for chest pain.   Gastrointestinal: Negative for abdominal pain, diarrhea and nausea.   Genitourinary: Negative for dysuria.   Musculoskeletal: Positive for arthralgias and neck stiffness. Negative for joint swelling, myalgias and neck pain.   Skin: Negative for color change and rash.   Neurological: Negative for weakness and numbness.   Hematological: Negative for adenopathy.   Psychiatric/Behavioral: Negative for sleep disturbance.          Objective:           Physical Exam   Constitutional: She is oriented to person, place, and time and well-developed, well-nourished, and in no distress. No distress.   HENT:   Head: Normocephalic and atraumatic.   Mouth/Throat: No oropharyngeal exudate.   Eyes: Conjunctivae are normal. No scleral icterus.   Neck: Normal range of motion. Neck supple. No thyromegaly present.   Cardiovascular: Normal rate, regular rhythm and normal heart sounds.    Pulmonary/Chest: Effort normal and breath sounds normal.   Abdominal: Soft. There is no tenderness.         Right Side Rheumatological Exam     Examination finds the shoulder, elbow, wrist, knee, 1st PIP, 1st MCP, 2nd PIP, 2nd MCP, 3rd PIP, 3rd MCP, 4th PIP, 4th MCP, 5th PIP and 5th MCP normal.     Left Side Rheumatological Exam     Examination finds the shoulder, elbow, wrist, knee, 1st PIP, 1st MCP, 2nd PIP, 2nd MCP, 3rd PIP, 3rd MCP, 4th PIP, 4th MCP, 5th PIP and 5th MCP normal.       Lymphadenopathy:     She has no cervical adenopathy.   Neurological: She is alert and oriented to person, place, and time.   Skin: Skin is warm and dry. No rash noted.   Musculoskeletal: Normal range of motion. She exhibits no edema or tenderness.        labs:  Reviewed;  Tammy-negative  Ccp-negative  RF-49    Arthritis survey (2017): I personally reviewed;  No significant detrimental interval change since the examination of 9/26/16 is appreciated    Assessment:    60  yo old female with PMH of hyperlipidemia, Migraines, left breast cancer status post lumpectomy (june 2016), HTN here for evaluation of joint pain and +RF.  I suspect patient has early RA as she is having more episodes of hand stiffness.I told her that +RF does not indicate RA but we will continue to monitor her.  She has worsening pain in  Neck and lower back which I suspect is from DJD so will refer to pain management.    Plan:     -continue baclofen 10mg qhs prn for neck stiffness  Continue voltaren gel QID PRN  -continue meloxicam 15 mg po qday  Labs  xrays  Pain clinic referral  Consider MRI of left foot and right hand  Encouraged weight loss  -discussed diet that is low in fat and carbohydrates.    rtc in 6 months    40 * minutes of total time spent on the encounter, which includes face to face time and non-face to face time preparing to see the patient (eg, review of tests), Obtaining and/or reviewing separately obtained history, Documenting clinical information in the electronic or other health record, Independently interpreting results (not separately reported) and communicating results to the patient/family/caregiver, or Care coordination (not separately reported).

## 2022-06-23 NOTE — PROGRESS NOTES
Answers for HPI/ROS submitted by the patient on 6/22/2022  fever: No  eye redness: No  mouth sores: No  headaches: No  shortness of breath: No  chest pain: No  trouble swallowing: No  diarrhea: No  constipation: No  unexpected weight change: No  genital sore: No  dysuria: No  During the last 3 days, have you had a skin rash?: No  Bruises or bleeds easily: No  cough: No

## 2022-06-29 ENCOUNTER — HOSPITAL ENCOUNTER (OUTPATIENT)
Dept: RADIOLOGY | Facility: HOSPITAL | Age: 62
Discharge: HOME | End: 2022-06-29
Attending: OBSTETRICS & GYNECOLOGY
Payer: COMMERCIAL

## 2022-06-29 DIAGNOSIS — R92.8 ABNORMAL FINDING ON BREAST IMAGING: ICD-10-CM

## 2022-06-29 PROCEDURE — 77065 DX MAMMO INCL CAD UNI: CPT | Mod: TC,LT

## 2022-06-29 PROCEDURE — 77061 MAMMO DIGITAL DIAGNOSTIC LEFT WITH TOMO: ICD-10-PCS | Mod: 26,LT,, | Performed by: RADIOLOGY

## 2022-06-29 PROCEDURE — 77061 BREAST TOMOSYNTHESIS UNI: CPT | Mod: 26,LT,, | Performed by: RADIOLOGY

## 2022-06-29 PROCEDURE — 77065 DX MAMMO INCL CAD UNI: CPT | Mod: 26,LT,, | Performed by: RADIOLOGY

## 2022-06-29 PROCEDURE — 77065 MAMMO DIGITAL DIAGNOSTIC LEFT WITH TOMO: ICD-10-PCS | Mod: 26,LT,, | Performed by: RADIOLOGY

## 2022-06-30 ENCOUNTER — TELEPHONE (OUTPATIENT)
Dept: RADIOLOGY | Facility: HOSPITAL | Age: 62
End: 2022-06-30
Payer: COMMERCIAL

## 2022-06-30 NOTE — TELEPHONE ENCOUNTER
Spoke with patient. Reviewed breast biopsy procedure and reviewed instructions for breast biopsy. Patient expressed understanding and all questions were answered. Provided patient with my phone number to call for any further concerns or questions.   Patient scheduled breast biopsy at the [facility] on 7/22/2022.

## 2022-07-22 ENCOUNTER — HOSPITAL ENCOUNTER (OUTPATIENT)
Dept: RADIOLOGY | Facility: HOSPITAL | Age: 62
Discharge: HOME | End: 2022-07-22
Attending: OBSTETRICS & GYNECOLOGY
Payer: COMMERCIAL

## 2022-07-22 DIAGNOSIS — R92.8 ABNORMAL FINDING ON BREAST IMAGING: ICD-10-CM

## 2022-07-22 DIAGNOSIS — R92.8 ABNORMAL FINDING ON BREAST IMAGING: Primary | ICD-10-CM

## 2022-07-22 PROCEDURE — 88305 TISSUE EXAM BY PATHOLOGIST: CPT | Performed by: STUDENT IN AN ORGANIZED HEALTH CARE EDUCATION/TRAINING PROGRAM

## 2022-07-22 PROCEDURE — 25000003 PHARM REV CODE 250: Performed by: OBSTETRICS & GYNECOLOGY

## 2022-07-22 PROCEDURE — 19082 BX BREAST ADD LESION STRTCTC: CPT | Mod: LT,,, | Performed by: RADIOLOGY

## 2022-07-22 PROCEDURE — 88342 CHG IMMUNOCYTOCHEMISTRY: ICD-10-PCS | Mod: 26,,, | Performed by: STUDENT IN AN ORGANIZED HEALTH CARE EDUCATION/TRAINING PROGRAM

## 2022-07-22 PROCEDURE — 88342 IMHCHEM/IMCYTCHM 1ST ANTB: CPT | Mod: 26,,, | Performed by: STUDENT IN AN ORGANIZED HEALTH CARE EDUCATION/TRAINING PROGRAM

## 2022-07-22 PROCEDURE — 19081 BX BREAST 1ST LESION STRTCTC: CPT | Mod: LT,,, | Performed by: RADIOLOGY

## 2022-07-22 PROCEDURE — 88341 PR IHC OR ICC EACH ADD'L SINGLE ANTIBODY  STAINPR: ICD-10-PCS | Mod: 26,,, | Performed by: STUDENT IN AN ORGANIZED HEALTH CARE EDUCATION/TRAINING PROGRAM

## 2022-07-22 PROCEDURE — 88341 IMHCHEM/IMCYTCHM EA ADD ANTB: CPT | Performed by: STUDENT IN AN ORGANIZED HEALTH CARE EDUCATION/TRAINING PROGRAM

## 2022-07-22 PROCEDURE — 88305 TISSUE EXAM BY PATHOLOGIST: ICD-10-PCS | Mod: 26,,, | Performed by: STUDENT IN AN ORGANIZED HEALTH CARE EDUCATION/TRAINING PROGRAM

## 2022-07-22 PROCEDURE — 19081 MAMMO BREAST STEREOTACTIC BREAST BIOPSY LEFT: ICD-10-PCS | Mod: LT,,, | Performed by: RADIOLOGY

## 2022-07-22 PROCEDURE — 88342 IMHCHEM/IMCYTCHM 1ST ANTB: CPT | Performed by: STUDENT IN AN ORGANIZED HEALTH CARE EDUCATION/TRAINING PROGRAM

## 2022-07-22 PROCEDURE — 88341 IMHCHEM/IMCYTCHM EA ADD ANTB: CPT | Mod: 26,,, | Performed by: STUDENT IN AN ORGANIZED HEALTH CARE EDUCATION/TRAINING PROGRAM

## 2022-07-22 PROCEDURE — 88305 TISSUE EXAM BY PATHOLOGIST: CPT | Mod: 26,,, | Performed by: STUDENT IN AN ORGANIZED HEALTH CARE EDUCATION/TRAINING PROGRAM

## 2022-07-22 PROCEDURE — 19082 PR BX BRST, EA ADD'L LESION, STEREOTACTIC GUIDANCE: ICD-10-PCS | Mod: LT,,, | Performed by: RADIOLOGY

## 2022-07-22 PROCEDURE — 27200939 MAMMO BREAST STEREOTACTIC BREAST BIOPSY LEFT

## 2022-07-22 PROCEDURE — 19082 BX BREAST ADD LESION STRTCTC: CPT

## 2022-07-22 RX ORDER — LIDOCAINE HCL/EPINEPHRINE/PF 2%-1:200K
20 VIAL (ML) INJECTION ONCE
Status: COMPLETED | OUTPATIENT
Start: 2022-07-22 | End: 2022-07-22

## 2022-07-22 RX ORDER — LIDOCAINE HYDROCHLORIDE 10 MG/ML
3 INJECTION, SOLUTION EPIDURAL; INFILTRATION; INTRACAUDAL; PERINEURAL ONCE
Status: COMPLETED | OUTPATIENT
Start: 2022-07-22 | End: 2022-07-22

## 2022-07-22 RX ADMIN — LIDOCAINE HYDROCHLORIDE 3 ML: 10 INJECTION, SOLUTION EPIDURAL; INFILTRATION; INTRACAUDAL; PERINEURAL at 01:07

## 2022-07-22 RX ADMIN — LIDOCAINE HYDROCHLORIDE,EPINEPHRINE BITARTRATE 20 ML: 20; .005 INJECTION, SOLUTION EPIDURAL; INFILTRATION; INTRACAUDAL; PERINEURAL at 02:07

## 2022-07-23 DIAGNOSIS — I10 HYPERTENSION, UNSPECIFIED TYPE: ICD-10-CM

## 2022-07-23 NOTE — TELEPHONE ENCOUNTER
No new care gaps identified.  [facility] Embedded Care Gaps. Reference number: [id number]. 7/23/2022   7:20:57 AM GRACIAT

## 2022-07-24 RX ORDER — LOSARTAN POTASSIUM 25 MG/1
TABLET ORAL
Qty: 90 TABLET | Refills: 1 | Status: SHIPPED | OUTPATIENT
Start: 2022-07-24 | End: 2023-02-05

## 2022-07-25 NOTE — TELEPHONE ENCOUNTER
Refill Decision Note   Christal Posey  is requesting a refill authorization.  Brief Assessment and Rationale for Refill:  Approve     Medication Therapy Plan:       Medication Reconciliation Completed: No   Comments:     No Care Gaps recommended.     Note composed:8:30 PM 07/24/2022

## 2022-07-29 ENCOUNTER — TELEPHONE (OUTPATIENT)
Dept: OBSTETRICS AND GYNECOLOGY | Facility: CLINIC | Age: 62
End: 2022-07-29
Payer: COMMERCIAL

## 2022-07-29 ENCOUNTER — DOCUMENTATION ONLY (OUTPATIENT)
Dept: HEMATOLOGY/ONCOLOGY | Facility: CLINIC | Age: 62
End: 2022-07-29
Payer: COMMERCIAL

## 2022-07-29 ENCOUNTER — TELEPHONE (OUTPATIENT)
Dept: HEMATOLOGY/ONCOLOGY | Facility: CLINIC | Age: 62
End: 2022-07-29
Payer: COMMERCIAL

## 2022-07-29 LAB
FINAL PATHOLOGIC DIAGNOSIS: NORMAL
GROSS: NORMAL
Lab: NORMAL

## 2022-07-29 NOTE — TELEPHONE ENCOUNTER
Called patient to let her know I was thinking about her. She was told about her breast biopsy results by Dr. Bob today. She does not have any questions at this time and thanked me for calling.

## 2022-07-29 NOTE — TELEPHONE ENCOUNTER
Called Patient with results of breast biopsy from 07/22/22.  Explained that the biopsy showed ADH and DCIS. Discussed what this means and that the next step is to meet with a breast surgeon. An appt was made for 08/03/22 with Dr. Jama.  Reviewed location of breast center. Patient verbalized understanding.      ----- Message from Mario Bob MD sent at 7/29/2022 11:18 AM CDT -----  Left breast 9:00 calcs are malignant and concordant.    Left breast 12:00 calcs are benign and concordant, but given ADH (atypia), recommend surgical excision at same time as the 9:00 calcs to exclude up stage of disease.    Thank you,    Mario Bob M.D.

## 2022-07-29 NOTE — NURSING
Oncology Navigation   Intake  Date of Diagnosis: 7/22/2022  Cancer Type: Breast  Internal / External Referral: Internal  Referral Source: [facility]  Date of Referral: 6/30/2022  Date Worked: 7/29/2022  First Appointment Available: 8/3/2022  Appointment Date: 8/3/2022  First Available Date vs. Scheduled Date (days): 0     Treatment  Current Status: Staging work-up    Surgical Oncologist: Wiley  Consult Date: 8/3/2022          Procedures: Biopsy; Mammogram; Ultrasound  Biopsy Schedule Date: 7/22/2022  Mammo Schedule Date: 7/22/2022  Ultrasound Schedule Date: 7/22/2022       ER: Positive  HI: Positive           Acuity      Follow Up  No follow-ups on file.

## 2022-08-02 ENCOUNTER — OFFICE VISIT (OUTPATIENT)
Dept: DERMATOLOGY | Facility: CLINIC | Age: 62
End: 2022-08-02
Payer: COMMERCIAL

## 2022-08-02 DIAGNOSIS — L98.9 DISEASE OF SKIN AND SUBCUTANEOUS TISSUE: Primary | ICD-10-CM

## 2022-08-02 PROCEDURE — 88312 SPECIAL STAINS GROUP 1: CPT | Performed by: PATHOLOGY

## 2022-08-02 PROCEDURE — 1159F PR MEDICATION LIST DOCUMENTED IN MEDICAL RECORD: ICD-10-PCS | Mod: CPTII,S$GLB,, | Performed by: DERMATOLOGY

## 2022-08-02 PROCEDURE — 88305 TISSUE EXAM BY PATHOLOGIST: CPT | Performed by: PATHOLOGY

## 2022-08-02 PROCEDURE — 99999 PR PBB SHADOW E&M-EST. PATIENT-LVL IV: CPT | Mod: PBBFAC,,, | Performed by: DERMATOLOGY

## 2022-08-02 PROCEDURE — 1160F PR REVIEW ALL MEDS BY PRESCRIBER/CLIN PHARMACIST DOCUMENTED: ICD-10-PCS | Mod: CPTII,S$GLB,, | Performed by: DERMATOLOGY

## 2022-08-02 PROCEDURE — 88305 TISSUE EXAM BY PATHOLOGIST: ICD-10-PCS | Mod: 26,,, | Performed by: PATHOLOGY

## 2022-08-02 PROCEDURE — 88305 TISSUE EXAM BY PATHOLOGIST: CPT | Mod: 26,,, | Performed by: PATHOLOGY

## 2022-08-02 PROCEDURE — 1160F RVW MEDS BY RX/DR IN RCRD: CPT | Mod: CPTII,S$GLB,, | Performed by: DERMATOLOGY

## 2022-08-02 PROCEDURE — 4010F ACE/ARB THERAPY RXD/TAKEN: CPT | Mod: CPTII,S$GLB,, | Performed by: DERMATOLOGY

## 2022-08-02 PROCEDURE — 99214 PR OFFICE/OUTPT VISIT, EST, LEVL IV, 30-39 MIN: ICD-10-PCS | Mod: 25,S$GLB,, | Performed by: DERMATOLOGY

## 2022-08-02 PROCEDURE — 88312 PR  SPECIAL STAINS,GROUP I: ICD-10-PCS | Mod: 26,,, | Performed by: PATHOLOGY

## 2022-08-02 PROCEDURE — 88312 SPECIAL STAINS GROUP 1: CPT | Mod: 26,,, | Performed by: PATHOLOGY

## 2022-08-02 PROCEDURE — 99214 OFFICE O/P EST MOD 30 MIN: CPT | Mod: 25,S$GLB,, | Performed by: DERMATOLOGY

## 2022-08-02 PROCEDURE — 1159F MED LIST DOCD IN RCRD: CPT | Mod: CPTII,S$GLB,, | Performed by: DERMATOLOGY

## 2022-08-02 PROCEDURE — 4010F PR ACE/ARB THEARPY RXD/TAKEN: ICD-10-PCS | Mod: CPTII,S$GLB,, | Performed by: DERMATOLOGY

## 2022-08-02 PROCEDURE — 99999 PR PBB SHADOW E&M-EST. PATIENT-LVL IV: ICD-10-PCS | Mod: PBBFAC,,, | Performed by: DERMATOLOGY

## 2022-08-02 RX ORDER — HYDROCORTISONE BUTYRATE 1 MG/G
CREAM TOPICAL
Qty: 45 G | Refills: 3 | Status: SHIPPED | OUTPATIENT
Start: 2022-08-02 | End: 2024-01-03

## 2022-08-02 NOTE — PATIENT INSTRUCTIONS

## 2022-08-02 NOTE — PROGRESS NOTES
Subjective:       Patient ID:  Christal Posey is a 62 y.o. female who presents for   Chief Complaint   Patient presents with    Rash     F/u     History of Present Illness: The patient presents for follow up of rash.    The patient was last seen on: 5/11/2022 for irritant contact dermatitis (pt used TAC 0.025% & mupiricin 2%) and says it has not resolved.     No fly h/o same.           Rash - Initial  Affected locations: left axilla, right axilla and groin (under breast, in groin)  Duration: 1 year  Signs / symptoms: irritated, itching and crusting  Severity: mild to moderate  Timing: constant  Aggravated by: nothing  Relieving factors/Treatments tried: nothing  Improvement on treatment: no relief        Review of Systems   Constitutional: Positive for weight gain (10 - 15# in past year). Negative for weight loss.   Skin: Positive for itching and rash.        Objective:    Physical Exam   Constitutional: She appears well-developed and well-nourished. No distress.   Neurological: She is alert and oriented to person, place, and time. She is not disoriented.   Psychiatric: She has a normal mood and affect.   Skin:   Areas Examined (abnormalities noted in diagram):   Chest / Axilla Inspection Performed  Abdomen Inspection Performed  Genitals / Buttocks / Groin Inspection Performed                  Diagram Legend     Erythematous scaling macule/papule c/w actinic keratosis       Vascular papule c/w angioma      Pigmented verrucoid papule/plaque c/w seborrheic keratosis      Yellow umbilicated papule c/w sebaceous hyperplasia      Irregularly shaped tan macule c/w lentigo     1-2 mm smooth white papules consistent with Milia      Movable subcutaneous cyst with punctum c/w epidermal inclusion cyst      Subcutaneous movable cyst c/w pilar cyst      Firm pink to brown papule c/w dermatofibroma      Pedunculated fleshy papule(s) c/w skin tag(s)      Evenly pigmented macule c/w junctional nevus     Mildly variegated  pigmented, slightly irregular-bordered macule c/w mildly atypical nevus      Flesh colored to evenly pigmented papule c/w intradermal nevus       Pink pearly papule/plaque c/w basal cell carcinoma      Erythematous hyperkeratotic cursted plaque c/w SCC      Surgical scar with no sign of skin cancer recurrence      Open and closed comedones      Inflammatory papules and pustules      Verrucoid papule consistent consistent with wart     Erythematous eczematous patches and plaques     Dystrophic onycholytic nail with subungual debris c/w onychomycosis     Umbilicated papule    Erythematous-base heme-crusted tan verrucoid plaque consistent with inflamed seborrheic keratosis     Erythematous Silvery Scaling Plaque c/w Psoriasis     See annotation                      Assessment / Plan:      Pathology Orders:     Normal Orders This Visit    Specimen to Pathology, Dermatology     Questions:    Procedure Type: Dermatology and skin neoplasms    Number of Specimens: 1    ------------------------: -------------------------    Spec 1 Procedure: Biopsy    Spec 1 Clinical Impression: r/o ally ally vs intertrigo vs other    Spec 1 Source: right inguinal fold    Release to patient: Immediate        Disease of skin and subcutaneous tissue  -     hydrocortisone butyrate (LOCOID) 0.1 % Crea cream; AAA bid  Dispense: 45 g; Refill: 3    -     Specimen to Pathology, Dermatology  Punch biopsy procedure note:  Punch biopsy performed after verbal consent obtained. Area marked and prepped with alcohol. Approximately 1cc of 1% lidocaine with epinephrine injected. 4 mm disposable punch used to remove lesion. Hemostasis obtained and biopsy site closed with 1 - 2 Prolene sutures. Wound care instructions reviewed with patient and handout given.             Follow up in about 2 weeks (around 8/16/2022).

## 2022-08-03 ENCOUNTER — DOCUMENTATION ONLY (OUTPATIENT)
Dept: SURGERY | Facility: CLINIC | Age: 62
End: 2022-08-03

## 2022-08-03 ENCOUNTER — LAB VISIT (OUTPATIENT)
Dept: LAB | Facility: HOSPITAL | Age: 62
End: 2022-08-03
Attending: SURGERY
Payer: COMMERCIAL

## 2022-08-03 ENCOUNTER — OFFICE VISIT (OUTPATIENT)
Dept: SURGERY | Facility: CLINIC | Age: 62
End: 2022-08-03
Payer: COMMERCIAL

## 2022-08-03 VITALS
SYSTOLIC BLOOD PRESSURE: 171 MMHG | WEIGHT: 218 LBS | HEIGHT: 66 IN | HEART RATE: 86 BPM | BODY MASS INDEX: 35.03 KG/M2 | DIASTOLIC BLOOD PRESSURE: 77 MMHG

## 2022-08-03 DIAGNOSIS — C50.912 RECURRENT MALIGNANT NEOPLASM OF LEFT BREAST: Primary | ICD-10-CM

## 2022-08-03 DIAGNOSIS — D05.12 DUCTAL CARCINOMA IN SITU (DCIS) OF LEFT BREAST: ICD-10-CM

## 2022-08-03 DIAGNOSIS — Z01.818 PRE-OP TESTING: ICD-10-CM

## 2022-08-03 DIAGNOSIS — D05.12 DUCTAL CARCINOMA IN SITU (DCIS) OF LEFT BREAST: Primary | ICD-10-CM

## 2022-08-03 DIAGNOSIS — Z86.000 HISTORY OF DUCTAL CARCINOMA IN SITU (DCIS) OF BREAST: ICD-10-CM

## 2022-08-03 DIAGNOSIS — N60.99 ATYPICAL DUCTAL HYPERPLASIA OF BREAST: ICD-10-CM

## 2022-08-03 LAB
ALBUMIN SERPL BCP-MCNC: 3.9 G/DL (ref 3.5–5.2)
ALP SERPL-CCNC: 102 U/L (ref 55–135)
ALT SERPL W/O P-5'-P-CCNC: 32 U/L (ref 10–44)
ANION GAP SERPL CALC-SCNC: 9 MMOL/L (ref 8–16)
AST SERPL-CCNC: 22 U/L (ref 10–40)
BASOPHILS # BLD AUTO: 0.05 K/UL (ref 0–0.2)
BASOPHILS NFR BLD: 0.4 % (ref 0–1.9)
BILIRUB SERPL-MCNC: 0.4 MG/DL (ref 0.1–1)
BUN SERPL-MCNC: 11 MG/DL (ref 6–20)
CALCIUM SERPL-MCNC: 9.6 MG/DL (ref 8.7–10.5)
CHLORIDE SERPL-SCNC: 104 MMOL/L (ref 95–110)
CO2 SERPL-SCNC: 29 MMOL/L (ref 23–29)
CREAT SERPL-MCNC: 1 MG/DL (ref 0.5–1.4)
DIFFERENTIAL METHOD: ABNORMAL
EOSINOPHIL # BLD AUTO: 0.1 K/UL (ref 0–0.5)
EOSINOPHIL NFR BLD: 1.2 % (ref 0–8)
ERYTHROCYTE [DISTWIDTH] IN BLOOD BY AUTOMATED COUNT: 13.2 % (ref 11.5–14.5)
EST. GFR  (NO RACE VARIABLE): >60 ML/MIN/1.73 M^2
GLUCOSE SERPL-MCNC: 103 MG/DL (ref 70–110)
HCT VFR BLD AUTO: 40.5 % (ref 37–48.5)
HGB BLD-MCNC: 13.7 G/DL (ref 12–16)
IMM GRANULOCYTES # BLD AUTO: 0.03 K/UL (ref 0–0.04)
IMM GRANULOCYTES NFR BLD AUTO: 0.3 % (ref 0–0.5)
LYMPHOCYTES # BLD AUTO: 1.9 K/UL (ref 1–4.8)
LYMPHOCYTES NFR BLD: 16.7 % (ref 18–48)
MCH RBC QN AUTO: 30 PG (ref 27–31)
MCHC RBC AUTO-ENTMCNC: 33.8 G/DL (ref 32–36)
MCV RBC AUTO: 89 FL (ref 82–98)
MONOCYTES # BLD AUTO: 0.4 K/UL (ref 0.3–1)
MONOCYTES NFR BLD: 3.5 % (ref 4–15)
NEUTROPHILS # BLD AUTO: 8.7 K/UL (ref 1.8–7.7)
NEUTROPHILS NFR BLD: 77.9 % (ref 38–73)
NRBC BLD-RTO: 0 /100 WBC
PLATELET # BLD AUTO: 318 K/UL (ref 150–450)
PMV BLD AUTO: 10.3 FL (ref 9.2–12.9)
POTASSIUM SERPL-SCNC: 4 MMOL/L (ref 3.5–5.1)
PROT SERPL-MCNC: 7.6 G/DL (ref 6–8.4)
RBC # BLD AUTO: 4.57 M/UL (ref 4–5.4)
SODIUM SERPL-SCNC: 142 MMOL/L (ref 136–145)
WBC # BLD AUTO: 11.12 K/UL (ref 3.9–12.7)

## 2022-08-03 PROCEDURE — 1160F PR REVIEW ALL MEDS BY PRESCRIBER/CLIN PHARMACIST DOCUMENTED: ICD-10-PCS | Mod: CPTII,S$GLB,, | Performed by: SURGERY

## 2022-08-03 PROCEDURE — 3078F PR MOST RECENT DIASTOLIC BLOOD PRESSURE < 80 MM HG: ICD-10-PCS | Mod: CPTII,S$GLB,, | Performed by: SURGERY

## 2022-08-03 PROCEDURE — 3078F DIAST BP <80 MM HG: CPT | Mod: CPTII,S$GLB,, | Performed by: SURGERY

## 2022-08-03 PROCEDURE — 36415 COLL VENOUS BLD VENIPUNCTURE: CPT | Performed by: SURGERY

## 2022-08-03 PROCEDURE — 1160F RVW MEDS BY RX/DR IN RCRD: CPT | Mod: CPTII,S$GLB,, | Performed by: SURGERY

## 2022-08-03 PROCEDURE — 99999 PR PBB SHADOW E&M-EST. PATIENT-LVL IV: ICD-10-PCS | Mod: PBBFAC,,, | Performed by: SURGERY

## 2022-08-03 PROCEDURE — 85025 COMPLETE CBC W/AUTO DIFF WBC: CPT | Performed by: SURGERY

## 2022-08-03 PROCEDURE — 3008F PR BODY MASS INDEX (BMI) DOCUMENTED: ICD-10-PCS | Mod: CPTII,S$GLB,, | Performed by: SURGERY

## 2022-08-03 PROCEDURE — 1159F PR MEDICATION LIST DOCUMENTED IN MEDICAL RECORD: ICD-10-PCS | Mod: CPTII,S$GLB,, | Performed by: SURGERY

## 2022-08-03 PROCEDURE — 4010F PR ACE/ARB THEARPY RXD/TAKEN: ICD-10-PCS | Mod: CPTII,S$GLB,, | Performed by: SURGERY

## 2022-08-03 PROCEDURE — 3077F PR MOST RECENT SYSTOLIC BLOOD PRESSURE >= 140 MM HG: ICD-10-PCS | Mod: CPTII,S$GLB,, | Performed by: SURGERY

## 2022-08-03 PROCEDURE — 4010F ACE/ARB THERAPY RXD/TAKEN: CPT | Mod: CPTII,S$GLB,, | Performed by: SURGERY

## 2022-08-03 PROCEDURE — 99215 PR OFFICE/OUTPT VISIT, EST, LEVL V, 40-54 MIN: ICD-10-PCS | Mod: S$GLB,,, | Performed by: SURGERY

## 2022-08-03 PROCEDURE — 99215 OFFICE O/P EST HI 40 MIN: CPT | Mod: S$GLB,,, | Performed by: SURGERY

## 2022-08-03 PROCEDURE — 1159F MED LIST DOCD IN RCRD: CPT | Mod: CPTII,S$GLB,, | Performed by: SURGERY

## 2022-08-03 PROCEDURE — 80053 COMPREHEN METABOLIC PANEL: CPT | Performed by: SURGERY

## 2022-08-03 PROCEDURE — 99999 PR PBB SHADOW E&M-EST. PATIENT-LVL IV: CPT | Mod: PBBFAC,,, | Performed by: SURGERY

## 2022-08-03 PROCEDURE — 3077F SYST BP >= 140 MM HG: CPT | Mod: CPTII,S$GLB,, | Performed by: SURGERY

## 2022-08-03 PROCEDURE — 3008F BODY MASS INDEX DOCD: CPT | Mod: CPTII,S$GLB,, | Performed by: SURGERY

## 2022-08-03 NOTE — PROGRESS NOTES
Breast Surgery  [facility]  Department of Surgery      REFERRING PROVIDER: Mario Bob MD  [address] [place] [address]    Chief Complaint: Breast Cancer (New Patient Left Breast DCIS & ADH 7/2222 .)      Subjective:      Patient ID: Christal Posey is a 62 y.o. 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 She met with both medical and radiation oncology post operatively and received no adjuvant hormone or XRT.  She now presents with left breast Ductal Carcinoma in Situ.     She presented for screening mammogram on 6/22/22 for yearly scheduled screening.. This identified Left breast calcifications at the lower central middle position. Follow-up mammogram on 6/29/22 showed two lesions in the left breast. The first at 9 o'clock axis middle depth near the prior lumpectomy scar, there are fine pleomorphic calcifications in a grouped distribution spanning 0.7 cm and the second lesion in the left breast at 12:00 o'clock axis middle depth, there are fine pleomorphic calcifications in a grouped distribution spanning 0.9 cm. A stereotactic biopsy was performed on 7/22/22 with pathology revealing ductal carcinoma in-situ and ADH of the breast.     Findings at that time were the following:   Lesion 1:    Location: Left Breast 9:00 (DCIS)    Clip:Q, in expected position  Tumor size: 0.7 cm   Tumor ndgndrndanddndend:nd nd2nd Estrogen Receptor: +   Progesterone Receptor: +    Lymph node status: Clinically Negative     Lesion 2:    Location: Left Breast 12:00 (ADH)   Clip: X, in expected position  Tumor size: 0.9 cm     Patient does routinely do self breast exams.  Patient has not noted a change on breast exam.  Patient denies nipple discharge. Patient admits to to previous breast biopsy. Patient admits to a personal history of breast cancer. Family history includes Father with lung cancer, paternal uncle with lung cancer, no history of  breast or ovarian cancer.     GYN History:  Age of menarche was 14. Patient underwent hysterectomy in .  Patient admits to hormonal therapy, estrace vaginal cream. Patient is . Age of first live birth was 24. Patient did breast feed for 2 months.    Past Medical History:   Diagnosis Date    Allergy     Breast cancer 2016    Left breast low grade DCIS, ER/DE positive    Colon polyp 10/31/2018    Ischemic colitis suspected as well ( area biopsied).    GERD (gastroesophageal reflux disease)     Hiatal hernia     Hyperlipidemia     Irritable bowel syndrome     Migraine headache     RA (rheumatoid arthritis)     Squamous cell carcinoma excised 14    in situ, R forearm     Past Surgical History:   Procedure Laterality Date    BREAST BIOPSY Left 2016    DCIS    BREAST LUMPECTOMY      ECTOPIC PREGNANCY SURGERY Right     HYSTERECTOMY      ovaries left intact    removal of ovarian cyst Right oct 1992    ovary left intact    TUBAL LIGATION      tubal reversal  1991     Current Outpatient Medications on File Prior to Visit   Medication Sig Dispense Refill    ALPRAZolam (XANAX) 0.25 MG tablet Take 1 tablet (0.25 mg total) by mouth nightly as needed for Insomnia. 30 tablet 0    baclofen (LIORESAL) 10 MG tablet Take 10 mg by mouth 3 (three) times daily.      budesonide (PULMICORT) 0.5 mg/2 mL nebulizer solution SMARTSI Via Nebulizer Twice Daily      butalbital-acetaminophen-caffeine -40 mg (FIORICET, ESGIC) -40 mg per tablet Take 1 tablet by mouth every 6 to 8 hours as needed. 60 tablet 0    cholestyramine (QUESTRAN) 4 gram packet Take 1 packet by mouth once daily.      diclofenac sodium (VOLTAREN) 1 % Gel APPLY 2 GRAMS TOPICALLY 3 (THREE) TIMES DAILY. 100 g 3    fluticasone (FLONASE) 50 mcg/actuation nasal spray       gabapentin (NEURONTIN) 800 MG tablet Take 800 mg by mouth 3 (three) times daily.      lansoprazole (PREVACID) 30 MG capsule Take 30 mg by mouth  every morning. 1 Capsule, Delayed Release(E.C.) Oral Every day      losartan (COZAAR) 25 MG tablet TAKE 1 TABLET BY MOUTH EVERY DAY 90 tablet 1    meloxicam (MOBIC) 15 MG tablet Take 1 tablet (15 mg total) by mouth once daily. 30 tablet 0    mupirocin (BACTROBAN) 2 % ointment Apply topically 2 (two) times daily. 22 g 1    NEILMED SINUS RINSE COMPLETE pkdv by Each Nostril route.      ondansetron (ZOFRAN) 4 MG tablet Take 1 tablet (4 mg total) by mouth every 8 (eight) hours as needed for Nausea. 1 Tablet Oral Every 6 hours 12 tablet 12    prasterone, dhea, (INTRAROSA) 6.5 mg Inst Place 6.5 mg vaginally every evening. 30 each 11    PREMARIN vaginal cream PLACE 1 GRAM VAGINALLY 2 TIMES A WK  12    sumatriptan (IMITREX) 20 mg/actuation nasal spray USE 1 SPRAY IN EACH NOSTRIL AT ONSET OF HEADACHE. MAY REPEAT ONCE IN 2 HOURS IF NO RELIEF. NO MORE THAN 2 SPRAYS PER 24 HOURS 6 each 12    triamcinolone acetonide 0.025% (KENALOG) 0.025 % Oint Apply topically 2 (two) times daily. x 1-2 wks then prn flares only 80 g 2    VENTOLIN HFA 90 mcg/actuation inhaler INHALE 2 PUFFS INTO THE LUNGS EVERY 6 HOURS AS NEEDED FOR WHEEZING. RESCUE 18 g 0    budesonide 1 mg/2 mL NbSp       CHOLESTYRAMINE LIGHT 4 gram packet Take 1 packet by mouth once daily.      hydrocortisone butyrate (LOCOID) 0.1 % Crea cream AAA bid 45 g 3    multivitamin (THERAGRAN) per tablet Take 1 tablet by mouth once daily.      triamcinolone acetonide 0.1% (KENALOG) 0.1 % cream Apply topically 2 (two) times daily. 15 g 1    vitamin D 1000 units Tab Take 185 mg by mouth every morning.        No current facility-administered medications on file prior to visit.     Social History     Socioeconomic History    Marital status:      Spouse name: Parrish    Number of children: 2   Tobacco Use    Smoking status: Never Smoker    Smokeless tobacco: Never Used   Substance and Sexual Activity    Alcohol use: No     Alcohol/week: 0.0 standard drinks    Drug  "use: No    Sexual activity: Yes     Partners: Male     Birth control/protection: Surgical, None     Comment:  to Parrish    Other Topics Concern    Are you pregnant or think you may be? No    Breast-feeding No     Family History   Problem Relation Age of Onset    Migraines Sister     Migraines Brother     Seizures Brother     Migraines Maternal Grandmother     Seizures Maternal Grandmother     Cancer Father 77        Lung cancer    Cancer Paternal Uncle         lung    Cancer Maternal Uncle         prostate ca    Melanoma Neg Hx     Breast cancer Neg Hx     Colon cancer Neg Hx     Ovarian cancer Neg Hx         Review of Systems   All other systems reviewed and are negative.    Objective:   BP (!) 171/77 (BP Location: Left arm, Patient Position: Sitting, BP Method: Large (Automatic))   Pulse 86   Ht 5' 6" (1.676 m)   Wt 98.9 kg (218 lb)   LMP  (LMP Unknown)   BMI 35.19 kg/m²     Physical Exam   Constitutional: She is oriented to person, place, and time. She appears well-developed and well-nourished.   HENT:   Head: Normocephalic and atraumatic.   Cardiovascular: Normal rate, regular rhythm and normal heart sounds.  Exam reveals no gallop and no friction rub.    No murmur heard.  Pulmonary/Chest: Effort normal and breath sounds normal. No respiratory distress. She has no wheezes. Right breast exhibits no inverted nipple, no mass, no nipple discharge, no skin change and no tenderness. Left breast exhibits no inverted nipple, no mass, no nipple discharge, no skin change and no tenderness.       Lymphadenopathy:     She has no cervical adenopathy.     She has no axillary adenopathy.        Right: No supraclavicular adenopathy present.        Left: No supraclavicular adenopathy present.   Neurological: She is alert and oriented to person, place, and time.   Skin: Skin is warm and dry. No rash noted. No erythema. No pallor.     Psychiatric: She has a normal mood and affect. Her behavior is normal. " Judgment and thought content normal.       Radiology review: Images personally reviewed by me in the clinic and shown to the patient during the consultation.     Assessment:       1. Recurrent malignant neoplasm of left breast    2. History of ductal carcinoma in situ (DCIS) of breast    3. Pre-op testing        Plan:   Left breast, Recurrent Clinical Stage 0 ([id number]), Ductal Carcinoma in Situ, estrogen receptor positive, progesterone receptor positive     Multidisciplinary nature of breast cancer care was discussed in detail at today's visit.    According to National Comprehensive Cancer Network guidelines, systemic staging is recommended in the setting of recurrence. This generally consists of either a PET/CT scan or a CT of the chest, abdomen, and pelvis, as well as a bone scan.  We discussed that her original cancer as well as recurrence or both ductal carcinoma in Situ.  We discussed the risk of distant recurrence at this time was low.  Staging scans deferred.    We discussed surgical options in the setting of recurrent breast cancer with prior lumpectomy.  She did not have prior radiation.  The she is still candidate for a lumpectomy.  We discussed that her left breast is much smaller than the right due to the prior lumpectomy.  This asymmetry would be further exaggerated by a repeat lumpectomy.  We discussed that overall survival for mastectomy lumpectomy is considered adequate the [place] however there is a high risk of local recurrence from performing a lumpectomy without radiation.  We discussed that if she received lumpectomy I would strongly encourage her to proceed with radiation.urgical technique and rationale was discussed with the patient. Risks and benefits of the procedure were discussed in detail.  Risks include but are not limited to infection, bleeding, poor cosmesis, positive margins requiring reexcision, and local and distant recurrence. We discussed the option for mastectomy.  We discussed the  risks and benefits of mastectomy. Risks include but are not limited to risk of bleeding, infection, poor cosmesis, need for additional surgery, clip placement, scaring, pain including phantom pain, fluid collections, shoulder stiffness, prolonged healing, and recurrence.  Reconstruction after mastectomy was discussed.  Reconstructive generally include implant or autologous tissue reconstruction. There are certain health qualifications that the patient must meet in order to be a candidate for reconstruction.  We discussed surgical incision would be based on both oncologic and cosmetic concerns. Based on the location of her tumor she is good candidate for nipple sparing approach. The plastics surgeon will also evaluate her breast shape and size to determine if it be cosmetically acceptable to spare the nipple.  Local recurrence at the nipple is about 3%. There is about a 5% risk of necrosis or ischemia requiring reoperation and a 10% risk of epidermolysis not requiring reoperation.  We discussed the option for contralateral prophylactic mastectomy.  Undergoing a contralateral prophylactic mastectomy does not improve the cure rate for the known cancer or reduce the risk of the that cancer returning.  We discussed the risk of contralateral breast cancer in the setting of DCIS about 1% per year.   Contralateral prophylactic mastectomy is not 100% protected against development of a new breast cancer.  Undergoing surgery on the contralateral breast has the risk of surgical site complications which could delay cancer treatments.      We also discussed axillary staging using sentinel node biopsy.  We discussed the sentinel lymph node biopsy is recommended when undergoing a mastectomy for DCIS but is only recommended in certain situations when undergoing a lumpectomy for DCIS.  [place] lymph node biopsies performed utilizing the injection of blue and radioactive dye.  This dye travels to the 1st few lymph nodes that drain  the breast.  Lymph nodes that uptake the blue or radioactive dye or are palpable are surgically removed and sent to pathology.  Typically 1-5 lymph nodes are removed during this procedure although exact numbers vary depending on the patient.  This procedure allows sampling of the lymph nodes most at risk for metastasis.  We will plan to proceed with sentinel node biopsy if she proceeds with a mastectomy but not if she proceeds with lumpectomy.    We also discussed the role of systemic therapy in the treatment of early stage breast cancer. We discussed that this is based on tumor biology and rachel status and will be determined based on final pathology.  We discussed the chemotherapy is not recommended for DCIS but may be recommended if she is found have a large area of invasive cancer.  She will be recommended for anti-estrogen therapy. We discussed that if the cancer is hormone positive, endocrine therapy would be recommended in most cases and its use can reduce the risk of recurrence as well as improve survival. Side effects of treatment were briefly discussed. She'll be referred to medical oncology post-operatively for further discussion of her options.     She doesmeet NCCN guidelines for genetic testing based on recurrent diagnosis. We discussed genetic testing at length and she will like to proceed    Following her discussion today, she is unsure on how she would like to proceed.  She strongly considering 2 main options 1. left partial mastectomy with adjuvant radiation adjuvant endocrine therapy.  If she proceeds with this option to be beneficial to get a preoperative MRI to further evaluate the extent of disease and limit excision as much as possible.  2. left mastectomy and left sentinel lymph node biopsy with implant based reconstruction and endocrine therapy for protection of the right breast.     Surgery will be scheduled once  plans are finalized.  Plan to follow up by phone call next week.    Patient  was given the patient information packet.  All her questions were answered.    Total time spent with the patient: 60 minutes.  45 minutes of face to face consultation and 15 minutes of chart review and coordination of care.

## 2022-08-03 NOTE — PROGRESS NOTES
Genetics Lay Navigator Note:    Met with patient at her consult with Dr. Jama today 8/3/2022, to facilitate genetic testing. Set patient up with [employer] genetic counselor over the phone to complete counseling prior to testing. Patient verbalized understanding to all counseling information. [employer] brochure with number to call with questions or concerns provided to patient as well as my card. Encouraged patient to call me or [employer] at any time.     Lab appointment made and patient escorted with [employer] kit to lab for specimen draw and processing. Patient instructed that results will be provided as soon as they are available. No questions or concerns from patient about plan of care.     [employer] Genetic Pedigree scanned in media and attached to documentation note.    [employer] Tracking # [id number]

## 2022-08-04 ENCOUNTER — DOCUMENTATION ONLY (OUTPATIENT)
Dept: HEMATOLOGY/ONCOLOGY | Facility: CLINIC | Age: 62
End: 2022-08-04
Payer: COMMERCIAL

## 2022-08-04 NOTE — NURSING
Met with pt during her appt with Dr Jama.  Pt to decide on type of surgery she wants, a lumpectomy vs a mastectomy with recon.  Per Dr Jama's request, I sent myself a reminder to call the pt next week to discuss.  Sent pt for genetics.  No other needs at present.  Oncology Navigation   Intake  Date of Diagnosis: 7/22/2022  Cancer Type: Breast  Internal / External Referral: Internal  Referral Source: [employer]  Date of Referral: 6/30/2022  Date Worked: 8/4/2022  First Appointment Available: 8/3/2022  Appointment Date: 8/3/2022  First Available Date vs. Scheduled Date (days): 0     Treatment  Current Status: Staging work-up    Surgical Oncologist: [facility]  Type of Surgery: lumpectomy (will need MRI) vs mastectomy with SLNB and implant recon  Consult Date: 8/3/2022          Procedures: Genetic test  Biopsy Schedule Date: 7/22/2022  Genetic Testing Date Sent: 8/3/2022  Mammo Schedule Date: 7/22/2022  Ultrasound Schedule Date: 7/22/2022    General Referrals: [facility]ative medicine    ER: Positive  TX: Positive       Support Systems: Family members     Acuity      Follow Up  No follow-ups on file.

## 2022-08-08 ENCOUNTER — TELEPHONE (OUTPATIENT)
Dept: SURGERY | Facility: CLINIC | Age: 62
End: 2022-08-08
Payer: COMMERCIAL

## 2022-08-08 DIAGNOSIS — D05.12 DUCTAL CARCINOMA IN SITU (DCIS) OF LEFT BREAST: Primary | ICD-10-CM

## 2022-08-08 NOTE — TELEPHONE ENCOUNTER
Spoke with patient regarding surgical treatment. Answered all questions. Pt requesting to meet with rad onc prior to making a decision. Leaning towards lumpectomy at this time.

## 2022-08-10 ENCOUNTER — DOCUMENTATION ONLY (OUTPATIENT)
Dept: HEMATOLOGY/ONCOLOGY | Facility: CLINIC | Age: 62
End: 2022-08-10
Payer: COMMERCIAL

## 2022-08-10 LAB
FINAL PATHOLOGIC DIAGNOSIS: NORMAL
GROSS: NORMAL
Lab: NORMAL
MICROSCOPIC EXAM: NORMAL

## 2022-08-10 NOTE — PROGRESS NOTES
Called pt to inform her of her dates and time of her rad onc and MRI appts.  Instructed her to call if any additional assistance is needed.  Pt verbalized understanding

## 2022-08-11 ENCOUNTER — OFFICE VISIT (OUTPATIENT)
Dept: RADIATION ONCOLOGY | Facility: CLINIC | Age: 62
End: 2022-08-11
Payer: COMMERCIAL

## 2022-08-11 VITALS
BODY MASS INDEX: 34.19 KG/M2 | HEIGHT: 66 IN | DIASTOLIC BLOOD PRESSURE: 93 MMHG | OXYGEN SATURATION: 95 % | TEMPERATURE: 98 F | HEART RATE: 95 BPM | WEIGHT: 212.75 LBS | SYSTOLIC BLOOD PRESSURE: 179 MMHG

## 2022-08-11 DIAGNOSIS — D05.12 DUCTAL CARCINOMA IN SITU (DCIS) OF LEFT BREAST: Primary | ICD-10-CM

## 2022-08-11 PROCEDURE — 4010F ACE/ARB THERAPY RXD/TAKEN: CPT | Mod: CPTII,S$GLB,, | Performed by: RADIOLOGY

## 2022-08-11 PROCEDURE — 3077F PR MOST RECENT SYSTOLIC BLOOD PRESSURE >= 140 MM HG: ICD-10-PCS | Mod: CPTII,S$GLB,, | Performed by: RADIOLOGY

## 2022-08-11 PROCEDURE — 3080F DIAST BP >= 90 MM HG: CPT | Mod: CPTII,S$GLB,, | Performed by: RADIOLOGY

## 2022-08-11 PROCEDURE — 99999 PR PBB SHADOW E&M-EST. PATIENT-LVL III: ICD-10-PCS | Mod: PBBFAC,,, | Performed by: RADIOLOGY

## 2022-08-11 PROCEDURE — 3008F BODY MASS INDEX DOCD: CPT | Mod: CPTII,S$GLB,, | Performed by: RADIOLOGY

## 2022-08-11 PROCEDURE — 3080F PR MOST RECENT DIASTOLIC BLOOD PRESSURE >= 90 MM HG: ICD-10-PCS | Mod: CPTII,S$GLB,, | Performed by: RADIOLOGY

## 2022-08-11 PROCEDURE — 3008F PR BODY MASS INDEX (BMI) DOCUMENTED: ICD-10-PCS | Mod: CPTII,S$GLB,, | Performed by: RADIOLOGY

## 2022-08-11 PROCEDURE — 99205 PR OFFICE/OUTPT VISIT, NEW, LEVL V, 60-74 MIN: ICD-10-PCS | Mod: S$GLB,,, | Performed by: RADIOLOGY

## 2022-08-11 PROCEDURE — 99205 OFFICE O/P NEW HI 60 MIN: CPT | Mod: S$GLB,,, | Performed by: RADIOLOGY

## 2022-08-11 PROCEDURE — 99999 PR PBB SHADOW E&M-EST. PATIENT-LVL III: CPT | Mod: PBBFAC,,, | Performed by: RADIOLOGY

## 2022-08-11 PROCEDURE — 3077F SYST BP >= 140 MM HG: CPT | Mod: CPTII,S$GLB,, | Performed by: RADIOLOGY

## 2022-08-11 PROCEDURE — 4010F PR ACE/ARB THEARPY RXD/TAKEN: ICD-10-PCS | Mod: CPTII,S$GLB,, | Performed by: RADIOLOGY

## 2022-08-11 RX ORDER — ATOGEPANT 60 MG/1
60 TABLET ORAL
COMMUNITY
Start: 2022-07-21 | End: 2023-02-24

## 2022-08-11 NOTE — PROGRESS NOTES
PATIENT IDENTIFICATION:  Patient Name: Christal Posey  MRN: [id number]  : 1960    DIAGNOSIS: Recurrent DCIS of the left breast    HISTORY OF PRESENT ILLNESS:   The patient is a 62-year-old woman with recurrent DCIS the left breast.  She has been referred to our department to discuss role of adjuvant radiation therapy.    The patient's history dates back to  which she was initially diagnosed with a small focus of low-grade DCIS measuring 2 mm.  She underwent lumpectomy and opted to forego adjuvant endocrine therapy and radiation.    The patient underwent mammogram on 2022.  Imaging revealed calcifications in the lower central region of the left breast, middle depth.  Compared to the previous study, the calcifications appeared increased in number.  Some the calcifications were in the region of the prior lumpectomy.    Biopsy of the lesion at the 9 o'clock position the left breast revealed low-grade DCIS.  On immunohistochemistry, the tumor cells were ER CT strongly positive.    Oncology History   Ductal carcinoma in situ (DCIS) of left breast   2016 Initial Diagnosis    Ductal carcinoma in situ (DCIS) of left breast     6/10/2016 Surgery    Surgeon: Dr. Stoll  Left breast partial mastectomy  2 mm low grade DCIS, ER/CT positive        GYN History:  Age of menarche was 14. Patient underwent hysterectomy in .  Patient admits to hormonal therapy, estrace vaginal cream. Patient is . Age of first live birth was 24. Patient did breast feed for 2 months.    REVIEW OF SYSTEMS:   Review of Systems   Constitutional: Negative for fever, malaise/fatigue and weight loss.   HENT: Negative for ear pain, hearing loss, sinus pain and sore throat.    Eyes: Negative for blurred vision, double vision and pain.   Respiratory: Negative for cough, hemoptysis, shortness of breath and wheezing.    Cardiovascular: Negative for chest pain, palpitations and leg swelling.   Gastrointestinal: Negative for abdominal  pain, blood in stool, constipation, diarrhea, heartburn, nausea and vomiting.   Genitourinary: Negative for dysuria, frequency, hematuria and urgency.   Musculoskeletal: Positive for back pain and joint pain.   Skin: Negative for itching and rash.   Neurological: Negative for tingling, focal weakness, seizures and headaches.   Psychiatric/Behavioral: Negative for depression. The patient is not nervous/anxious.        PAST MEDICAL HISTORY:  Past Medical History:   Diagnosis Date    Allergy     Breast cancer 05/2016    Left breast low grade DCIS, ER/OR positive    Colon polyp 10/31/2018    Ischemic colitis suspected as well ( area biopsied).    GERD (gastroesophageal reflux disease)     Hiatal hernia     Hyperlipidemia     Irritable bowel syndrome     Migraine headache     RA (rheumatoid arthritis)     Squamous cell carcinoma excised 11/12/14    in situ, R forearm       PAST SURGICAL HISTORY:  Past Surgical History:   Procedure Laterality Date    BREAST BIOPSY Left 05/2016    DCIS    BREAST LUMPECTOMY      ECTOPIC PREGNANCY SURGERY Right     HYSTERECTOMY  1998    ovaries left intact    removal of ovarian cyst Right oct 1992    ovary left intact    TUBAL LIGATION      tubal reversal  july 1991       ALLERGIES:   Review of patient's allergies indicates:   Allergen Reactions    Pravastatin Other (See Comments)     Severe muscle pain.  Muscle pain    Other reaction(s): Other (See Comments)  Severe muscle pain.    Amitriptyline      Other reaction(s): dizzy    Codeine      Other reaction(s): Unknown    Doxycycline      Other reaction(s): Nausea    Erythromycin      Other reaction(s): Unknown    Iodine      Other reaction(s): Swelling  Other reaction(s): Unknown    Macrobid  [nitrofurantoin monohyd/m-cryst]      Other reaction(s): Unknown    Verapamil      Other reaction(s): Headache       MEDICATIONS:  Current Outpatient Medications   Medication Sig    cholestyramine (QUESTRAN) 4 gram packet Take  1 packet by mouth once daily.    gabapentin (NEURONTIN) 800 MG tablet Take 800 mg by mouth 3 (three) times daily.    hydrocortisone butyrate (LOCOID) 0.1 % Crea cream AAA bid    lansoprazole (PREVACID) 30 MG capsule Take 30 mg by mouth every morning. 1 Capsule, Delayed Release(E.C.) Oral Every day    losartan (COZAAR) 25 MG tablet TAKE 1 TABLET BY MOUTH EVERY DAY    multivitamin (THERAGRAN) per tablet Take 1 tablet by mouth once daily.    mupirocin (BACTROBAN) 2 % ointment Apply topically 2 (two) times daily.    triamcinolone acetonide 0.025% (KENALOG) 0.025 % Oint Apply topically 2 (two) times daily. x 1-2 wks then prn flares only    vitamin D 1000 units Tab Take 185 mg by mouth every morning.     ALPRAZolam (XANAX) 0.25 MG tablet Take 1 tablet (0.25 mg total) by mouth nightly as needed for Insomnia. (Patient not taking: Reported on 2022)    atogepant (QULIPTA) 60 mg Tab Take 60 mg by mouth.    baclofen (LIORESAL) 10 MG tablet Take 10 mg by mouth 3 (three) times daily.    budesonide (PULMICORT) 0.5 mg/2 mL nebulizer solution SMARTSI Via Nebulizer Twice Daily    budesonide 1 mg/2 mL NbSp     butalbital-acetaminophen-caffeine -40 mg (FIORICET, ESGIC) -40 mg per tablet Take 1 tablet by mouth every 6 to 8 hours as needed. (Patient not taking: Reported on 2022)    CHOLESTYRAMINE LIGHT 4 gram packet Take 1 packet by mouth once daily.    diclofenac sodium (VOLTAREN) 1 % Gel APPLY 2 GRAMS TOPICALLY 3 (THREE) TIMES DAILY. (Patient not taking: Reported on 2022)    fluticasone (FLONASE) 50 mcg/actuation nasal spray     NEILMED SINUS RINSE COMPLETE pkdv by Each Nostril route.    ondansetron (ZOFRAN) 4 MG tablet Take 1 tablet (4 mg total) by mouth every 8 (eight) hours as needed for Nausea. 1 Tablet Oral Every 6 hours (Patient not taking: Reported on 2022)    prasterone, dhea, (INTRAROSA) 6.5 mg Inst Place 6.5 mg vaginally every evening. (Patient not taking: Reported on  2022)    PREMARIN vaginal cream PLACE 1 GRAM VAGINALLY 2 TIMES A WK    sumatriptan (IMITREX) 20 mg/actuation nasal spray USE 1 SPRAY IN EACH NOSTRIL AT ONSET OF HEADACHE. MAY REPEAT ONCE IN 2 HOURS IF NO RELIEF. NO MORE THAN 2 SPRAYS PER 24 HOURS (Patient not taking: Reported on 2022)    VENTOLIN HFA 90 mcg/actuation inhaler INHALE 2 PUFFS INTO THE LUNGS EVERY 6 HOURS AS NEEDED FOR WHEEZING. RESCUE (Patient not taking: Reported on 2022)     No current facility-administered medications for this visit.       SOCIAL HISTORY:  Social History     Socioeconomic History    Marital status:      Spouse name: Parrish    Number of children: 2   Tobacco Use    Smoking status: Never Smoker    Smokeless tobacco: Never Used   Substance and Sexual Activity    Alcohol use: No     Alcohol/week: 0.0 standard drinks    Drug use: No    Sexual activity: Yes     Partners: Male     Birth control/protection: Surgical, None     Comment:  to Parrish    Other Topics Concern    Are you pregnant or think you may be? No    Breast-feeding No       FAMILY HISTORY:  Family History   Problem Relation Age of Onset    Migraines Sister     Migraines Brother     Seizures Brother     Migraines Maternal Grandmother     Seizures Maternal Grandmother     Cancer Father 77        Lung cancer    Cancer Paternal Uncle         lung    Cancer Maternal Uncle         prostate ca    Melanoma Neg Hx     Breast cancer Neg Hx     Colon cancer Neg Hx     Ovarian cancer Neg Hx          PHYSICAL EXAMINATION:  Vitals:    22 1308   BP: (!) 179/93   Pulse: 95   Temp: 98.2 °F (36.8 °C)     Body mass index is 34.34 kg/m².    ECO  Physical Exam   Constitutional: She is oriented to person, place, and time. She appears well-developed and well-nourished.   HENT:   Head: Normocephalic and atraumatic.   Eyes: Conjunctivae, EOM and lids are normal.   Neck: Trachea normal.   Cardiovascular: Normal rate and intact distal pulses.     Pulmonary/Chest: Effort normal.   Abdominal: Soft. Normal appearance.   Musculoskeletal: Normal range of motion.   Neurological: She is alert and oriented to person, place, and time.   Skin: Skin is warm and dry.     Psychiatric: She has a normal mood and affect. Her behavior is normal. Judgment and thought content normal.         ASSESSMENT/PLAN:  Christal was seen today for breast cancer.    Diagnoses and all orders for this visit:    Ductal carcinoma in situ (DCIS) of left breast      We discussed the various treatment options including breast conservation with lumpectomy and mastectomy.  If she opts for breast conservation, the patient would be recommended adjuvant radiation especially given the fact that this represents disease recurrence.  If she has a single lesion in the breast, then she would be a candidate for partial breast irradiation to the lumpectomy cavity to a dose of 30 Gy in 5 fractions.  If she is found to have more than one lesion and opts for breast conservation then she would be recommended whole breast radiation.  Whole breast radiation would be delivered using deep inspiration breath hold technique to minimize radiation dose delivered to the heart and lungs. I explained to her that cardiac sparing will be bit more challenging given the location of her tumor in the lower inner quadrant of the left breast but that it could be accomplished with 3D treatment planning and use of DIBH.    The risks and benefits of treatment have been discussed with the patient and she expressed full understanding. At the conclusion of our discussion, the patient remained undecided.  She will undergo MRI which should better characterize the extent of her disease in the breast and may help her decide between breast conservation and mastectomy.  She was instructed to contact me should she have any additional questions regarding radiation.    I spent approximately 60 minutes reviewing the available records and  evaluating the patient, out of which over 50% of the time was spent face to face with the patient in counseling and coordinating this patient's care.

## 2022-08-12 ENCOUNTER — HOSPITAL ENCOUNTER (OUTPATIENT)
Dept: RADIOLOGY | Facility: OTHER | Age: 62
Discharge: HOME | End: 2022-08-12
Attending: SURGERY
Payer: COMMERCIAL

## 2022-08-12 DIAGNOSIS — D05.12 DUCTAL CARCINOMA IN SITU (DCIS) OF LEFT BREAST: ICD-10-CM

## 2022-08-12 PROCEDURE — 77049 MRI BREAST W/WO CONTRAST, W/CAD, BILATERAL: ICD-10-PCS | Mod: 26,,, | Performed by: RADIOLOGY

## 2022-08-12 PROCEDURE — 25500020 PHARM REV CODE 255: Performed by: SURGERY

## 2022-08-12 PROCEDURE — 77049 MRI BREAST C-+ W/CAD BI: CPT | Mod: 26,,, | Performed by: RADIOLOGY

## 2022-08-12 PROCEDURE — 77049 MRI BREAST C-+ W/CAD BI: CPT | Mod: TC

## 2022-08-12 PROCEDURE — A9577 INJ MULTIHANCE: HCPCS | Performed by: SURGERY

## 2022-08-12 RX ADMIN — GADOBENATE DIMEGLUMINE 19 ML: 529 INJECTION, SOLUTION INTRAVENOUS at 04:08

## 2022-08-16 ENCOUNTER — OFFICE VISIT (OUTPATIENT)
Dept: DERMATOLOGY | Facility: CLINIC | Age: 62
End: 2022-08-16
Payer: COMMERCIAL

## 2022-08-16 ENCOUNTER — TELEPHONE (OUTPATIENT)
Dept: RADIOLOGY | Facility: HOSPITAL | Age: 62
End: 2022-08-16
Payer: COMMERCIAL

## 2022-08-16 DIAGNOSIS — L91.8 INFLAMED SKIN TAG: ICD-10-CM

## 2022-08-16 DIAGNOSIS — B37.9 CANDIDA ALBICANS INFECTION: Primary | ICD-10-CM

## 2022-08-16 PROCEDURE — 1160F RVW MEDS BY RX/DR IN RCRD: CPT | Mod: CPTII,S$GLB,, | Performed by: DERMATOLOGY

## 2022-08-16 PROCEDURE — 4010F PR ACE/ARB THEARPY RXD/TAKEN: ICD-10-PCS | Mod: CPTII,S$GLB,, | Performed by: DERMATOLOGY

## 2022-08-16 PROCEDURE — 11200 PR REMOVAL OF SKIN TAGS, UP TO 15: ICD-10-PCS | Mod: S$GLB,,, | Performed by: DERMATOLOGY

## 2022-08-16 PROCEDURE — 99999 PR PBB SHADOW E&M-EST. PATIENT-LVL IV: ICD-10-PCS | Mod: PBBFAC,,, | Performed by: DERMATOLOGY

## 2022-08-16 PROCEDURE — 1160F PR REVIEW ALL MEDS BY PRESCRIBER/CLIN PHARMACIST DOCUMENTED: ICD-10-PCS | Mod: CPTII,S$GLB,, | Performed by: DERMATOLOGY

## 2022-08-16 PROCEDURE — 1159F MED LIST DOCD IN RCRD: CPT | Mod: CPTII,S$GLB,, | Performed by: DERMATOLOGY

## 2022-08-16 PROCEDURE — 99214 PR OFFICE/OUTPT VISIT, EST, LEVL IV, 30-39 MIN: ICD-10-PCS | Mod: 25,S$GLB,, | Performed by: DERMATOLOGY

## 2022-08-16 PROCEDURE — 11200 RMVL SKIN TAGS UP TO&INC 15: CPT | Mod: S$GLB,,, | Performed by: DERMATOLOGY

## 2022-08-16 PROCEDURE — 4010F ACE/ARB THERAPY RXD/TAKEN: CPT | Mod: CPTII,S$GLB,, | Performed by: DERMATOLOGY

## 2022-08-16 PROCEDURE — 1159F PR MEDICATION LIST DOCUMENTED IN MEDICAL RECORD: ICD-10-PCS | Mod: CPTII,S$GLB,, | Performed by: DERMATOLOGY

## 2022-08-16 PROCEDURE — 99214 OFFICE O/P EST MOD 30 MIN: CPT | Mod: 25,S$GLB,, | Performed by: DERMATOLOGY

## 2022-08-16 PROCEDURE — 99999 PR PBB SHADOW E&M-EST. PATIENT-LVL IV: CPT | Mod: PBBFAC,,, | Performed by: DERMATOLOGY

## 2022-08-16 RX ORDER — CICLOPIROX OLAMINE 7.7 MG/G
CREAM TOPICAL 2 TIMES DAILY
Qty: 90 G | Refills: 1 | Status: SHIPPED | OUTPATIENT
Start: 2022-08-16 | End: 2023-03-15

## 2022-08-16 RX ORDER — FLUCONAZOLE 200 MG/1
TABLET ORAL
Qty: 6 TABLET | Refills: 0 | Status: ON HOLD | OUTPATIENT
Start: 2022-08-16 | End: 2022-11-28 | Stop reason: HOSPADM

## 2022-08-16 NOTE — PROGRESS NOTES
Subjective:       Patient ID:  Christal Posey is a 62 y.o. female who presents for   Chief Complaint   Patient presents with    Follow-up     History of Present Illness: The patient presents for follow up of rash - present x 1 year    The patient was last seen on: 8/2/2022 for bx of right inguinal fold (numerous yeasts and hyphae within the stratum corneum, consistent with candidal intertrigo versus dermatophytosis). Pt used 0.1% locoid cream but reports rash is still there. - less itchy    Final Pathologic Diagnosis      Date                     Value               Ref Range           Status               08/02/2022                                                       Final            1.  Skin, right inguinal fold, punch biopsy: - NUMEROUS YEASTS AND HYPHAE WITHIN THE STRATUM CORNEUM, CONSISTENT WITH CANDIDAL INTERTRIGO VERSUS DERMATOPHYTOSIS.   Comment:  Interp By Odin Lara M.D., Signed on 08/10/2022 at 18:51  ----------      No results found for: LABA1C, HGBA1C  No h/o diabetes          Review of Systems   Constitutional: Positive for weight gain (10 - 15# in past year). Negative for weight loss.   Skin: Positive for itching and rash.        Objective:    Physical Exam   Constitutional: She appears well-developed and well-nourished. No distress.   Neurological: She is alert and oriented to person, place, and time. She is not disoriented.   Psychiatric: She has a normal mood and affect.   Skin:   Areas Examined (abnormalities noted in diagram):   Neck Inspection Performed  Chest / Axilla Inspection Performed  Abdomen Inspection Performed  Genitals / Buttocks / Groin Inspection Performed                       Diagram Legend     Erythematous scaling macule/papule c/w actinic keratosis       Vascular papule c/w angioma      Pigmented verrucoid papule/plaque c/w seborrheic keratosis      Yellow umbilicated papule c/w sebaceous hyperplasia      Irregularly shaped tan macule c/w lentigo     1-2 mm smooth  white papules consistent with Milia      Movable subcutaneous cyst with punctum c/w epidermal inclusion cyst      Subcutaneous movable cyst c/w pilar cyst      Firm pink to brown papule c/w dermatofibroma      Pedunculated fleshy papule(s) c/w skin tag(s)      Evenly pigmented macule c/w junctional nevus     Mildly variegated pigmented, slightly irregular-bordered macule c/w mildly atypical nevus      Flesh colored to evenly pigmented papule c/w intradermal nevus       Pink pearly papule/plaque c/w basal cell carcinoma      Erythematous hyperkeratotic cursted plaque c/w SCC      Surgical scar with no sign of skin cancer recurrence      Open and closed comedones      Inflammatory papules and pustules      Verrucoid papule consistent consistent with wart     Erythematous eczematous patches and plaques     Dystrophic onycholytic nail with subungual debris c/w onychomycosis     Umbilicated papule    Erythematous-base heme-crusted tan verrucoid plaque consistent with inflamed seborrheic keratosis     Erythematous Silvery Scaling Plaque c/w Psoriasis     See annotation  Lab Results   Component Value Date    ALT 32 08/03/2022    AST 22 08/03/2022    ALKPHOS 102 08/03/2022    BILITOT 0.4 08/03/2022         Assessment / Plan:        Candida albicans infection - skin  bx +  -     fluconazole (DIFLUCAN) 200 MG Tab; Take 1 pill po qday x 3 repeat in 1 week  Dispense: 6 tablet; Refill: 0  -     ciclopirox (LOPROX) 0.77 % Crea; Apply topically 2 (two) times daily.  Dispense: 90 g; Refill: 1    Inflamed skin tags - neck  Verbal consent obtained. 2 lesions removed with scissor snip removal after anesthesia with 1% lidocaine with epinephrine. Hemostasis achieved with aluminum chloride and hyfrecation. No complications.               Follow up if symptoms worsen or fail to improve.

## 2022-08-16 NOTE — TELEPHONE ENCOUNTER
Spoke with patient. Reviewed breast biopsy procedure and reviewed instructions for breast biopsy. Patient expressed understanding and all questions were answered. Provided patient with my phone number to call for any further concerns or questions.   Patient scheduled breast ultrasound and breast biopsy at the [facility] on 8/24/2022.

## 2022-08-16 NOTE — PATIENT INSTRUCTIONS

## 2022-08-17 ENCOUNTER — OFFICE VISIT (OUTPATIENT)
Dept: PLASTIC SURGERY | Facility: CLINIC | Age: 62
End: 2022-08-17
Payer: COMMERCIAL

## 2022-08-17 VITALS
HEART RATE: 106 BPM | HEIGHT: 66 IN | DIASTOLIC BLOOD PRESSURE: 87 MMHG | WEIGHT: 212 LBS | SYSTOLIC BLOOD PRESSURE: 169 MMHG | BODY MASS INDEX: 34.07 KG/M2

## 2022-08-17 DIAGNOSIS — Z85.3 PERSONAL HISTORY OF BREAST CANCER: Primary | ICD-10-CM

## 2022-08-17 PROCEDURE — 3077F SYST BP >= 140 MM HG: CPT | Mod: CPTII,S$GLB,, | Performed by: SURGERY

## 2022-08-17 PROCEDURE — 3008F PR BODY MASS INDEX (BMI) DOCUMENTED: ICD-10-PCS | Mod: CPTII,S$GLB,, | Performed by: SURGERY

## 2022-08-17 PROCEDURE — 3008F BODY MASS INDEX DOCD: CPT | Mod: CPTII,S$GLB,, | Performed by: SURGERY

## 2022-08-17 PROCEDURE — 3079F DIAST BP 80-89 MM HG: CPT | Mod: CPTII,S$GLB,, | Performed by: SURGERY

## 2022-08-17 PROCEDURE — 99999 PR PBB SHADOW E&M-EST. PATIENT-LVL III: ICD-10-PCS | Mod: PBBFAC,,, | Performed by: SURGERY

## 2022-08-17 PROCEDURE — 1160F PR REVIEW ALL MEDS BY PRESCRIBER/CLIN PHARMACIST DOCUMENTED: ICD-10-PCS | Mod: CPTII,S$GLB,, | Performed by: SURGERY

## 2022-08-17 PROCEDURE — 4010F PR ACE/ARB THEARPY RXD/TAKEN: ICD-10-PCS | Mod: CPTII,S$GLB,, | Performed by: SURGERY

## 2022-08-17 PROCEDURE — 99203 OFFICE O/P NEW LOW 30 MIN: CPT | Mod: S$GLB,,, | Performed by: SURGERY

## 2022-08-17 PROCEDURE — 99999 PR PBB SHADOW E&M-EST. PATIENT-LVL III: CPT | Mod: PBBFAC,,, | Performed by: SURGERY

## 2022-08-17 PROCEDURE — 1159F PR MEDICATION LIST DOCUMENTED IN MEDICAL RECORD: ICD-10-PCS | Mod: CPTII,S$GLB,, | Performed by: SURGERY

## 2022-08-17 PROCEDURE — 3079F PR MOST RECENT DIASTOLIC BLOOD PRESSURE 80-89 MM HG: ICD-10-PCS | Mod: CPTII,S$GLB,, | Performed by: SURGERY

## 2022-08-17 PROCEDURE — 1160F RVW MEDS BY RX/DR IN RCRD: CPT | Mod: CPTII,S$GLB,, | Performed by: SURGERY

## 2022-08-17 PROCEDURE — 1159F MED LIST DOCD IN RCRD: CPT | Mod: CPTII,S$GLB,, | Performed by: SURGERY

## 2022-08-17 PROCEDURE — 3077F PR MOST RECENT SYSTOLIC BLOOD PRESSURE >= 140 MM HG: ICD-10-PCS | Mod: CPTII,S$GLB,, | Performed by: SURGERY

## 2022-08-17 PROCEDURE — 4010F ACE/ARB THERAPY RXD/TAKEN: CPT | Mod: CPTII,S$GLB,, | Performed by: SURGERY

## 2022-08-17 PROCEDURE — 99203 PR OFFICE/OUTPT VISIT, NEW, LEVL III, 30-44 MIN: ICD-10-PCS | Mod: S$GLB,,, | Performed by: SURGERY

## 2022-08-17 NOTE — LETTER
[facility] 2nd Floor  [address]  [facility] [address]  Phone: [phone]  Fax: [phone] August 22, 2022      JOVANNI Jama MD  [address]  [facility] [address]    Patient: Christal Posey   MR Number: [id number]   YOB: 1960   Date of Visit: 8/17/2022     Dear Dr. Jama:    Thank you for referring Christal Posey to me for evaluation. Attached are the relevant portions of my assessment and plan of care.    If you have questions, please do not hesitate to call me. I look forward to following Christal along with you.    Sincerely,    Garland Gray MD   Section of Plastic Surgery  Department of Surgery  [facility]    CRB/hcr    CC  Blane Brian MD

## 2022-08-17 NOTE — PROGRESS NOTES
Subjective:      Christal Posey is a 62 y.o. year old female who presents to the [facility] on 2022 for consultation regarding breast reconstruction after newly diagnosed L breast DCIS/ Patient history of left breast DCIS s/p lumpectomy without radiation that now developed a new lesion. She additionally underwent MRI showing a lesion of the right breast, which is scheduled for biopsy next week. Patient presents today to discuss reconstructive options. Denies fever, chills, nausea, vomiting, or other systemic signs of infection.    Vitals:    22 1403   BP: (!) 169/87   Pulse: 106        Review of patient's allergies indicates:   Allergen Reactions    Pravastatin Other (See Comments)     Severe muscle pain.  Muscle pain    Other reaction(s): Other (See Comments)  Severe muscle pain.    Amitriptyline      Other reaction(s): dizzy    Codeine      Other reaction(s): Unknown    Doxycycline      Other reaction(s): Nausea    Erythromycin      Other reaction(s): Unknown    Iodine      Other reaction(s): Swelling  Other reaction(s): Unknown    Macrobid  [nitrofurantoin monohyd/m-cryst]      Other reaction(s): Unknown    Verapamil      Other reaction(s): Headache       Current Outpatient Medications on File Prior to Visit   Medication Sig Dispense Refill    ALPRAZolam (XANAX) 0.25 MG tablet Take 1 tablet (0.25 mg total) by mouth nightly as needed for Insomnia. 30 tablet 0    atogepant (QULIPTA) 60 mg Tab Take 60 mg by mouth.      baclofen (LIORESAL) 10 MG tablet Take 10 mg by mouth 3 (three) times daily.      budesonide (PULMICORT) 0.5 mg/2 mL nebulizer solution SMARTSI Via Nebulizer Twice Daily      budesonide 1 mg/2 mL NbS       butalbital-acetaminophen-caffeine -40 mg (FIORICET, ESGIC) -40 mg per tablet Take 1 tablet by mouth every 6 to 8 hours as needed. 60 tablet 0    cholestyramine (QUESTRAN) 4 gram packet Take 1 packet by mouth once daily.      CHOLESTYRAMINE LIGHT  4 gram packet Take 1 packet by mouth once daily.      ciclopirox (LOPROX) 0.77 % Crea Apply topically 2 (two) times daily. 90 g 1    diclofenac sodium (VOLTAREN) 1 % Gel APPLY 2 GRAMS TOPICALLY 3 (THREE) TIMES DAILY. 100 g 3    fluconazole (DIFLUCAN) 200 MG Tab Take 1 pill po qday x 3 repeat in 1 week 6 tablet 0    fluticasone (FLONASE) 50 mcg/actuation nasal spray       gabapentin (NEURONTIN) 800 MG tablet Take 800 mg by mouth 3 (three) times daily.      hydrocortisone butyrate (LOCOID) 0.1 % Crea cream AAA bid 45 g 3    lansoprazole (PREVACID) 30 MG capsule Take 30 mg by mouth every morning. 1 Capsule, Delayed Release(E.C.) Oral Every day      losartan (COZAAR) 25 MG tablet TAKE 1 TABLET BY MOUTH EVERY DAY 90 tablet 1    multivitamin (THERAGRAN) per tablet Take 1 tablet by mouth once daily.      mupirocin (BACTROBAN) 2 % ointment Apply topically 2 (two) times daily. 22 g 1    NEILMED SINUS RINSE COMPLETE pkdv by Each Nostril route.      ondansetron (ZOFRAN) 4 MG tablet Take 1 tablet (4 mg total) by mouth every 8 (eight) hours as needed for Nausea. 1 Tablet Oral Every 6 hours 12 tablet 12    prasterone, dhea, (INTRAROSA) 6.5 mg Inst Place 6.5 mg vaginally every evening. 30 each 11    PREMARIN vaginal cream PLACE 1 GRAM VAGINALLY 2 TIMES A WK  12    sumatriptan (IMITREX) 20 mg/actuation nasal spray USE 1 SPRAY IN EACH NOSTRIL AT ONSET OF HEADACHE. MAY REPEAT ONCE IN 2 HOURS IF NO RELIEF. NO MORE THAN 2 SPRAYS PER 24 HOURS 6 each 12    triamcinolone acetonide 0.025% (KENALOG) 0.025 % Oint Apply topically 2 (two) times daily. x 1-2 wks then prn flares only 80 g 2    VENTOLIN HFA 90 mcg/actuation inhaler INHALE 2 PUFFS INTO THE LUNGS EVERY 6 HOURS AS NEEDED FOR WHEEZING. RESCUE 18 g 0    vitamin D 1000 units Tab Take 185 mg by mouth every morning.        No current facility-administered medications on file prior to visit.       Patient Active Problem List   Diagnosis    Hiatal hernia    Irritable  bowel syndrome    Migraine with aura and without status migrainosus, not intractable    Symptomatic menopausal or female climacteric states    Ductal carcinoma in situ (DCIS) of left breast    Preoperative testing    Arthralgia of both hands    Rheumatoid factor positive    Obesity (BMI 30-39.9)    Dyspareunia, female    Vitamin D deficiency    Colon polyp       Past Surgical History:   Procedure Laterality Date    BREAST BIOPSY Left 05/2016    DCIS    BREAST LUMPECTOMY      ECTOPIC PREGNANCY SURGERY Right     HYSTERECTOMY  1998    ovaries left intact    removal of ovarian cyst Right oct 1992    ovary left intact    TUBAL LIGATION      tubal reversal  july 1991       Social History     Socioeconomic History    Marital status:      Spouse name: aPrrish    Number of children: 2   Tobacco Use    Smoking status: Never Smoker    Smokeless tobacco: Never Used   Substance and Sexual Activity    Alcohol use: No     Alcohol/week: 0.0 standard drinks    Drug use: No    Sexual activity: Yes     Partners: Male     Birth control/protection: Surgical, None     Comment:  to Parrish    Other Topics Concern    Are you pregnant or think you may be? No    Breast-feeding No           Review of Systems: negative except HPI    Objective:     Physical Exam:  Vitals:    08/17/22 1403   BP: (!) 169/87   Pulse: 106       WD WN NAD  VSS  Normal resp effort  Bilateral large breasts, minimal ptosis noted, prior lumpectomy scar noted, left breast slightly smaller than right         Assessment:       No diagnosis found.    Plan:   62 y.o. female with L breast DCIS recurrent and right breast lesion scheduled for biopsy    Will await final pathology of biopsy to determine if mastectomy planned for unilateral vs bilateraly  If unilateral, patient may be candidate for LUIS FERNANDO reconstruction; if bilateral may plan for expander placement followed by second stage procedure for implant exchange  - Patient was seen and  evaluated by myself and Dr. Garland Gray    - will await final oncologic recs by Dr. Jama for surgical planning, patient understands   - Risks, benefits and alternatives to surgery were discussed. Will submit for insurance authorization.  - Office staff to coordinate surgery date once insurance authorization obtained      All questions were answered. The patient was advised to call the clinic with any questions or concerns prior to their next visit.           Francisco Fine MD- Fellow  Plastic and Reconstruction Surgery Department  [phone] office

## 2022-08-18 ENCOUNTER — PATIENT MESSAGE (OUTPATIENT)
Dept: SURGERY | Facility: CLINIC | Age: 62
End: 2022-08-18
Payer: COMMERCIAL

## 2022-08-18 ENCOUNTER — TELEPHONE (OUTPATIENT)
Dept: SURGERY | Facility: CLINIC | Age: 62
End: 2022-08-18
Payer: COMMERCIAL

## 2022-08-18 NOTE — TELEPHONE ENCOUNTER
Genetic Lay Navigation Note:    Called patient with the results of her genetic testing. Informed patient that results were negative for any notable mutation. Instructed patient that we would ensure she received a copy of her results via [employer] or mail, and to call us with any questions or concerns regarding the full report. Patient verbalized understanding to all information, no questions at this time.     Patient's ordering physician made aware of results and that patient was informed of them.     Full results reports attached to documentation note dated 8/3/2022 and scanned in media.    Report # 1        Report # 2

## 2022-08-19 ENCOUNTER — TELEPHONE (OUTPATIENT)
Dept: SURGERY | Facility: CLINIC | Age: 62
End: 2022-08-19
Payer: COMMERCIAL

## 2022-08-19 NOTE — TELEPHONE ENCOUNTER
Called patient. She has decided on bilateral mastectomy with implants. Discussed that right breast biopsy of the MRI finding would still be beneficial to help us determine if we need to check her right side lymph nodes.   If right breast biopsy is not done preop then would recommend right sentinel lymph node at the time of mastectomy. She is amendable to proceeding with percutaneous biopsy of the right breast lesion to help us determine if a right sentinel lymph node biopsy can be avoided.   Limited benefit of biopsying the left breast MRI findings can avoid.

## 2022-08-24 ENCOUNTER — HOSPITAL ENCOUNTER (OUTPATIENT)
Dept: RADIOLOGY | Facility: HOSPITAL | Age: 62
Discharge: HOME | End: 2022-08-24
Attending: SURGERY
Payer: COMMERCIAL

## 2022-08-24 DIAGNOSIS — R92.8 ABNORMAL FINDING ON BREAST IMAGING: ICD-10-CM

## 2022-08-24 PROCEDURE — 76642 ULTRASOUND BREAST LIMITED: CPT | Mod: TC,RT

## 2022-08-24 PROCEDURE — 76642 US BREAST RIGHT LIMITED: ICD-10-PCS | Mod: 26,RT,, | Performed by: RADIOLOGY

## 2022-08-24 PROCEDURE — 76642 ULTRASOUND BREAST LIMITED: CPT | Mod: 26,RT,, | Performed by: RADIOLOGY

## 2022-08-24 RX ORDER — LIDOCAINE HCL/EPINEPHRINE/PF 2%-1:200K
10 VIAL (ML) INJECTION ONCE
Status: DISCONTINUED | OUTPATIENT
Start: 2022-08-24 | End: 2022-08-25 | Stop reason: HOSPADM

## 2022-08-24 RX ORDER — LIDOCAINE HYDROCHLORIDE 10 MG/ML
3 INJECTION, SOLUTION EPIDURAL; INFILTRATION; INTRACAUDAL; PERINEURAL ONCE
Status: DISCONTINUED | OUTPATIENT
Start: 2022-08-24 | End: 2022-08-25 | Stop reason: HOSPADM

## 2022-08-26 ENCOUNTER — TELEPHONE (OUTPATIENT)
Dept: RADIOLOGY | Facility: HOSPITAL | Age: 62
End: 2022-08-26
Payer: COMMERCIAL

## 2022-08-26 NOTE — TELEPHONE ENCOUNTER
Spoke with patient. Reviewed MRI breast biopsy procedure and reviewed instructions for MRI breast biopsy. Patient expressed understanding and all questions were answered. Provided patient with my phone number to call for any further concerns or questions.   Patient scheduled MRI breast biopsy for 9/28/2022.

## 2022-09-06 ENCOUNTER — TELEPHONE (OUTPATIENT)
Dept: SURGERY | Facility: CLINIC | Age: 62
End: 2022-09-06
Payer: COMMERCIAL

## 2022-09-06 ENCOUNTER — PATIENT MESSAGE (OUTPATIENT)
Dept: HEMATOLOGY/ONCOLOGY | Facility: CLINIC | Age: 62
End: 2022-09-06
Payer: COMMERCIAL

## 2022-09-06 DIAGNOSIS — C50.912 RECURRENT MALIGNANT NEOPLASM OF LEFT BREAST: ICD-10-CM

## 2022-09-06 DIAGNOSIS — Z01.818 PRE-OP TESTING: Primary | ICD-10-CM

## 2022-09-06 NOTE — TELEPHONE ENCOUNTER
Spoke with patient to confirm surgical date of 10/24 per patient request with Dr. Jama and Dr. Gray. All appt details confirmed. No additional concerns at this time.

## 2022-09-12 ENCOUNTER — HOSPITAL ENCOUNTER (OUTPATIENT)
Dept: CARDIOLOGY | Facility: CLINIC | Age: 62
Discharge: HOME | End: 2022-09-12
Payer: COMMERCIAL

## 2022-09-12 DIAGNOSIS — Z01.818 PRE-OP TESTING: ICD-10-CM

## 2022-09-12 PROCEDURE — 93005 ELECTROCARDIOGRAM TRACING: CPT | Mod: S$GLB,,, | Performed by: SURGERY

## 2022-09-12 PROCEDURE — 93010 ELECTROCARDIOGRAM REPORT: CPT | Mod: S$GLB,,, | Performed by: INTERNAL MEDICINE

## 2022-09-12 PROCEDURE — 93010 EKG 12-LEAD: ICD-10-PCS | Mod: S$GLB,,, | Performed by: INTERNAL MEDICINE

## 2022-09-12 PROCEDURE — 93005 EKG 12-LEAD: ICD-10-PCS | Mod: S$GLB,,, | Performed by: SURGERY

## 2022-09-28 ENCOUNTER — HOSPITAL ENCOUNTER (OUTPATIENT)
Dept: RADIOLOGY | Facility: HOSPITAL | Age: 62
Discharge: HOME | End: 2022-09-28
Attending: SURGERY
Payer: COMMERCIAL

## 2022-09-28 DIAGNOSIS — R92.8 ABNORMAL FINDING ON BREAST IMAGING: ICD-10-CM

## 2022-09-28 DIAGNOSIS — N63.0 BREAST MASS: Primary | ICD-10-CM

## 2022-09-28 PROCEDURE — 77065 DX MAMMO INCL CAD UNI: CPT | Mod: 26,RT,, | Performed by: RADIOLOGY

## 2022-09-28 PROCEDURE — 88305 TISSUE EXAM BY PATHOLOGIST: ICD-10-PCS | Mod: 26,,, | Performed by: PATHOLOGY

## 2022-09-28 PROCEDURE — 88305 TISSUE EXAM BY PATHOLOGIST: CPT | Mod: 26,,, | Performed by: PATHOLOGY

## 2022-09-28 PROCEDURE — A4648 IMPLANTABLE TISSUE MARKER: HCPCS

## 2022-09-28 PROCEDURE — 88305 TISSUE EXAM BY PATHOLOGIST: CPT | Performed by: PATHOLOGY

## 2022-09-28 PROCEDURE — 19085 BX BREAST 1ST LESION MR IMAG: CPT | Mod: ,,, | Performed by: RADIOLOGY

## 2022-09-28 PROCEDURE — 77065 MAMMO DIGITAL DIAGNOSTIC RIGHT: ICD-10-PCS | Mod: 26,RT,, | Performed by: RADIOLOGY

## 2022-09-28 PROCEDURE — A9577 INJ MULTIHANCE: HCPCS | Performed by: SURGERY

## 2022-09-28 PROCEDURE — 25000003 PHARM REV CODE 250: Performed by: SURGERY

## 2022-09-28 PROCEDURE — 25500020 PHARM REV CODE 255: Performed by: SURGERY

## 2022-09-28 PROCEDURE — 77065 DX MAMMO INCL CAD UNI: CPT | Mod: TC,RT

## 2022-09-28 PROCEDURE — 19085 BX BREAST 1ST LESION MR IMAG: CPT | Mod: TC

## 2022-09-28 PROCEDURE — 19085 MRI BREAST BIOPSY WITH IMAGING 1ST SITE: ICD-10-PCS | Mod: ,,, | Performed by: RADIOLOGY

## 2022-09-28 RX ORDER — LIDOCAINE HYDROCHLORIDE 10 MG/ML
3 INJECTION, SOLUTION EPIDURAL; INFILTRATION; INTRACAUDAL; PERINEURAL ONCE
Status: COMPLETED | OUTPATIENT
Start: 2022-09-28 | End: 2022-09-28

## 2022-09-28 RX ORDER — LIDOCAINE HYDROCHLORIDE AND EPINEPHRINE 10; 10 MG/ML; UG/ML
20 INJECTION, SOLUTION INFILTRATION; PERINEURAL ONCE
Status: COMPLETED | OUTPATIENT
Start: 2022-09-28 | End: 2022-09-28

## 2022-09-28 RX ADMIN — GADOBENATE DIMEGLUMINE 20 ML: 529 INJECTION, SOLUTION INTRAVENOUS at 09:09

## 2022-09-28 RX ADMIN — LIDOCAINE HYDROCHLORIDE,EPINEPHRINE BITARTRATE 20 ML: 10; .01 INJECTION, SOLUTION INFILTRATION; PERINEURAL at 09:09

## 2022-09-28 RX ADMIN — LIDOCAINE HYDROCHLORIDE 3 ML: 10 INJECTION, SOLUTION EPIDURAL; INFILTRATION; INTRACAUDAL; PERINEURAL at 09:09

## 2022-10-03 LAB
COMMENT: NORMAL
FINAL PATHOLOGIC DIAGNOSIS: NORMAL
GROSS: NORMAL
Lab: NORMAL

## 2022-10-05 ENCOUNTER — DOCUMENTATION ONLY (OUTPATIENT)
Dept: SURGERY | Facility: CLINIC | Age: 62
End: 2022-10-05
Payer: COMMERCIAL

## 2022-10-05 ENCOUNTER — TELEPHONE (OUTPATIENT)
Dept: SURGERY | Facility: CLINIC | Age: 62
End: 2022-10-05
Payer: COMMERCIAL

## 2022-10-05 NOTE — TELEPHONE ENCOUNTER
Spoke with patient about results of recent right breast biopsy. Benign papilloma, no need for right sentinel node at time of surgery. Patient verbalized understanding. All questions asked and answered.

## 2022-10-21 ENCOUNTER — TELEPHONE (OUTPATIENT)
Dept: SURGERY | Facility: CLINIC | Age: 62
End: 2022-10-21
Payer: COMMERCIAL

## 2022-10-21 ENCOUNTER — ANESTHESIA EVENT (OUTPATIENT)
Dept: SURGERY | Facility: HOSPITAL | Age: 62
End: 2022-10-21
Payer: COMMERCIAL

## 2022-10-21 NOTE — ANESTHESIA PREPROCEDURE EVALUATION
[facility]  Anesthesia Pre-Operative Evaluation         Patient Name/: Christal Posey, 1960  MRN: [id number]    SUBJECTIVE:     Pre-operative evaluation for Procedure(s) (LRB):  MASTECTOMY BILATERAL (Bilateral)  BIOPSY, LYMPH NODE, SENTINEL LEFT (Left)  INJECTION, FOR SENTINEL NODE IDENTIFICATION LEFT (Left)  INSERTION, BREAST IMPLANT BILATERAL (Bilateral)     10/23/2022    Christal Posey is a 62 y.o. female w/ a significant PMHx of migraines,  IBS, GERD, breast cancer s/p lumpectomy 2016.     Patient now presents for the above procedure(s).    ________________________________________  ECHO: No results found for this or any previous visit.    ________________________________________    Prev airway: None documented    LDA:       Peripheral IV - Single Lumen 22 1550 22 G Right Antecubital (Active)   Number of days: 72       Drips: None documented.      Patient Active Problem List   Diagnosis    Hiatal hernia    Irritable bowel syndrome    Migraine with aura and without status migrainosus, not intractable    Symptomatic menopausal or female climacteric states    Ductal carcinoma in situ (DCIS) of left breast    Preoperative testing    Arthralgia of both hands    Rheumatoid factor positive    Obesity (BMI 30-39.9)    Dyspareunia, female    Vitamin D deficiency    Colon polyp       Review of patient's allergies indicates:   Allergen Reactions    Pravastatin Other (See Comments)     Severe muscle pain.  Muscle pain    Other reaction(s): Other (See Comments)  Severe muscle pain.    Shellfish containing products Anaphylaxis    Amitriptyline      Other reaction(s): dizzy    Codeine      Other reaction(s): Unknown    Doxycycline      Other reaction(s): Nausea    Erythromycin      Other reaction(s): Unknown    Iodine      Other reaction(s): Swelling  Other reaction(s): Unknown    Macrobid  [nitrofurantoin monohyd/m-cryst]      Other reaction(s): Unknown    Verapamil       Other reaction(s): Headache       Current Inpatient Medications:       No current facility-administered medications on file prior to encounter.     Current Outpatient Medications on File Prior to Encounter   Medication Sig Dispense Refill    gabapentin (NEURONTIN) 800 MG tablet Take 800 mg by mouth 3 (three) times daily.      losartan (COZAAR) 25 MG tablet TAKE 1 TABLET BY MOUTH EVERY DAY (Patient taking differently: Take 25 mg by mouth nightly.) 90 tablet 1    ALPRAZolam (XANAX) 0.25 MG tablet Take 1 tablet (0.25 mg total) by mouth nightly as needed for Insomnia. 30 tablet 0    atogepant (QULIPTA) 60 mg Tab Take 60 mg by mouth.      baclofen (LIORESAL) 10 MG tablet Take 10 mg by mouth 3 (three) times daily.      budesonide (PULMICORT) 0.5 mg/2 mL nebulizer solution SMARTSI Via Nebulizer Twice Daily      budesonide 1 mg/2 mL NbSp       butalbital-acetaminophen-caffeine -40 mg (FIORICET, ESGIC) -40 mg per tablet Take 1 tablet by mouth every 6 to 8 hours as needed. 60 tablet 0    cholestyramine (QUESTRAN) 4 gram packet Take 1 packet by mouth once daily.      CHOLESTYRAMINE LIGHT 4 gram packet Take 1 packet by mouth once daily.      ciclopirox (LOPROX) 0.77 % Crea Apply topically 2 (two) times daily. 90 g 1    diclofenac sodium (VOLTAREN) 1 % Gel APPLY 2 GRAMS TOPICALLY 3 (THREE) TIMES DAILY. 100 g 3    fluconazole (DIFLUCAN) 200 MG Tab Take 1 pill po qday x 3 repeat in 1 week (Patient not taking: Reported on 10/21/2022) 6 tablet 0    fluticasone (FLONASE) 50 mcg/actuation nasal spray       hydrocortisone butyrate (LOCOID) 0.1 % Crea cream AAA bid 45 g 3    lansoprazole (PREVACID) 30 MG capsule Take 30 mg by mouth every morning. 1 Capsule, Delayed Release(E.C.) Oral Every day      multivitamin (THERAGRAN) per tablet Take 1 tablet by mouth once daily.      mupirocin (BACTROBAN) 2 % ointment Apply topically 2 (two) times daily. 22 g 1    NEILMED SINUS RINSE COMPLETE pkdv by Each Nostril  route.      ondansetron (ZOFRAN) 4 MG tablet Take 1 tablet (4 mg total) by mouth every 8 (eight) hours as needed for Nausea. 1 Tablet Oral Every 6 hours 12 tablet 12    prasterone, dhea, (INTRAROSA) 6.5 mg Inst Place 6.5 mg vaginally every evening. 30 each 11    PREMARIN vaginal cream PLACE 1 GRAM VAGINALLY 2 TIMES A WK  12    sumatriptan (IMITREX) 20 mg/actuation nasal spray USE 1 SPRAY IN EACH NOSTRIL AT ONSET OF HEADACHE. MAY REPEAT ONCE IN 2 HOURS IF NO RELIEF. NO MORE THAN 2 SPRAYS PER 24 HOURS 6 each 12    triamcinolone acetonide 0.025% (KENALOG) 0.025 % Oint Apply topically 2 (two) times daily. x 1-2 wks then prn flares only 80 g 2    VENTOLIN HFA 90 mcg/actuation inhaler INHALE 2 PUFFS INTO THE LUNGS EVERY 6 HOURS AS NEEDED FOR WHEEZING. RESCUE 18 g 0    vitamin D 1000 units Tab Take 185 mg by mouth every morning.          Past Surgical History:   Procedure Laterality Date    BREAST BIOPSY Left 05/2016    DCIS    BREAST LUMPECTOMY      ECTOPIC PREGNANCY SURGERY Right     HYSTERECTOMY  1998    ovaries left intact    removal of ovarian cyst Right oct 1992    ovary left intact    TUBAL LIGATION      tubal reversal  july 1991       Social History:  Tobacco Use: Low Risk     Smoking Tobacco Use: Never    Smokeless Tobacco Use: Never    Passive Exposure: Not on file       Alcohol Use: Not on file       OBJECTIVE:     Vital Signs Range:  BMI Readings from Last 1 Encounters:   08/17/22 34.22 kg/m²               Significant Labs:        Component Value Date/Time    WBC 11.12 08/03/2022 1643    HGB 13.7 08/03/2022 1643    HCT 40.5 08/03/2022 1643     08/03/2022 1643     08/03/2022 1643    K 4.0 08/03/2022 1643     08/03/2022 1643    CO2 29 08/03/2022 1643     08/03/2022 1643    BUN 11 08/03/2022 1643    CREATININE 1.0 08/03/2022 1643    MG 2.2 09/04/2006 0605    CALCIUM 9.6 08/03/2022 1643    ALBUMIN 3.9 08/03/2022 1643    PROT 7.6 08/03/2022 1643    ALKPHOS 102 08/03/2022  1643    BILITOT 0.4 08/03/2022 1643    AST 22 08/03/2022 1643    ALT 32 08/03/2022 1643    INR 1.0 05/16/2012 0207        Please see Results Review for additional labs.     Diagnostic Studies: No relevant studies.    EKG:   Results for orders placed or performed during the hospital encounter of 09/12/22   EKG 12-lead    Collection Time: 09/12/22  8:52 AM    Narrative    Test Reason : Z01.818,    Vent. Rate : 076 BPM     Atrial Rate : 076 BPM     P-R Int : 154 ms          QRS Dur : 092 ms      QT Int : 366 ms       P-R-T Axes : 062 023 046 degrees     QTc Int : 411 ms    Normal sinus rhythm  Low voltage QRS  Borderline Abnormal ECG  When compared with ECG of 15-NOV-2021 08:16,  No significant change was found  Confirmed by Todd Kuhn MD ([id number]) on 9/12/2022 10:02:23 AM    Referred By: JOVANNI LEONG           Confirmed By:Todd Kuhn MD       ECHO:  See subjective, if available.      ASSESSMENT/PLAN:           Pre-op Assessment    I have reviewed the Patient Summary Reports.     I have reviewed the Nursing Notes. I have reviewed the NPO Status.      Review of Systems  Anesthesia Hx:  Neg history of prior surgery. Denies Family Hx of Anesthesia complications.  Personal Hx of Anesthesia complications, Post-Operative Nausea/Vomiting, in the past, but not with recent anesthetics / prophylaxis   Social:  Non-Smoker    Hematology/Oncology:         -- Denies Anemia: --  Cancer in past history:  Breast   EENT/Dental:   denies chronic allergic rhinitis   Cardiovascular:   Denies Hypertension.  Denies CAD.    Denies CABG/stent.  Denies Dysrhythmias.  no hyperlipidemia ECG has been reviewed.    Pulmonary:   Denies Pneumonia Denies COPD.  Denies Shortness of breath.    Hepatic/GI:   Hiatal Hernia, GERD    Neurological:   Headaches    Endocrine:   Denies Diabetes.        Physical Exam  General: Well nourished, Cooperative, Alert and Oriented    Airway:  Mallampati: II / I  Mouth Opening: Normal  TM Distance: Normal  Tongue:  Normal  Neck ROM: Normal ROM    Dental:  Intact        Anesthesia Plan  Type of Anesthesia, risks & benefits discussed:    Anesthesia Type: Gen ETT  Intra-op Monitoring Plan: Standard ASA Monitors and Art Line  Post Op Pain Control Plan: multimodal analgesia and peripheral nerve block  Induction:  IV  Airway Plan: Direct, Post-Induction  Informed Consent: Informed consent signed with the Patient and all parties understand the risks and agree with anesthesia plan.  All questions answered. Patient consented to blood products? Yes  ASA Score: 3  Day of Surgery Review of History & Physical: H&P Update referred to the surgeon/provider.  Anesthesia Plan Notes: Discussed plan for General endotracheal anesthesia    Ready For Surgery From Anesthesia Perspective.     .    Pt requests Scopolamine patch placed in Pre-op

## 2022-10-21 NOTE — TELEPHONE ENCOUNTER
Spoke with pt about her arrival time being at 0500 for her scheduled procedure. NPO status reinforced. All questions answered at this time.

## 2022-10-21 NOTE — PRE-PROCEDURE INSTRUCTIONS
PreOp Instructions given:   - Verbal medication information (what to hold and what to take)   - NPO guidelines [id number]  - Arrival place directions given; time to be given the day before procedure by the   Surgeon's Office [facility]  - Bathing with antibacterial soap   - Don't wear any jewelry or bring any valuables AM of surgery   - No makeup or moisturizer to face   - No perfume/cologne, powder, lotions or aftershave   Pt. verbalized understanding.   Pt reports h/o PONV - Scopolamine patch has helped in the past.

## 2022-10-24 ENCOUNTER — HOSPITAL ENCOUNTER (OUTPATIENT)
Dept: RADIOLOGY | Facility: HOSPITAL | Age: 62
Discharge: HOME | End: 2022-10-24
Attending: SURGERY | Admitting: SURGERY
Payer: COMMERCIAL

## 2022-10-24 ENCOUNTER — HOSPITAL ENCOUNTER (OUTPATIENT)
Facility: HOSPITAL | Age: 62
Discharge: HOME | End: 2022-10-26
Attending: SURGERY | Admitting: SURGERY
Payer: COMMERCIAL

## 2022-10-24 ENCOUNTER — HOSPITAL ENCOUNTER (OUTPATIENT)
Dept: RADIOLOGY | Facility: HOSPITAL | Age: 62
Discharge: HOME | End: 2022-10-24
Attending: SURGERY
Payer: COMMERCIAL

## 2022-10-24 ENCOUNTER — ANESTHESIA (OUTPATIENT)
Dept: SURGERY | Facility: HOSPITAL | Age: 62
End: 2022-10-24
Payer: COMMERCIAL

## 2022-10-24 DIAGNOSIS — D05.10 DUCTAL CARCINOMA IN SITU (DCIS) OF BREAST: Primary | ICD-10-CM

## 2022-10-24 DIAGNOSIS — C50.912 RECURRENT MALIGNANT NEOPLASM OF LEFT BREAST: ICD-10-CM

## 2022-10-24 PROCEDURE — 19303 MAST SIMPLE COMPLETE: CPT | Mod: 50,,, | Performed by: SURGERY

## 2022-10-24 PROCEDURE — 63600175 PHARM REV CODE 636 W HCPCS: Performed by: STUDENT IN AN ORGANIZED HEALTH CARE EDUCATION/TRAINING PROGRAM

## 2022-10-24 PROCEDURE — 38900 PR INTRAOPERATIVE SENTINEL LYMPH NODE ID W DYE INJECTION: ICD-10-PCS | Mod: LT,,, | Performed by: SURGERY

## 2022-10-24 PROCEDURE — 25000003 PHARM REV CODE 250: Performed by: SURGERY

## 2022-10-24 PROCEDURE — 76098 X-RAY EXAM SURGICAL SPECIMEN: CPT | Mod: TC

## 2022-10-24 PROCEDURE — 88307 TISSUE EXAM BY PATHOLOGIST: CPT | Performed by: PATHOLOGY

## 2022-10-24 PROCEDURE — 88307 PR  SURG PATH,LEVEL V: ICD-10-PCS | Mod: 26,,, | Performed by: PATHOLOGY

## 2022-10-24 PROCEDURE — 38525 BIOPSY/REMOVAL LYMPH NODES: CPT | Mod: 51,LT,, | Performed by: SURGERY

## 2022-10-24 PROCEDURE — D9220A PRA ANESTHESIA: Mod: ,,, | Performed by: ANESTHESIOLOGY

## 2022-10-24 PROCEDURE — 64462: ICD-10-PCS | Mod: ,,, | Performed by: SURGERY

## 2022-10-24 PROCEDURE — 19499 UNLISTED PROCEDURE BREAST: CPT | Mod: ,,, | Performed by: SURGERY

## 2022-10-24 PROCEDURE — 64461 PVB THORACIC SINGLE INJ SITE: CPT | Mod: 50,59,, | Performed by: SURGERY

## 2022-10-24 PROCEDURE — 38525 PR BIOPSY/REM LYMPH NODES, AXILLARY: ICD-10-PCS | Mod: 51,LT,, | Performed by: SURGERY

## 2022-10-24 PROCEDURE — 63600175 PHARM REV CODE 636 W HCPCS

## 2022-10-24 PROCEDURE — 38792 PR IDENTIFY SENTINEL 2DE: ICD-10-PCS | Mod: 59,LT,, | Performed by: SURGERY

## 2022-10-24 PROCEDURE — 88341 PR IHC OR ICC EACH ADD'L SINGLE ANTIBODY  STAINPR: ICD-10-PCS | Mod: 26,59,, | Performed by: PATHOLOGY

## 2022-10-24 PROCEDURE — 88360 PR  TUMOR IMMUNOHISTOCHEM/MANUAL: ICD-10-PCS | Mod: 26,,, | Performed by: PATHOLOGY

## 2022-10-24 PROCEDURE — 88360 TUMOR IMMUNOHISTOCHEM/MANUAL: CPT | Mod: 59 | Performed by: PATHOLOGY

## 2022-10-24 PROCEDURE — 27201423 OPTIME MED/SURG SUP & DEVICES STERILE SUPPLY: Performed by: SURGERY

## 2022-10-24 PROCEDURE — 25000003 PHARM REV CODE 250

## 2022-10-24 PROCEDURE — 38900 IO MAP OF SENT LYMPH NODE: CPT | Mod: LT,,, | Performed by: SURGERY

## 2022-10-24 PROCEDURE — C1729 CATH, DRAINAGE: HCPCS | Performed by: SURGERY

## 2022-10-24 PROCEDURE — 25000003 PHARM REV CODE 250: Performed by: STUDENT IN AN ORGANIZED HEALTH CARE EDUCATION/TRAINING PROGRAM

## 2022-10-24 PROCEDURE — A9520 TC99 TILMANOCEPT DIAG 0.5MCI: HCPCS

## 2022-10-24 PROCEDURE — C1789 PROSTHESIS, BREAST, IMP: HCPCS | Performed by: SURGERY

## 2022-10-24 PROCEDURE — 71000033 HC RECOVERY, INTIAL HOUR: Performed by: SURGERY

## 2022-10-24 PROCEDURE — 88341 IMHCHEM/IMCYTCHM EA ADD ANTB: CPT | Mod: 26,59,, | Performed by: PATHOLOGY

## 2022-10-24 PROCEDURE — 71000015 HC POSTOP RECOV 1ST HR: Performed by: SURGERY

## 2022-10-24 PROCEDURE — 64461: ICD-10-PCS | Mod: 50,59,, | Performed by: SURGERY

## 2022-10-24 PROCEDURE — 88342 IMHCHEM/IMCYTCHM 1ST ANTB: CPT | Mod: 26,59,, | Performed by: PATHOLOGY

## 2022-10-24 PROCEDURE — 38792 RA TRACER ID OF SENTINL NODE: CPT | Mod: 59,LT,, | Performed by: SURGERY

## 2022-10-24 PROCEDURE — 88377 M/PHMTRC ALYS ISHQUANT/SEMIQ: CPT | Performed by: PATHOLOGY

## 2022-10-24 PROCEDURE — 36000706: Performed by: SURGERY

## 2022-10-24 PROCEDURE — 63600175 PHARM REV CODE 636 W HCPCS: Performed by: SURGERY

## 2022-10-24 PROCEDURE — 64462 PVB THORACIC 2ND+ INJ SITE: CPT | Performed by: SURGERY

## 2022-10-24 PROCEDURE — 19357 PR BREAST RECONSTRUC W TISS EXPANDR: ICD-10-PCS | Mod: 50,,, | Performed by: SURGERY

## 2022-10-24 PROCEDURE — 36000707: Performed by: SURGERY

## 2022-10-24 PROCEDURE — 19303 PR MASTECTOMY, SIMPLE, COMPLETE: ICD-10-PCS | Mod: 50,,, | Performed by: SURGERY

## 2022-10-24 PROCEDURE — 88360 TUMOR IMMUNOHISTOCHEM/MANUAL: CPT | Mod: 26,,, | Performed by: PATHOLOGY

## 2022-10-24 PROCEDURE — 19357 TISS XPNDR PLMT BRST RCNSTJ: CPT | Mod: 50,,, | Performed by: SURGERY

## 2022-10-24 PROCEDURE — 25000003 PHARM REV CODE 250: Performed by: ANESTHESIOLOGY

## 2022-10-24 PROCEDURE — 88307 TISSUE EXAM BY PATHOLOGIST: CPT | Mod: 26,,, | Performed by: PATHOLOGY

## 2022-10-24 PROCEDURE — C1781 MESH (IMPLANTABLE): HCPCS | Performed by: SURGERY

## 2022-10-24 PROCEDURE — 71000016 HC POSTOP RECOV ADDL HR: Performed by: SURGERY

## 2022-10-24 PROCEDURE — 19499 PR IMPLANTATION OF NON-BIOLOGIC MESH/IMPLANT BREAST: ICD-10-PCS | Mod: ,,, | Performed by: SURGERY

## 2022-10-24 PROCEDURE — 88341 IMHCHEM/IMCYTCHM EA ADD ANTB: CPT | Mod: 59 | Performed by: PATHOLOGY

## 2022-10-24 PROCEDURE — 37000008 HC ANESTHESIA 1ST 15 MINUTES: Performed by: SURGERY

## 2022-10-24 PROCEDURE — 88342 CHG IMMUNOCYTOCHEMISTRY: ICD-10-PCS | Mod: 26,59,, | Performed by: PATHOLOGY

## 2022-10-24 PROCEDURE — 37000009 HC ANESTHESIA EA ADD 15 MINS: Performed by: SURGERY

## 2022-10-24 PROCEDURE — 63600175 PHARM REV CODE 636 W HCPCS: Performed by: ANESTHESIOLOGY

## 2022-10-24 PROCEDURE — 88342 IMHCHEM/IMCYTCHM 1ST ANTB: CPT | Performed by: PATHOLOGY

## 2022-10-24 PROCEDURE — D9220A PRA ANESTHESIA: ICD-10-PCS | Mod: ,,, | Performed by: ANESTHESIOLOGY

## 2022-10-24 DEVICE — MESH VICRYL KNITTED 12X12IN: Type: IMPLANTABLE DEVICE | Site: BREAST | Status: FUNCTIONAL

## 2022-10-24 DEVICE — EXPANDER NATRELLE FH EP 550CC: Type: IMPLANTABLE DEVICE | Site: BREAST | Status: FUNCTIONAL

## 2022-10-24 RX ORDER — MUPIROCIN 20 MG/G
OINTMENT TOPICAL
Status: DISCONTINUED | OUTPATIENT
Start: 2022-10-24 | End: 2022-10-24

## 2022-10-24 RX ORDER — DEXAMETHASONE SODIUM PHOSPHATE 4 MG/ML
INJECTION, SOLUTION INTRA-ARTICULAR; INTRALESIONAL; INTRAMUSCULAR; INTRAVENOUS; SOFT TISSUE
Status: DISCONTINUED | OUTPATIENT
Start: 2022-10-24 | End: 2022-10-24

## 2022-10-24 RX ORDER — CEFAZOLIN SODIUM 1 G/3ML
INJECTION, POWDER, FOR SOLUTION INTRAMUSCULAR; INTRAVENOUS
Status: DISCONTINUED | OUTPATIENT
Start: 2022-10-24 | End: 2022-10-24 | Stop reason: HOSPADM

## 2022-10-24 RX ORDER — HYDROMORPHONE HYDROCHLORIDE 2 MG/1
2 TABLET ORAL
Status: DISCONTINUED | OUTPATIENT
Start: 2022-10-24 | End: 2022-10-25

## 2022-10-24 RX ORDER — MIDAZOLAM HYDROCHLORIDE 1 MG/ML
.5-4 INJECTION INTRAMUSCULAR; INTRAVENOUS
Status: DISCONTINUED | OUTPATIENT
Start: 2022-10-24 | End: 2022-10-24

## 2022-10-24 RX ORDER — ONDANSETRON 4 MG/1
4 TABLET, ORALLY DISINTEGRATING ORAL EVERY 6 HOURS PRN
Qty: 35 TABLET | Refills: 0 | Status: ON HOLD | OUTPATIENT
Start: 2022-10-24 | End: 2022-11-28 | Stop reason: SDUPTHER

## 2022-10-24 RX ORDER — SODIUM CHLORIDE 9 MG/ML
INJECTION, SOLUTION INTRAVENOUS CONTINUOUS
Status: DISCONTINUED | OUTPATIENT
Start: 2022-10-24 | End: 2022-10-24

## 2022-10-24 RX ORDER — ONDANSETRON 2 MG/ML
INJECTION INTRAMUSCULAR; INTRAVENOUS
Status: DISCONTINUED | OUTPATIENT
Start: 2022-10-24 | End: 2022-10-24

## 2022-10-24 RX ORDER — ACETAMINOPHEN 10 MG/ML
INJECTION, SOLUTION INTRAVENOUS
Status: DISCONTINUED | OUTPATIENT
Start: 2022-10-24 | End: 2022-10-24

## 2022-10-24 RX ORDER — MUPIROCIN 20 MG/G
OINTMENT TOPICAL 2 TIMES DAILY
Status: DISCONTINUED | OUTPATIENT
Start: 2022-10-24 | End: 2022-10-26 | Stop reason: HOSPADM

## 2022-10-24 RX ORDER — HEPARIN SODIUM 5000 [USP'U]/ML
5000 INJECTION, SOLUTION INTRAVENOUS; SUBCUTANEOUS EVERY 8 HOURS
Status: DISCONTINUED | OUTPATIENT
Start: 2022-10-24 | End: 2022-10-26 | Stop reason: HOSPADM

## 2022-10-24 RX ORDER — NEOSTIGMINE METHYLSULFATE 0.5 MG/ML
INJECTION, SOLUTION INTRAVENOUS
Status: DISCONTINUED | OUTPATIENT
Start: 2022-10-24 | End: 2022-10-24

## 2022-10-24 RX ORDER — PHENYLEPHRINE HCL IN 0.9% NACL 1 MG/10 ML
SYRINGE (ML) INTRAVENOUS
Status: DISCONTINUED | OUTPATIENT
Start: 2022-10-24 | End: 2022-10-24

## 2022-10-24 RX ORDER — HYDROMORPHONE HYDROCHLORIDE 1 MG/ML
0.2 INJECTION, SOLUTION INTRAMUSCULAR; INTRAVENOUS; SUBCUTANEOUS EVERY 5 MIN PRN
Status: DISCONTINUED | OUTPATIENT
Start: 2022-10-24 | End: 2022-10-24 | Stop reason: HOSPADM

## 2022-10-24 RX ORDER — ACETAMINOPHEN 325 MG/1
650 TABLET ORAL EVERY 6 HOURS
Status: DISCONTINUED | OUTPATIENT
Start: 2022-10-24 | End: 2022-10-26 | Stop reason: HOSPADM

## 2022-10-24 RX ORDER — BACLOFEN 10 MG/1
10 TABLET ORAL 3 TIMES DAILY
Status: DISCONTINUED | OUTPATIENT
Start: 2022-10-24 | End: 2022-10-26 | Stop reason: HOSPADM

## 2022-10-24 RX ORDER — BUDESONIDE 0.25 MG/2ML
0.5 INHALANT ORAL EVERY 12 HOURS
Status: DISCONTINUED | OUTPATIENT
Start: 2022-10-24 | End: 2022-10-26 | Stop reason: HOSPADM

## 2022-10-24 RX ORDER — HYDROCODONE BITARTRATE AND ACETAMINOPHEN 5; 325 MG/1; MG/1
1 TABLET ORAL EVERY 6 HOURS PRN
Qty: 28 TABLET | Refills: 0 | Status: ON HOLD | OUTPATIENT
Start: 2022-10-24 | End: 2022-11-28 | Stop reason: SDUPTHER

## 2022-10-24 RX ORDER — LIDOCAINE HYDROCHLORIDE 10 MG/ML
1 INJECTION INFILTRATION; PERINEURAL ONCE
Status: COMPLETED | OUTPATIENT
Start: 2022-10-24 | End: 2022-10-24

## 2022-10-24 RX ORDER — GABAPENTIN 400 MG/1
800 CAPSULE ORAL 3 TIMES DAILY
Status: DISCONTINUED | OUTPATIENT
Start: 2022-10-24 | End: 2022-10-26 | Stop reason: HOSPADM

## 2022-10-24 RX ORDER — ALPRAZOLAM 0.25 MG/1
0.25 TABLET ORAL NIGHTLY PRN
Status: DISCONTINUED | OUTPATIENT
Start: 2022-10-24 | End: 2022-10-26 | Stop reason: HOSPADM

## 2022-10-24 RX ORDER — PROPOFOL 10 MG/ML
VIAL (ML) INTRAVENOUS
Status: DISCONTINUED | OUTPATIENT
Start: 2022-10-24 | End: 2022-10-24

## 2022-10-24 RX ORDER — CEFAZOLIN SODIUM 1 G/3ML
INJECTION, POWDER, FOR SOLUTION INTRAMUSCULAR; INTRAVENOUS
Status: DISCONTINUED | OUTPATIENT
Start: 2022-10-24 | End: 2022-10-24

## 2022-10-24 RX ORDER — ROPIVACAINE HYDROCHLORIDE 5 MG/ML
INJECTION, SOLUTION EPIDURAL; INFILTRATION; PERINEURAL
Status: COMPLETED | OUTPATIENT
Start: 2022-10-24 | End: 2022-10-24

## 2022-10-24 RX ORDER — FENTANYL CITRATE 50 UG/ML
INJECTION, SOLUTION INTRAMUSCULAR; INTRAVENOUS
Status: COMPLETED
Start: 2022-10-24 | End: 2022-10-24

## 2022-10-24 RX ORDER — CLINDAMYCIN HYDROCHLORIDE 300 MG/1
300 CAPSULE ORAL EVERY 6 HOURS
Qty: 28 CAPSULE | Refills: 0 | Status: SHIPPED | OUTPATIENT
Start: 2022-10-24 | End: 2022-11-01

## 2022-10-24 RX ORDER — IBUPROFEN 400 MG/1
800 TABLET ORAL EVERY 8 HOURS
Status: DISCONTINUED | OUTPATIENT
Start: 2022-10-24 | End: 2022-10-26 | Stop reason: HOSPADM

## 2022-10-24 RX ORDER — ONDANSETRON 4 MG/1
4 TABLET, FILM COATED ORAL EVERY 8 HOURS PRN
Status: DISCONTINUED | OUTPATIENT
Start: 2022-10-24 | End: 2022-10-26 | Stop reason: HOSPADM

## 2022-10-24 RX ORDER — CEFAZOLIN SODIUM/WATER 2 G/20 ML
2 SYRINGE (ML) INTRAVENOUS
Status: DISCONTINUED | OUTPATIENT
Start: 2022-10-24 | End: 2022-10-24

## 2022-10-24 RX ORDER — SUCCINYLCHOLINE CHLORIDE 20 MG/ML
INJECTION INTRAMUSCULAR; INTRAVENOUS
Status: DISCONTINUED | OUTPATIENT
Start: 2022-10-24 | End: 2022-10-24

## 2022-10-24 RX ORDER — EPHEDRINE SULFATE 50 MG/ML
INJECTION, SOLUTION INTRAVENOUS
Status: DISCONTINUED | OUTPATIENT
Start: 2022-10-24 | End: 2022-10-24

## 2022-10-24 RX ORDER — ONDANSETRON 2 MG/ML
4 INJECTION INTRAMUSCULAR; INTRAVENOUS DAILY PRN
Status: DISCONTINUED | OUTPATIENT
Start: 2022-10-24 | End: 2022-10-24 | Stop reason: HOSPADM

## 2022-10-24 RX ORDER — ROCURONIUM BROMIDE 10 MG/ML
INJECTION, SOLUTION INTRAVENOUS
Status: DISCONTINUED | OUTPATIENT
Start: 2022-10-24 | End: 2022-10-24

## 2022-10-24 RX ORDER — HALOPERIDOL 5 MG/ML
INJECTION INTRAMUSCULAR
Status: DISCONTINUED | OUTPATIENT
Start: 2022-10-24 | End: 2022-10-24

## 2022-10-24 RX ORDER — SCOLOPAMINE TRANSDERMAL SYSTEM 1 MG/1
1 PATCH, EXTENDED RELEASE TRANSDERMAL
Status: DISCONTINUED | OUTPATIENT
Start: 2022-10-24 | End: 2022-10-24

## 2022-10-24 RX ORDER — PANTOPRAZOLE SODIUM 40 MG/1
40 TABLET, DELAYED RELEASE ORAL DAILY
Status: DISCONTINUED | OUTPATIENT
Start: 2022-10-25 | End: 2022-10-26 | Stop reason: HOSPADM

## 2022-10-24 RX ORDER — FENTANYL CITRATE 50 UG/ML
25-200 INJECTION, SOLUTION INTRAMUSCULAR; INTRAVENOUS
Status: DISCONTINUED | OUTPATIENT
Start: 2022-10-24 | End: 2022-10-24

## 2022-10-24 RX ORDER — LOSARTAN POTASSIUM 25 MG/1
25 TABLET ORAL DAILY
Status: DISCONTINUED | OUTPATIENT
Start: 2022-10-25 | End: 2022-10-26 | Stop reason: HOSPADM

## 2022-10-24 RX ORDER — FENTANYL CITRATE 50 UG/ML
25 INJECTION, SOLUTION INTRAMUSCULAR; INTRAVENOUS EVERY 5 MIN PRN
Status: COMPLETED | OUTPATIENT
Start: 2022-10-24 | End: 2022-10-24

## 2022-10-24 RX ORDER — LIDOCAINE HYDROCHLORIDE 20 MG/ML
INJECTION, SOLUTION EPIDURAL; INFILTRATION; INTRACAUDAL; PERINEURAL
Status: DISCONTINUED | OUTPATIENT
Start: 2022-10-24 | End: 2022-10-24

## 2022-10-24 RX ORDER — CIPROFLOXACIN 2 MG/ML
INJECTION, SOLUTION INTRAVENOUS
Status: COMPLETED | OUTPATIENT
Start: 2022-10-24 | End: 2022-10-24

## 2022-10-24 RX ORDER — BUTALBITAL, ACETAMINOPHEN AND CAFFEINE 50; 325; 40 MG/1; MG/1; MG/1
1 TABLET ORAL EVERY 6 HOURS PRN
Status: DISCONTINUED | OUTPATIENT
Start: 2022-10-24 | End: 2022-10-26 | Stop reason: HOSPADM

## 2022-10-24 RX ADMIN — ROCURONIUM BROMIDE 30 MG: 10 INJECTION INTRAVENOUS at 08:10

## 2022-10-24 RX ADMIN — MIDAZOLAM 2 MG: 1 INJECTION INTRAMUSCULAR; INTRAVENOUS at 07:10

## 2022-10-24 RX ADMIN — FENTANYL CITRATE 25 MCG: 50 INJECTION, SOLUTION INTRAMUSCULAR; INTRAVENOUS at 06:10

## 2022-10-24 RX ADMIN — GABAPENTIN 800 MG: 400 CAPSULE ORAL at 08:10

## 2022-10-24 RX ADMIN — Medication 100 MCG: at 08:10

## 2022-10-24 RX ADMIN — LIDOCAINE HYDROCHLORIDE 100 MG: 20 INJECTION, SOLUTION EPIDURAL; INFILTRATION; INTRACAUDAL; PERINEURAL at 08:10

## 2022-10-24 RX ADMIN — EPHEDRINE SULFATE 10 MG: 50 INJECTION INTRAVENOUS at 08:10

## 2022-10-24 RX ADMIN — ROCURONIUM BROMIDE 20 MG: 10 INJECTION INTRAVENOUS at 02:10

## 2022-10-24 RX ADMIN — ACETAMINOPHEN 650 MG: 325 TABLET ORAL at 10:10

## 2022-10-24 RX ADMIN — EPHEDRINE SULFATE 10 MG: 50 INJECTION INTRAVENOUS at 03:10

## 2022-10-24 RX ADMIN — HYDROMORPHONE HYDROCHLORIDE 2 MG: 2 TABLET ORAL at 09:10

## 2022-10-24 RX ADMIN — ACETAMINOPHEN 1000 MG: 10 INJECTION, SOLUTION INTRAVENOUS at 03:10

## 2022-10-24 RX ADMIN — CEFAZOLIN 2 G: 330 INJECTION, POWDER, FOR SOLUTION INTRAMUSCULAR; INTRAVENOUS at 08:10

## 2022-10-24 RX ADMIN — EPHEDRINE SULFATE 5 MG: 50 INJECTION INTRAVENOUS at 08:10

## 2022-10-24 RX ADMIN — DEXAMETHASONE SODIUM PHOSPHATE 4 MG: 4 INJECTION INTRA-ARTICULAR; INTRALESIONAL; INTRAMUSCULAR; INTRAVENOUS; SOFT TISSUE at 09:10

## 2022-10-24 RX ADMIN — Medication 150 MCG: at 08:10

## 2022-10-24 RX ADMIN — HEPARIN SODIUM 5000 UNITS: 5000 INJECTION INTRAVENOUS; SUBCUTANEOUS at 10:10

## 2022-10-24 RX ADMIN — PROPOFOL 120 MG: 10 INJECTION, EMULSION INTRAVENOUS at 08:10

## 2022-10-24 RX ADMIN — HALOPERIDOL LACTATE 0.5 MG: 5 INJECTION, SOLUTION INTRAMUSCULAR at 03:10

## 2022-10-24 RX ADMIN — GLYCOPYRROLATE 0.5 MG: 0.2 INJECTION, SOLUTION INTRAMUSCULAR; INTRAVITREAL at 05:10

## 2022-10-24 RX ADMIN — LIDOCAINE HYDROCHLORIDE 0.1 ML: 10 INJECTION, SOLUTION EPIDURAL; INFILTRATION; INTRACAUDAL; PERINEURAL at 06:10

## 2022-10-24 RX ADMIN — EPHEDRINE SULFATE 10 MG: 50 INJECTION INTRAVENOUS at 09:10

## 2022-10-24 RX ADMIN — BACLOFEN 10 MG: 10 TABLET ORAL at 08:10

## 2022-10-24 RX ADMIN — ROCURONIUM BROMIDE 10 MG: 10 INJECTION INTRAVENOUS at 10:10

## 2022-10-24 RX ADMIN — CEFAZOLIN 2 G: 330 INJECTION, POWDER, FOR SOLUTION INTRAMUSCULAR; INTRAVENOUS at 12:10

## 2022-10-24 RX ADMIN — ROPIVACAINE HYDROCHLORIDE 40 ML: 5 INJECTION EPIDURAL; INFILTRATION; PERINEURAL at 07:10

## 2022-10-24 RX ADMIN — FENTANYL CITRATE 100 MCG: 0.05 INJECTION, SOLUTION INTRAMUSCULAR; INTRAVENOUS at 07:10

## 2022-10-24 RX ADMIN — ROCURONIUM BROMIDE 30 MG: 10 INJECTION INTRAVENOUS at 01:10

## 2022-10-24 RX ADMIN — ROCURONIUM BROMIDE 20 MG: 10 INJECTION INTRAVENOUS at 03:10

## 2022-10-24 RX ADMIN — SODIUM CHLORIDE, SODIUM GLUCONATE, SODIUM ACETATE, POTASSIUM CHLORIDE, MAGNESIUM CHLORIDE, SODIUM PHOSPHATE, DIBASIC, AND POTASSIUM PHOSPHATE: .53; .5; .37; .037; .03; .012; .00082 INJECTION, SOLUTION INTRAVENOUS at 02:10

## 2022-10-24 RX ADMIN — MUPIROCIN: 20 OINTMENT TOPICAL at 06:10

## 2022-10-24 RX ADMIN — ROCURONIUM BROMIDE 10 MG: 10 INJECTION INTRAVENOUS at 02:10

## 2022-10-24 RX ADMIN — SODIUM CHLORIDE: 0.9 INJECTION, SOLUTION INTRAVENOUS at 06:10

## 2022-10-24 RX ADMIN — ONDANSETRON 4 MG: 2 INJECTION INTRAMUSCULAR; INTRAVENOUS at 03:10

## 2022-10-24 RX ADMIN — SCOPALAMINE 1 PATCH: 1 PATCH, EXTENDED RELEASE TRANSDERMAL at 06:10

## 2022-10-24 RX ADMIN — EPHEDRINE SULFATE 15 MG: 50 INJECTION INTRAVENOUS at 04:10

## 2022-10-24 RX ADMIN — CEFAZOLIN 2 G: 330 INJECTION, POWDER, FOR SOLUTION INTRAMUSCULAR; INTRAVENOUS at 04:10

## 2022-10-24 RX ADMIN — SODIUM CHLORIDE: 0.9 INJECTION, SOLUTION INTRAVENOUS at 07:10

## 2022-10-24 RX ADMIN — ROCURONIUM BROMIDE 10 MG: 10 INJECTION INTRAVENOUS at 08:10

## 2022-10-24 RX ADMIN — NEOSTIGMINE METHYLSULFATE 5 MG: 0.5 INJECTION, SOLUTION INTRAVENOUS at 05:10

## 2022-10-24 RX ADMIN — FENTANYL CITRATE 50 MCG: 0.05 INJECTION, SOLUTION INTRAMUSCULAR; INTRAVENOUS at 05:10

## 2022-10-24 RX ADMIN — MUPIROCIN: 20 OINTMENT TOPICAL at 08:10

## 2022-10-24 RX ADMIN — SUCCINYLCHOLINE CHLORIDE 120 MG: 20 INJECTION, SOLUTION INTRAMUSCULAR; INTRAVENOUS; PARENTERAL at 08:10

## 2022-10-24 RX ADMIN — SODIUM CHLORIDE 0.25 MCG/KG/MIN: 9 INJECTION, SOLUTION INTRAVENOUS at 09:10

## 2022-10-24 RX ADMIN — SODIUM CHLORIDE, SODIUM GLUCONATE, SODIUM ACETATE, POTASSIUM CHLORIDE, MAGNESIUM CHLORIDE, SODIUM PHOSPHATE, DIBASIC, AND POTASSIUM PHOSPHATE: .53; .5; .37; .037; .03; .012; .00082 INJECTION, SOLUTION INTRAVENOUS at 08:10

## 2022-10-24 RX ADMIN — IBUPROFEN 800 MG: 400 TABLET, FILM COATED ORAL at 10:10

## 2022-10-24 NOTE — ASSESSMENT & PLAN NOTE
Christal Posey is a 62 y.o. female who presents for bilateral mastectomy, left sentinel node biopsy with possible axillary dissection and will undergo immediate reconstruction with plastic surgery.    -Surgical plan including risks and alternatives reviewed, all questions answered  -Consent signed at bedside  -Proceed to OR as planned

## 2022-10-24 NOTE — H&P
[facility] ([facility])  Plastic Surgery  History & Physical  Subjective:      Christal Posey is a 62 y.o. year old female who presents to the Plastic Surgery Clinic on 2022 for consultation regarding breast reconstruction after newly diagnosed L breast DCIS/ Patient history of left breast DCIS s/p lumpectomy without radiation that now developed a new lesion. She additionally underwent MRI showing a lesion of the right breast, which is scheduled for biopsy next week. Patient presents today to discuss reconstructive options. Denies fever, chills, nausea, vomiting, or other systemic signs of infection.     Today she present to [facility] for planned bilateral mastectomy with Dr. Jama and subsequent recontruction with Dr. Gray      Vitals:     22 1403   BP: (!) 169/87   Pulse: 106               Review of patient's allergies indicates:   Allergen Reactions    Pravastatin Other (See Comments)       Severe muscle pain.  Muscle pain     Other reaction(s): Other (See Comments)  Severe muscle pain.    Amitriptyline         Other reaction(s): dizzy    Codeine         Other reaction(s): Unknown    Doxycycline         Other reaction(s): Nausea    Erythromycin         Other reaction(s): Unknown    Iodine         Other reaction(s): Swelling  Other reaction(s): Unknown    Macrobid  [nitrofurantoin monohyd/m-cryst]         Other reaction(s): Unknown    Verapamil         Other reaction(s): Headache                Current Outpatient Medications on File Prior to Visit   Medication Sig Dispense Refill    ALPRAZolam (XANAX) 0.25 MG tablet Take 1 tablet (0.25 mg total) by mouth nightly as needed for Insomnia. 30 tablet 0    atogepant (QULIPTA) 60 mg Tab Take 60 mg by mouth.        baclofen (LIORESAL) 10 MG tablet Take 10 mg by mouth 3 (three) times daily.        budesonide (PULMICORT) 0.5 mg/2 mL nebulizer solution SMARTSI Via Nebulizer Twice Daily        budesonide 1 mg/2 mL [facility]           butalbital-acetaminophen-caffeine -40 mg (FIORICET, ESGIC) -40 mg per tablet Take 1 tablet by mouth every 6 to 8 hours as needed. 60 tablet 0    cholestyramine (QUESTRAN) 4 gram packet Take 1 packet by mouth once daily.        CHOLESTYRAMINE LIGHT 4 gram packet Take 1 packet by mouth once daily.        ciclopirox (LOPROX) 0.77 % Crea Apply topically 2 (two) times daily. 90 g 1    diclofenac sodium (VOLTAREN) 1 % Gel APPLY 2 GRAMS TOPICALLY 3 (THREE) TIMES DAILY. 100 g 3    fluconazole (DIFLUCAN) 200 MG Tab Take 1 pill po qday x 3 repeat in 1 week 6 tablet 0    fluticasone (FLONASE) 50 mcg/actuation nasal spray          gabapentin (NEURONTIN) 800 MG tablet Take 800 mg by mouth 3 (three) times daily.        hydrocortisone butyrate (LOCOID) 0.1 % Crea cream AAA bid 45 g 3    lansoprazole (PREVACID) 30 MG capsule Take 30 mg by mouth every morning. 1 Capsule, Delayed Release(E.C.) Oral Every day        losartan (COZAAR) 25 MG tablet TAKE 1 TABLET BY MOUTH EVERY DAY 90 tablet 1    multivitamin (THERAGRAN) per tablet Take 1 tablet by mouth once daily.        mupirocin (BACTROBAN) 2 % ointment Apply topically 2 (two) times daily. 22 g 1    NEILMED SINUS RINSE COMPLETE pkdv by Each Nostril route.        ondansetron (ZOFRAN) 4 MG tablet Take 1 tablet (4 mg total) by mouth every 8 (eight) hours as needed for Nausea. 1 Tablet Oral Every 6 hours 12 tablet 12    prasterone, dhea, (INTRAROSA) 6.5 mg Inst Place 6.5 mg vaginally every evening. 30 each 11    PREMARIN vaginal cream PLACE 1 GRAM VAGINALLY 2 TIMES A WK   12    sumatriptan (IMITREX) 20 mg/actuation nasal spray USE 1 SPRAY IN EACH NOSTRIL AT ONSET OF HEADACHE. MAY REPEAT ONCE IN 2 HOURS IF NO RELIEF. NO MORE THAN 2 SPRAYS PER 24 HOURS 6 each 12    triamcinolone acetonide 0.025% (KENALOG) 0.025 % Oint Apply topically 2 (two) times daily. x 1-2 wks then prn flares only 80 g 2    VENTOLIN HFA 90 mcg/actuation inhaler INHALE 2 PUFFS INTO THE LUNGS EVERY 6 HOURS AS  NEEDED FOR WHEEZING. RESCUE 18 g 0    vitamin D 1000 units Tab Take 185 mg by mouth every morning.           No current facility-administered medications on file prior to visit.             Patient Active Problem List   Diagnosis    Hiatal hernia    Irritable bowel syndrome    Migraine with aura and without status migrainosus, not intractable    Symptomatic menopausal or female climacteric states    Ductal carcinoma in situ (DCIS) of left breast    Preoperative testing    Arthralgia of both hands    Rheumatoid factor positive    Obesity (BMI 30-39.9)    Dyspareunia, female    Vitamin D deficiency    Colon polyp               Past Surgical History:   Procedure Laterality Date    BREAST BIOPSY Left 05/2016     DCIS    BREAST LUMPECTOMY        ECTOPIC PREGNANCY SURGERY Right      HYSTERECTOMY   1998     ovaries left intact    removal of ovarian cyst Right oct 1992     ovary left intact    TUBAL LIGATION        tubal reversal   july 1991         Social History               Socioeconomic History    Marital status:        Spouse name: Parrish    Number of children: 2   Tobacco Use    Smoking status: Never Smoker    Smokeless tobacco: Never Used   Substance and Sexual Activity    Alcohol use: No       Alcohol/week: 0.0 standard drinks    Drug use: No    Sexual activity: Yes       Partners: Male       Birth control/protection: Surgical, None       Comment:  to Parrish    Other Topics Concern    Are you pregnant or think you may be? No    Breast-feeding No                  Review of Systems: negative except HPI     Objective:      Physical Exam:      Vitals:     08/17/22 1403   BP: (!) 169/87   Pulse: 106         WD WN NAD  VSS  Normal resp effort  Bilateral large breasts, minimal ptosis noted, prior lumpectomy scar noted, left breast slightly smaller than right            Assessment:       Pre-Op Diagnosis Codes:     * Recurrent malignant neoplasm of left breast [C50.912]      Plan:   62 y.o. female with L breast  DCIS recurrent and right breast lesion scheduled for biopsy     Plan for bilateral implant insertion today after bilateral mastectomy with Dr. Jama  - Patient was seen and evaluated by myself and Dr. Garland Gray    - Risks, benefits and alternatives to surgery were discussed.           All questions were answered. The patient was advised to call the clinic with any questions or concerns prior to their next visit.

## 2022-10-24 NOTE — HPI
Christal Posey is a 62 y.o. female who presents for bilateral mastectomy and left sentinel node biopsy, possible axillary dissection in the setting of ductal carcinoma in situ of the left breast. She was last seen in clinic on 8/3/22 and since that time underwent additional imaging and biopsy of a right breast lesion with pathology demonstrating an intraductal papilloma. She denies any other changes in her medical history including hospitalizations, emergency room visits, diagnoses or medications. She is feeling well overall today and is otherwise in her usual state of health.

## 2022-10-24 NOTE — ANESTHESIA PROCEDURE NOTES
Bilateral Paravertebral Single Injections    Patient location during procedure: pre-op   Block not for primary anesthetic.  Reason for block: at surgeon's request and post-op pain management   Post-op Pain Location: bilateral breast   Start time: 10/24/2022 7:36 AM  Timeout: 10/24/2022 7:36 AM   End time: 10/24/2022 7:42 AM    Staffing  Authorizing Provider: Case Magana MD  Performing Provider: Case Magana MD    Preanesthetic Checklist  Completed: patient identified, IV checked, site marked, risks and benefits discussed, surgical consent, monitors and equipment checked, pre-op evaluation and timeout performed  Peripheral Block  Patient position: sitting  Prep: ChloraPrep  Patient monitoring: heart rate, cardiac monitor, continuous pulse ox, continuous capnometry and frequent blood pressure checks  Block type: paravertebral - thoracic  Laterality: bilateral  Injection technique: single shot  Interspace: T2-3 and T4-5    Needle  Needle type: Tuohy   Needle gauge: 17 G  Needle length: 3.5 in  Needle localization: anatomical landmarks     Assessment  Injection assessment: negative aspiration and negative parasthesia  Paresthesia pain: none  Heart rate change: no  Slow fractionated injection: yes  Pain Tolerance: comfortable throughout block and no complaints  Medications:    Medications: ropivacaine (NAROPIN) injection 0.5% - Perineural   40 mL - 10/24/2022 7:42:00 AM    Additional Notes  T2 os at 7 cm  T4 os at 7 cm  VSS.  D[facility] RN monitoring vitals throughout procedure.  Patient tolerated procedure well.  10 cc of 0.5% ropivacaine with epi 1:300k administered at each level bilaterally

## 2022-10-24 NOTE — OP NOTE
Operative Note     10/24/2022    PRE-OP DIAGNOSIS: Recurrent malignant neoplasm of left breast [C50.912]      POST-OP DIAGNOSIS: Post-Op Diagnosis Codes:     * Recurrent malignant neoplasm of left breast [C50.912]    Procedure(s):  MASTECTOMY BILATERAL  BIOPSY, LYMPH NODE, SENTINEL LEFT  INJECTION, FOR SENTINEL NODE IDENTIFICATION LEFT  INSERTION, BREAST IMPLANT BILATERAL     SURGEON: Surgeon(s) and Role:  Panel 1:     * JOVANNI Jama MD - Primary     * Todd Akers MD - Resident - Assisting  Panel 2:     * Garland Gray MD - Primary     * Sharmaine Perez MD - Resident - Assisting    ANESTHESIA: General     OPERATIVE FINDINGS:  2 clips in left breast, 1 clip in right breast, 1 left sentinel lymph node    INDICATION FOR PROCEDURE: This patient presents with a 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  The patient has elected to undergo bilateral nipple sparing mastectomy with sentinel lymph node biopsy for rachel assessment. The patient was informed of the possible risks and complications of the procedure, including but not limited to anesthetic risks, bleeding, infection, and need for additional surgery.  The patient concurred with the proposed plan, and has given informed consent.  The site of surgery was properly noted/marked in the preoperative holding area.    The patient was then brought to the operating room and placed in the supine position with both upper extremities extended.  General anesthesia was administered. Perioperative antibiotics were administered consisting and a time out was performed confirming the patient, site, and procedure.  The patient's left breast was injected with technetium sulfur colloid and Isosulfan blue dye to facilitate sentinel lymph node identification. The bilateral chest and  axilla was then prepped and draped in the usual sterile fashion.    We then turned our attention to the right breast where an inframammary incision was made, sparing the nipple areolar complex.  The incision was made with a 15 blade and deepened through the subcutaneous tissues with electrocautery.  Skin flaps were raised to the clavicle superiorly, to the lateral border of the sternum medially, to the inframammary fold inferiorly, and to the anterior border of the latissimus dorsi muscle laterally. Lighted retractors were used to assist in the creation of the skin flaps. The tissue beneath the nipple areolar complex was sharply dissected being careful to dissect to the dermis.  We were careful to ensure that the ductal tissue beneath the nipple was removed.  The breast tissue was then excised off the chest wall using electrocautery and taking care to incorporate the pectoralis fascia while leaving the serratus fascia behind.  The resulting mastectomy specimen was marked using a short stitch superiorly and a long stitch laterally, double stitch subareolar.  The breast was sent to pathology for permanent evaluation.   The operative field was irrigated with normal saline and all bleeding points were secured with cautery.   The incision will be closed by the plastic surgery service.      We first turned our attention to the left axilla. The gamma probe was used to identify an area of increased radioactivity within the lower axilla.  A small incision was made in a transverse inferior fashion just beneath the hairline in the axilla. The clavipectoral sheath was sharply incised to reveal the level I axillary lymph nodes. The probe was used to identify a single node with increased radioactivity.  This node was brought into the operative field and carefully dissected free of the surrounding lymphovascular structures.  The highest ex vivo count of the node was 1549.  The node was then sent to pathology for frozen section  evaluation, labeled as sentinel node #1.  A total of 1 axillary sentinel nodes and 0 axillary non-sentinel nodes were identified, excised and submitted to pathology.  Bed counts were obtained to confirm that the 10% rule had not been violated.   The wound was irrigated with normal saline, and all bleeding points were secured with cautery.  The incision was closed in layers of vicryl suture and running 4-0 monocryl.     We then turned our attention to the left breast where an inframammary incision was made, sparing the nipple areolar complex.  The incision was made with a 15 blade and deepened through the subcutaneous tissues with electrocautery.  Skin flaps were raised to the clavicle superiorly, to the lateral border of the sternum medially, to the inframammary fold inferiorly, and to the anterior border of the latissimus dorsi muscle laterally. Lighted retractors were used to assist in the creation of the skin flaps. The tissue beneath the nipple areolar complex was sharply dissected being careful to dissect to the dermis.  We were careful to ensure that the ductal tissue beneath the nipple was removed.  The breast tissue was then excised off the chest wall using electrocautery and taking care to incorporate the pectoralis fascia while leaving the serratus fascia behind.  The resulting mastectomy specimen was marked using a short stitch superiorly and a long stitch laterally, double stitch subareolar.  Additional tissue was taken from the anterior lateral and anterior superior areas of the mastectomy flap. The breast was sent to pathology for permanent evaluation.   The operative field was irrigated with normal saline and all bleeding points were secured with cautery.   The incision will be closed by the plastic surgery service.     Frozen section rachel evaluation revealed no evidence of metastatic disease.  Therefore, the operative field was irrigated with normal saline and all bleeding points were secured with  Bovie electrocautery.  The incision will be closed by the plastic surgery service.      At the end of my portion of the operation, all sponge, instrument, and needle counts x 2 were correct.    ESTIMATED BLOOD LOSS: less than 100 mL    COMPLICATIONS:  None    DISPOSITION: intraop transfer to the plastic surgery service    ATTESTATION:   I was present and scrubbed for the entire procedure.    Rossana Synoptic Reporting:  Operation performed with curative intent: yes    Tracer used to identify sentinel nodes in the upfront surgery (non-neoadjuvant) setting: dye,  and radioactive tracer,     Tracer used to identify sentinel nodes in the neoadjuvant setting: N/A    All nodes (colored or non-colored) present at the end of a dye-filled lymphatic channel were removed: Yes    All significantly radioactive nodes were removed: Yes    All palpably suspicious nodes were removed: N/A, no palpable nodes    Biopsy-proven positive nodes marked with clips prior to chemotherapy were identified and removed: N/A

## 2022-10-24 NOTE — SUBJECTIVE & OBJECTIVE
No current facility-administered medications on file prior to encounter.     Current Outpatient Medications on File Prior to Encounter   Medication Sig    gabapentin (NEURONTIN) 800 MG tablet Take 800 mg by mouth 3 (three) times daily.    losartan (COZAAR) 25 MG tablet TAKE 1 TABLET BY MOUTH EVERY DAY (Patient taking differently: Take 25 mg by mouth nightly.)    ALPRAZolam (XANAX) 0.25 MG tablet Take 1 tablet (0.25 mg total) by mouth nightly as needed for Insomnia.    atogepant (QULIPTA) 60 mg Tab Take 60 mg by mouth.    baclofen (LIORESAL) 10 MG tablet Take 10 mg by mouth 3 (three) times daily.    budesonide (PULMICORT) 0.5 mg/2 mL nebulizer solution SMARTSI Via Nebulizer Twice Daily    budesonide 1 mg/2 mL NbSp     butalbital-acetaminophen-caffeine -40 mg (FIORICET, ESGIC) -40 mg per tablet Take 1 tablet by mouth every 6 to 8 hours as needed.    cholestyramine (QUESTRAN) 4 gram packet Take 1 packet by mouth once daily.    CHOLESTYRAMINE LIGHT 4 gram packet Take 1 packet by mouth once daily.    ciclopirox (LOPROX) 0.77 % Crea Apply topically 2 (two) times daily.    diclofenac sodium (VOLTAREN) 1 % Gel APPLY 2 GRAMS TOPICALLY 3 (THREE) TIMES DAILY.    fluconazole (DIFLUCAN) 200 MG Tab Take 1 pill po qday x 3 repeat in 1 week (Patient not taking: Reported on 10/21/2022)    fluticasone (FLONASE) 50 mcg/actuation nasal spray     hydrocortisone butyrate (LOCOID) 0.1 % Crea cream AAA bid    lansoprazole (PREVACID) 30 MG capsule Take 30 mg by mouth every morning. 1 Capsule, Delayed Release(E.C.) Oral Every day    multivitamin (THERAGRAN) per tablet Take 1 tablet by mouth once daily.    mupirocin (BACTROBAN) 2 % ointment Apply topically 2 (two) times daily.    NEILMED SINUS RINSE COMPLETE pkdv by Each Nostril route.    ondansetron (ZOFRAN) 4 MG tablet Take 1 tablet (4 mg total) by mouth every 8 (eight) hours as needed for Nausea. 1 Tablet Oral Every 6 hours    prasterone, dhea, (INTRAROSA) 6.5 mg Inst Place  6.5 mg vaginally every evening.    PREMARIN vaginal cream PLACE 1 GRAM VAGINALLY 2 TIMES A WK    sumatriptan (IMITREX) 20 mg/actuation nasal spray USE 1 SPRAY IN EACH NOSTRIL AT ONSET OF HEADACHE. MAY REPEAT ONCE IN 2 HOURS IF NO RELIEF. NO MORE THAN 2 SPRAYS PER 24 HOURS    triamcinolone acetonide 0.025% (KENALOG) 0.025 % Oint Apply topically 2 (two) times daily. x 1-2 wks then prn flares only    VENTOLIN HFA 90 mcg/actuation inhaler INHALE 2 PUFFS INTO THE LUNGS EVERY 6 HOURS AS NEEDED FOR WHEEZING. RESCUE    vitamin D 1000 units Tab Take 185 mg by mouth every morning.        Review of patient's allergies indicates:   Allergen Reactions    Pravastatin Other (See Comments)     Severe muscle pain.  Muscle pain    Other reaction(s): Other (See Comments)  Severe muscle pain.    Shellfish containing products Anaphylaxis    Amitriptyline      Other reaction(s): dizzy    Codeine      Other reaction(s): Unknown    Doxycycline      Other reaction(s): Nausea    Erythromycin      Other reaction(s): Unknown    Iodine      Other reaction(s): Swelling  Other reaction(s): Unknown    Macrobid  [nitrofurantoin monohyd/m-cryst]      Other reaction(s): Unknown    Verapamil      Other reaction(s): Headache       Past Medical History:   Diagnosis Date    Allergy     Breast cancer 05/2016    Left breast low grade DCIS, ER/[place] positive    Colon polyp 10/31/2018    Ischemic colitis suspected as well ( area biopsied).    GERD (gastroesophageal reflux disease)     Hiatal hernia     Hyperlipidemia     Irritable bowel syndrome     Migraine headache     RA (rheumatoid arthritis)     Squamous cell carcinoma excised 11/12/14    in situ, R forearm     Past Surgical History:   Procedure Laterality Date    BREAST BIOPSY Left 05/2016    DCIS    BREAST LUMPECTOMY      ECTOPIC PREGNANCY SURGERY Right     HYSTERECTOMY  1998    ovaries left intact    removal of ovarian cyst Right oct 1992    ovary left intact    TUBAL LIGATION      tubal reversal  july  1991     Family History       Problem Relation (Age of Onset)    Cancer Father (77), Paternal Uncle, Maternal Uncle    Migraines Sister, Brother, Maternal Grandmother    Seizures Brother, Maternal Grandmother          Tobacco Use    Smoking status: Never    Smokeless tobacco: Never   Substance and Sexual Activity    Alcohol use: No     Alcohol/week: 0.0 standard drinks    Drug use: No    Sexual activity: Yes     Partners: Male     Birth control/protection: Surgical, None     Comment:  to [facility]      Review of Systems   Constitutional:  Negative for activity change, chills and fever.   HENT:  Negative for congestion and sore throat.    Respiratory:  Negative for cough and shortness of breath.    Cardiovascular:  Negative for chest pain.   Gastrointestinal:  Negative for nausea and vomiting.   Genitourinary:  Negative for difficulty urinating and dysuria.   Neurological:  Negative for seizures and syncope.   Objective:     Vital Signs (Most Recent):    Vital Signs (24h Range):           There is no height or weight on file to calculate BMI.    Physical Exam  Vitals and nursing note reviewed.   Constitutional:       General: She is not in acute distress.     Appearance: Normal appearance. She is not ill-appearing.   Eyes:      Extraocular Movements: Extraocular movements intact.      Conjunctiva/sclera: Conjunctivae normal.   Cardiovascular:      Rate and Rhythm: Normal rate.   Pulmonary:      Effort: Pulmonary effort is normal. No respiratory distress.   Skin:     General: Skin is warm and dry.   Neurological:      General: No focal deficit present.      Mental Status: She is alert and oriented to person, place, and time.   Psychiatric:         Mood and Affect: Mood normal.         Behavior: Behavior normal.       Significant Labs:  I have reviewed all pertinent lab results within the past 24 hours.    Significant Diagnostics:  I have reviewed all pertinent imaging results/findings within the past 24 hours.

## 2022-10-24 NOTE — H&P
[facility] - Surgery ([facility])  General Surgery  History & Physical    Patient Name: Christal Poesy  MRN: [id number]  Admission Date: 10/24/2022  Attending Physician: JOVANNI Jama MD   Primary Care Provider: Blane Brian MD    Patient information was obtained from patient and past medical records.     Subjective:     Chief Complaint/Reason for Admission: DCIS    History of Present Illness: Christal Posey is a 62 y.o. female who presents for bilateral mastectomy and left sentinel node biopsy, possible axillary dissection in the setting of ductal carcinoma in situ of the left breast. She was last seen in clinic on 8/3/22 and since that time underwent additional imaging and biopsy of a right breast lesion with pathology demonstrating an intraductal papilloma. She denies any other changes in her medical history including hospitalizations, emergency room visits, diagnoses or medications. She is feeling well overall today and is otherwise in her usual state of health.       No current facility-administered medications on file prior to encounter.     Current Outpatient Medications on File Prior to Encounter   Medication Sig    gabapentin (NEURONTIN) 800 MG tablet Take 800 mg by mouth 3 (three) times daily.    losartan (COZAAR) 25 MG tablet TAKE 1 TABLET BY MOUTH EVERY DAY (Patient taking differently: Take 25 mg by mouth nightly.)    ALPRAZolam (XANAX) 0.25 MG tablet Take 1 tablet (0.25 mg total) by mouth nightly as needed for Insomnia.    atogepant (QULIPTA) 60 mg Tab Take 60 mg by mouth.    baclofen (LIORESAL) 10 MG tablet Take 10 mg by mouth 3 (three) times daily.    budesonide (PULMICORT) 0.5 mg/2 mL nebulizer solution SMARTSI Via Nebulizer Twice Daily    budesonide 1 mg/2 mL NbSp     butalbital-acetaminophen-caffeine -40 mg (FIORICET, ESGIC) -40 mg per tablet Take 1 tablet by mouth every 6 to 8 hours as needed.    cholestyramine (QUESTRAN) 4 gram packet Take 1 packet by mouth once daily.     CHOLESTYRAMINE LIGHT 4 gram packet Take 1 packet by mouth once daily.    ciclopirox (LOPROX) 0.77 % Crea Apply topically 2 (two) times daily.    diclofenac sodium (VOLTAREN) 1 % Gel APPLY 2 GRAMS TOPICALLY 3 (THREE) TIMES DAILY.    fluconazole (DIFLUCAN) 200 MG Tab Take 1 pill po qday x 3 repeat in 1 week (Patient not taking: Reported on 10/21/2022)    fluticasone (FLONASE) 50 mcg/actuation nasal spray     hydrocortisone butyrate (LOCOID) 0.1 % Crea cream AAA bid    lansoprazole (PREVACID) 30 MG capsule Take 30 mg by mouth every morning. 1 Capsule, Delayed Release(E.C.) Oral Every day    multivitamin (THERAGRAN) per tablet Take 1 tablet by mouth once daily.    mupirocin (BACTROBAN) 2 % ointment Apply topically 2 (two) times daily.    NEILMED SINUS RINSE COMPLETE pkdv by Each Nostril route.    ondansetron (ZOFRAN) 4 MG tablet Take 1 tablet (4 mg total) by mouth every 8 (eight) hours as needed for Nausea. 1 Tablet Oral Every 6 hours    prasterone, dhea, (INTRAROSA) 6.5 mg Inst Place 6.5 mg vaginally every evening.    PREMARIN vaginal cream PLACE 1 GRAM VAGINALLY 2 TIMES A WK    sumatriptan (IMITREX) 20 mg/actuation nasal spray USE 1 SPRAY IN EACH NOSTRIL AT ONSET OF HEADACHE. MAY REPEAT ONCE IN 2 HOURS IF NO RELIEF. NO MORE THAN 2 SPRAYS PER 24 HOURS    triamcinolone acetonide 0.025% (KENALOG) 0.025 % Oint Apply topically 2 (two) times daily. x 1-2 wks then prn flares only    VENTOLIN HFA 90 mcg/actuation inhaler INHALE 2 PUFFS INTO THE LUNGS EVERY 6 HOURS AS NEEDED FOR WHEEZING. RESCUE    vitamin D 1000 units Tab Take 185 mg by mouth every morning.        Review of patient's allergies indicates:   Allergen Reactions    Pravastatin Other (See Comments)     Severe muscle pain.  Muscle pain    Other reaction(s): Other (See Comments)  Severe muscle pain.    Shellfish containing products Anaphylaxis    Amitriptyline      Other reaction(s): dizzy    Codeine      Other reaction(s): Unknown     Doxycycline      Other reaction(s): Nausea    Erythromycin      Other reaction(s): Unknown    Iodine      Other reaction(s): Swelling  Other reaction(s): Unknown    Macrobid  [nitrofurantoin monohyd/m-cryst]      Other reaction(s): Unknown    Verapamil      Other reaction(s): Headache       Past Medical History:   Diagnosis Date    Allergy     Breast cancer 05/2016    Left breast low grade DCIS, ER/[place] positive    Colon polyp 10/31/2018    Ischemic colitis suspected as well ( area biopsied).    GERD (gastroesophageal reflux disease)     Hiatal hernia     Hyperlipidemia     Irritable bowel syndrome     Migraine headache     RA (rheumatoid arthritis)     Squamous cell carcinoma excised 11/12/14    in situ, R forearm     Past Surgical History:   Procedure Laterality Date    BREAST BIOPSY Left 05/2016    DCIS    BREAST LUMPECTOMY      ECTOPIC PREGNANCY SURGERY Right     HYSTERECTOMY  1998    ovaries left intact    removal of ovarian cyst Right oct 1992    ovary left intact    TUBAL LIGATION      tubal reversal  july 1991     Family History       Problem Relation (Age of Onset)    Cancer Father (77), Paternal Uncle, Maternal Uncle    Migraines Sister, Brother, Maternal Grandmother    Seizures Brother, Maternal Grandmother          Tobacco Use    Smoking status: Never    Smokeless tobacco: Never   Substance and Sexual Activity    Alcohol use: No     Alcohol/week: 0.0 standard drinks    Drug use: No    Sexual activity: Yes     Partners: Male     Birth control/protection: Surgical, None     Comment:  to [facility]      Review of Systems   Constitutional:  Negative for activity change, chills and fever.   HENT:  Negative for congestion and sore throat.    Respiratory:  Negative for cough and shortness of breath.    Cardiovascular:  Negative for chest pain.   Gastrointestinal:  Negative for nausea and vomiting.   Genitourinary:  Negative for difficulty urinating and dysuria.   Neurological:  Negative  for seizures and syncope.   Objective:     Vital Signs (Most Recent):    Vital Signs (24h Range):           There is no height or weight on file to calculate BMI.    Physical Exam  Vitals and nursing note reviewed.   Constitutional:       General: She is not in acute distress.     Appearance: Normal appearance. She is not ill-appearing.   Eyes:      Extraocular Movements: Extraocular movements intact.      Conjunctiva/sclera: Conjunctivae normal.   Cardiovascular:      Rate and Rhythm: Normal rate.   Pulmonary:      Effort: Pulmonary effort is normal. No respiratory distress.   Skin:     General: Skin is warm and dry.   Neurological:      General: No focal deficit present.      Mental Status: She is alert and oriented to person, place, and time.   Psychiatric:         Mood and Affect: Mood normal.         Behavior: Behavior normal.       Significant Labs:  I have reviewed all pertinent lab results within the past 24 hours.    Significant Diagnostics:  I have reviewed all pertinent imaging results/findings within the past 24 hours.      Assessment/Plan:     * Ductal carcinoma in situ (DCIS) of left breast  Christal Posey is a 62 y.o. female who presents for bilateral mastectomy, left sentinel node biopsy with possible axillary dissection and will undergo immediate reconstruction with plastic surgery.    -Surgical plan including risks and alternatives reviewed, all questions answered  -Consent signed at bedside  -Proceed to OR as planned      VTE Risk Mitigation (From admission, onward)         Ordered     IP VTE HIGH RISK PATIENT  Once         10/24/22 0541     Place sequential compression device  Until discontinued         10/24/22 0541                Todd Akers MD  General Surgery  [facility] - Surgery ([facility])

## 2022-10-24 NOTE — PROGRESS NOTES
Tolerated paravertebral block well, resting comfortably without complaint, VSS. To OR per stretcher accompanied by anesthesia resident and OR nurse, portable O2 in use, HOB up.

## 2022-10-24 NOTE — ANESTHESIA PROCEDURE NOTES
Intubation    Date/Time: 10/24/2022 8:06 AM  Performed by: Rene Dobson MD  Authorized by: David Cooney MD     Intubation:     Induction:  Rapid sequence induction    Intubated:  Postinduction    Mask Ventilation:  N/a    Attempts:  1    Attempted By:  Resident anesthesiologist    Method of Intubation:  Direct    Blade:  Strickland 2    Laryngeal View Grade: Grade I - full view of cords      Difficult Airway Encountered?: No      Complications:  None    Airway Device:  Oral endotracheal tube    Airway Device Size:  7.0    Style/Cuff Inflation:  Cuffed    Inflation Amount (mL):  8    Tube secured:  21    Secured at:  The lips    Placement Verified By:  Capnometry    Complicating Factors:  None    Findings Post-Intubation:  BS equal bilateral and atraumatic/condition of teeth unchanged

## 2022-10-24 NOTE — TRANSFER OF CARE
"Anesthesia Transfer of Care Note    Patient: Christal Posey    Procedure(s) Performed: Procedure(s) (LRB):  MASTECTOMY BILATERAL (Bilateral)  BIOPSY, LYMPH NODE, SENTINEL LEFT (Left)  INJECTION, FOR SENTINEL NODE IDENTIFICATION LEFT (Left)  INSERTION, BREAST IMPLANT BILATERAL (Bilateral)    Patient location: PACU    Anesthesia Type: general    Transport from OR: Transported from OR on 6-10 L/min O2 by face mask with adequate spontaneous ventilation    Post pain: adequate analgesia    Post assessment: no apparent anesthetic complications and tolerated procedure well    Post vital signs: stable    Level of consciousness: sedated    Nausea/Vomiting: no nausea/vomiting    Complications: none    Transfer of care protocol was followed      Last vitals:   Visit Vitals  BP (!) 98/54 (BP Location: Left arm, Patient Position: Lying)   Pulse 75   Temp 36.4 °C (97.5 °F) (Temporal)   Resp 13   Ht 5' 6" (1.676 m)   Wt 96.2 kg (212 lb)   LMP  (LMP Unknown)   SpO2 100%   Breastfeeding No   BMI 34.22 kg/m²     "

## 2022-10-24 NOTE — BRIEF OP NOTE
Brief Operative Note     SUMMARY     Surgery Date: 10/24/2022     Surgeon(s) and Role:  Panel 1:     * JOVANNI Jama MD - Primary     * Todd Akers MD - Resident - Assisting  Panel 2:     * Garland Gray MD - Primary     * Sharmaine Perez MD - Resident - Assisting      * Anand Morrow MD - Resident - Assisting      Pre-op Diagnosis:  Recurrent malignant neoplasm of left breast [C50.912]    Post-op Diagnosis:  Recurrent malignant neoplasm of left breast [C50.912]    Procedure(s) (LRB):  MASTECTOMY BILATERAL (Bilateral)  BIOPSY, LYMPH NODE, SENTINEL LEFT (Left)  INJECTION, FOR SENTINEL NODE IDENTIFICATION LEFT (Left)  INSERTION, BREAST IMPLANT BILATERAL (Bilateral)    Anesthesia: General    Description of Procedure:   Bilateral tissue expander insertion    Findings/Key Components:  Healthy flaps and nipples, vicryl mesh sling placed on right, 2 drains placed bilaterally    Estimated Blood Loss: Minimal         Specimens Removed:   Specimen (24h ago, onward)       Start     Ordered    10/24/22 1543  Specimen to Pathology, Surgery General Surgery  Once        Comments: Pre-op Diagnosis: Recurrent malignant neoplasm of left breast [C50.912]Procedure(s):MASTECTOMY BILATERALBIOPSY, LYMPH NODE, SENTINEL LEFTINJECTION, FOR SENTINEL NODE IDENTIFICATION LEFTINSERTION, BREAST IMPLANT BILATERAL Number of specimens: 1Name of specimens: #1. RIGHT BREAST MASTECTOMY SHORT STITCH SUPERIOR LONG LATERAL DOUBLE STITCH NIPPLE MARGIN-PERMANENT, #2. LEFT AXILLARY SENTINEL LYMPH NODE #1 HOT AND BLUE 1451- FRESH; #3 LEFT BREAST MASTECTOMY SHORT STITCH SUPERIOR LONG LATERAL DOUBLE STITCH NIPPLE MARGIN-PERMANENT; #4 LEFT MASTECTOMY ADDITIONAL INTERIOR LATERAL MARGIN, CLIP CEZAR NEW MARGIN-PERMANENT, #5 LEFT MASTECTOMY ADDITIONAL NEW SUPERIOR MARGIN, CLIP [place] NEW MARGIN-PERMANENT     References:    Click here for ordering Quick Tip   Question Answer Comment   Procedure Type: General Surgery    Specimen Class: Known or  suspected malignancy    Which provider would you like to cc? JOVANNI LEONG    Release to patient Immediate        10/24/22 6479

## 2022-10-24 NOTE — BRIEF OP NOTE
[facility] - [facility] ([facility])  Brief Operative Note    SUMMARY     Surgery Date: 10/24/2022     Surgeon(s) and Role:  Panel 1:     * JOVANNI Jama MD - Primary     * Todd Akers MD - Resident - Assisting  Panel 2:     * Garland Gray MD - Primary     * Sharmaine Perez MD - Resident - Assisting        Pre-op Diagnosis:  Recurrent malignant neoplasm of left breast [C50.912]    Post-op Diagnosis:  Post-Op Diagnosis Codes:     * Recurrent malignant neoplasm of left breast [C50.912]    Procedure(s) (LRB):  MASTECTOMY BILATERAL (Bilateral)  BIOPSY, LYMPH NODE, SENTINEL LEFT (Left)  INJECTION, FOR SENTINEL NODE IDENTIFICATION LEFT (Left)  INSERTION, BREAST IMPLANT BILATERAL (Bilateral)    Anesthesia: General    Operative Findings:   Bilateral mastectomy  Left sentinel lymph node biopsy x1, hot and blue  Case turned over to Plastic Surgery for implant insertion    Estimated Blood Loss: 25cc, see final EBL per plastics    Estimated Blood Loss has been documented.         Specimens:   Specimen (24h ago, onward)      None            [id number]

## 2022-10-24 NOTE — OP NOTE
Date of surgery 10/24/2022   Preoperative diagnosis breast cancer   Postoperative diagnosis is the same   Procedure performed  1. Immediate bilateral breast reconstruction using tissue expanders  2. Placement of Vicryl mesh right breast  3. Placement of Vicryl mesh left breast  Surgeon [facility]   Anesthesia general  Complications none  Blood loss minimal  Drains x4    After completion of mastectomy entered the room.  A the flap evaluation showed good the flap thickness.  It was down to dermis beneath both nipples.  The nipples had good color and turgor.  Hemostasis was achieved using the Bovie as well as surgical clips.  Pockets were irrigated with triple antibiotic solution.    Subpectoral pocket was then created.  Lateral Lao minor serratus anterior flap was elevated.  Exact procedure was performed on the opposite side.  Patient was re-prepped redraped gloves were changed 2 550 cc Magna Site tissue expanders opened on the back table all air was removed.  They are placed in the subpectoral pocket and the 2-0 Prolene sutures were tied above the muscle securing the tissue expander.  The pectoralis major muscle was sutured to the Lao minor serratus anterior flaps laterally.  Inferiorly however we were not able to close either side.  Thus Vicryl mesh which was for ply thick was then used on each side.  Both expanders were placed secured.  150 cc of air was placed in each expander.  Two drains were placed on each side 1 in the subcutaneous space 1 under the implant.  The incisions were then closed using 3-0 Monocryl followed by running 4-0 Monocryl subcuticular suture.  There were no complications end dictation

## 2022-10-25 PROCEDURE — 25000003 PHARM REV CODE 250: Performed by: STUDENT IN AN ORGANIZED HEALTH CARE EDUCATION/TRAINING PROGRAM

## 2022-10-25 PROCEDURE — 63600175 PHARM REV CODE 636 W HCPCS: Performed by: STUDENT IN AN ORGANIZED HEALTH CARE EDUCATION/TRAINING PROGRAM

## 2022-10-25 PROCEDURE — 94640 AIRWAY INHALATION TREATMENT: CPT

## 2022-10-25 PROCEDURE — 94761 N-INVAS EAR/PLS OXIMETRY MLT: CPT

## 2022-10-25 PROCEDURE — 25000003 PHARM REV CODE 250

## 2022-10-25 PROCEDURE — 25000242 PHARM REV CODE 250 ALT 637 W/ HCPCS: Performed by: STUDENT IN AN ORGANIZED HEALTH CARE EDUCATION/TRAINING PROGRAM

## 2022-10-25 PROCEDURE — 51798 US URINE CAPACITY MEASURE: CPT

## 2022-10-25 RX ORDER — OXYCODONE HYDROCHLORIDE 5 MG/1
5 TABLET ORAL EVERY 4 HOURS PRN
Status: DISCONTINUED | OUTPATIENT
Start: 2022-10-25 | End: 2022-10-26 | Stop reason: HOSPADM

## 2022-10-25 RX ORDER — BACITRACIN 500 [USP'U]/G
OINTMENT TOPICAL 2 TIMES DAILY
Qty: 14 G | Refills: 0 | Status: SHIPPED | OUTPATIENT
Start: 2022-10-25 | End: 2023-02-24

## 2022-10-25 RX ORDER — OXYCODONE HYDROCHLORIDE 5 MG/1
5 TABLET ORAL EVERY 6 HOURS PRN
Status: DISCONTINUED | OUTPATIENT
Start: 2022-10-25 | End: 2022-10-25

## 2022-10-25 RX ORDER — FENTANYL CITRATE 50 UG/ML
INJECTION, SOLUTION INTRAMUSCULAR; INTRAVENOUS
Status: DISCONTINUED | OUTPATIENT
Start: 2022-10-24 | End: 2022-10-25

## 2022-10-25 RX ORDER — OXYCODONE HYDROCHLORIDE 10 MG/1
10 TABLET ORAL EVERY 4 HOURS PRN
Status: DISCONTINUED | OUTPATIENT
Start: 2022-10-25 | End: 2022-10-26 | Stop reason: HOSPADM

## 2022-10-25 RX ORDER — CLINDAMYCIN HYDROCHLORIDE 150 MG/1
300 CAPSULE ORAL EVERY 6 HOURS
Status: DISCONTINUED | OUTPATIENT
Start: 2022-10-25 | End: 2022-10-26 | Stop reason: HOSPADM

## 2022-10-25 RX ADMIN — GABAPENTIN 800 MG: 400 CAPSULE ORAL at 09:10

## 2022-10-25 RX ADMIN — MUPIROCIN: 20 OINTMENT TOPICAL at 10:10

## 2022-10-25 RX ADMIN — HEPARIN SODIUM 5000 UNITS: 5000 INJECTION INTRAVENOUS; SUBCUTANEOUS at 02:10

## 2022-10-25 RX ADMIN — HYDROMORPHONE HYDROCHLORIDE 2 MG: 2 TABLET ORAL at 12:10

## 2022-10-25 RX ADMIN — BUDESONIDE 0.5 MG: 0.25 INHALANT RESPIRATORY (INHALATION) at 08:10

## 2022-10-25 RX ADMIN — HEPARIN SODIUM 5000 UNITS: 5000 INJECTION INTRAVENOUS; SUBCUTANEOUS at 05:10

## 2022-10-25 RX ADMIN — CLINDAMYCIN HYDROCHLORIDE 300 MG: 150 CAPSULE ORAL at 12:10

## 2022-10-25 RX ADMIN — BACLOFEN 10 MG: 10 TABLET ORAL at 02:10

## 2022-10-25 RX ADMIN — BACLOFEN 10 MG: 10 TABLET ORAL at 09:10

## 2022-10-25 RX ADMIN — CLINDAMYCIN HYDROCHLORIDE 300 MG: 150 CAPSULE ORAL at 05:10

## 2022-10-25 RX ADMIN — IBUPROFEN 800 MG: 400 TABLET, FILM COATED ORAL at 10:10

## 2022-10-25 RX ADMIN — BUDESONIDE 0.5 MG: 0.25 INHALANT RESPIRATORY (INHALATION) at 09:10

## 2022-10-25 RX ADMIN — HEPARIN SODIUM 5000 UNITS: 5000 INJECTION INTRAVENOUS; SUBCUTANEOUS at 10:10

## 2022-10-25 RX ADMIN — IBUPROFEN 800 MG: 400 TABLET, FILM COATED ORAL at 05:10

## 2022-10-25 RX ADMIN — IBUPROFEN 800 MG: 400 TABLET, FILM COATED ORAL at 02:10

## 2022-10-25 RX ADMIN — GABAPENTIN 800 MG: 400 CAPSULE ORAL at 02:10

## 2022-10-25 RX ADMIN — LOSARTAN POTASSIUM 25 MG: 25 TABLET, FILM COATED ORAL at 09:10

## 2022-10-25 RX ADMIN — ACETAMINOPHEN 650 MG: 325 TABLET ORAL at 05:10

## 2022-10-25 RX ADMIN — PANTOPRAZOLE SODIUM 40 MG: 40 TABLET, DELAYED RELEASE ORAL at 09:10

## 2022-10-25 RX ADMIN — ACETAMINOPHEN 650 MG: 325 TABLET ORAL at 12:10

## 2022-10-25 RX ADMIN — HYDROMORPHONE HYDROCHLORIDE 2 MG: 2 TABLET ORAL at 03:10

## 2022-10-25 RX ADMIN — MUPIROCIN: 20 OINTMENT TOPICAL at 09:10

## 2022-10-25 RX ADMIN — GABAPENTIN 800 MG: 400 CAPSULE ORAL at 10:10

## 2022-10-25 RX ADMIN — OXYCODONE 5 MG: 5 TABLET ORAL at 05:10

## 2022-10-25 RX ADMIN — BACLOFEN 10 MG: 10 TABLET ORAL at 10:10

## 2022-10-25 NOTE — PLAN OF CARE
[facility] - [facility]  Discharge Assessment    Primary Care Provider: Blane Brian MD     Discharge Assessment (most recent)       BRIEF DISCHARGE ASSESSMENT - 10/25/22 1027          Discharge Planning    Assessment Type Discharge Planning Brief Assessment     Resource/Environmental Concerns none     Support Systems Spouse/significant other     Equipment Currently Used at Home none     Current Living Arrangements home/apartment/condo     Patient/Family Anticipates Transition to home with family     Patient/Family Anticipated Services at Transition none     DME Needed Upon Discharge  none     Discharge Plan A Home with family     Discharge Plan B Home with family                     Spoke with patient at bedside to complete d/c planning assessment. Patient lives with her spouse and adult daughter in a single story home with 3 steps to enter. Independent with ADL'S. No DME in home. Verified PCP, Pharmacy and health insurance. Patient's daughter has arranged off work to be home with patient this week and spouse will be home next week to provide help as needed. Patient hopeful for d/c today pending voiding, ambulation and pain control. Bedside Nurse Emerald to ambulate patient at this time. Will continue to follow for needs.

## 2022-10-25 NOTE — PLAN OF CARE
Pt is Aox4, VSS. Pain assessed and managed with PRN medication. Pt is progressing towards goals. MARY ANN drains in place, output documented. SCD's in place with frequent checks for skin breakdown. Fall precautions in place, no reported falls. IV site CDI. Mayen in place, output documented.  Ambulates with 1x assist. Safety precautions in place bed in lowest position,wheels locked, call light within reach, id band and allergy band on, and clutter free environment.

## 2022-10-25 NOTE — ANESTHESIA RELEASE NOTE
"Anesthesia Release from PACU Note    Patient: Christal Posey    Procedure(s) Performed: Procedure(s) (LRB):  MASTECTOMY BILATERAL (Bilateral)  BIOPSY, LYMPH NODE, SENTINEL LEFT (Left)  INJECTION, FOR SENTINEL NODE IDENTIFICATION LEFT (Left)  INSERTION, BREAST IMPLANT BILATERAL (Bilateral)    Anesthesia type: general    Post pain: Adequate analgesia    Post assessment: no apparent anesthetic complications and tolerated procedure well    Last Vitals:   Visit Vitals  BP (!) 122/56 (BP Location: Right arm, Patient Position: Lying)   Pulse 89   Temp 36.7 °C (98.1 °F) (Oral)   Resp 16   Ht 5' 6" (1.676 m)   Wt 96 kg (211 lb 10.3 oz)   LMP  (LMP Unknown)   SpO2 96%   Breastfeeding No   BMI 34.16 kg/m²       Post vital signs: stable    Level of consciousness: awake and alert     Nausea/Vomiting: no nausea/no vomiting    Complications: none    Airway Patency: patent    Respiratory: unassisted, spontaneous ventilation, room air    Cardiovascular: stable and blood pressure at baseline    Hydration: euvolemic  "

## 2022-10-25 NOTE — SUBJECTIVE & OBJECTIVE
Interval History: Ms. Posey reports difficulty with pain control despite PRN dilaudid. She states this tends to make her sleepy rather than help with pain. Two drains to each mastectomy site, total of 260 cc out overnight. Mayen removed this morning. VSS. Afebrile.    Medications:  Continuous Infusions:  Scheduled Meds:   acetaminophen  650 mg Oral Q6H    baclofen  10 mg Oral TID    budesonide  0.5 mg Nebulization Q12H    clindamycin  300 mg Oral Q6H    gabapentin  800 mg Oral TID    heparin (porcine)  5,000 Units Subcutaneous Q8H    ibuprofen  800 mg Oral Q8H    losartan  25 mg Oral Daily    mupirocin   Nasal BID    pantoprazole  40 mg Oral Daily     PRN Meds:ALPRAZolam, butalbital-acetaminophen-caffeine -40 mg, ondansetron, oxyCODONE, oxyCODONE     Review of patient's allergies indicates:   Allergen Reactions    Pravastatin Other (See Comments)     Severe muscle pain.  Muscle pain    Other reaction(s): Other (See Comments)  Severe muscle pain.    Shellfish containing products Anaphylaxis    Amitriptyline      Other reaction(s): dizzy    Codeine      Other reaction(s): Unknown    Doxycycline      Other reaction(s): Nausea    Erythromycin      Other reaction(s): Unknown    Iodine      Other reaction(s): Swelling  Other reaction(s): Unknown    Macrobid  [nitrofurantoin monohyd/m-cryst]      Other reaction(s): Unknown    Verapamil      Other reaction(s): Headache     Objective:     Vital Signs (Most Recent):  Temp: 98.1 °F (36.7 °C) (10/25/22 0556)  Pulse: 89 (10/25/22 0556)  Resp: 16 (10/25/22 0557)  BP: (!) 122/56 (10/25/22 0556)  SpO2: 96 % (10/25/22 0556)   Vital Signs (24h Range):  Temp:  [97.5 °F (36.4 °C)-98.9 °F (37.2 °C)] 98.1 °F (36.7 °C)  Pulse:  [60-89] 89  Resp:  [12-20] 16  SpO2:  [91 %-100 %] 96 %  BP: ()/(50-89) 122/56     Weight: 96 kg (211 lb 10.3 oz)  Body mass index is 34.16 kg/m².    Intake/Output - Last 3 Shifts         10/23 0700  10/24 0659 10/24 0700  10/25 0659 10/25  0700  10/26 0659    P.O.  118     IV Piggyback  3700     Total Intake(mL/kg)  3818 (39.8)     Urine (mL/kg/hr)  3070 (1.3)     Emesis/NG output  0     Drains  260     Stool  0     Total Output  3330     Net  +488            Stool Occurrence  0 x             Physical Exam  Vitals and nursing note reviewed.   Constitutional:       General: She is not in acute distress.     Appearance: Normal appearance. She is obese. She is not ill-appearing.   Eyes:      Extraocular Movements: Extraocular movements intact.      Conjunctiva/sclera: Conjunctivae normal.   Cardiovascular:      Rate and Rhythm: Normal rate.   Pulmonary:      Effort: Pulmonary effort is normal. No respiratory distress.   Skin:     General: Skin is warm and dry.      Comments: Bilateral mastectomy and right axillary incisions clean dry and intact. Appropriately tender to palpation. Drains with thin serosanguinous output.    Neurological:      General: No focal deficit present.      Mental Status: She is alert and oriented to person, place, and time.   Psychiatric:         Mood and Affect: Mood normal.         Behavior: Behavior normal.       Significant Labs:  I have reviewed all pertinent lab results within the past 24 hours.    Significant Diagnostics:  I have reviewed all pertinent imaging results/findings within the past 24 hours.

## 2022-10-25 NOTE — PROGRESS NOTES
Progress Note  Plastic Surgery    Admit Date: 10/24/2022  S/P: Procedure(s) (LRB):  MASTECTOMY BILATERAL (Bilateral)  BIOPSY, LYMPH NODE, SENTINEL LEFT (Left)  INJECTION, FOR SENTINEL NODE IDENTIFICATION LEFT (Left)  INSERTION, BREAST IMPLANT BILATERAL (Bilateral)    Post-operative Day: 1 Day Post-Op    Hospital Day: 2    SUBJECTIVE:     Patient states she had pain overnight and is currently sore. Has not gotten up out of bed. Denies fever, nausea, vomiting, SOB, palpitations. Mayen in place on visit, removed this morning. Will optimize pain control. Incisions CDI, some bruising on breast skin.         OBJECTIVE:     Vital Signs (Most Recent)  Temp: 98.1 °F (36.7 °C) (10/25/22 0556)  Pulse: 89 (10/25/22 0556)  Resp: 16 (10/25/22 0557)  BP: (!) 122/56 (10/25/22 0556)  SpO2: 96 % (10/25/22 0556)    Vital Signs Range (Last 24H):  Temp:  [97.5 °F (36.4 °C)-98.9 °F (37.2 °C)]   Pulse:  [60-92]   Resp:  [12-20]   BP: ()/(50-90)   SpO2:  [91 %-100 %]     I & O (Last 24H):  Intake/Output Summary (Last 24 hours) at 10/25/2022 0708  Last data filed at 10/25/2022 0543  Gross per 24 hour   Intake 3818 ml   Output 3330 ml   Net 488 ml     Physical Exam:  General: no distress  Lungs:  clear to auscultation bilaterally and normal respiratory effort  Heart: regular rate and rhythm, S1, S2 normal, no murmur, rub or gallop  Abdomen: soft, non-tender non-distented; bowel sounds normal; no masses,  no organomegaly    Wound/Incision:  clean, dry, intact, some bruising on breast skin bilaterally. IMF incisions CDI, breasts dressed with tegaderm and fluff.     Laboratory:  Labs within the past 24 hours have been reviewed.        ASSESSMENT/PLAN:     Assessment: uncomplicated post-operative course, pain, soreness .    Plan: continue current treatment, out of bed, ambulate, advance diet, continue antibiotics, pain control, incentive spirometry, discontinue urinary catheter, discharge planning.

## 2022-10-25 NOTE — DISCHARGE SUMMARY
[facility] - [facility]  Plastic Surgery  Discharge Summary      Patient Name: Christal Posey  MRN: [id number]  Admission Date: 10/24/2022  Hospital Length of Stay: 0 days  Discharge Date and Time:  10/25/2022 2:55 PM  Attending Physician: Garland Gray, *   Discharging Provider: Sharmaine Perez MD  Primary Care Provider: Blane Brian MD     HPI: Christal Posey is a 62 y.o. year old female who presents presents for bilateral mastectomy and left sentinel node biopsy, possible axillary dissection in the setting of ductal carcinoma in situ of the left breast. She was seen in Dr. Snyder breast clinic on 8/3/22 and since that time underwent additional imaging and biopsy of a right breast lesion with pathology demonstrating an intraductal papilloma. She denies any other changes in her medical history including hospitalizations, emergency room visits, diagnoses or medications. She then presented to  Plastic Surgery Clinic on 08/17/2022 for consultation regarding breast reconstruction after newly diagnosed L breast DCIS/ Patient history of left breast DCIS s/p lumpectomy without radiation that now developed a new lesion. Denies fever, chills, nausea, vomiting, or other systemic signs of infection.     She presented to [facility] on 10/24/22 t for planned bilateral mastectomy with Dr. Jama and subsequent recontruction with Dr. Gray and underwent the stated procedure.     Procedure(s) (LRB):  MASTECTOMY BILATERAL (Bilateral)  BIOPSY, LYMPH NODE, SENTINEL LEFT (Left)  INJECTION, FOR SENTINEL NODE IDENTIFICATION LEFT (Left)  INSERTION, BREAST IMPLANT BILATERAL (Bilateral)     Hospital Course:  Patient was evaluated and consented preoperatively. She was admitted for  bilateral mastectomy with subsequent reconstruction by plastic surgery and was taken back to the operating room where she underwent the above stated procedure. For full details, see operative note. She was transported to the recovery room in hemodynamically stable  condition. She was admitted to extended recovery where she stayed for one midnight.  She had  some pain and soreness overnight, but otherwise has has an uncomplicated post operative course so far. She has 4 drains in place, two on each breast all putting out thin bloody fluid, no clots appreciated. She has been given drain care teaching.  She is now tolerating PO intake and pain and nausea are well-controlled on PRN PO medications. She is voiding spontaneously, having bowel movemnts and ambulating without assistance. She is ready for discharge.       Consults: none    Significant Diagnostic Studies: none    Pending Diagnostic Studies:       Procedure Component Value Units Date/Time    Specimen to Pathology, Surgery General Surgery [[id number]] Collected: 10/24/22 1543    Order Status: Sent Lab Status: In process Updated: 10/24/22 1543    Specimen: Tissue           Final Active Diagnoses:    Diagnosis Date Noted POA    PRINCIPAL PROBLEM:  Ductal carcinoma in situ (DCIS) of left breast [D05.12] 05/26/2016 Yes     Chronic      Problems Resolved During this Admission:      Discharged Condition: good    Disposition: Home, self care    Follow Up : In clinic    Patient Instructions:      Teach MARY ANN drain care and provide sheet to record output     Call MD for:  temperature >100.4     Call MD for:  persistent nausea and vomiting     Call MD for:  severe uncontrolled pain     Call MD for:  difficulty breathing, headache or visual disturbances     Call MD for:  redness, tenderness, or signs of infection (pain, swelling, redness, odor or green/yellow discharge around incision site)     Call MD for:  hives     Call MD for:  persistent dizziness or light-headedness     Call MD for:  extreme fatigue     Medications:  Reconciled Home Medications:      Medication List        START taking these medications      bacitracin 500 unit/gram ointment  Apply topically 2 (two) times daily.     clindamycin 300 MG capsule  Commonly known as:  CLEOCIN  Take 1 capsule (300 mg total) by mouth every 6 (six) hours. for 7 days     HYDROcodone-acetaminophen 5-325 mg per tablet  Commonly known as: NORCO  Take 1 tablet by mouth every 6 (six) hours as needed for Pain.     ondansetron 4 MG Tbdl  Commonly known as: ZOFRAN-ODT  Dissolve 1 tablet (4 mg total) by mouth every 6 (six) hours as needed (take as needed for nausea).            CHANGE how you take these medications      losartan 25 MG tablet  Commonly known as: COZAAR  TAKE 1 TABLET BY MOUTH EVERY DAY  What changed: when to take this            CONTINUE taking these medications      ALPRAZolam 0.25 MG tablet  Commonly known as: XANAX  Take 1 tablet (0.25 mg total) by mouth nightly as needed for Insomnia.     baclofen 10 MG tablet  Commonly known as: LIORESAL  Take 10 mg by mouth 3 (three) times daily.     * budesonide 1 mg/2 mL Nbsp     * budesonide 0.5 mg/2 mL nebulizer solution  Commonly known as: PULMICORT  SMARTSI Via Nebulizer Twice Daily     butalbital-acetaminophen-caffeine -40 mg -40 mg per tablet  Commonly known as: FIORICET, ESGIC  Take 1 tablet by mouth every 6 to 8 hours as needed.     cholestyramine 4 gram packet  Commonly known as: QUESTRAN  Take 1 packet by mouth once daily.     CHOLESTYRAMINE LIGHT 4 gram Pwpk  Generic drug: cholestyramine-aspartame  Take 1 packet by mouth once daily.     ciclopirox 0.77 % Crea  Commonly known as: LOPROX  Apply topically 2 (two) times daily.     diclofenac sodium 1 % Gel  Commonly known as: VOLTAREN  APPLY 2 GRAMS TOPICALLY 3 (THREE) TIMES DAILY.     fluticasone propionate 50 mcg/actuation nasal spray  Commonly known as: FLONASE     gabapentin 800 MG tablet  Commonly known as: NEURONTIN  Take 800 mg by mouth 3 (three) times daily.     hydrocortisone butyrate 0.1 % Crea cream  Commonly known as: LOCOID  AAA bid     lansoprazole 30 MG capsule  Commonly known as: PREVACID  Take 30 mg by mouth every morning. 1 Capsule, Delayed Release(E.C.) Oral  Every day     multivitamin per tablet  Commonly known as: THERAGRAN  Take 1 tablet by mouth once daily.     mupirocin 2 % ointment  Commonly known as: BACTROBAN  Apply topically 2 (two) times daily.     NEILMED SINUS RINSE COMPLETE Pkdv  Generic drug: sod chlor-bicarb-squeez bottle  by Each Nostril route.     ondansetron 4 MG tablet  Commonly known as: ZOFRAN  Take 1 tablet (4 mg total) by mouth every 8 (eight) hours as needed for Nausea. 1 Tablet Oral Every 6 hours     prasterone (dhea) 6.5 mg Inst  Commonly known as: INTRAROSA  Place 6.5 mg vaginally every evening.     PREMARIN vaginal cream  Generic drug: conjugated estrogens  PLACE 1 GRAM VAGINALLY 2 TIMES A WK     QULIPTA 60 mg Tab  Generic drug: atogepant  Take 60 mg by mouth.     SUMAtriptan 20 mg/actuation nasal spray  Commonly known as: IMITREX  USE 1 SPRAY IN EACH NOSTRIL AT ONSET OF HEADACHE. MAY REPEAT ONCE IN 2 HOURS IF NO RELIEF. NO MORE THAN 2 SPRAYS PER 24 HOURS     triamcinolone acetonide 0.025% 0.025 % Oint  Commonly known as: KENALOG  Apply topically 2 (two) times daily. x 1-2 wks then prn flares only     VENTOLIN HFA 90 mcg/actuation inhaler  Generic drug: albuterol  INHALE 2 PUFFS INTO THE LUNGS EVERY 6 HOURS AS NEEDED FOR WHEEZING. RESCUE     vitamin D 1000 units Tab  Commonly known as: VITAMIN D3  Take 185 mg by mouth every morning.           * This list has 2 medication(s) that are the same as other medications prescribed for you. Read the directions carefully, and ask your doctor or other care provider to review them with you.                ASK your doctor about these medications      fluconazole 200 MG Tab  Commonly known as: DIFLUCAN  Take 1 pill po qday x 3 repeat in 1 week              Sharmaine Perez MD  General Surgery  [facility] - Surgery

## 2022-10-25 NOTE — NURSING
..Nurses Note -- 4 Eyes      10/25/2022   2:13 AM      Skin assessed during: Transfer      [x] No Pressure Injuries Present    []Prevention Measures Documented      [] Yes- Altered Skin Integrity Present or Discovered   [] LDA Added if Not in Epic (Describe Wound)   [] New Altered Skin Integrity was Present on Admit and Documented in LDA   [] Wound Image Taken    Wound Care Consulted? No    Attending Nurse:  Diann Nash LPN     Second RN/Staff Member:  Alissa Wilkins RN

## 2022-10-25 NOTE — PROGRESS NOTES
[facility] - [facility]  Breast Surgery  Progress Note    Subjective:     History of Present Illness:  Christal Posey is a 62 y.o. female who presents for bilateral mastectomy and left sentinel node biopsy, possible axillary dissection in the setting of ductal carcinoma in situ of the left breast. She was last seen in clinic on 8/3/22 and since that time underwent additional imaging and biopsy of a right breast lesion with pathology demonstrating an intraductal papilloma. She denies any other changes in her medical history including hospitalizations, emergency room visits, diagnoses or medications. She is feeling well overall today and is otherwise in her usual state of health.       Post-Op Info:  Procedure(s) (LRB):  MASTECTOMY BILATERAL (Bilateral)  BIOPSY, LYMPH NODE, SENTINEL LEFT (Left)  INJECTION, FOR SENTINEL NODE IDENTIFICATION LEFT (Left)  INSERTION, BREAST IMPLANT BILATERAL (Bilateral)   1 Day Post-Op     Interval History: Ms. Posey reports difficulty with pain control despite PRN dilaudid. She states this tends to make her sleepy rather than help with pain. Two drains to each mastectomy site, total of 260 cc out overnight. Mayen removed this morning. VSS. Afebrile.    Medications:  Continuous Infusions:  Scheduled Meds:   acetaminophen  650 mg Oral Q6H    baclofen  10 mg Oral TID    budesonide  0.5 mg Nebulization Q12H    clindamycin  300 mg Oral Q6H    gabapentin  800 mg Oral TID    heparin (porcine)  5,000 Units Subcutaneous Q8H    ibuprofen  800 mg Oral Q8H    losartan  25 mg Oral Daily    mupirocin   Nasal BID    pantoprazole  40 mg Oral Daily     PRN Meds:ALPRAZolam, butalbital-acetaminophen-caffeine -40 mg, ondansetron, oxyCODONE, oxyCODONE     Review of patient's allergies indicates:   Allergen Reactions    Pravastatin Other (See Comments)     Severe muscle pain.  Muscle pain    Other reaction(s): Other (See Comments)  Severe muscle pain.    Shellfish containing products  Anaphylaxis    Amitriptyline      Other reaction(s): dizzy    Codeine      Other reaction(s): Unknown    Doxycycline      Other reaction(s): Nausea    Erythromycin      Other reaction(s): Unknown    Iodine      Other reaction(s): Swelling  Other reaction(s): Unknown    Macrobid  [nitrofurantoin monohyd/m-cryst]      Other reaction(s): Unknown    Verapamil      Other reaction(s): Headache     Objective:     Vital Signs (Most Recent):  Temp: 98.1 °F (36.7 °C) (10/25/22 0556)  Pulse: 89 (10/25/22 0556)  Resp: 16 (10/25/22 0557)  BP: (!) 122/56 (10/25/22 0556)  SpO2: 96 % (10/25/22 0556)   Vital Signs (24h Range):  Temp:  [97.5 °F (36.4 °C)-98.9 °F (37.2 °C)] 98.1 °F (36.7 °C)  Pulse:  [60-89] 89  Resp:  [12-20] 16  SpO2:  [91 %-100 %] 96 %  BP: ()/(50-89) 122/56     Weight: 96 kg (211 lb 10.3 oz)  Body mass index is 34.16 kg/m².    Intake/Output - Last 3 Shifts         10/23 0700  10/24 0659 10/24 0700  10/25 0659 10/25 0700  10/26 0659    P.O.  118     IV Piggyback  3700     Total Intake(mL/kg)  3818 (39.8)     Urine (mL/kg/hr)  3070 (1.3)     Emesis/NG output  0     Drains  260     Stool  0     Total Output  3330     Net  +488            Stool Occurrence  0 x             Physical Exam  Vitals and nursing note reviewed.   Constitutional:       General: She is not in acute distress.     Appearance: Normal appearance. She is obese. She is not ill-appearing.   Eyes:      Extraocular Movements: Extraocular movements intact.      Conjunctiva/sclera: Conjunctivae normal.   Cardiovascular:      Rate and Rhythm: Normal rate.   Pulmonary:      Effort: Pulmonary effort is normal. No respiratory distress.   Skin:     General: Skin is warm and dry.      Comments: Bilateral mastectomy and right axillary incisions clean dry and intact. Appropriately tender to palpation. Drains with thin serosanguinous output.    Neurological:      General: No focal deficit present.      Mental Status: She is alert and oriented to  person, place, and time.   Psychiatric:         Mood and Affect: Mood normal.         Behavior: Behavior normal.       Significant Labs:  I have reviewed all pertinent lab results within the past 24 hours.    Significant Diagnostics:  I have reviewed all pertinent imaging results/findings within the past 24 hours.    Assessment/Plan:     * Ductal carcinoma in situ (DCIS) of left breast  Christal Posey is a 62 y.o. female with DCIS of the left breast who is now s/p bilateral mastectomy and left sentinel node biopsy with reconstruction with plastic surgery on 10/24/22. She is recovering as expected.    -Pain control: Agree discontinuation of dilaudid, continue MM + PRN oral oxy for breakthrough  -Drain management and antibiotics per Plastic Surgery  -OOBTC, Ambulate    Dispo: Possibly home today pending pain control and voiding trial          MD [facility] - Surgery

## 2022-10-25 NOTE — ANESTHESIA POSTPROCEDURE EVALUATION
Anesthesia Post Evaluation    Patient: Christal Posey    Procedure(s) Performed: Procedure(s) (LRB):  MASTECTOMY BILATERAL (Bilateral)  BIOPSY, LYMPH NODE, SENTINEL LEFT (Left)  INJECTION, FOR SENTINEL NODE IDENTIFICATION LEFT (Left)  INSERTION, BREAST IMPLANT BILATERAL (Bilateral)    Final Anesthesia Type: general      Patient location during evaluation: PACU  Patient participation: Yes- Able to Participate  Level of consciousness: awake and alert  Post-procedure vital signs: reviewed and stable  Pain management: adequate  Airway patency: patent    PONV status at discharge: No PONV  Anesthetic complications: no      Cardiovascular status: hemodynamically stable  Respiratory status: unassisted, spontaneous ventilation and room air  Hydration status: euvolemic  Follow-up not needed.          Vitals Value Taken Time   /56 10/25/22 0556   Temp 36.7 °C (98.1 °F) 10/25/22 0556   Pulse 89 10/25/22 0556   Resp 16 10/25/22 0557   SpO2 96 % 10/25/22 0556         Event Time   Out of Recovery 18:15:00         Pain/Tushar Score: Pain Rating Prior to Med Admin: 9 (10/25/2022  5:57 AM)  Pain Rating Post Med Admin: 5 (10/24/2022  7:08 PM)  Tushar Score: 9 (10/24/2022  6:15 PM)

## 2022-10-25 NOTE — PROGRESS NOTES
Pt unable to void, bladder scanned 158 cc noted ,Dr. Sharmaine Perez MD.notified. continue to monitor , awaiting to void before going home.

## 2022-10-25 NOTE — PROGRESS NOTES
Discharge Note: Pt discharged home alert and oriented x4, skin warm to touch, gauze dressing with surgical bra intact , Juan José drains x4, Pt and daughter taught juan josé drain care, Awaiting for patient to void, pt verbalized understanding of discharge teaching.

## 2022-10-25 NOTE — DISCHARGE INSTRUCTIONS
Keep MARY ANN drains in place and monitor output, record in log for office visit   Okay to remove bandages in 24hrs and shower in 48hrs, avoid submerging the incisions in bath tub/pool  Apply bacitracin 3 times per day to each nipple  Use of narcotics can lead to constipation, recommend taking a stool softener while on medications  Call the office with any questions or concerns   F/u in office on Wed 11/2

## 2022-10-25 NOTE — NURSING TRANSFER
Nursing Transfer Note      10/24/2022     Reason patient is being transferred: Post Op    Transfer To: 511, report given to JUSTEN Tidwell    Transfer via stretcher    Transfer with : corona catheter, bulb drains and personal belonging in a plastic bag: pair or reading glasses, clothes and shoes.    Transported by Patient transport    Medicines sent: none    Any special needs or follow-up needed: n/a    Chart send with patient: Yes    Notified: daughter, updated via test messaging and phone call, room number given    Patient reassessed at: 10-24-22 at 19:00

## 2022-10-25 NOTE — ASSESSMENT & PLAN NOTE
Christal Posey is a 62 y.o. female with DCIS of the left breast who is now s/p bilateral mastectomy and left sentinel node biopsy with reconstruction with plastic surgery on 10/24/22. She is recovering as expected.    -Pain control: Agree discontinuation of dilaudid, continue MM + PRN oral oxy for breakthrough  -Drain management and antibiotics per Plastic Surgery  -OOBTC, Ambulate    Dispo: Possibly home today pending pain control and voiding trial

## 2022-10-26 VITALS
RESPIRATION RATE: 16 BRPM | OXYGEN SATURATION: 98 % | BODY MASS INDEX: 34.01 KG/M2 | HEART RATE: 75 BPM | WEIGHT: 211.63 LBS | DIASTOLIC BLOOD PRESSURE: 75 MMHG | TEMPERATURE: 98 F | HEIGHT: 66 IN | SYSTOLIC BLOOD PRESSURE: 144 MMHG

## 2022-10-26 PROCEDURE — 63600175 PHARM REV CODE 636 W HCPCS: Performed by: STUDENT IN AN ORGANIZED HEALTH CARE EDUCATION/TRAINING PROGRAM

## 2022-10-26 PROCEDURE — 25000003 PHARM REV CODE 250

## 2022-10-26 PROCEDURE — 25000003 PHARM REV CODE 250: Performed by: STUDENT IN AN ORGANIZED HEALTH CARE EDUCATION/TRAINING PROGRAM

## 2022-10-26 RX ADMIN — ACETAMINOPHEN 650 MG: 325 TABLET ORAL at 06:10

## 2022-10-26 RX ADMIN — CLINDAMYCIN HYDROCHLORIDE 300 MG: 150 CAPSULE ORAL at 01:10

## 2022-10-26 RX ADMIN — CLINDAMYCIN HYDROCHLORIDE 300 MG: 150 CAPSULE ORAL at 06:10

## 2022-10-26 RX ADMIN — HEPARIN SODIUM 5000 UNITS: 5000 INJECTION INTRAVENOUS; SUBCUTANEOUS at 06:10

## 2022-10-26 RX ADMIN — IBUPROFEN 800 MG: 400 TABLET, FILM COATED ORAL at 06:10

## 2022-10-26 NOTE — PLAN OF CARE
[facility] - [facility]  Discharge Final Note    Primary Care Provider: Blane Brian MD    Expected Discharge Date: 10/26/2022    Final Discharge Note (most recent)       Final Note - 10/26/22 1455          Final Note    Assessment Type Final Discharge Note     Anticipated Discharge Disposition Home or Self Care     What phone number can be called within the next 1-3 days to see how you are doing after discharge? --   [phone]    Hospital Resources/Appts/Education Provided Appointments scheduled and added to AVS                     Important Message from Medicare      Future Appointments   Date Time Provider Department Center   11/9/2022 10:30 AM JOVANNI Jama MD [facility]   12/5/2022 10:00 AM Marilyn Lua MD [facility]     Patient discharged home to care of family on 10/26/22.

## 2022-10-26 NOTE — ADDENDUM NOTE
Addendum  created 10/25/22 2242 by Diana Weber CRNA    Intraprocedure Meds edited, Orders acknowledged in Narrator

## 2022-10-26 NOTE — NURSING
ASKED PT IF SHE HAD VOID, SHE HAD NOT.  STATED IT WAS LATE.  WANTED TO STAY OVER NIGHT.  WILL LET THE PROVIDER ON CALL KNOW. SARAH CAMPUZANO

## 2022-10-26 NOTE — NURSING
SURGERY PROVIDER ON CALL, RETURN THE PAGE.  SPOKE WITH HER AND NOTIFIED HER OF THE PATIENT WANTING TO STAY.  EN ROUTE TO SEE RAUL. SARAH CAMPUZANO

## 2022-10-27 ENCOUNTER — PATIENT MESSAGE (OUTPATIENT)
Dept: INTERNAL MEDICINE | Facility: CLINIC | Age: 62
End: 2022-10-27
Payer: COMMERCIAL

## 2022-10-28 ENCOUNTER — PATIENT MESSAGE (OUTPATIENT)
Dept: PLASTIC SURGERY | Facility: CLINIC | Age: 62
End: 2022-10-28
Payer: COMMERCIAL

## 2022-10-28 ENCOUNTER — TELEPHONE (OUTPATIENT)
Dept: PLASTIC SURGERY | Facility: CLINIC | Age: 62
End: 2022-10-28
Payer: COMMERCIAL

## 2022-10-28 DIAGNOSIS — R06.2 WHEEZING: ICD-10-CM

## 2022-10-28 DIAGNOSIS — R06.2 WHEEZING: Primary | ICD-10-CM

## 2022-10-28 RX ORDER — ALBUTEROL SULFATE 90 UG/1
2 AEROSOL, METERED RESPIRATORY (INHALATION) EVERY 6 HOURS PRN
Qty: 18 G | Refills: 1 | Status: SHIPPED | OUTPATIENT
Start: 2022-10-28

## 2022-10-28 NOTE — TELEPHONE ENCOUNTER
Pt. Reports swelling remains left and right side of breast, the area near her axilla.  Discussed MARY ANN drains and the output bilateral with no issues.  Pt had surgery 10/24 but did not get discharged until 10/26 due to her inability to urinate post op.  Pt reports she has no surgery bra, instructed to apply good supportive sports bra at this time and caution not to kink the MARY ANN drain lines.  Pt reports good understanding to call back should she have any change in her condition or other issues.   Call ended.

## 2022-10-28 NOTE — TELEPHONE ENCOUNTER
Care Due:                  Date            Visit Type   Department     Provider  --------------------------------------------------------------------------------                                MYCHART                              ANNUAL                              CHECKUP/PHY  [facility]  Last Visit: 11-      [facility]       LIZA CAMPOS  Next Visit: None Scheduled  None         None Found                                                            Last  Test          Frequency    Reason                     Performed    Due Date  --------------------------------------------------------------------------------    Office Visit  12 months..  albuterol, losartan......  11-   11-    [facility] Embedded Care Gaps. Reference number: [id number]. 10/28/2022   6:31:27 PM CDT

## 2022-10-28 NOTE — TELEPHONE ENCOUNTER
Contact call to pt -  pt sent images of clear tape on chest and was concerned about it peeling off.  Reassurance to patient given. No other questions.  P/o set for Wednesday 11/2/22.  Call ended.

## 2022-10-28 NOTE — TELEPHONE ENCOUNTER
Outgoing contact with pt after receiving images of tape peeling off after surgery.  Assured patient the peeling of the edges with happen with getting wet when she showered.  Instructed to pat dry carefully.  Patient denies bleeding or issues with incisional edges.  Post op appt is for 11/2/22.  Call ended.

## 2022-10-29 RX ORDER — ALBUTEROL SULFATE 90 UG/1
AEROSOL, METERED RESPIRATORY (INHALATION)
Qty: 54 G | OUTPATIENT
Start: 2022-10-29

## 2022-10-29 NOTE — TELEPHONE ENCOUNTER
Quick DC. Request already responded to by other means (e.g. phone or fax)   Refill Authorization Note   Christal Posey  is requesting a refill authorization.  Brief Assessment and Rationale for Refill:  Quick Discontinue  Medication Therapy Plan:  Signed 10/28/22    Medication Reconciliation Completed:  No    Medication-related problems identified: Requires appointment   Comments:     Note composed:5:58 PM 10/29/2022

## 2022-10-31 ENCOUNTER — PATIENT MESSAGE (OUTPATIENT)
Dept: PLASTIC SURGERY | Facility: CLINIC | Age: 62
End: 2022-10-31
Payer: COMMERCIAL

## 2022-11-02 ENCOUNTER — OFFICE VISIT (OUTPATIENT)
Dept: PLASTIC SURGERY | Facility: CLINIC | Age: 62
End: 2022-11-02
Payer: COMMERCIAL

## 2022-11-02 VITALS — SYSTOLIC BLOOD PRESSURE: 160 MMHG | DIASTOLIC BLOOD PRESSURE: 72 MMHG | OXYGEN SATURATION: 100 % | HEART RATE: 104 BPM

## 2022-11-02 DIAGNOSIS — Z09 SURGERY FOLLOW-UP EXAMINATION: Primary | ICD-10-CM

## 2022-11-02 PROCEDURE — 4010F PR ACE/ARB THEARPY RXD/TAKEN: ICD-10-PCS | Mod: CPTII,S$GLB,, | Performed by: SURGERY

## 2022-11-02 PROCEDURE — 99024 POSTOP FOLLOW-UP VISIT: CPT | Mod: S$GLB,,, | Performed by: SURGERY

## 2022-11-02 PROCEDURE — 1159F PR MEDICATION LIST DOCUMENTED IN MEDICAL RECORD: ICD-10-PCS | Mod: CPTII,S$GLB,, | Performed by: SURGERY

## 2022-11-02 PROCEDURE — 99999 PR PBB SHADOW E&M-EST. PATIENT-LVL III: ICD-10-PCS | Mod: PBBFAC,,, | Performed by: SURGERY

## 2022-11-02 PROCEDURE — 1160F RVW MEDS BY RX/DR IN RCRD: CPT | Mod: CPTII,S$GLB,, | Performed by: SURGERY

## 2022-11-02 PROCEDURE — 1159F MED LIST DOCD IN RCRD: CPT | Mod: CPTII,S$GLB,, | Performed by: SURGERY

## 2022-11-02 PROCEDURE — 4010F ACE/ARB THERAPY RXD/TAKEN: CPT | Mod: CPTII,S$GLB,, | Performed by: SURGERY

## 2022-11-02 PROCEDURE — 3078F DIAST BP <80 MM HG: CPT | Mod: CPTII,S$GLB,, | Performed by: SURGERY

## 2022-11-02 PROCEDURE — 3078F PR MOST RECENT DIASTOLIC BLOOD PRESSURE < 80 MM HG: ICD-10-PCS | Mod: CPTII,S$GLB,, | Performed by: SURGERY

## 2022-11-02 PROCEDURE — 99999 PR PBB SHADOW E&M-EST. PATIENT-LVL III: CPT | Mod: PBBFAC,,, | Performed by: SURGERY

## 2022-11-02 PROCEDURE — 1160F PR REVIEW ALL MEDS BY PRESCRIBER/CLIN PHARMACIST DOCUMENTED: ICD-10-PCS | Mod: CPTII,S$GLB,, | Performed by: SURGERY

## 2022-11-02 PROCEDURE — 99024 PR POST-OP FOLLOW-UP VISIT: ICD-10-PCS | Mod: S$GLB,,, | Performed by: SURGERY

## 2022-11-02 PROCEDURE — 3077F PR MOST RECENT SYSTOLIC BLOOD PRESSURE >= 140 MM HG: ICD-10-PCS | Mod: CPTII,S$GLB,, | Performed by: SURGERY

## 2022-11-02 PROCEDURE — 3077F SYST BP >= 140 MM HG: CPT | Mod: CPTII,S$GLB,, | Performed by: SURGERY

## 2022-11-02 RX ORDER — BACLOFEN 10 MG/1
10 TABLET ORAL 3 TIMES DAILY
Qty: 30 TABLET | Refills: 0 | Status: SHIPPED | OUTPATIENT
Start: 2022-11-02 | End: 2023-05-05 | Stop reason: SDUPTHER

## 2022-11-02 NOTE — PROGRESS NOTES
Plastic Surgery Clinic Postop Visit    Subjective:      Christal Posey is a 62 y.o. year old female who presents to the Plastic Surgery Clinic on 2022 for follow up visit status post bilateral mastectomy and bilateral reconstruction with TE. Pain controlled with baclofen. Patient reports right nipple has discoloration and has minor drainage. Denies fever, chills, nausea, vomiting, or other systemic signs of infection.    Vitals:    22 0936   BP: (!) 160/72   Pulse: 104        Review of patient's allergies indicates:   Allergen Reactions    Pravastatin Other (See Comments)     Severe muscle pain.  Muscle pain    Other reaction(s): Other (See Comments)  Severe muscle pain.    Shellfish containing products Anaphylaxis    Amitriptyline      Other reaction(s): dizzy    Codeine      Other reaction(s): Unknown    Doxycycline      Other reaction(s): Nausea    Erythromycin      Other reaction(s): Unknown    Iodine      Other reaction(s): Swelling  Other reaction(s): Unknown    Macrobid  [nitrofurantoin monohyd/m-cryst]      Other reaction(s): Unknown    Verapamil      Other reaction(s): Headache       Current Outpatient Medications on File Prior to Visit   Medication Sig Dispense Refill    albuterol (VENTOLIN HFA) 90 mcg/actuation inhaler Inhale 2 puffs into the lungs every 6 (six) hours as needed for Wheezing. Rescue 18 g 1    ALPRAZolam (XANAX) 0.25 MG tablet Take 1 tablet (0.25 mg total) by mouth nightly as needed for Insomnia. 30 tablet 0    atogepant (QULIPTA) 60 mg Tab Take 60 mg by mouth.      bacitracin 500 unit/gram ointment Apply topically 2 (two) times daily. 14 g 0    baclofen (LIORESAL) 10 MG tablet Take 10 mg by mouth 3 (three) times daily.      budesonide (PULMICORT) 0.5 mg/2 mL nebulizer solution SMARTSI Via Nebulizer Twice Daily      budesonide 1 mg/2 mL NbSp       butalbital-acetaminophen-caffeine -40 mg (FIORICET, ESGIC) -40 mg per tablet Take 1 tablet by mouth every 6 to  8 hours as needed. 60 tablet 0    cholestyramine (QUESTRAN) 4 gram packet Take 1 packet by mouth once daily.      CHOLESTYRAMINE LIGHT 4 gram packet Take 1 packet by mouth once daily.      ciclopirox (LOPROX) 0.77 % Crea Apply topically 2 (two) times daily. 90 g 1    diclofenac sodium (VOLTAREN) 1 % Gel APPLY 2 GRAMS TOPICALLY 3 (THREE) TIMES DAILY. 100 g 3    fluconazole (DIFLUCAN) 200 MG Tab Take 1 pill po qday x 3 repeat in 1 week 6 tablet 0    fluticasone (FLONASE) 50 mcg/actuation nasal spray       gabapentin (NEURONTIN) 800 MG tablet Take 800 mg by mouth 3 (three) times daily.      HYDROcodone-acetaminophen (NORCO) 5-325 mg per tablet Take 1 tablet by mouth every 6 (six) hours as needed for Pain. 28 tablet 0    hydrocortisone butyrate (LOCOID) 0.1 % Crea cream AAA bid 45 g 3    lansoprazole (PREVACID) 30 MG capsule Take 30 mg by mouth every morning. 1 Capsule, Delayed Release(E.C.) Oral Every day      losartan (COZAAR) 25 MG tablet TAKE 1 TABLET BY MOUTH EVERY DAY (Patient taking differently: Take 25 mg by mouth nightly.) 90 tablet 1    multivitamin (THERAGRAN) per tablet Take 1 tablet by mouth once daily.      mupirocin (BACTROBAN) 2 % ointment Apply topically 2 (two) times daily. 22 g 1    NEILMED SINUS RINSE COMPLETE pkdv by Each Nostril route.      ondansetron (ZOFRAN) 4 MG tablet Take 1 tablet (4 mg total) by mouth every 8 (eight) hours as needed for Nausea. 1 Tablet Oral Every 6 hours 12 tablet 12    ondansetron (ZOFRAN-ODT) 4 MG TbDL Dissolve 1 tablet (4 mg total) by mouth every 6 (six) hours as needed (take as needed for nausea). 35 tablet 0    prasterone, dhea, (INTRAROSA) 6.5 mg Inst Place 6.5 mg vaginally every evening. 30 each 11    PREMARIN vaginal cream PLACE 1 GRAM VAGINALLY 2 TIMES A WK  12    sumatriptan (IMITREX) 20 mg/actuation nasal spray USE 1 SPRAY IN EACH NOSTRIL AT ONSET OF HEADACHE. MAY REPEAT ONCE IN 2 HOURS IF NO RELIEF. NO MORE THAN 2 SPRAYS PER 24 HOURS 6 each 12    triamcinolone  acetonide 0.025% (KENALOG) 0.025 % Oint Apply topically 2 (two) times daily. x 1-2 wks then prn flares only 80 g 2    vitamin D 1000 units Tab Take 185 mg by mouth every morning.       [] clindamycin (CLEOCIN) 300 MG capsule Take 1 capsule (300 mg total) by mouth every 6 (six) hours. for 7 days 28 capsule 0     No current facility-administered medications on file prior to visit.       Patient Active Problem List   Diagnosis    Hiatal hernia    Irritable bowel syndrome    Migraine with aura and without status migrainosus, not intractable    Symptomatic menopausal or female climacteric states    Ductal carcinoma in situ (DCIS) of left breast    Preoperative testing    Arthralgia of both hands    Rheumatoid factor positive    Obesity (BMI 30-39.9)    Dyspareunia, female    Vitamin D deficiency    Colon polyp       Past Surgical History:   Procedure Laterality Date    BREAST BIOPSY Left 2016    DCIS    BREAST LUMPECTOMY      ECTOPIC PREGNANCY SURGERY Right     HYSTERECTOMY      ovaries left intact    INJECTION FOR SENTINEL NODE IDENTIFICATION Left 10/24/2022    Procedure: INJECTION, FOR SENTINEL NODE IDENTIFICATION LEFT;  Surgeon: JOVANNI Jama MD;  Location: [address];  Service: General;  Laterality: Left;    INSERTION OF BREAST IMPLANT Bilateral 10/24/2022    Procedure: INSERTION, BREAST IMPLANT BILATERAL;  Surgeon: Garland Gray MD;  Location: [facility] OR [address] [place] [address];  Service: Plastics;  Laterality: Bilateral;    MASTECTOMY Bilateral 10/24/2022    Procedure: MASTECTOMY BILATERAL;  Surgeon: JOVANNI Jama MD;  Location: [address];  Service: General;  Laterality: Bilateral;    removal of ovarian cyst Right oct 1992    ovary left intact    SENTINEL LYMPH NODE BIOPSY Left 10/24/2022    Procedure: BIOPSY, LYMPH NODE, SENTINEL LEFT;  Surgeon: JOVANNI Jama MD;  Location: [facility] OR [address] [place] [address];  Service: General;  Laterality: Left;    TUBAL LIGATION      tubal reversal  1991       Social  History     Socioeconomic History    Marital status:      Spouse name: Parrish    Number of children: 2   Tobacco Use    Smoking status: Never    Smokeless tobacco: Never   Substance and Sexual Activity    Alcohol use: No     Alcohol/week: 0.0 standard drinks    Drug use: No    Sexual activity: Yes     Partners: Male     Birth control/protection: Surgical, None     Comment:  to Parrish    Other Topics Concern    Are you pregnant or think you may be? No    Breast-feeding No           Review of Systems: discoloration of right nipple and minor drainage    Objective:     Physical Exam:  Vitals:    11/02/22 0936   BP: (!) 160/72   Pulse: 104       WD WN NAD  VSS  Normal resp effort  Bilateral mastectomy incisions healing well, right nipple discoloration with minor drainage        Assessment:       No diagnosis found.    Plan:   62 y.o. female status post bilateral mastectomy and bilateral reconstruction with TE  - Doing well, no issues  - upper drains removed today  - apply bacitracin to bilateral nipples  - wear telfa pads over nipples  - abd pads in bra to compress skin  - Return to clinic in 2 weeks.       All questions were answered. The patient was advised to call the clinic with any questions or concerns prior to their next visit.       Anand Morrow MD- Fellow

## 2022-11-03 LAB — INTEGRATED BRAC ANALYSIS: NORMAL

## 2022-11-04 ENCOUNTER — TELEPHONE (OUTPATIENT)
Dept: PLASTIC SURGERY | Facility: CLINIC | Age: 62
End: 2022-11-04
Payer: COMMERCIAL

## 2022-11-04 ENCOUNTER — DOCUMENTATION ONLY (OUTPATIENT)
Dept: HEMATOLOGY/ONCOLOGY | Facility: CLINIC | Age: 62
End: 2022-11-04
Payer: COMMERCIAL

## 2022-11-04 NOTE — PROGRESS NOTES
Pt called to say she saw Dr Gray yesterday and the resident pulled her drains.  Covered them with gauze 2nd to drainage.  She reports an odor this morning.  I forwarded a staff message to Perla Bob asking her to call the pt

## 2022-11-04 NOTE — TELEPHONE ENCOUNTER
In basket message received. Return call to patient for further evaluation of patient stating drainage with odor. Pt further discussion with Dr Gray via phone, pt was instructed to come into clinic [facility] plastic department today.

## 2022-11-14 ENCOUNTER — DOCUMENTATION ONLY (OUTPATIENT)
Dept: HEMATOLOGY/ONCOLOGY | Facility: CLINIC | Age: 62
End: 2022-11-14
Payer: COMMERCIAL

## 2022-11-14 ENCOUNTER — OFFICE VISIT (OUTPATIENT)
Dept: SURGERY | Facility: CLINIC | Age: 62
End: 2022-11-14
Payer: COMMERCIAL

## 2022-11-14 VITALS
OXYGEN SATURATION: 99 % | SYSTOLIC BLOOD PRESSURE: 152 MMHG | HEIGHT: 66 IN | BODY MASS INDEX: 33.91 KG/M2 | DIASTOLIC BLOOD PRESSURE: 72 MMHG | HEART RATE: 94 BPM | WEIGHT: 211 LBS

## 2022-11-14 DIAGNOSIS — C50.912 RECURRENT MALIGNANT NEOPLASM OF LEFT BREAST: Primary | ICD-10-CM

## 2022-11-14 DIAGNOSIS — D05.12 DUCTAL CARCINOMA IN SITU (DCIS) OF LEFT BREAST: ICD-10-CM

## 2022-11-14 PROCEDURE — 99999 PR PBB SHADOW E&M-EST. PATIENT-LVL III: ICD-10-PCS | Mod: PBBFAC,,, | Performed by: SURGERY

## 2022-11-14 PROCEDURE — 1160F PR REVIEW ALL MEDS BY PRESCRIBER/CLIN PHARMACIST DOCUMENTED: ICD-10-PCS | Mod: CPTII,S$GLB,, | Performed by: SURGERY

## 2022-11-14 PROCEDURE — 3078F PR MOST RECENT DIASTOLIC BLOOD PRESSURE < 80 MM HG: ICD-10-PCS | Mod: CPTII,S$GLB,, | Performed by: SURGERY

## 2022-11-14 PROCEDURE — 1159F MED LIST DOCD IN RCRD: CPT | Mod: CPTII,S$GLB,, | Performed by: SURGERY

## 2022-11-14 PROCEDURE — 4010F PR ACE/ARB THEARPY RXD/TAKEN: ICD-10-PCS | Mod: CPTII,S$GLB,, | Performed by: SURGERY

## 2022-11-14 PROCEDURE — 3078F DIAST BP <80 MM HG: CPT | Mod: CPTII,S$GLB,, | Performed by: SURGERY

## 2022-11-14 PROCEDURE — 99999 PR PBB SHADOW E&M-EST. PATIENT-LVL III: CPT | Mod: PBBFAC,,, | Performed by: SURGERY

## 2022-11-14 PROCEDURE — 99024 POSTOP FOLLOW-UP VISIT: CPT | Mod: S$GLB,,, | Performed by: SURGERY

## 2022-11-14 PROCEDURE — 3008F BODY MASS INDEX DOCD: CPT | Mod: CPTII,S$GLB,, | Performed by: SURGERY

## 2022-11-14 PROCEDURE — 3008F PR BODY MASS INDEX (BMI) DOCUMENTED: ICD-10-PCS | Mod: CPTII,S$GLB,, | Performed by: SURGERY

## 2022-11-14 PROCEDURE — 1159F PR MEDICATION LIST DOCUMENTED IN MEDICAL RECORD: ICD-10-PCS | Mod: CPTII,S$GLB,, | Performed by: SURGERY

## 2022-11-14 PROCEDURE — 3077F SYST BP >= 140 MM HG: CPT | Mod: CPTII,S$GLB,, | Performed by: SURGERY

## 2022-11-14 PROCEDURE — 99024 PR POST-OP FOLLOW-UP VISIT: ICD-10-PCS | Mod: S$GLB,,, | Performed by: SURGERY

## 2022-11-14 PROCEDURE — 4010F ACE/ARB THERAPY RXD/TAKEN: CPT | Mod: CPTII,S$GLB,, | Performed by: SURGERY

## 2022-11-14 PROCEDURE — 1160F RVW MEDS BY RX/DR IN RCRD: CPT | Mod: CPTII,S$GLB,, | Performed by: SURGERY

## 2022-11-14 PROCEDURE — 3077F PR MOST RECENT SYSTOLIC BLOOD PRESSURE >= 140 MM HG: ICD-10-PCS | Mod: CPTII,S$GLB,, | Performed by: SURGERY

## 2022-11-14 NOTE — PROGRESS NOTES
[facility]       Post-Op        REFERRING PHYSICIAN:  No referring provider defined for this encounter.       Blane Brian MD    MEDICAL ONCOLOGIST:    Latanya Julian MD    DIAGNOSIS:    This is a 62 y.o. female with a stage pT1b (sn)N0  grade 1 ER pos SD neg HER2 equivocal, FISH pending IDC of the left breast.    TREATMENT SUMMARY:  The patient is status post bilateral mastectomy and left sentinel node biopsy with bilateral tissue expander placement on 10/24/2022.  Final pathology showed:    1. BREAST, RIGHT, MASTECTOMY:   - Negative for malignancy.   - Atypical ductal hyperplasia (ADH), focal.   - No residual intraductal papilloma present.   - Previous biopsy site changes and biopsy clip.   - Benign subareolar nipple ducts.   - One benign intramammary lymph node.   2. AXILLARY SENTINEL LYMPH NODE, LEFT, # 1, HOT BLUE 1451, EXCISION:   - One benign axillary sentinel lymph node, negative for metastatic carcinoma (0/1).   - Immunohistochemical stains for CK WSK, Cam 5.2, and CK AE/1/AE3 are   negative, supporting the diagnosis.   3. BREAST, LEFT, NIPPLE SPARING MASTECTOMY:   - Invasive ductal carcinoma of breast with extensive intraductal component, see Tumor Synoptic.   - Size of invasive carcinoma: 9 MM (measured histologically on slide 3L).   - Trinity Histologic Score: Grade 1 of 3.                     Tubule Formation:  2                     Nuclear Pleomorphism:  2                     Mitotic Activity:  1   - Associated (DCIS), intermediate nuclear grade, cribriform type, with central necrosis.   - Size of DCIS: at least 10 MM.   - No lymphovascular invasion.   - Margins: Invasive carcinoma and DCIS are greater than 10 MM from all margins.   - Benign subareolar nipple ducts.   - Both biopsy clips are identified and corresponding areas sampled.   - Microcalcifications: Seen in association with benign breast ducts.   - Pathologic staging: pT1b (sn)N0   4. BREAST, LEFT, ADDITIONAL INFERIOR LATERAL  MARGIN, NIPPLE SPARING MASTECTOMY:   - Negative for atypia or malignancy.   - Benign breast tissue with predominantly fatty stroma.   5. BREAST, LEFT, NEW SUPERIOR MARGIN, NIPPLE SPARING MASTECTOMY:   - Negative for atypia or malignancy.   - Benign breast tissue with predominantly fatty stroma.     INTERVAL HISTORY:   Christal Posey comes in for a post-op check.  She denies fever, chills, chest pain or shortness of breath.  Her pain is well controlled.  Two of her four drains were removed at her post op visit with Dr. Gray last week.  The right-sided drain is no longer holding suction well.  She has a small area of epidermolysis at the superior aspect of the right nipple.    MEDICATIONS:  Current Outpatient Medications   Medication Sig Dispense Refill    albuterol (VENTOLIN HFA) 90 mcg/actuation inhaler Inhale 2 puffs into the lungs every 6 (six) hours as needed for Wheezing. Rescue 18 g 1    ALPRAZolam (XANAX) 0.25 MG tablet Take 1 tablet (0.25 mg total) by mouth nightly as needed for Insomnia. 30 tablet 0    atogepant (QULIPTA) 60 mg Tab Take 60 mg by mouth.      bacitracin 500 unit/gram ointment Apply topically 2 (two) times daily. 14 g 0    baclofen (LIORESAL) 10 MG tablet Take 1 tablet (10 mg total) by mouth 3 (three) times daily. 30 tablet 0    budesonide (PULMICORT) 0.5 mg/2 mL nebulizer solution SMARTSI Via Nebulizer Twice Daily      budesonide 1 mg/2 mL NbSp       butalbital-acetaminophen-caffeine -40 mg (FIORICET, ESGIC) -40 mg per tablet Take 1 tablet by mouth every 6 to 8 hours as needed. 60 tablet 0    cholestyramine (QUESTRAN) 4 gram packet Take 1 packet by mouth once daily.      CHOLESTYRAMINE LIGHT 4 gram packet Take 1 packet by mouth once daily.      ciclopirox (LOPROX) 0.77 % Crea Apply topically 2 (two) times daily. 90 g 1    diclofenac sodium (VOLTAREN) 1 % Gel APPLY 2 GRAMS TOPICALLY 3 (THREE) TIMES DAILY. 100 g 3    fluconazole (DIFLUCAN) 200 MG Tab Take 1 pill po qday x 3  repeat in 1 week 6 tablet 0    fluticasone (FLONASE) 50 mcg/actuation nasal spray       gabapentin (NEURONTIN) 800 MG tablet Take 800 mg by mouth 3 (three) times daily.      HYDROcodone-acetaminophen (NORCO) 5-325 mg per tablet Take 1 tablet by mouth every 6 (six) hours as needed for Pain. 28 tablet 0    hydrocortisone butyrate (LOCOID) 0.1 % Crea cream AAA bid 45 g 3    lansoprazole (PREVACID) 30 MG capsule Take 30 mg by mouth every morning. 1 Capsule, Delayed Release(E.C.) Oral Every day      losartan (COZAAR) 25 MG tablet TAKE 1 TABLET BY MOUTH EVERY DAY (Patient taking differently: Take 25 mg by mouth nightly.) 90 tablet 1    multivitamin (THERAGRAN) per tablet Take 1 tablet by mouth once daily.      mupirocin (BACTROBAN) 2 % ointment Apply topically 2 (two) times daily. 22 g 1    NEILMED SINUS RINSE COMPLETE pkdv by Each Nostril route.      ondansetron (ZOFRAN) 4 MG tablet Take 1 tablet (4 mg total) by mouth every 8 (eight) hours as needed for Nausea. 1 Tablet Oral Every 6 hours 12 tablet 12    ondansetron (ZOFRAN-ODT) 4 MG TbDL Dissolve 1 tablet (4 mg total) by mouth every 6 (six) hours as needed (take as needed for nausea). 35 tablet 0    prasterone, dhea, (INTRAROSA) 6.5 mg Inst Place 6.5 mg vaginally every evening. 30 each 11    PREMARIN vaginal cream PLACE 1 GRAM VAGINALLY 2 TIMES A WK  12    sumatriptan (IMITREX) 20 mg/actuation nasal spray USE 1 SPRAY IN EACH NOSTRIL AT ONSET OF HEADACHE. MAY REPEAT ONCE IN 2 HOURS IF NO RELIEF. NO MORE THAN 2 SPRAYS PER 24 HOURS 6 each 12    triamcinolone acetonide 0.025% (KENALOG) 0.025 % Oint Apply topically 2 (two) times daily. x 1-2 wks then prn flares only 80 g 2    vitamin D 1000 units Tab Take 185 mg by mouth every morning.        No current facility-administered medications for this visit.       ALLERGIES:   Review of patient's allergies indicates:   Allergen Reactions    Pravastatin Other (See Comments)     Severe muscle pain.  Muscle pain    Other reaction(s):  Other (See Comments)  Severe muscle pain.    Shellfish containing products Anaphylaxis    Amitriptyline      Other reaction(s): dizzy    Codeine      Other reaction(s): Unknown    Doxycycline      Other reaction(s): Nausea    Erythromycin      Other reaction(s): Unknown    Iodine      Other reaction(s): Swelling  Other reaction(s): Unknown    Macrobid  [nitrofurantoin monohyd/m-cryst]      Other reaction(s): Unknown    Verapamil      Other reaction(s): Headache       PHYSICAL EXAMINATION:   General:  This is a well appearing female with appropriate speech, affect and gait.     Breast:  Incision clean, dry, and intact. Bilateral drains with serous drainage. Small area of epidermolysis at the superior aspect of right nipple.     IMPRESSION:   The patient has had an uneventful postoperative course.    PLAN:   We discussed the upstaging to invasive cancer and final pathology.  We discussed that the only change in treatment is now endocrine therapy would have some benefit.  1. return in 6 months for a follow up office visit and breast exam  2. S/p bilateral mastectomy, no need for additional mammography  3. The patient is advised in continued exam of the breast chest wall and to report to this office sooner should she note any areas of abnormality or concern.   4.  She has been instructed to meet with Medical Oncology for discussion of adjuvant treatment recommendations

## 2022-11-14 NOTE — PROGRESS NOTES
Met with pt during her post op appt with Dr Jama.  Pt scheduled with med onc and 6 month f/u with Dr Jama.  Reviewed dates, location and times of upcoming appts.  Pt verbalized udnerstanding, no additional needs at present.  Oncology Navigation   Intake  Date of Diagnosis: 07/22/22  Cancer Type: Breast  Internal / External Referral: Internal  [facility]  Date of Referral: 06/30/22  Date Worked: 08/10/22  First Appointment Available: 08/03/22  Appointment Date: 08/03/22  First Available Date vs. Scheduled Date (days): 0     Treatment  Current Status: Staging work-up    Surgical Oncologist: Wiley  Type of Surgery: lumpectomy (will need MRI) vs mastectomy with SLNB and implant recon  Consult Date: 08/03/22          Procedures: Genetic test  Biopsy Schedule Date: 07/22/22  Genetic Testing Date Sent: 08/03/22  Mammo Schedule Date: 07/22/22  Ultrasound Schedule Date: 07/22/22    General Referrals: Integrative medicine    ER: Positive  MO: Positive       Support Systems: Family members     Acuity      Follow Up  No follow-ups on file.

## 2022-11-16 ENCOUNTER — OFFICE VISIT (OUTPATIENT)
Dept: PLASTIC SURGERY | Facility: CLINIC | Age: 62
End: 2022-11-16
Payer: COMMERCIAL

## 2022-11-16 VITALS
DIASTOLIC BLOOD PRESSURE: 72 MMHG | HEIGHT: 66 IN | BODY MASS INDEX: 33.91 KG/M2 | SYSTOLIC BLOOD PRESSURE: 145 MMHG | HEART RATE: 93 BPM | WEIGHT: 211 LBS | OXYGEN SATURATION: 100 %

## 2022-11-16 DIAGNOSIS — Z09 SURGERY FOLLOW-UP EXAMINATION: Primary | ICD-10-CM

## 2022-11-16 LAB
COMMENT: NORMAL
FINAL PATHOLOGIC DIAGNOSIS: NORMAL
GROSS: NORMAL
Lab: NORMAL
SUPPLEMENTAL DIAGNOSIS: NORMAL

## 2022-11-16 PROCEDURE — 4010F ACE/ARB THERAPY RXD/TAKEN: CPT | Mod: CPTII,S$GLB,, | Performed by: SURGERY

## 2022-11-16 PROCEDURE — 99024 PR POST-OP FOLLOW-UP VISIT: ICD-10-PCS | Mod: S$GLB,,, | Performed by: SURGERY

## 2022-11-16 PROCEDURE — 1159F PR MEDICATION LIST DOCUMENTED IN MEDICAL RECORD: ICD-10-PCS | Mod: CPTII,S$GLB,, | Performed by: SURGERY

## 2022-11-16 PROCEDURE — 3008F BODY MASS INDEX DOCD: CPT | Mod: CPTII,S$GLB,, | Performed by: SURGERY

## 2022-11-16 PROCEDURE — 3078F DIAST BP <80 MM HG: CPT | Mod: CPTII,S$GLB,, | Performed by: SURGERY

## 2022-11-16 PROCEDURE — 1159F MED LIST DOCD IN RCRD: CPT | Mod: CPTII,S$GLB,, | Performed by: SURGERY

## 2022-11-16 PROCEDURE — 99999 PR PBB SHADOW E&M-EST. PATIENT-LVL III: CPT | Mod: PBBFAC,,, | Performed by: SURGERY

## 2022-11-16 PROCEDURE — 3077F PR MOST RECENT SYSTOLIC BLOOD PRESSURE >= 140 MM HG: ICD-10-PCS | Mod: CPTII,S$GLB,, | Performed by: SURGERY

## 2022-11-16 PROCEDURE — 99024 POSTOP FOLLOW-UP VISIT: CPT | Mod: S$GLB,,, | Performed by: SURGERY

## 2022-11-16 PROCEDURE — 3077F SYST BP >= 140 MM HG: CPT | Mod: CPTII,S$GLB,, | Performed by: SURGERY

## 2022-11-16 PROCEDURE — 3008F PR BODY MASS INDEX (BMI) DOCUMENTED: ICD-10-PCS | Mod: CPTII,S$GLB,, | Performed by: SURGERY

## 2022-11-16 PROCEDURE — 4010F PR ACE/ARB THEARPY RXD/TAKEN: ICD-10-PCS | Mod: CPTII,S$GLB,, | Performed by: SURGERY

## 2022-11-16 PROCEDURE — 3078F PR MOST RECENT DIASTOLIC BLOOD PRESSURE < 80 MM HG: ICD-10-PCS | Mod: CPTII,S$GLB,, | Performed by: SURGERY

## 2022-11-16 PROCEDURE — 99999 PR PBB SHADOW E&M-EST. PATIENT-LVL III: ICD-10-PCS | Mod: PBBFAC,,, | Performed by: SURGERY

## 2022-11-16 NOTE — PROGRESS NOTES
Plastic Surgery Clinic Postop Visit    Subjective:      Christal Posey is a 62 y.o. year old female who presents to the Plastic Surgery Clinic on 2022 for follow up visit status post bilateral mastectomy and bilateral reconstruction with TE. Pain controlled. Denies fever, chills, nausea, vomiting, or other systemic signs of infection.    Vitals:    22 1018   BP: (!) 145/72   Pulse: 93        Review of patient's allergies indicates:   Allergen Reactions    Pravastatin Other (See Comments)     Severe muscle pain.  Muscle pain    Other reaction(s): Other (See Comments)  Severe muscle pain.    Shellfish containing products Anaphylaxis    Amitriptyline      Other reaction(s): dizzy    Codeine      Other reaction(s): Unknown    Doxycycline      Other reaction(s): Nausea    Erythromycin      Other reaction(s): Unknown    Iodine      Other reaction(s): Swelling  Other reaction(s): Unknown    Macrobid  [nitrofurantoin monohyd/m-cryst]      Other reaction(s): Unknown    Verapamil      Other reaction(s): Headache       Current Outpatient Medications on File Prior to Visit   Medication Sig Dispense Refill    albuterol (VENTOLIN HFA) 90 mcg/actuation inhaler Inhale 2 puffs into the lungs every 6 (six) hours as needed for Wheezing. Rescue 18 g 1    ALPRAZolam (XANAX) 0.25 MG tablet Take 1 tablet (0.25 mg total) by mouth nightly as needed for Insomnia. 30 tablet 0    bacitracin 500 unit/gram ointment Apply topically 2 (two) times daily. 14 g 0    baclofen (LIORESAL) 10 MG tablet Take 1 tablet (10 mg total) by mouth 3 (three) times daily. 30 tablet 0    budesonide (PULMICORT) 0.5 mg/2 mL nebulizer solution SMARTSI Via Nebulizer Twice Daily      budesonide 1 mg/2 mL NbSp       butalbital-acetaminophen-caffeine -40 mg (FIORICET, ESGIC) -40 mg per tablet Take 1 tablet by mouth every 6 to 8 hours as needed. 60 tablet 0    cholestyramine (QUESTRAN) 4 gram packet Take 1 packet by mouth once daily.       CHOLESTYRAMINE LIGHT 4 gram packet Take 1 packet by mouth once daily.      ciclopirox (LOPROX) 0.77 % Crea Apply topically 2 (two) times daily. 90 g 1    diclofenac sodium (VOLTAREN) 1 % Gel APPLY 2 GRAMS TOPICALLY 3 (THREE) TIMES DAILY. 100 g 3    fluconazole (DIFLUCAN) 200 MG Tab Take 1 pill po qday x 3 repeat in 1 week 6 tablet 0    gabapentin (NEURONTIN) 800 MG tablet Take 800 mg by mouth 3 (three) times daily.      HYDROcodone-acetaminophen (NORCO) 5-325 mg per tablet Take 1 tablet by mouth every 6 (six) hours as needed for Pain. 28 tablet 0    hydrocortisone butyrate (LOCOID) 0.1 % Crea cream AAA bid 45 g 3    lansoprazole (PREVACID) 30 MG capsule Take 30 mg by mouth every morning. 1 Capsule, Delayed Release(E.C.) Oral Every day      losartan (COZAAR) 25 MG tablet TAKE 1 TABLET BY MOUTH EVERY DAY (Patient taking differently: Take 25 mg by mouth nightly.) 90 tablet 1    multivitamin (THERAGRAN) per tablet Take 1 tablet by mouth once daily.      mupirocin (BACTROBAN) 2 % ointment Apply topically 2 (two) times daily. 22 g 1    NEILMED SINUS RINSE COMPLETE pkdv by Each Nostril route.      ondansetron (ZOFRAN) 4 MG tablet Take 1 tablet (4 mg total) by mouth every 8 (eight) hours as needed for Nausea. 1 Tablet Oral Every 6 hours 12 tablet 12    ondansetron (ZOFRAN-ODT) 4 MG TbDL Dissolve 1 tablet (4 mg total) by mouth every 6 (six) hours as needed (take as needed for nausea). 35 tablet 0    prasterone, dhea, (INTRAROSA) 6.5 mg Inst Place 6.5 mg vaginally every evening. 30 each 11    PREMARIN vaginal cream PLACE 1 GRAM VAGINALLY 2 TIMES A WK  12    sumatriptan (IMITREX) 20 mg/actuation nasal spray USE 1 SPRAY IN EACH NOSTRIL AT ONSET OF HEADACHE. MAY REPEAT ONCE IN 2 HOURS IF NO RELIEF. NO MORE THAN 2 SPRAYS PER 24 HOURS 6 each 12    triamcinolone acetonide 0.025% (KENALOG) 0.025 % Oint Apply topically 2 (two) times daily. x 1-2 wks then prn flares only 80 g 2    vitamin D 1000 units Tab Take 185 mg by mouth every  morning.       atogepant (QULIPTA) 60 mg Tab Take 60 mg by mouth.      fluticasone (FLONASE) 50 mcg/actuation nasal spray        No current facility-administered medications on file prior to visit.       Patient Active Problem List   Diagnosis    Hiatal hernia    Irritable bowel syndrome    Migraine with aura and without status migrainosus, not intractable    Symptomatic menopausal or female climacteric states    Ductal carcinoma in situ (DCIS) of left breast    Preoperative testing    Arthralgia of both hands    Rheumatoid factor positive    Obesity (BMI 30-39.9)    Dyspareunia, female    Vitamin D deficiency    Colon polyp       Past Surgical History:   Procedure Laterality Date    BREAST BIOPSY Left 05/2016    DCIS    BREAST LUMPECTOMY      ECTOPIC PREGNANCY SURGERY Right     HYSTERECTOMY  1998    ovaries left intact    INJECTION FOR SENTINEL NODE IDENTIFICATION Left 10/24/2022    Procedure: INJECTION, FOR SENTINEL NODE IDENTIFICATION LEFT;  Surgeon: JOVANNI Jama MD;  Location: [facility] OR [address] [place] [address];  Service: General;  Laterality: Left;    INSERTION OF BREAST IMPLANT Bilateral 10/24/2022    Procedure: INSERTION, BREAST IMPLANT BILATERAL;  Surgeon: Garland Gray MD;  Location: [facility] OR [address] [place] [address];  Service: Plastics;  Laterality: Bilateral;    MASTECTOMY Bilateral 10/24/2022    Procedure: MASTECTOMY BILATERAL;  Surgeon: JOVANNI Jama MD;  Location: [facility] OR [address] [place] [address];  Service: General;  Laterality: Bilateral;    removal of ovarian cyst Right oct 1992    ovary left intact    SENTINEL LYMPH NODE BIOPSY Left 10/24/2022    Procedure: BIOPSY, LYMPH NODE, SENTINEL LEFT;  Surgeon: JOVANNI Jama MD;  Location: [facility] OR [address] [place] [address];  Service: General;  Laterality: Left;    TUBAL LIGATION      tubal reversal  july 1991       Social History     Socioeconomic History    Marital status:      Spouse name: Parrish    Number of children: 2   Tobacco Use    Smoking status: Never     Passive exposure: Never    Smokeless  tobacco: Never   Substance and Sexual Activity    Alcohol use: No     Alcohol/week: 0.0 standard drinks    Drug use: No    Sexual activity: Yes     Partners: Male     Birth control/protection: Surgical, None     Comment:  to Parrish    Other Topics Concern    Are you pregnant or think you may be? No    Breast-feeding No           Review of Systems: discoloration of right nipple and minor drainage    Objective:     Physical Exam:  Vitals:    11/16/22 1018   BP: (!) 145/72   Pulse: 93       WD WN NAD  VSS  Normal resp effort  Bilateral mastectomy incisions healing well, right nipple discoloration with minor drainage        Assessment:       No diagnosis found.    Plan:   62 y.o. female status post bilateral mastectomy and bilateral reconstruction with TE  - Doing well, no issues  - 200cc inserted in each TE  - Return to clinic in 2 weeks.       All questions were answered. The patient was advised to call the clinic with any questions or concerns prior to their next visit.       Anand Morrow MD- Fellow

## 2022-11-16 NOTE — H&P (VIEW-ONLY)
Plastic Surgery Clinic Postop Visit    Subjective:      Christal Posey is a 62 y.o. year old female who presents to the Plastic Surgery Clinic on 2022 for follow up visit status post bilateral mastectomy and bilateral reconstruction with TE. Pain controlled. Denies fever, chills, nausea, vomiting, or other systemic signs of infection.    Vitals:    22 1018   BP: (!) 145/72   Pulse: 93        Review of patient's allergies indicates:   Allergen Reactions    Pravastatin Other (See Comments)     Severe muscle pain.  Muscle pain    Other reaction(s): Other (See Comments)  Severe muscle pain.    Shellfish containing products Anaphylaxis    Amitriptyline      Other reaction(s): dizzy    Codeine      Other reaction(s): Unknown    Doxycycline      Other reaction(s): Nausea    Erythromycin      Other reaction(s): Unknown    Iodine      Other reaction(s): Swelling  Other reaction(s): Unknown    Macrobid  [nitrofurantoin monohyd/m-cryst]      Other reaction(s): Unknown    Verapamil      Other reaction(s): Headache       Current Outpatient Medications on File Prior to Visit   Medication Sig Dispense Refill    albuterol (VENTOLIN HFA) 90 mcg/actuation inhaler Inhale 2 puffs into the lungs every 6 (six) hours as needed for Wheezing. Rescue 18 g 1    ALPRAZolam (XANAX) 0.25 MG tablet Take 1 tablet (0.25 mg total) by mouth nightly as needed for Insomnia. 30 tablet 0    bacitracin 500 unit/gram ointment Apply topically 2 (two) times daily. 14 g 0    baclofen (LIORESAL) 10 MG tablet Take 1 tablet (10 mg total) by mouth 3 (three) times daily. 30 tablet 0    budesonide (PULMICORT) 0.5 mg/2 mL nebulizer solution SMARTSI Via Nebulizer Twice Daily      budesonide 1 mg/2 mL NbSp       butalbital-acetaminophen-caffeine -40 mg (FIORICET, ESGIC) -40 mg per tablet Take 1 tablet by mouth every 6 to 8 hours as needed. 60 tablet 0    cholestyramine (QUESTRAN) 4 gram packet Take 1 packet by mouth once daily.       CHOLESTYRAMINE LIGHT 4 gram packet Take 1 packet by mouth once daily.      ciclopirox (LOPROX) 0.77 % Crea Apply topically 2 (two) times daily. 90 g 1    diclofenac sodium (VOLTAREN) 1 % Gel APPLY 2 GRAMS TOPICALLY 3 (THREE) TIMES DAILY. 100 g 3    fluconazole (DIFLUCAN) 200 MG Tab Take 1 pill po qday x 3 repeat in 1 week 6 tablet 0    gabapentin (NEURONTIN) 800 MG tablet Take 800 mg by mouth 3 (three) times daily.      HYDROcodone-acetaminophen (NORCO) 5-325 mg per tablet Take 1 tablet by mouth every 6 (six) hours as needed for Pain. 28 tablet 0    hydrocortisone butyrate (LOCOID) 0.1 % Crea cream AAA bid 45 g 3    lansoprazole (PREVACID) 30 MG capsule Take 30 mg by mouth every morning. 1 Capsule, Delayed Release(E.C.) Oral Every day      losartan (COZAAR) 25 MG tablet TAKE 1 TABLET BY MOUTH EVERY DAY (Patient taking differently: Take 25 mg by mouth nightly.) 90 tablet 1    multivitamin (THERAGRAN) per tablet Take 1 tablet by mouth once daily.      mupirocin (BACTROBAN) 2 % ointment Apply topically 2 (two) times daily. 22 g 1    NEILMED SINUS RINSE COMPLETE pkdv by Each Nostril route.      ondansetron (ZOFRAN) 4 MG tablet Take 1 tablet (4 mg total) by mouth every 8 (eight) hours as needed for Nausea. 1 Tablet Oral Every 6 hours 12 tablet 12    ondansetron (ZOFRAN-ODT) 4 MG TbDL Dissolve 1 tablet (4 mg total) by mouth every 6 (six) hours as needed (take as needed for nausea). 35 tablet 0    prasterone, dhea, (INTRAROSA) 6.5 mg Inst Place 6.5 mg vaginally every evening. 30 each 11    PREMARIN vaginal cream PLACE 1 GRAM VAGINALLY 2 TIMES A WK  12    sumatriptan (IMITREX) 20 mg/actuation nasal spray USE 1 SPRAY IN EACH NOSTRIL AT ONSET OF HEADACHE. MAY REPEAT ONCE IN 2 HOURS IF NO RELIEF. NO MORE THAN 2 SPRAYS PER 24 HOURS 6 each 12    triamcinolone acetonide 0.025% (KENALOG) 0.025 % Oint Apply topically 2 (two) times daily. x 1-2 wks then prn flares only 80 g 2    vitamin D 1000 units Tab Take 185 mg by mouth every  morning.       atogepant (QULIPTA) 60 mg Tab Take 60 mg by mouth.      fluticasone (FLONASE) 50 mcg/actuation nasal spray        No current facility-administered medications on file prior to visit.       Patient Active Problem List   Diagnosis    Hiatal hernia    Irritable bowel syndrome    Migraine with aura and without status migrainosus, not intractable    Symptomatic menopausal or female climacteric states    Ductal carcinoma in situ (DCIS) of left breast    Preoperative testing    Arthralgia of both hands    Rheumatoid factor positive    Obesity (BMI 30-39.9)    Dyspareunia, female    Vitamin D deficiency    Colon polyp       Past Surgical History:   Procedure Laterality Date    BREAST BIOPSY Left 05/2016    DCIS    BREAST LUMPECTOMY      ECTOPIC PREGNANCY SURGERY Right     HYSTERECTOMY  1998    ovaries left intact    INJECTION FOR SENTINEL NODE IDENTIFICATION Left 10/24/2022    Procedure: INJECTION, FOR SENTINEL NODE IDENTIFICATION LEFT;  Surgeon: JOVANNI Jama MD;  Location: [facility] OR [address] [place] [address];  Service: General;  Laterality: Left;    INSERTION OF BREAST IMPLANT Bilateral 10/24/2022    Procedure: INSERTION, BREAST IMPLANT BILATERAL;  Surgeon: Garland Gray MD;  Location: [facility] OR [address] [place] [address];  Service: Plastics;  Laterality: Bilateral;    MASTECTOMY Bilateral 10/24/2022    Procedure: MASTECTOMY BILATERAL;  Surgeon: JOVANNI Jama MD;  Location: [facility] OR [address] [place] [address];  Service: General;  Laterality: Bilateral;    removal of ovarian cyst Right oct 1992    ovary left intact    SENTINEL LYMPH NODE BIOPSY Left 10/24/2022    Procedure: BIOPSY, LYMPH NODE, SENTINEL LEFT;  Surgeon: JOVANNI Jama MD;  Location: [facility] OR [address] [place] [address];  Service: General;  Laterality: Left;    TUBAL LIGATION      tubal reversal  july 1991       Social History     Socioeconomic History    Marital status:      Spouse name: Parrish    Number of children: 2   Tobacco Use    Smoking status: Never     Passive exposure: Never    Smokeless  tobacco: Never   Substance and Sexual Activity    Alcohol use: No     Alcohol/week: 0.0 standard drinks    Drug use: No    Sexual activity: Yes     Partners: Male     Birth control/protection: Surgical, None     Comment:  to Parrish    Other Topics Concern    Are you pregnant or think you may be? No    Breast-feeding No           Review of Systems: discoloration of right nipple and minor drainage    Objective:     Physical Exam:  Vitals:    11/16/22 1018   BP: (!) 145/72   Pulse: 93       WD WN NAD  VSS  Normal resp effort  Bilateral mastectomy incisions healing well, right nipple discoloration with minor drainage        Assessment:       No diagnosis found.    Plan:   62 y.o. female status post bilateral mastectomy and bilateral reconstruction with TE  - Doing well, no issues  - 200cc inserted in each TE  - Return to clinic in 2 weeks.       All questions were answered. The patient was advised to call the clinic with any questions or concerns prior to their next visit.       Anand Morrow MD- Fellow

## 2022-11-17 ENCOUNTER — TELEPHONE (OUTPATIENT)
Dept: PLASTIC SURGERY | Facility: CLINIC | Age: 62
End: 2022-11-17
Payer: COMMERCIAL

## 2022-11-18 ENCOUNTER — OFFICE VISIT (OUTPATIENT)
Dept: HEMATOLOGY/ONCOLOGY | Facility: CLINIC | Age: 62
End: 2022-11-18
Payer: COMMERCIAL

## 2022-11-18 VITALS
BODY MASS INDEX: 34.19 KG/M2 | OXYGEN SATURATION: 99 % | WEIGHT: 212.75 LBS | HEART RATE: 100 BPM | TEMPERATURE: 98 F | SYSTOLIC BLOOD PRESSURE: 146 MMHG | HEIGHT: 66 IN | RESPIRATION RATE: 18 BRPM | DIASTOLIC BLOOD PRESSURE: 74 MMHG

## 2022-11-18 DIAGNOSIS — C50.312 MALIGNANT NEOPLASM OF LOWER-INNER QUADRANT OF LEFT BREAST IN FEMALE, ESTROGEN RECEPTOR POSITIVE: ICD-10-CM

## 2022-11-18 DIAGNOSIS — Z17.0 MALIGNANT NEOPLASM OF LOWER-INNER QUADRANT OF LEFT BREAST IN FEMALE, ESTROGEN RECEPTOR POSITIVE: ICD-10-CM

## 2022-11-18 DIAGNOSIS — D05.12 DUCTAL CARCINOMA IN SITU (DCIS) OF LEFT BREAST: Primary | Chronic | ICD-10-CM

## 2022-11-18 PROCEDURE — 3077F SYST BP >= 140 MM HG: CPT | Mod: CPTII,S$GLB,, | Performed by: INTERNAL MEDICINE

## 2022-11-18 PROCEDURE — 3078F PR MOST RECENT DIASTOLIC BLOOD PRESSURE < 80 MM HG: ICD-10-PCS | Mod: CPTII,S$GLB,, | Performed by: INTERNAL MEDICINE

## 2022-11-18 PROCEDURE — 3078F DIAST BP <80 MM HG: CPT | Mod: CPTII,S$GLB,, | Performed by: INTERNAL MEDICINE

## 2022-11-18 PROCEDURE — 99204 PR OFFICE/OUTPT VISIT, NEW, LEVL IV, 45-59 MIN: ICD-10-PCS | Mod: S$GLB,,, | Performed by: INTERNAL MEDICINE

## 2022-11-18 PROCEDURE — 4010F PR ACE/ARB THEARPY RXD/TAKEN: ICD-10-PCS | Mod: CPTII,S$GLB,, | Performed by: INTERNAL MEDICINE

## 2022-11-18 PROCEDURE — 3008F PR BODY MASS INDEX (BMI) DOCUMENTED: ICD-10-PCS | Mod: CPTII,S$GLB,, | Performed by: INTERNAL MEDICINE

## 2022-11-18 PROCEDURE — 3077F PR MOST RECENT SYSTOLIC BLOOD PRESSURE >= 140 MM HG: ICD-10-PCS | Mod: CPTII,S$GLB,, | Performed by: INTERNAL MEDICINE

## 2022-11-18 PROCEDURE — 4010F ACE/ARB THERAPY RXD/TAKEN: CPT | Mod: CPTII,S$GLB,, | Performed by: INTERNAL MEDICINE

## 2022-11-18 PROCEDURE — 99204 OFFICE O/P NEW MOD 45 MIN: CPT | Mod: S$GLB,,, | Performed by: INTERNAL MEDICINE

## 2022-11-18 PROCEDURE — 99999 PR PBB SHADOW E&M-EST. PATIENT-LVL III: ICD-10-PCS | Mod: PBBFAC,,, | Performed by: INTERNAL MEDICINE

## 2022-11-18 PROCEDURE — 99999 PR PBB SHADOW E&M-EST. PATIENT-LVL III: CPT | Mod: PBBFAC,,, | Performed by: INTERNAL MEDICINE

## 2022-11-18 PROCEDURE — 3008F BODY MASS INDEX DOCD: CPT | Mod: CPTII,S$GLB,, | Performed by: INTERNAL MEDICINE

## 2022-11-18 NOTE — PROGRESS NOTES
Subjective:       Patient ID: Christal Posey is a 62 y.o. female.    Chief Complaint: Ductal carcinoma in situ (DCIS) of left breast    HPI    - 8/12/2022 Breast MRI:  Findings:  Left  Numerous scattered foci and small masses are present, most of which appear similar to eachother.    There is an 8 mm x 7 mm x 7 mm irregularly shaped, heterogeneous mass seen in the left breast at 6 o'clock, 2.4 cm from the nipple and 8 cm from the chest wall. Delayed phase is washout.   There is a 9 mm x 7 mm x 7 mm irregularly shaped, homogeneous mass with irregular margins seen in the upper inner quadrant of the right breast, 2.8 cm from the nipple and 6.7 cm from the chest wall. Delayed phase is plateau. This is best seen on Series 58338: Image 50.  This is approximately 3 cm anterior and superior to the signal void related to the 9 o'clock biopsy marker.   There is no suspicious enhancement associated with either biopsy marker.   Right  Numerous scattered foci and small masses are present, most of which appear similar to eachother.    There is a 6 mm oval, homogeneous mass with circumscribed margins seen in the lower central region of the right breast, 2.3 cm from the nipple and 9.5 cm from the chest wall. Delayed phase is washout. This is best seen in Series 97823: Image 70.   There is no internal mammary or axillary adenopathy.  Impression:  Left  Mass: Left breast 9 mm x 7 mm x 7 mm mass at the upper inner position. Assessment: 4 - Suspicious finding. Biopsy is recommended.   Mass: Left breast 8 mm x 7 mm x 7 mm mass at the 6 o'clock position. Assessment: 4 - Suspicious finding. Biopsy is recommended.   There is no suspicious enhancement associated with either biopsy marker.   Right  Mass: Right breast  6 mm mass at the lower central position. Assessment: 4 - Suspicious finding. Biopsy is recommended.   BI-RADS Category:   Overall: 4 - Suspicious    - 9/28/2022 MRI guided breast biopsy:  BREAST, RIGHT, LOWER CENTRAL MASS,  BIOPSY:   - Intraductal papilloma, benign.   - Benign breast tissue with predominantly fatty stroma and blood clot.   - Microcalcifications: Focally seen in association with benign breast ducts.   - No atypia or malignancy.   - Additional deeper sections have been examined.     - 10/24/2022 Bilateral Mastectomy:  1. BREAST, RIGHT, MASTECTOMY:   - Negative for malignancy.   - Atypical ductal hyperplasia (ADH), focal.   - No residual intraductal papilloma present.   - Previous biopsy site changes and biopsy clip.   - Benign subareolar nipple ducts.   - One benign intramammary lymph node.   2. AXILLARY SENTINEL LYMPH NODE, LEFT, # 1, HOT BLUE 1451, EXCISION:   - One benign axillary sentinel lymph node, negative for metastatic carcinoma   (0/1).   - Immunohistochemical stains for CK WSK, Cam 5.2, and CK AE/1/AE3 are   negative, supporting the diagnosis.   3. BREAST, LEFT, NIPPLE SPARING MASTECTOMY:   - Invasive ductal carcinoma of breast with extensive intraductal component,   see Tumor Synoptic.   - Size of invasive carcinoma: 9 MM (measured histologically on slide 3L).   - [place] Histologic Score: Grade 1 of 3.                     Tubule Formation:  2                     Nuclear Pleomorphism:  2                     Mitotic Activity:  1   - Associated (DCIS), intermediate nuclear grade, cribriform type, with   central necrosis.   - Size of DCIS: at least 10 MM.   - No lymphovascular invasion.   - Margins:   ·tab Invasive carcinoma and DCIS are greater than 10 MM from all margins.   - Benign subareolar nipple ducts.   - Both biopsy clips are identified and corresponding areas sampled.   - Microcalcifications: Seen in association with benign breast ducts.   - Pathologic staging: pT1b (sn)N0   4. BREAST, LEFT, ADDITIONAL INFERIOR LATERAL MARGIN, NIPPLE SPARING   MASTECTOMY:   - Negative for atypia or malignancy.   - Benign breast tissue with predominantly fatty stroma.   5. BREAST, LEFT, NEW SUPERIOR MARGIN, NIPPLE  "SPARING MASTECTOMY:   - Negative for atypia or malignancy.   - Benign breast tissue with predominantly fatty stroma.   TUMOR SYNOPTIC: (INVASIVE CARCINOMA OF THE BREAST: Resection)   Standard(s) : AJCC-UICC 8   SPECIMEN   Procedure   Total mastectomy (including nipple-sparing and skin-sparing mastectomy)   Specimen Laterality   Left   TUMOR   +Tumor Site   Upper inner quadrant   Histologic Type   Invasive carcinoma of no special type (ductal)   Histologic Grade ([facility] Histologic Score)   [place] Score   Glandular (Acinar) / Tubular Differentiation     Score 2 (10 to 75% of tumor area forming glandular / tubular structures)   Nuclear Pleomorphism     Score 2 (Cells larger than normal with open vesicular nuclei, visible   nucleoli, and moderate     variability in both size and shape)   Mitotic Rate     Score 1   Overall Grade     Grade 1 (scores of 3, 4 or 5)   Tumor Size   Greatest dimension of largest invasive focus greater than 1 mm (specify exact   measurement in Millimeters (mm)): 9 mm   +Tumor Focality   Single focus of invasive carcinoma   Ductal Carcinoma In Situ (DCIS)   Present   Positive for extensive intraductal component (EIC)   +Size (Extent) of DCIS     Estimated size (extent) of DCIS is at least in Millimeters (mm): at least   10 mm (multiple foci)   +Architectural Patterns     Cribriform   +Nuclear Grade     Grade II (intermediate)   +Necrosis     Present, central (expansive "comedo" necrosis)   +Lobular Carcinoma In Situ (LCIS)   Not identified   Tumor Extent  Tumor Extent (required only if skin, nipple, or skeletal muscle   are present and involved)     Not applicable (skin, nipple, and skeletal muscle are absent OR are   uninvolved)   Lymphovascular Invasion   Not identified   +Dermal Lymphovascular Invasion   No skin present   +Microcalcifications   Present in non-neoplastic tissue   Treatment Effect in the Breast   No known presurgical therapy   MARGINS   Margin Status for Invasive " Carcinoma   All margins negative for invasive carcinoma   Distance from Invasive   Carcinoma to Closest Margin     Specify in Millimeters (mm)     Greater than: 10 mm   +Closest Margin(s) to Invasive Carcinoma  (select all that apply)     Cannot be determined (explain): invasive carcinoma is in a random section   of the breast which did not get gross measurements to the nearest margin   Margin Status for DCIS   All margins negative for DCIS   Distance from DCIS to Closest Margin     Specify in Millimeters (mm)     Greater than: 10 mm   Closest Margin(s) to DCIS     Other (specify): nipple margin is closest gross margin   REGIONAL LYMPH NODES   Regional Lymph Node Status   Regional lymph nodes present   All regional lymph nodes negative for tumor   Total Number of Lymph Nodes Examined (sentinel and non-sentinel)     Exact number (specify): 1   Number of [place] Nodes Examined     Exact number (specify): 1   PATHOLOGIC STAGE CLASSIFICATION (pTNM, AJCC 8th Edition)   pT Category   pT1b: Tumor greater than 5 mm but less than or equal to 10 mm in greatest   dimension   Regional Lymph Nodes Modifier   (sn): [place] node(s) evaluated. If 6 or more nodes (sentinel or   nonsentinel) are removed, this modifier should not be used.   pN Category   pN0: No regional lymph node metastasis identified or ITCs only   SPECIAL STUDIES   BREAST BIOMARKERS (performed on the current specimen):   ER:  Positive (95% of tumor cells demonstrate moderate nuclear   immunoreactivity).   PA:  Negative.   [id number]:  Equivocal (2+, HER2 FISH is pending).   Ki-67 proliferative index:  15%.     Prior history of DCIS  Monitored by surveillance- opted out of adjuvant endocrine with low volume disease  DCIS History:  5/16/16 Screening mammography:  Calcifications in the left breast require additional evaluation.    ACR BI-RADS Category 0: Incomplete: Need Additional Imaging Evaluation  - 5/17/16 Diagnostic mammogram  Calcifications in the left breast  are suspicious.  Histology using core biopsy  is recommended.  ACR BI-RADS Category 4: Suspicious Abnormality  - 5/20/16 biopsy  1, 2. LEFT BREAST, WITH CALCIFICATIONS AND WITHOUT CALCIFICATIONS, UPPER INNER QUADRANT  (NEEDLE BIOPSY):  -Low-grade ductal carcinoma in situ (DCIS)  -Nuclear grade 1 out of 3  -Cribriform pattern  -Associated with microcalcifications  %, % positive  - 6/10/2016 Lumpectomy  Pathology:  Procedure - Excision with wire guided localization  Lymph node sampling - Not sampled  Specimen laterality - Left  Tumor site - Not specified  Size of DCIS - 2mm, including findings from the biopsy report  Histologic type - Ductal carcinoma in situ  Architectural pattern - Solid and cribriforming  Nuclear grade - Low  Necrosis - Absent  Margins - Negative, inferior is 2mm  Pathologc staging - p Tis Nx Mx  Hormone receptors (See Breast Biopsy report; MS16 - 45073)  Estrogen receptor - Positive, strong, 100%  Progesterone receptors - Positive, strong, 100%    SUPPLEMENTAL #1:   HER2 AMPLIFICATION ASSOCIATED WITH BREAST CANCER, FISH, TISSUE:   Result Summary   Negative     Additional PMH:  Bilateral cataract surgery    FH:  No new cancers    Review of Systems   Constitutional:  Negative for activity change, appetite change, chills, diaphoresis, fatigue, fever and unexpected weight change.   HENT:  Negative for nasal congestion, dental problem, ear pain, hearing loss, mouth sores, nosebleeds, rhinorrhea, tinnitus and trouble swallowing.    Eyes:  Negative for visual disturbance.   Respiratory:  Negative for cough, chest tightness, shortness of breath and wheezing.    Cardiovascular:  Negative for chest pain, palpitations and leg swelling.   Gastrointestinal:  Negative for abdominal distention, abdominal pain, blood in stool, constipation, diarrhea, nausea and vomiting.   Genitourinary:  Negative for decreased urine volume, difficulty urinating, dysuria, frequency and hematuria.   Musculoskeletal:   Negative for arthralgias, back pain, gait problem, joint swelling and neck pain.   Integumentary:  Negative for pallor and rash.   Neurological:  Negative for dizziness, weakness, light-headedness, numbness and headaches.   Hematological:  Negative for adenopathy. Does not bruise/bleed easily.   Psychiatric/Behavioral:  Negative for confusion, dysphoric mood and sleep disturbance.        Objective:      Physical Exam  Vitals and nursing note reviewed.   Constitutional:       General: She is not in acute distress.     Appearance: Normal appearance. She is well-developed.      Comments: Presents alone  Very pleasant   HENT:      Head: Normocephalic and atraumatic.   Eyes:      General: No scleral icterus.     Extraocular Movements: Extraocular movements intact.      Conjunctiva/sclera: Conjunctivae normal.      Pupils: Pupils are equal, round, and reactive to light.      Right eye: Pupil is round and reactive.      Left eye: Pupil is round and reactive.   Neck:      Thyroid: No thyromegaly.      Vascular: No JVD.      Trachea: No tracheal deviation.   Cardiovascular:      Rate and Rhythm: Normal rate and regular rhythm.      Heart sounds: Normal heart sounds. No murmur heard.    No friction rub. No gallop.   Pulmonary:      Effort: Pulmonary effort is normal. No respiratory distress.      Breath sounds: Normal breath sounds. No wheezing or rales.      Comments: Bilateral mastectomies  Healing well  Abdominal:      General: Bowel sounds are normal. There is no distension.      Palpations: Abdomen is soft. There is no mass.      Tenderness: There is no abdominal tenderness. There is no guarding or rebound.      Comments: No organomegaly   Musculoskeletal:         General: No swelling, tenderness or deformity. Normal range of motion.      Cervical back: Normal range of motion and neck supple. No rigidity or tenderness.      Right lower leg: No edema.      Left lower leg: No edema.   Lymphadenopathy:      Head:      Right  side of head: No submandibular adenopathy.      Left side of head: No submandibular adenopathy.      Cervical: No cervical adenopathy.      Right cervical: No superficial, deep or posterior cervical adenopathy.     Left cervical: No superficial, deep or posterior cervical adenopathy.      Upper Body:      Right upper body: No supraclavicular adenopathy.      Left upper body: No supraclavicular adenopathy.   Skin:     General: Skin is warm and dry.      Coloration: Skin is not jaundiced or pale.      Findings: No bruising, erythema, lesion, petechiae or rash.   Neurological:      General: No focal deficit present.      Mental Status: She is alert and oriented to person, place, and time. Mental status is at baseline.      Sensory: No sensory deficit.      Motor: No weakness.      Coordination: Coordination normal.      Gait: Gait normal.      Deep Tendon Reflexes: Reflexes are normal and symmetric.   Psychiatric:         Mood and Affect: Mood normal. Mood is not anxious or depressed.         Behavior: Behavior normal.         Thought Content: Thought content normal.         Judgment: Judgment normal.       Labs- reviewed  Imaging- reviewed  Assessment:       Problem List Items Addressed This Visit       Ductal carcinoma in situ (DCIS) of left breast - Primary (Chronic)    Malignant neoplasm of lower-inner quadrant of left breast in female, estrogen receptor positive       Plan:     Reviewed adjuvant endocrine therapy- awaiting [id number] and finalize plan    Addendum 11/29/2022  Called with [id number] FISH negative result  Discussed Tamoxifen versus AI   Opts for Tamoxifen  Reviewed side effects  As returned to surgery for encapsulation will wait to start x 2 weeks  See 4  weeks post    Route Chart for Scheduling    Med Onc Chart Routing      Follow up with physician    Follow up with HARVEY 6 weeks. see PJ   Infusion scheduling note    Injection scheduling note    Labs    Imaging    Pharmacy appointment    Other referrals         Treatment Plan Information   OP ANASTROZOLE DAILY   Latanya Julian MD   Upcoming Treatment Dates - OP ANASTROZOLE DAILY    11/28/2022       Antiemetics       Physician communication order       Take Home Chemotherapy       anastrozole (ARIMIDEX) 1 mg Tab

## 2022-11-22 ENCOUNTER — PATIENT MESSAGE (OUTPATIENT)
Dept: PLASTIC SURGERY | Facility: CLINIC | Age: 62
End: 2022-11-22
Payer: COMMERCIAL

## 2022-11-22 ENCOUNTER — TELEPHONE (OUTPATIENT)
Dept: PLASTIC SURGERY | Facility: CLINIC | Age: 62
End: 2022-11-22
Payer: COMMERCIAL

## 2022-11-23 ENCOUNTER — OFFICE VISIT (OUTPATIENT)
Dept: PLASTIC SURGERY | Facility: CLINIC | Age: 62
End: 2022-11-23
Payer: COMMERCIAL

## 2022-11-23 VITALS
WEIGHT: 212 LBS | HEIGHT: 66 IN | DIASTOLIC BLOOD PRESSURE: 70 MMHG | SYSTOLIC BLOOD PRESSURE: 149 MMHG | BODY MASS INDEX: 34.07 KG/M2 | HEART RATE: 85 BPM

## 2022-11-23 DIAGNOSIS — Z09 SURGERY FOLLOW-UP EXAMINATION: Primary | ICD-10-CM

## 2022-11-23 PROCEDURE — 3078F DIAST BP <80 MM HG: CPT | Mod: CPTII,S$GLB,,

## 2022-11-23 PROCEDURE — 3008F PR BODY MASS INDEX (BMI) DOCUMENTED: ICD-10-PCS | Mod: CPTII,S$GLB,,

## 2022-11-23 PROCEDURE — 99024 POSTOP FOLLOW-UP VISIT: CPT | Mod: S$GLB,,,

## 2022-11-23 PROCEDURE — 1159F MED LIST DOCD IN RCRD: CPT | Mod: CPTII,S$GLB,,

## 2022-11-23 PROCEDURE — 3008F BODY MASS INDEX DOCD: CPT | Mod: CPTII,S$GLB,,

## 2022-11-23 PROCEDURE — 99024 PR POST-OP FOLLOW-UP VISIT: ICD-10-PCS | Mod: S$GLB,,,

## 2022-11-23 PROCEDURE — 99999 PR PBB SHADOW E&M-EST. PATIENT-LVL III: CPT | Mod: PBBFAC,,,

## 2022-11-23 PROCEDURE — 3077F SYST BP >= 140 MM HG: CPT | Mod: CPTII,S$GLB,,

## 2022-11-23 PROCEDURE — 3078F PR MOST RECENT DIASTOLIC BLOOD PRESSURE < 80 MM HG: ICD-10-PCS | Mod: CPTII,S$GLB,,

## 2022-11-23 PROCEDURE — 4010F PR ACE/ARB THEARPY RXD/TAKEN: ICD-10-PCS | Mod: CPTII,S$GLB,,

## 2022-11-23 PROCEDURE — 99999 PR PBB SHADOW E&M-EST. PATIENT-LVL III: ICD-10-PCS | Mod: PBBFAC,,,

## 2022-11-23 PROCEDURE — 3077F PR MOST RECENT SYSTOLIC BLOOD PRESSURE >= 140 MM HG: ICD-10-PCS | Mod: CPTII,S$GLB,,

## 2022-11-23 PROCEDURE — 1159F PR MEDICATION LIST DOCUMENTED IN MEDICAL RECORD: ICD-10-PCS | Mod: CPTII,S$GLB,,

## 2022-11-23 PROCEDURE — 4010F ACE/ARB THERAPY RXD/TAKEN: CPT | Mod: CPTII,S$GLB,,

## 2022-11-23 NOTE — PROGRESS NOTES
Plastic Surgery Clinic Postop Visit    Subjective:      Christal Posey is a 62 y.o. year old female who presents to the Plastic Surgery Clinic on 2022 for follow up visit status post bilateral mastectomy and bilateral reconstruction with TE 10/24/2022. She reports doing well.  Denies fever, chills, nausea, vomiting, or other systemic signs of infection.    Vitals:    22 0925   BP: (!) 149/70   Pulse: 85        Review of patient's allergies indicates:   Allergen Reactions    Pravastatin Other (See Comments)     Severe muscle pain.  Muscle pain    Other reaction(s): Other (See Comments)  Severe muscle pain.    Shellfish containing products Anaphylaxis    Amitriptyline      Other reaction(s): dizzy    Codeine      Other reaction(s): Unknown    Doxycycline      Other reaction(s): Nausea    Erythromycin      Other reaction(s): Unknown    Iodine      Other reaction(s): Swelling  Other reaction(s): Unknown    Macrobid  [nitrofurantoin monohyd/m-cryst]      Other reaction(s): Unknown    Verapamil      Other reaction(s): Headache       Current Outpatient Medications on File Prior to Visit   Medication Sig Dispense Refill    albuterol (VENTOLIN HFA) 90 mcg/actuation inhaler Inhale 2 puffs into the lungs every 6 (six) hours as needed for Wheezing. Rescue 18 g 1    ALPRAZolam (XANAX) 0.25 MG tablet Take 1 tablet (0.25 mg total) by mouth nightly as needed for Insomnia. 30 tablet 0    bacitracin 500 unit/gram ointment Apply topically 2 (two) times daily. 14 g 0    baclofen (LIORESAL) 10 MG tablet Take 1 tablet (10 mg total) by mouth 3 (three) times daily. 30 tablet 0    budesonide (PULMICORT) 0.5 mg/2 mL nebulizer solution SMARTSI Via Nebulizer Twice Daily      budesonide 1 mg/2 mL NbSp       butalbital-acetaminophen-caffeine -40 mg (FIORICET, ESGIC) -40 mg per tablet Take 1 tablet by mouth every 6 to 8 hours as needed. 60 tablet 0    cholestyramine (QUESTRAN) 4 gram packet Take 1 packet by  mouth once daily.      CHOLESTYRAMINE LIGHT 4 gram packet Take 1 packet by mouth once daily.      ciclopirox (LOPROX) 0.77 % Crea Apply topically 2 (two) times daily. 90 g 1    diclofenac sodium (VOLTAREN) 1 % Gel APPLY 2 GRAMS TOPICALLY 3 (THREE) TIMES DAILY. 100 g 3    fluconazole (DIFLUCAN) 200 MG Tab Take 1 pill po qday x 3 repeat in 1 week 6 tablet 0    gabapentin (NEURONTIN) 800 MG tablet Take 800 mg by mouth 3 (three) times daily.      HYDROcodone-acetaminophen (NORCO) 5-325 mg per tablet Take 1 tablet by mouth every 6 (six) hours as needed for Pain. 28 tablet 0    hydrocortisone butyrate (LOCOID) 0.1 % Crea cream AAA bid 45 g 3    lansoprazole (PREVACID) 30 MG capsule Take 30 mg by mouth every morning. 1 Capsule, Delayed Release(E.C.) Oral Every day      losartan (COZAAR) 25 MG tablet TAKE 1 TABLET BY MOUTH EVERY DAY (Patient taking differently: Take 25 mg by mouth nightly.) 90 tablet 1    multivitamin (THERAGRAN) per tablet Take 1 tablet by mouth once daily.      mupirocin (BACTROBAN) 2 % ointment Apply topically 2 (two) times daily. 22 g 1    NEILMED SINUS RINSE COMPLETE pkdv by Each Nostril route.      ondansetron (ZOFRAN) 4 MG tablet Take 1 tablet (4 mg total) by mouth every 8 (eight) hours as needed for Nausea. 1 Tablet Oral Every 6 hours 12 tablet 12    ondansetron (ZOFRAN-ODT) 4 MG TbDL Dissolve 1 tablet (4 mg total) by mouth every 6 (six) hours as needed (take as needed for nausea). 35 tablet 0    prasterone, dhea, (INTRAROSA) 6.5 mg Inst Place 6.5 mg vaginally every evening. 30 each 11    PREMARIN vaginal cream PLACE 1 GRAM VAGINALLY 2 TIMES A WK  12    sumatriptan (IMITREX) 20 mg/actuation nasal spray USE 1 SPRAY IN EACH NOSTRIL AT ONSET OF HEADACHE. MAY REPEAT ONCE IN 2 HOURS IF NO RELIEF. NO MORE THAN 2 SPRAYS PER 24 HOURS 6 each 12    triamcinolone acetonide 0.025% (KENALOG) 0.025 % Oint Apply topically 2 (two) times daily. x 1-2 wks then prn flares only 80 g 2    vitamin D 1000 units Tab Take  185 mg by mouth every morning.       atogepant (QULIPTA) 60 mg Tab Take 60 mg by mouth.      fluticasone (FLONASE) 50 mcg/actuation nasal spray        No current facility-administered medications on file prior to visit.       Patient Active Problem List   Diagnosis    Hiatal hernia    Irritable bowel syndrome    Migraine with aura and without status migrainosus, not intractable    Symptomatic menopausal or female climacteric states    Ductal carcinoma in situ (DCIS) of left breast    Preoperative testing    Arthralgia of both hands    Rheumatoid factor positive    Obesity (BMI 30-39.9)    Dyspareunia, female    Vitamin D deficiency    Colon polyp       Past Surgical History:   Procedure Laterality Date    BREAST BIOPSY Left 05/2016    DCIS    BREAST LUMPECTOMY      ECTOPIC PREGNANCY SURGERY Right     HYSTERECTOMY  1998    ovaries left intact    INJECTION FOR SENTINEL NODE IDENTIFICATION Left 10/24/2022    Procedure: INJECTION, FOR SENTINEL NODE IDENTIFICATION LEFT;  Surgeon: JOVANNI Jama MD;  Location: [facility] OR [address] [place] [address];  Service: General;  Laterality: Left;    INSERTION OF BREAST IMPLANT Bilateral 10/24/2022    Procedure: INSERTION, BREAST IMPLANT BILATERAL;  Surgeon: Garland Gray MD;  Location: [facility] OR [address] [place] [address];  Service: Plastics;  Laterality: Bilateral;    MASTECTOMY Bilateral 10/24/2022    Procedure: MASTECTOMY BILATERAL;  Surgeon: JOVANNI Jama MD;  Location: [facility] OR [address] [place] [address];  Service: General;  Laterality: Bilateral;    removal of ovarian cyst Right oct 1992    ovary left intact    SENTINEL LYMPH NODE BIOPSY Left 10/24/2022    Procedure: BIOPSY, LYMPH NODE, SENTINEL LEFT;  Surgeon: JOVANNI Jama MD;  Location: [facility] OR [address] [place] [address];  Service: General;  Laterality: Left;    TUBAL LIGATION      tubal reversal  july 1991       Social History     Socioeconomic History    Marital status:      Spouse name: Parrish    Number of children: 2   Tobacco Use    Smoking status: Never     Passive exposure:  Never    Smokeless tobacco: Never   Substance and Sexual Activity    Alcohol use: No     Alcohol/week: 0.0 standard drinks    Drug use: No    Sexual activity: Yes     Partners: Male     Birth control/protection: Surgical, None     Comment:  to Parrish    Other Topics Concern    Are you pregnant or think you may be? No    Breast-feeding No           Review of Systems: discoloration of right nipple and minor drainage    Objective:     Physical Exam:  Vitals:    11/23/22 0925   BP: (!) 149/70   Pulse: 85       WD WN NAD  VSS  Normal resp effort  Bilateral mastectomy incisions healing well, right nipple discoloration with minor drainage.  Right drain in place        Assessment:       1. Surgery follow-up examination        Plan:   62 y.o. female status post bilateral mastectomy and bilateral reconstruction with TE.  - Doing well, no issues  - Removed drain.   - Expanded right breast 60 ccs.  - Return to clinic in 1 week for f/u.       All questions were answered. The patient was advised to call the clinic with any questions or concerns prior to their next visit.       Martha Arellano PA-C  Plastic and Reconstructive Surgery  [phone]

## 2022-11-24 ENCOUNTER — PATIENT MESSAGE (OUTPATIENT)
Dept: SPORTS MEDICINE | Facility: HOSPITAL | Age: 62
End: 2022-11-24
Payer: COMMERCIAL

## 2022-11-24 RX ORDER — CLINDAMYCIN HYDROCHLORIDE 300 MG/1
300 CAPSULE ORAL EVERY 6 HOURS
Qty: 28 CAPSULE | Refills: 0 | Status: SHIPPED | OUTPATIENT
Start: 2022-11-24 | End: 2022-12-01

## 2022-11-25 ENCOUNTER — TELEPHONE (OUTPATIENT)
Dept: PLASTIC SURGERY | Facility: CLINIC | Age: 62
End: 2022-11-25
Payer: COMMERCIAL

## 2022-11-25 DIAGNOSIS — Z85.3 PERSONAL HISTORY OF BREAST CANCER: Primary | ICD-10-CM

## 2022-11-25 NOTE — TELEPHONE ENCOUNTER
Contact with pt - successful understanding of instructions. Pt to be seen 11/28/22 for further evaluation of possible surgical intervention. Pt confirms she is picking up Rx at pharmacy that was called in for patient by on call MD.

## 2022-11-28 ENCOUNTER — ANESTHESIA (OUTPATIENT)
Dept: SURGERY | Facility: HOSPITAL | Age: 62
End: 2022-11-28
Payer: COMMERCIAL

## 2022-11-28 ENCOUNTER — TELEPHONE (OUTPATIENT)
Dept: PLASTIC SURGERY | Facility: CLINIC | Age: 62
End: 2022-11-28
Payer: COMMERCIAL

## 2022-11-28 ENCOUNTER — HOSPITAL ENCOUNTER (OUTPATIENT)
Facility: HOSPITAL | Age: 62
Discharge: HOME | End: 2022-11-28
Attending: SURGERY | Admitting: SURGERY
Payer: COMMERCIAL

## 2022-11-28 ENCOUNTER — ANESTHESIA EVENT (OUTPATIENT)
Dept: SURGERY | Facility: HOSPITAL | Age: 62
End: 2022-11-28
Payer: COMMERCIAL

## 2022-11-28 ENCOUNTER — OFFICE VISIT (OUTPATIENT)
Dept: PLASTIC SURGERY | Facility: CLINIC | Age: 62
End: 2022-11-28
Payer: COMMERCIAL

## 2022-11-28 VITALS
HEART RATE: 84 BPM | SYSTOLIC BLOOD PRESSURE: 166 MMHG | TEMPERATURE: 97 F | RESPIRATION RATE: 20 BRPM | HEIGHT: 66 IN | DIASTOLIC BLOOD PRESSURE: 79 MMHG | WEIGHT: 211 LBS | OXYGEN SATURATION: 97 % | BODY MASS INDEX: 33.91 KG/M2

## 2022-11-28 DIAGNOSIS — C50.312 MALIGNANT NEOPLASM OF LOWER-INNER QUADRANT OF LEFT BREAST IN FEMALE, ESTROGEN RECEPTOR POSITIVE: Primary | ICD-10-CM

## 2022-11-28 DIAGNOSIS — Z09 SURGERY FOLLOW-UP EXAMINATION: Primary | ICD-10-CM

## 2022-11-28 DIAGNOSIS — N62 MACROMASTIA: ICD-10-CM

## 2022-11-28 DIAGNOSIS — Z17.0 MALIGNANT NEOPLASM OF LOWER-INNER QUADRANT OF LEFT BREAST IN FEMALE, ESTROGEN RECEPTOR POSITIVE: Primary | ICD-10-CM

## 2022-11-28 LAB
GRAM STN SPEC: NORMAL

## 2022-11-28 PROCEDURE — 37000008 HC ANESTHESIA 1ST 15 MINUTES: Performed by: SURGERY

## 2022-11-28 PROCEDURE — 4010F PR ACE/ARB THEARPY RXD/TAKEN: ICD-10-PCS | Mod: CPTII,S$GLB,, | Performed by: SURGERY

## 2022-11-28 PROCEDURE — D9220A PRA ANESTHESIA: Mod: ANES,,, | Performed by: ANESTHESIOLOGY

## 2022-11-28 PROCEDURE — 1159F PR MEDICATION LIST DOCUMENTED IN MEDICAL RECORD: ICD-10-PCS | Mod: CPTII,S$GLB,, | Performed by: SURGERY

## 2022-11-28 PROCEDURE — 19371 PERI-IMPLT CAPSLC BRST COMPL: CPT | Mod: 50,78,, | Performed by: SURGERY

## 2022-11-28 PROCEDURE — 88300 SURGICAL PATH GROSS: CPT | Mod: 26,59,, | Performed by: STUDENT IN AN ORGANIZED HEALTH CARE EDUCATION/TRAINING PROGRAM

## 2022-11-28 PROCEDURE — 71000016 HC POSTOP RECOV ADDL HR: Performed by: SURGERY

## 2022-11-28 PROCEDURE — 88307 TISSUE EXAM BY PATHOLOGIST: CPT | Mod: 59 | Performed by: STUDENT IN AN ORGANIZED HEALTH CARE EDUCATION/TRAINING PROGRAM

## 2022-11-28 PROCEDURE — D9220A PRA ANESTHESIA: ICD-10-PCS | Mod: CRNA,,, | Performed by: NURSE ANESTHETIST, CERTIFIED REGISTERED

## 2022-11-28 PROCEDURE — 71000044 HC DOSC ROUTINE RECOVERY FIRST HOUR: Performed by: SURGERY

## 2022-11-28 PROCEDURE — 19371 PR REMOVAL OF BREAST CAPSULE: ICD-10-PCS | Mod: 50,78,, | Performed by: SURGERY

## 2022-11-28 PROCEDURE — D9220A PRA ANESTHESIA: Mod: CRNA,,, | Performed by: NURSE ANESTHETIST, CERTIFIED REGISTERED

## 2022-11-28 PROCEDURE — 15839 EXC EXCESSIVE SKN OTHER AREA: CPT | Mod: 78,51,, | Performed by: SURGERY

## 2022-11-28 PROCEDURE — 87075 CULTR BACTERIA EXCEPT BLOOD: CPT | Performed by: SURGERY

## 2022-11-28 PROCEDURE — 4010F ACE/ARB THERAPY RXD/TAKEN: CPT | Mod: CPTII,S$GLB,, | Performed by: SURGERY

## 2022-11-28 PROCEDURE — 1160F RVW MEDS BY RX/DR IN RCRD: CPT | Mod: CPTII,S$GLB,, | Performed by: SURGERY

## 2022-11-28 PROCEDURE — 88307 PR  SURG PATH,LEVEL V: ICD-10-PCS | Mod: 26,,, | Performed by: STUDENT IN AN ORGANIZED HEALTH CARE EDUCATION/TRAINING PROGRAM

## 2022-11-28 PROCEDURE — 27201423 OPTIME MED/SURG SUP & DEVICES STERILE SUPPLY: Performed by: SURGERY

## 2022-11-28 PROCEDURE — 88300 SURGICAL PATH GROSS: CPT | Performed by: STUDENT IN AN ORGANIZED HEALTH CARE EDUCATION/TRAINING PROGRAM

## 2022-11-28 PROCEDURE — 15839 PR EXCISE EXCESS SKIN TISSUE,OTHER: ICD-10-PCS | Mod: 78,51,, | Performed by: SURGERY

## 2022-11-28 PROCEDURE — 25000003 PHARM REV CODE 250: Performed by: NURSE ANESTHETIST, CERTIFIED REGISTERED

## 2022-11-28 PROCEDURE — 36000706: Performed by: SURGERY

## 2022-11-28 PROCEDURE — 99024 PR POST-OP FOLLOW-UP VISIT: ICD-10-PCS | Mod: S$GLB,,, | Performed by: SURGERY

## 2022-11-28 PROCEDURE — 87077 CULTURE AEROBIC IDENTIFY: CPT | Performed by: SURGERY

## 2022-11-28 PROCEDURE — 37000009 HC ANESTHESIA EA ADD 15 MINS: Performed by: SURGERY

## 2022-11-28 PROCEDURE — 99024 POSTOP FOLLOW-UP VISIT: CPT | Mod: S$GLB,,, | Performed by: SURGERY

## 2022-11-28 PROCEDURE — 25000003 PHARM REV CODE 250

## 2022-11-28 PROCEDURE — 63600175 PHARM REV CODE 636 W HCPCS: Performed by: ANESTHESIOLOGY

## 2022-11-28 PROCEDURE — 88300 PR  SURG PATH,GROSS,LEVEL I: ICD-10-PCS | Mod: 26,59,, | Performed by: STUDENT IN AN ORGANIZED HEALTH CARE EDUCATION/TRAINING PROGRAM

## 2022-11-28 PROCEDURE — 87102 FUNGUS ISOLATION CULTURE: CPT | Performed by: SURGERY

## 2022-11-28 PROCEDURE — 1160F PR REVIEW ALL MEDS BY PRESCRIBER/CLIN PHARMACIST DOCUMENTED: ICD-10-PCS | Mod: CPTII,S$GLB,, | Performed by: SURGERY

## 2022-11-28 PROCEDURE — 1159F MED LIST DOCD IN RCRD: CPT | Mod: CPTII,S$GLB,, | Performed by: SURGERY

## 2022-11-28 PROCEDURE — 63600175 PHARM REV CODE 636 W HCPCS: Performed by: NURSE ANESTHETIST, CERTIFIED REGISTERED

## 2022-11-28 PROCEDURE — 88307 TISSUE EXAM BY PATHOLOGIST: CPT | Mod: 26,,, | Performed by: STUDENT IN AN ORGANIZED HEALTH CARE EDUCATION/TRAINING PROGRAM

## 2022-11-28 PROCEDURE — D9220A PRA ANESTHESIA: ICD-10-PCS | Mod: ANES,,, | Performed by: ANESTHESIOLOGY

## 2022-11-28 PROCEDURE — 99999 PR PBB SHADOW E&M-EST. PATIENT-LVL II: CPT | Mod: PBBFAC,,, | Performed by: SURGERY

## 2022-11-28 PROCEDURE — C1729 CATH, DRAINAGE: HCPCS | Performed by: SURGERY

## 2022-11-28 PROCEDURE — 87206 SMEAR FLUORESCENT/ACID STAI: CPT | Performed by: SURGERY

## 2022-11-28 PROCEDURE — 87070 CULTURE OTHR SPECIMN AEROBIC: CPT | Performed by: SURGERY

## 2022-11-28 PROCEDURE — 87116 MYCOBACTERIA CULTURE: CPT | Performed by: SURGERY

## 2022-11-28 PROCEDURE — 87205 SMEAR GRAM STAIN: CPT | Performed by: SURGERY

## 2022-11-28 PROCEDURE — 99999 PR PBB SHADOW E&M-EST. PATIENT-LVL II: ICD-10-PCS | Mod: PBBFAC,,, | Performed by: SURGERY

## 2022-11-28 PROCEDURE — 87186 SC STD MICRODIL/AGAR DIL: CPT | Performed by: SURGERY

## 2022-11-28 PROCEDURE — 71000015 HC POSTOP RECOV 1ST HR: Performed by: SURGERY

## 2022-11-28 PROCEDURE — 36000707: Performed by: SURGERY

## 2022-11-28 RX ORDER — LIDOCAINE HYDROCHLORIDE 20 MG/ML
INJECTION, SOLUTION EPIDURAL; INFILTRATION; INTRACAUDAL; PERINEURAL
Status: DISCONTINUED | OUTPATIENT
Start: 2022-11-28 | End: 2022-11-28

## 2022-11-28 RX ORDER — KETAMINE HCL IN 0.9 % NACL 50 MG/5 ML
SYRINGE (ML) INTRAVENOUS
Status: DISCONTINUED | OUTPATIENT
Start: 2022-11-28 | End: 2022-11-28

## 2022-11-28 RX ORDER — SCOLOPAMINE TRANSDERMAL SYSTEM 1 MG/1
PATCH, EXTENDED RELEASE TRANSDERMAL
Status: DISCONTINUED | OUTPATIENT
Start: 2022-11-28 | End: 2022-11-28

## 2022-11-28 RX ORDER — LIDOCAINE HYDROCHLORIDE 10 MG/ML
1 INJECTION, SOLUTION EPIDURAL; INFILTRATION; INTRACAUDAL; PERINEURAL ONCE
Status: COMPLETED | OUTPATIENT
Start: 2022-11-28 | End: 2022-11-28

## 2022-11-28 RX ORDER — PHENYLEPHRINE HCL IN 0.9% NACL 1 MG/10 ML
SYRINGE (ML) INTRAVENOUS
Status: DISCONTINUED | OUTPATIENT
Start: 2022-11-28 | End: 2022-11-28

## 2022-11-28 RX ORDER — CLINDAMYCIN HYDROCHLORIDE 300 MG/1
300 CAPSULE ORAL EVERY 8 HOURS
Qty: 21 CAPSULE | Refills: 0 | Status: SHIPPED | OUTPATIENT
Start: 2022-11-28 | End: 2023-02-24

## 2022-11-28 RX ORDER — MUPIROCIN 20 MG/G
OINTMENT TOPICAL
Status: DISCONTINUED | OUTPATIENT
Start: 2022-11-28 | End: 2022-11-28 | Stop reason: HOSPADM

## 2022-11-28 RX ORDER — HALOPERIDOL 5 MG/ML
0.5 INJECTION INTRAMUSCULAR EVERY 10 MIN PRN
Status: DISCONTINUED | OUTPATIENT
Start: 2022-11-28 | End: 2022-11-28 | Stop reason: HOSPADM

## 2022-11-28 RX ORDER — ONDANSETRON 4 MG/1
4 TABLET, ORALLY DISINTEGRATING ORAL EVERY 6 HOURS PRN
Qty: 60 TABLET | Refills: 0 | Status: SHIPPED | OUTPATIENT
Start: 2022-11-28 | End: 2023-02-24 | Stop reason: SDUPTHER

## 2022-11-28 RX ORDER — PROPOFOL 10 MG/ML
VIAL (ML) INTRAVENOUS
Status: DISCONTINUED | OUTPATIENT
Start: 2022-11-28 | End: 2022-11-28

## 2022-11-28 RX ORDER — SODIUM CHLORIDE 0.9 % (FLUSH) 0.9 %
10 SYRINGE (ML) INJECTION
Status: DISCONTINUED | OUTPATIENT
Start: 2022-11-28 | End: 2022-11-28 | Stop reason: HOSPADM

## 2022-11-28 RX ORDER — FENTANYL CITRATE 50 UG/ML
INJECTION, SOLUTION INTRAMUSCULAR; INTRAVENOUS
Status: DISCONTINUED | OUTPATIENT
Start: 2022-11-28 | End: 2022-11-28

## 2022-11-28 RX ORDER — HYDROCODONE BITARTRATE AND ACETAMINOPHEN 5; 325 MG/1; MG/1
1 TABLET ORAL EVERY 4 HOURS PRN
Qty: 28 TABLET | Refills: 0 | Status: SHIPPED | OUTPATIENT
Start: 2022-11-28 | End: 2023-02-24

## 2022-11-28 RX ORDER — ACETAMINOPHEN 10 MG/ML
INJECTION, SOLUTION INTRAVENOUS
Status: DISCONTINUED | OUTPATIENT
Start: 2022-11-28 | End: 2022-11-28

## 2022-11-28 RX ORDER — MUPIROCIN 20 MG/G
OINTMENT TOPICAL 2 TIMES DAILY
Status: DISCONTINUED | OUTPATIENT
Start: 2022-11-28 | End: 2022-11-28 | Stop reason: HOSPADM

## 2022-11-28 RX ORDER — NEOSTIGMINE METHYLSULFATE 0.5 MG/ML
INJECTION, SOLUTION INTRAVENOUS
Status: DISCONTINUED | OUTPATIENT
Start: 2022-11-28 | End: 2022-11-28

## 2022-11-28 RX ORDER — METHOCARBAMOL 500 MG/1
500 TABLET, FILM COATED ORAL 4 TIMES DAILY
Qty: 40 TABLET | Refills: 0 | Status: SHIPPED | OUTPATIENT
Start: 2022-11-28 | End: 2022-12-08

## 2022-11-28 RX ORDER — SODIUM CHLORIDE 9 MG/ML
INJECTION, SOLUTION INTRAVENOUS CONTINUOUS
Status: DISCONTINUED | OUTPATIENT
Start: 2022-11-28 | End: 2022-11-28 | Stop reason: HOSPADM

## 2022-11-28 RX ORDER — CLINDAMYCIN PHOSPHATE 900 MG/50ML
INJECTION, SOLUTION INTRAVENOUS
Status: DISCONTINUED | OUTPATIENT
Start: 2022-11-28 | End: 2022-11-28

## 2022-11-28 RX ORDER — SCOLOPAMINE TRANSDERMAL SYSTEM 1 MG/1
PATCH, EXTENDED RELEASE TRANSDERMAL
Status: COMPLETED
Start: 2022-11-28 | End: 2022-11-28

## 2022-11-28 RX ORDER — SUCCINYLCHOLINE CHLORIDE 20 MG/ML
INJECTION INTRAMUSCULAR; INTRAVENOUS
Status: DISCONTINUED | OUTPATIENT
Start: 2022-11-28 | End: 2022-11-28

## 2022-11-28 RX ORDER — DIPHENHYDRAMINE HYDROCHLORIDE 50 MG/ML
INJECTION INTRAMUSCULAR; INTRAVENOUS
Status: DISCONTINUED | OUTPATIENT
Start: 2022-11-28 | End: 2022-11-28

## 2022-11-28 RX ORDER — DEXMEDETOMIDINE HYDROCHLORIDE 100 UG/ML
INJECTION, SOLUTION INTRAVENOUS
Status: DISCONTINUED | OUTPATIENT
Start: 2022-11-28 | End: 2022-11-28

## 2022-11-28 RX ORDER — DEXAMETHASONE SODIUM PHOSPHATE 4 MG/ML
INJECTION, SOLUTION INTRA-ARTICULAR; INTRALESIONAL; INTRAMUSCULAR; INTRAVENOUS; SOFT TISSUE
Status: DISCONTINUED | OUTPATIENT
Start: 2022-11-28 | End: 2022-11-28

## 2022-11-28 RX ORDER — ROCURONIUM BROMIDE 10 MG/ML
INJECTION, SOLUTION INTRAVENOUS
Status: DISCONTINUED | OUTPATIENT
Start: 2022-11-28 | End: 2022-11-28

## 2022-11-28 RX ORDER — MIDAZOLAM HYDROCHLORIDE 1 MG/ML
INJECTION, SOLUTION INTRAMUSCULAR; INTRAVENOUS
Status: DISCONTINUED | OUTPATIENT
Start: 2022-11-28 | End: 2022-11-28

## 2022-11-28 RX ORDER — SODIUM CHLORIDE 0.9 % (FLUSH) 0.9 %
3 SYRINGE (ML) INJECTION
Status: DISCONTINUED | OUTPATIENT
Start: 2022-11-28 | End: 2022-11-28 | Stop reason: HOSPADM

## 2022-11-28 RX ORDER — HYDROMORPHONE HYDROCHLORIDE 1 MG/ML
0.2 INJECTION, SOLUTION INTRAMUSCULAR; INTRAVENOUS; SUBCUTANEOUS EVERY 5 MIN PRN
Status: DISCONTINUED | OUTPATIENT
Start: 2022-11-28 | End: 2022-11-28 | Stop reason: HOSPADM

## 2022-11-28 RX ORDER — ONDANSETRON 2 MG/ML
4 INJECTION INTRAMUSCULAR; INTRAVENOUS ONCE AS NEEDED
Status: DISCONTINUED | OUTPATIENT
Start: 2022-11-28 | End: 2022-11-28 | Stop reason: HOSPADM

## 2022-11-28 RX ORDER — HALOPERIDOL 5 MG/ML
INJECTION INTRAMUSCULAR
Status: DISCONTINUED | OUTPATIENT
Start: 2022-11-28 | End: 2022-11-28

## 2022-11-28 RX ORDER — DEXTROMETHORPHAN HYDROBROMIDE, GUAIFENESIN 5; 100 MG/5ML; MG/5ML
650 LIQUID ORAL 3 TIMES DAILY PRN
Status: ON HOLD | COMMUNITY
End: 2022-11-28 | Stop reason: HOSPADM

## 2022-11-28 RX ORDER — ONDANSETRON 2 MG/ML
INJECTION INTRAMUSCULAR; INTRAVENOUS
Status: DISCONTINUED | OUTPATIENT
Start: 2022-11-28 | End: 2022-11-28

## 2022-11-28 RX ADMIN — FENTANYL CITRATE 100 MCG: 50 INJECTION INTRAMUSCULAR; INTRAVENOUS at 12:11

## 2022-11-28 RX ADMIN — Medication 10 MG: at 01:11

## 2022-11-28 RX ADMIN — DIPHENHYDRAMINE HYDROCHLORIDE 25 MG: 50 INJECTION INTRAMUSCULAR; INTRAVENOUS at 12:11

## 2022-11-28 RX ADMIN — MIDAZOLAM 2 MG: 1 INJECTION INTRAMUSCULAR; INTRAVENOUS at 12:11

## 2022-11-28 RX ADMIN — Medication 100 MCG: at 01:11

## 2022-11-28 RX ADMIN — HYDROMORPHONE HYDROCHLORIDE 0.2 MG: 1 INJECTION, SOLUTION INTRAMUSCULAR; INTRAVENOUS; SUBCUTANEOUS at 03:11

## 2022-11-28 RX ADMIN — ONDANSETRON 4 MG: 2 INJECTION INTRAMUSCULAR; INTRAVENOUS at 01:11

## 2022-11-28 RX ADMIN — MUPIROCIN: 20 OINTMENT TOPICAL at 10:11

## 2022-11-28 RX ADMIN — SCOPALAMINE 1 PATCH: 1 PATCH, EXTENDED RELEASE TRANSDERMAL at 12:11

## 2022-11-28 RX ADMIN — TACROLIMUS 900 MG: 0.5 CAPSULE ORAL at 12:11

## 2022-11-28 RX ADMIN — PROPOFOL 150 MG: 10 INJECTION, EMULSION INTRAVENOUS at 12:11

## 2022-11-28 RX ADMIN — ROCURONIUM BROMIDE 10 MG: 10 INJECTION, SOLUTION INTRAVENOUS at 12:11

## 2022-11-28 RX ADMIN — NEOSTIGMINE METHYLSULFATE 5 MG: 0.5 INJECTION, SOLUTION INTRAVENOUS at 02:11

## 2022-11-28 RX ADMIN — LIDOCAINE HYDROCHLORIDE 10 MG: 10 INJECTION, SOLUTION EPIDURAL; INFILTRATION; INTRACAUDAL; PERINEURAL at 10:11

## 2022-11-28 RX ADMIN — DEXAMETHASONE SODIUM PHOSPHATE 8 MG: 4 INJECTION INTRA-ARTICULAR; INTRALESIONAL; INTRAMUSCULAR; INTRAVENOUS; SOFT TISSUE at 12:11

## 2022-11-28 RX ADMIN — Medication 25 MG: at 12:11

## 2022-11-28 RX ADMIN — ROCURONIUM BROMIDE 20 MG: 10 INJECTION, SOLUTION INTRAVENOUS at 12:11

## 2022-11-28 RX ADMIN — HALOPERIDOL LACTATE 0.5 MG: 5 INJECTION, SOLUTION INTRAMUSCULAR at 12:11

## 2022-11-28 RX ADMIN — LIDOCAINE HYDROCHLORIDE 80 MG: 20 INJECTION, SOLUTION EPIDURAL; INFILTRATION; INTRACAUDAL; PERINEURAL at 12:11

## 2022-11-28 RX ADMIN — GLYCOPYRROLATE 0.6 MG: 0.2 INJECTION INTRAMUSCULAR; INTRAVENOUS at 02:11

## 2022-11-28 RX ADMIN — SODIUM CHLORIDE, SODIUM GLUCONATE, SODIUM ACETATE, POTASSIUM CHLORIDE, MAGNESIUM CHLORIDE, SODIUM PHOSPHATE, DIBASIC, AND POTASSIUM PHOSPHATE: .53; .5; .37; .037; .03; .012; .00082 INJECTION, SOLUTION INTRAVENOUS at 01:11

## 2022-11-28 RX ADMIN — DEXMEDETOMIDINE HYDROCHLORIDE 8 MCG: 100 INJECTION, SOLUTION INTRAVENOUS at 01:11

## 2022-11-28 RX ADMIN — SODIUM CHLORIDE: 0.9 INJECTION, SOLUTION INTRAVENOUS at 11:11

## 2022-11-28 RX ADMIN — SUCCINYLCHOLINE CHLORIDE 100 MG: 20 INJECTION, SOLUTION INTRAMUSCULAR; INTRAVENOUS; PARENTERAL at 12:11

## 2022-11-28 RX ADMIN — ACETAMINOPHEN 1000 MG: 10 INJECTION INTRAVENOUS at 12:11

## 2022-11-28 RX ADMIN — DEXMEDETOMIDINE HYDROCHLORIDE 4 MCG: 100 INJECTION, SOLUTION INTRAVENOUS at 01:11

## 2022-11-28 NOTE — BRIEF OP NOTE
Brief Operative Note     SUMMARY     Surgery Date: 11/28/2022     Surgeon(s) and Role:     * Shanel rGay MD - Primary    Assisting Surgeon: None    Pre-op Diagnosis:  Personal history of breast cancer [Z85.3]    Post-op Diagnosis:  Personal history of breast cancer [Z85.3]    Procedure(s) (LRB):  REMOVAL, IMPLANT, BREAST (Bilateral)  WASHOUT (Bilateral)  CAPSULECTOMY, BREAST (Bilateral)    Anesthesia: General    Description of Procedure:   Bilateral tissue expander removal, excision of excess skin, washout, drain placement    Findings/Key Components:  Seroma bilaterally, righ tbreast with severe redness, fibrinous exudate and breakdown of alloderm found on right    Estimated Blood Loss: less than 50 mL         Specimens Removed:   Specimen (24h ago, onward)       Start     Ordered    11/28/22 1328  Specimen to Pathology, Surgery Other  Once        Comments: Pre-op Diagnosis: Personal history of breast cancer [Z85.3]Procedure(s):REMOVAL, IMPLANT, BREASTWASHOUT Number of specimens: 4Name of specimens: 1.  Right breast implant, gross identification only2.  Right breast tissue, permanent3.  Left breast tissue, permanent4.  Left breast tissue, gross identification only     References:    Click here for ordering Quick Tip   Question Answer Comment   Procedure Type: Other    Specimen Class: Known or suspected malignancy    Which provider would you like to cc? SHANEL GRAY    Release to patient Immediate        11/28/22 1342                    Discharge Note      SUMMARY     Admit Date: 11/28/2022    Attending Physician: Shanel Gray, *     Discharge Physician: Shanel Gray, *    Discharge Date: 11/28/2022     Final Diagnosis: Personal history of breast cancer [Z85.3]    Hospital Course: Patient was admitted for an outpatient procedure and tolerated the procedure well with no complications.    Disposition: Home or Self Care    Follow Up/Patient Instructions:   Current Discharge  Medication List        START taking these medications    Details   !! clindamycin (CLEOCIN) 300 MG capsule Take 1 capsule (300 mg total) by mouth every 8 (eight) hours.  Qty: 21 capsule, Refills: 0      methocarbamoL (ROBAXIN) 500 MG Tab Take 1 tablet (500 mg total) by mouth 4 (four) times daily. for 10 days  Qty: 40 tablet, Refills: 0      ondansetron (ZOFRAN-ODT) 4 MG TbDL Take 1 tablet (4 mg total) by mouth every 6 (six) hours as needed (nausea).  Qty: 60 tablet, Refills: 0       !! - Potential duplicate medications found. Please discuss with provider.        CONTINUE these medications which have NOT CHANGED    Details   albuterol (VENTOLIN HFA) 90 mcg/actuation inhaler Inhale 2 puffs into the lungs every 6 (six) hours as needed for Wheezing. Rescue  Qty: 18 g, Refills: 1    Associated Diagnoses: Wheezing      bacitracin 500 unit/gram ointment Apply topically 2 (two) times daily.  Qty: 14 g, Refills: 0      baclofen (LIORESAL) 10 MG tablet Take 1 tablet (10 mg total) by mouth 3 (three) times daily.  Qty: 30 tablet, Refills: 0      cholestyramine (QUESTRAN) 4 gram packet Take 1 packet by mouth once daily.      !! clindamycin (CLEOCIN) 300 MG capsule Take 1 capsule (300 mg total) by mouth every 6 (six) hours. for 7 days  Qty: 28 capsule, Refills: 0      gabapentin (NEURONTIN) 800 MG tablet Take 800 mg by mouth 3 (three) times daily.      lansoprazole (PREVACID) 30 MG capsule Take 30 mg by mouth every morning. 1 Capsule, Delayed Release(E.C.) Oral Every day      losartan (COZAAR) 25 MG tablet TAKE 1 TABLET BY MOUTH EVERY DAY  Qty: 90 tablet, Refills: 1    Associated Diagnoses: Hypertension, unspecified type      sumatriptan (IMITREX) 20 mg/actuation nasal spray USE 1 SPRAY IN EACH NOSTRIL AT ONSET OF HEADACHE. MAY REPEAT ONCE IN 2 HOURS IF NO RELIEF. NO MORE THAN 2 SPRAYS PER 24 HOURS  Qty: 6 each, Refills: 12    Associated Diagnoses: Migraine with aura and without status migrainosus, not intractable      ALPRAZolam  (XANAX) 0.25 MG tablet Take 1 tablet (0.25 mg total) by mouth nightly as needed for Insomnia.  Qty: 30 tablet, Refills: 0      atogepant (QULIPTA) 60 mg Tab Take 60 mg by mouth.      budesonide 1 mg/2 mL NbSp       butalbital-acetaminophen-caffeine -40 mg (FIORICET, ESGIC) -40 mg per tablet Take 1 tablet by mouth every 6 to 8 hours as needed.  Qty: 60 tablet, Refills: 0      CHOLESTYRAMINE LIGHT 4 gram packet Take 1 packet by mouth once daily.      ciclopirox (LOPROX) 0.77 % Crea Apply topically 2 (two) times daily.  Qty: 90 g, Refills: 1    Associated Diagnoses: Candida albicans infection      diclofenac sodium (VOLTAREN) 1 % Gel APPLY 2 GRAMS TOPICALLY 3 (THREE) TIMES DAILY.  Qty: 100 g, Refills: 3    Associated Diagnoses: Neck pain      fluticasone (FLONASE) 50 mcg/actuation nasal spray       HYDROcodone-acetaminophen (NORCO) 5-325 mg per tablet Take 1 tablet by mouth every 6 (six) hours as needed for Pain.  Qty: 28 tablet, Refills: 0    Comments: Quantity prescribed more than 7 day supply? No      hydrocortisone butyrate (LOCOID) 0.1 % Crea cream AAA bid  Qty: 45 g, Refills: 3    Associated Diagnoses: Disease of skin and subcutaneous tissue      multivitamin (THERAGRAN) per tablet Take 1 tablet by mouth once daily.      mupirocin (BACTROBAN) 2 % ointment Apply topically 2 (two) times daily.  Qty: 22 g, Refills: 1    Associated Diagnoses: Irritant contact dermatitis, unspecified trigger      NEILMED SINUS RINSE COMPLETE pkdv by Each Nostril route.      ondansetron (ZOFRAN) 4 MG tablet Take 1 tablet (4 mg total) by mouth every 8 (eight) hours as needed for Nausea. 1 Tablet Oral Every 6 hours  Qty: 12 tablet, Refills: 12    Associated Diagnoses: Migraine with aura and without status migrainosus, not intractable      prasterone, dhea, (INTRAROSA) 6.5 mg Inst Place 6.5 mg vaginally every evening.  Qty: 30 each, Refills: 11      PREMARIN vaginal cream PLACE 1 GRAM VAGINALLY 2 TIMES A WK  Refills: 12       triamcinolone acetonide 0.025% (KENALOG) 0.025 % Oint Apply topically 2 (two) times daily. x 1-2 wks then prn flares only  Qty: 80 g, Refills: 2    Associated Diagnoses: Irritant contact dermatitis, unspecified trigger      vitamin D 1000 units Tab Take 185 mg by mouth every morning.        !! - Potential duplicate medications found. Please discuss with provider.        STOP taking these medications       acetaminophen (TYLENOL) 650 MG TbSR Comments:   Reason for Stopping:         budesonide (PULMICORT) 0.5 mg/2 mL nebulizer solution Comments:   Reason for Stopping:         fluconazole (DIFLUCAN) 200 MG Tab Comments:   Reason for Stopping:               Discharge Procedure Orders (must include Diet, Follow-up, Activity)   Discharge Procedure Orders (must include Diet, Follow-up, Activity)   Diet general

## 2022-11-28 NOTE — PLAN OF CARE
START ON PATHWAY REGIMEN - Breast    [id number]        Anastrozole (Arimidex)     **Always confirm dose/schedule in your pharmacy ordering system**    Patient Characteristics:  Postoperative without Neoadjuvant Therapy (Pathologic Staging), Invasive   Disease, Adjuvant Therapy, HER2 Negative/Unknown/Equivocal, ER Positive, Node   Negative, pT1a-c, pN0/N1mi or pT2 or Higher, pN0, Genomic Testing Not Performed,   Hormonal Therapy, Postmenopausal  Therapeutic Status: Postoperative without Neoadjuvant Therapy (Pathologic   Staging)  AJCC Grade: G1  AJCC N Category: pN0  AJCC M Category: cM0  ER Status: Positive (+)  AJCC 8 Stage Grouping: IA  HER2 Status: Negative (-)  Oncotype Dx Recurrence Score: Not Appropriate  AJCC T Category: pT1b  DC Status: Negative (-)  Adjuvant Therapy Status: No Adjuvant Therapy Received Yet or Changing Initial   Adjuvant Regimen due to Tolerance  Has this patient completed genomic testing<= No - Did Not Order Test   Treatment Preferred: Hormonal Therapy  Menopausal Status: Postmenopausal  Intent of Therapy:  Curative Intent, Discussed with Patient

## 2022-11-28 NOTE — PATIENT INSTRUCTIONS
Keep MARY ANN drains in place and monitor output, record in log for office visit   Okay to remove bandages and shower in 48hrs, avoid submerging the incisions in bath tub/pool  Use of narcotics can lead to constipation, recommend taking a stool softener while on medications  Take all prescriptions as prescribed  Call the office with any questions or concerns

## 2022-11-28 NOTE — ANESTHESIA PROCEDURE NOTES
Intubation    Date/Time: 11/28/2022 12:22 PM  Performed by: Nitza Daley CRNA  Authorized by: Elio Zepeda MD     Intubation:     Induction:  Intravenous    Intubated:  Postinduction    Mask Ventilation:  Easy mask    Attempts:  1    Attempted By:  CRNA    Method of Intubation:  Direct    Blade:  Strickland 2    Laryngeal View Grade: Grade I - full view of cords      Difficult Airway Encountered?: No      Complications:  None    Airway Device:  Oral endotracheal tube    Airway Device Size:  7.5    Style/Cuff Inflation:  Cuffed (inflated to minimal occlusive pressure)    Inflation Amount (mL):  8    Tube secured:  22    Secured at:  The lips    Placement Verified By:  Capnometry    Complicating Factors:  None    Findings Post-Intubation:  BS equal bilateral and atraumatic/condition of teeth unchanged

## 2022-11-28 NOTE — TELEPHONE ENCOUNTER
Pt arrival 0800am for MD townsend RE: right breast postop. 2nd floor Exam room 9.  Pt vitals 168/98, P122, R18, Temp 98.5F.  Confirm pt is NPO.  Pt instructed to wait in [facility] waiting room after checking in. Pt add on for surgery intervention 11/28/22.

## 2022-11-28 NOTE — PLAN OF CARE
Patient tolerated procedure with no complaints of nausea. Pain treated per order. See MAR. Patient experiencing some discomfort from the squeezing feeling caused by pressure dressing. Denies wanting any pain medication. Discharge material given and explained to patient and daughter. Both verbalized understanding. Patient awaiting ride via wheelchair in stable condition with no complaints.

## 2022-11-28 NOTE — ANESTHESIA PREPROCEDURE EVALUATION
11/28/2022  Christal Posey is a 62 y.o., female.      Pre-op Assessment    I have reviewed the Patient Summary Reports.     I have reviewed the Nursing Notes. I have reviewed the NPO Status.   I have reviewed the Medications.     Review of Systems  Anesthesia Hx:  No problems with previous Anesthesia  History of prior surgery of interest to airway management or planning: Previous anesthesia: General  Denies Personal Hx of Anesthesia complications.   Hematology/Oncology:         -- Cancer in past history: Breast   Cardiovascular:  Cardiovascular Normal     Pulmonary:  Pulmonary Normal    Renal/:  Renal/ Normal     Hepatic/GI:   Hiatal Hernia, GERD    Neurological:   Headaches    Endocrine:  Endocrine Normal        Physical Exam  General: Well nourished, Cooperative and Alert    Airway:  Mallampati: II   Mouth Opening: Normal  TM Distance: Normal  Tongue: Normal  Neck ROM: Normal ROM    Dental:  Intact    Chest/Lungs:  Clear to auscultation, Normal Respiratory Rate    Heart:  Rate: Normal  Rhythm: Regular Rhythm  Sounds: Normal        Anesthesia Plan  Type of Anesthesia, risks & benefits discussed:    Anesthesia Type: Gen Natural Airway, Gen ETT, MAC  Intra-op Monitoring Plan: Standard ASA Monitors  Post Op Pain Control Plan: multimodal analgesia  Induction:  IV  Airway Plan: Direct, Post-Induction  Informed Consent: Informed consent signed with the Patient and all parties understand the risks and agree with anesthesia plan.  All questions answered.   ASA Score: 3  Day of Surgery Review of History & Physical: H&P Update referred to the surgeon/provider.    Ready For Surgery From Anesthesia Perspective.     .

## 2022-11-28 NOTE — TRANSFER OF CARE
"Anesthesia Transfer of Care Note    Patient: Christal Posey    Procedure(s) Performed: Procedure(s) (LRB):  REMOVAL, IMPLANT, BREAST (Bilateral)  WASHOUT (Bilateral)  CAPSULECTOMY, BREAST (Bilateral)    Patient location: PACU    Anesthesia Type: general    Transport from OR: Transported from OR on 6-10 L/min O2 by face mask with adequate spontaneous ventilation    Post pain: adequate analgesia    Post assessment: no apparent anesthetic complications and tolerated procedure well    Post vital signs: stable    Level of consciousness: awake    Nausea/Vomiting: no nausea/vomiting    Complications: none    Transfer of care protocol was followed      Last vitals:   Visit Vitals  /68 (BP Location: Left leg, Patient Position: Lying)   Pulse 76   Temp 36.3 °C (97.3 °F) (Temporal)   Resp 17   Ht 5' 6" (1.676 m)   Wt 95.7 kg (211 lb)   LMP  (LMP Unknown)   SpO2 97%   Breastfeeding No   BMI 34.06 kg/m²     "

## 2022-11-29 DIAGNOSIS — C50.312 MALIGNANT NEOPLASM OF LOWER-INNER QUADRANT OF LEFT BREAST IN FEMALE, ESTROGEN RECEPTOR POSITIVE: Primary | ICD-10-CM

## 2022-11-29 DIAGNOSIS — Z17.0 MALIGNANT NEOPLASM OF LOWER-INNER QUADRANT OF LEFT BREAST IN FEMALE, ESTROGEN RECEPTOR POSITIVE: Primary | ICD-10-CM

## 2022-11-29 RX ORDER — TAMOXIFEN CITRATE 20 MG/1
20 TABLET ORAL DAILY
Qty: 30 TABLET | Refills: 11 | Status: SHIPPED | OUTPATIENT
Start: 2022-11-29 | End: 2023-10-30

## 2022-11-29 NOTE — OP NOTE
Date of service 11/28/2022   Preoperative diagnosis cellulitis and capsular contracture of the right breast  2. Capsular contracture of the left breast  Procedure performed  1. Bilateral removal of implants (tissue expanders)   2. Total bilateral capsulectomy  3. Wide excision soft tissue of right axillary region measuring 16 cm x 6 cm with layered closure final incision 16 cm  4. Wide excision soft tissue of the left axillary region measuring 16 cm x 6 cm with layered closure final incision 16 cm  Surgeon Isaac  Anesthesia general  Complications none   Drains x2     The patient was placed in the supine position and after adequate general endotracheal anesthesia was prepped and draped in a normal sterile fashion on the right side the skin was noted to be extremely thin.  The previous incision was opened.  Immediately large amounts of jobs of liquified AlloDerm came out with a thickened fluid.  This was not infected it was liquified AlloDerm.  There were spots of liquified AlloDerm throughout.  Evaluation of the skin from the inside revealed it was paper thin.  With all of these in defect I elected at that point to simply remove the tissue expander which was removed.  I did not think that expansion would be successful for 2 reasons 1. The possibility of infection and 2. The fact that the skin was very thin.  The capsule was totally removed with the AlloDerm.  It was scraped off the posterior and anterior walls.  Vashe solution was used.  A lighted retractor was used to ensure hemostasis.  Excess soft tissue was widely excised in the right axillary region.  Two drains were placed incision was closed using 3-0 Monocryl followed by skin staples then a running 4-0 Monocryl subcuticular suture.  On the left side the incision was opened.  Wound serous fluid was noted with the AlloDerm remained intact.  The skin again on this side was paper thin where it was expanded.  Again I do not think this would have been a viable  expansion.  The tissue expander was removed.  The AlloDerm and capsule was removed.  Again Vashe solution was then used.  Wide soft tissue excision was performed in the left axillary region and this was closed using interrupted 3-0 Monocryl followed by skin staples.  Remainder of the incision was closed using 3-0 Monocryl followed by running 4-0 Monocryl subcuticular suture.  There were no complications with this procedure end of dictation

## 2022-11-29 NOTE — PROGRESS NOTES
Patient presents to [facility] for evaluate her bilateral breast reconstruction.  She had bilateral nipple sparing mastectomy and had a large amount of redundant skin.  This skin contracted greatly against the implant for seeing minimal expansion except for the lower pole on the right side but some extent on the left side as well.  Her breasts recently turned red.  Examining her the skin was noted to be very thin.  We will take her to surgery and explored.

## 2022-11-30 ENCOUNTER — TELEPHONE (OUTPATIENT)
Dept: PLASTIC SURGERY | Facility: CLINIC | Age: 62
End: 2022-11-30
Payer: COMMERCIAL

## 2022-11-30 LAB — BACTERIA SPEC AEROBE CULT: ABNORMAL

## 2022-11-30 RX ORDER — CIPROFLOXACIN 500 MG/1
500 TABLET ORAL EVERY 12 HOURS
Qty: 14 TABLET | Refills: 0 | Status: SHIPPED | OUTPATIENT
Start: 2022-11-30 | End: 2022-12-07

## 2022-11-30 NOTE — TELEPHONE ENCOUNTER
Review of culture results,  new instructions given to patient.  No longer need to take cleocin/clindamycin but to begin new Antibiotic for treatment.  Location of Rx to be sent to [facility].

## 2022-12-01 NOTE — ANESTHESIA POSTPROCEDURE EVALUATION
Anesthesia Post Evaluation    Patient: Christal Posey    Procedure(s) Performed: Procedure(s) (LRB):  REMOVAL, IMPLANT, BREAST (Bilateral)  WASHOUT (Bilateral)  CAPSULECTOMY, BREAST (Bilateral)    Final Anesthesia Type: general      Patient location during evaluation: PACU  Patient participation: Yes- Able to Participate  Level of consciousness: awake and alert and oriented  Post-procedure vital signs: reviewed and stable  Pain management: adequate  Airway patency: patent    PONV status at discharge: No PONV  Anesthetic complications: no      Cardiovascular status: hemodynamically stable  Respiratory status: unassisted, spontaneous ventilation and room air  Hydration status: euvolemic  Follow-up not needed.          Vitals Value Taken Time   /79 11/28/22 1646   Temp 36.3 °C (97.3 °F) 11/28/22 1449   Pulse 92 11/28/22 1652   Resp 25 11/28/22 1651   SpO2 92 % 11/28/22 1652   Vitals shown include unvalidated device data.      No case tracking events are documented in the log.      Pain/Tushar Score: No data recorded

## 2022-12-02 LAB — BACTERIA SPEC ANAEROBE CULT: NORMAL

## 2022-12-06 LAB
FINAL PATHOLOGIC DIAGNOSIS: NORMAL
GROSS: NORMAL
Lab: NORMAL

## 2022-12-07 ENCOUNTER — OFFICE VISIT (OUTPATIENT)
Dept: PLASTIC SURGERY | Facility: CLINIC | Age: 62
End: 2022-12-07
Payer: COMMERCIAL

## 2022-12-07 VITALS
SYSTOLIC BLOOD PRESSURE: 137 MMHG | WEIGHT: 211 LBS | HEART RATE: 105 BPM | BODY MASS INDEX: 33.91 KG/M2 | OXYGEN SATURATION: 98 % | DIASTOLIC BLOOD PRESSURE: 65 MMHG | HEIGHT: 66 IN

## 2022-12-07 DIAGNOSIS — Z09 SURGERY FOLLOW-UP EXAMINATION: Primary | ICD-10-CM

## 2022-12-07 PROCEDURE — 3075F PR MOST RECENT SYSTOLIC BLOOD PRESS GE 130-139MM HG: ICD-10-PCS | Mod: CPTII,S$GLB,, | Performed by: SURGERY

## 2022-12-07 PROCEDURE — 3078F PR MOST RECENT DIASTOLIC BLOOD PRESSURE < 80 MM HG: ICD-10-PCS | Mod: CPTII,S$GLB,, | Performed by: SURGERY

## 2022-12-07 PROCEDURE — 4010F ACE/ARB THERAPY RXD/TAKEN: CPT | Mod: CPTII,S$GLB,, | Performed by: SURGERY

## 2022-12-07 PROCEDURE — 3008F PR BODY MASS INDEX (BMI) DOCUMENTED: ICD-10-PCS | Mod: CPTII,S$GLB,, | Performed by: SURGERY

## 2022-12-07 PROCEDURE — 99024 POSTOP FOLLOW-UP VISIT: CPT | Mod: S$GLB,,, | Performed by: SURGERY

## 2022-12-07 PROCEDURE — 99024 PR POST-OP FOLLOW-UP VISIT: ICD-10-PCS | Mod: S$GLB,,, | Performed by: SURGERY

## 2022-12-07 PROCEDURE — 1159F MED LIST DOCD IN RCRD: CPT | Mod: CPTII,S$GLB,, | Performed by: SURGERY

## 2022-12-07 PROCEDURE — 3078F DIAST BP <80 MM HG: CPT | Mod: CPTII,S$GLB,, | Performed by: SURGERY

## 2022-12-07 PROCEDURE — 99999 PR PBB SHADOW E&M-EST. PATIENT-LVL V: ICD-10-PCS | Mod: PBBFAC,,, | Performed by: SURGERY

## 2022-12-07 PROCEDURE — 3008F BODY MASS INDEX DOCD: CPT | Mod: CPTII,S$GLB,, | Performed by: SURGERY

## 2022-12-07 PROCEDURE — 1159F PR MEDICATION LIST DOCUMENTED IN MEDICAL RECORD: ICD-10-PCS | Mod: CPTII,S$GLB,, | Performed by: SURGERY

## 2022-12-07 PROCEDURE — 3075F SYST BP GE 130 - 139MM HG: CPT | Mod: CPTII,S$GLB,, | Performed by: SURGERY

## 2022-12-07 PROCEDURE — 99999 PR PBB SHADOW E&M-EST. PATIENT-LVL V: CPT | Mod: PBBFAC,,, | Performed by: SURGERY

## 2022-12-07 PROCEDURE — 4010F PR ACE/ARB THEARPY RXD/TAKEN: ICD-10-PCS | Mod: CPTII,S$GLB,, | Performed by: SURGERY

## 2022-12-07 NOTE — PROGRESS NOTES
Plastic Surgery Clinic Postop Visit    Subjective:      Christal Posey is a 62 y.o. year old female who presents to the Plastic Surgery Clinic on 12/07/2022 for follow up visit status post bilateral breast implant removal on 11/28/22. Denies fever, chills, nausea, vomiting, or other systemic signs of infection. Just finished her antibiotic therapy yesterday. Reports minimal output from drains and minimal pain.     There were no vitals filed for this visit.     Review of patient's allergies indicates:   Allergen Reactions    Pravastatin Other (See Comments)     Severe muscle pain.  Muscle pain    Other reaction(s): Other (See Comments)  Severe muscle pain.    Shellfish containing products Anaphylaxis    Amitriptyline      Other reaction(s): dizzy    Codeine      Other reaction(s): Unknown    Doxycycline      Other reaction(s): Nausea    Erythromycin      Other reaction(s): Unknown    Iodine      Other reaction(s): Swelling  Other reaction(s): Unknown    Macrobid  [nitrofurantoin monohyd/m-cryst]      Other reaction(s): Unknown    Verapamil      Other reaction(s): Headache       Current Outpatient Medications on File Prior to Visit   Medication Sig Dispense Refill    albuterol (VENTOLIN HFA) 90 mcg/actuation inhaler Inhale 2 puffs into the lungs every 6 (six) hours as needed for Wheezing. Rescue 18 g 1    ALPRAZolam (XANAX) 0.25 MG tablet Take 1 tablet (0.25 mg total) by mouth nightly as needed for Insomnia. 30 tablet 0    bacitracin 500 unit/gram ointment Apply topically 2 (two) times daily. 14 g 0    baclofen (LIORESAL) 10 MG tablet Take 1 tablet (10 mg total) by mouth 3 (three) times daily. 30 tablet 0    budesonide 1 mg/2 mL NbSp       butalbital-acetaminophen-caffeine -40 mg (FIORICET, ESGIC) -40 mg per tablet Take 1 tablet by mouth every 6 to 8 hours as needed. 60 tablet 0    cholestyramine (QUESTRAN) 4 gram packet Take 1 packet by mouth once daily.      CHOLESTYRAMINE LIGHT 4 gram packet  Take 1 packet by mouth once daily.      ciclopirox (LOPROX) 0.77 % Crea Apply topically 2 (two) times daily. 90 g 1    ciprofloxacin HCl (CIPRO) 500 MG tablet Take 1 tablet (500 mg total) by mouth every 12 (twelve) hours. for 7 days 14 tablet 0    clindamycin (CLEOCIN) 300 MG capsule Take 1 capsule (300 mg total) by mouth every 8 (eight) hours. 21 capsule 0    diclofenac sodium (VOLTAREN) 1 % Gel APPLY 2 GRAMS TOPICALLY 3 (THREE) TIMES DAILY. 100 g 3    gabapentin (NEURONTIN) 800 MG tablet Take 800 mg by mouth 3 (three) times daily.      HYDROcodone-acetaminophen (NORCO) 5-325 mg per tablet Take 1 tablet by mouth every 4 (four) hours as needed for Pain. 28 tablet 0    hydrocortisone butyrate (LOCOID) 0.1 % Crea cream AAA bid 45 g 3    lansoprazole (PREVACID) 30 MG capsule Take 30 mg by mouth every morning. 1 Capsule, Delayed Release(E.C.) Oral Every day      losartan (COZAAR) 25 MG tablet TAKE 1 TABLET BY MOUTH EVERY DAY (Patient taking differently: Take 25 mg by mouth nightly.) 90 tablet 1    methocarbamoL (ROBAXIN) 500 MG Tab Take 1 tablet (500 mg total) by mouth 4 (four) times daily. for 10 days 40 tablet 0    multivitamin (THERAGRAN) per tablet Take 1 tablet by mouth once daily.      mupirocin (BACTROBAN) 2 % ointment Apply topically 2 (two) times daily. 22 g 1    NEILMED SINUS RINSE COMPLETE pkdv by Each Nostril route.      ondansetron (ZOFRAN) 4 MG tablet Take 1 tablet (4 mg total) by mouth every 8 (eight) hours as needed for Nausea. 1 Tablet Oral Every 6 hours 12 tablet 12    ondansetron (ZOFRAN-ODT) 4 MG TbDL Take 1 tablet (4 mg total) by mouth every 6 (six) hours as needed (nausea). 60 tablet 0    prasterone, dhea, (INTRAROSA) 6.5 mg Inst Place 6.5 mg vaginally every evening. 30 each 11    PREMARIN vaginal cream PLACE 1 GRAM VAGINALLY 2 TIMES A WK  12    sumatriptan (IMITREX) 20 mg/actuation nasal spray USE 1 SPRAY IN EACH NOSTRIL AT ONSET OF HEADACHE. MAY REPEAT ONCE IN 2 HOURS IF NO RELIEF. NO MORE THAN 2  SPRAYS PER 24 HOURS 6 each 12    tamoxifen (NOLVADEX) 20 MG Tab Take 1 tablet (20 mg total) by mouth once daily. 30 tablet 11    triamcinolone acetonide 0.025% (KENALOG) 0.025 % Oint Apply topically 2 (two) times daily. x 1-2 wks then prn flares only 80 g 2    vitamin D 1000 units Tab Take 185 mg by mouth every morning.       atogepant (QULIPTA) 60 mg Tab Take 60 mg by mouth.      fluticasone (FLONASE) 50 mcg/actuation nasal spray        No current facility-administered medications on file prior to visit.       Patient Active Problem List   Diagnosis    Hiatal hernia    Irritable bowel syndrome    Migraine with aura and without status migrainosus, not intractable    Symptomatic menopausal or female climacteric states    Ductal carcinoma in situ (DCIS) of left breast    Preoperative testing    Arthralgia of both hands    Rheumatoid factor positive    Obesity (BMI 30-39.9)    Dyspareunia, female    Vitamin D deficiency    Colon polyp    Malignant neoplasm of lower-inner quadrant of left breast in female, estrogen receptor positive       Past Surgical History:   Procedure Laterality Date    BREAST BIOPSY Left 05/2016    DCIS    BREAST CAPSULECTOMY Bilateral 11/28/2022    Procedure: CAPSULECTOMY, BREAST;  Surgeon: Garland Gray MD;  Location: [address];  Service: Plastics;  Laterality: Bilateral;    BREAST LUMPECTOMY      ECTOPIC PREGNANCY SURGERY Right     HYSTERECTOMY  1998    ovaries left intact    INJECTION FOR SENTINEL NODE IDENTIFICATION Left 10/24/2022    Procedure: INJECTION, FOR SENTINEL NODE IDENTIFICATION LEFT;  Surgeon: JOVANNI Jama MD;  Location: [facility] OR [address] [place] [address];  Service: General;  Laterality: Left;    INSERTION OF BREAST IMPLANT Bilateral 10/24/2022    Procedure: INSERTION, BREAST IMPLANT BILATERAL;  Surgeon: Garland Gray MD;  Location: [facility] OR [address] [place] [address];  Service: Plastics;  Laterality: Bilateral;    MASTECTOMY Bilateral 10/24/2022    Procedure: MASTECTOMY BILATERAL;  Surgeon:  JOVANNI Jama MD;  Location: [address];  Service: General;  Laterality: Bilateral;    REMOVAL OF BREAST IMPLANT Bilateral 11/28/2022    Procedure: REMOVAL, IMPLANT, BREAST;  Surgeon: Garland Gray MD;  Location: [address];  Service: Plastics;  Laterality: Bilateral;    removal of ovarian cyst Right oct 1992    ovary left intact    SENTINEL LYMPH NODE BIOPSY Left 10/24/2022    Procedure: BIOPSY, LYMPH NODE, SENTINEL LEFT;  Surgeon: JOVANNI Jama MD;  Location: [address];  Service: General;  Laterality: Left;    TUBAL LIGATION      tubal reversal  july 1991       Social History     Socioeconomic History    Marital status:      Spouse name: Parrish    Number of children: 2   Tobacco Use    Smoking status: Never     Passive exposure: Never    Smokeless tobacco: Never   Substance and Sexual Activity    Alcohol use: No     Alcohol/week: 0.0 standard drinks    Drug use: No    Sexual activity: Yes     Partners: Male     Birth control/protection: Surgical, None     Comment:  to Parrish    Other Topics Concern    Are you pregnant or think you may be? No    Breast-feeding No           Review of Systems: Review of Systems   Constitutional:  Negative for chills and fever.   HENT: Negative.     Eyes: Negative.    Respiratory:  Negative for cough and shortness of breath.    Cardiovascular: Negative.  Negative for chest pain.   Gastrointestinal:  Negative for nausea and vomiting.   Genitourinary: Negative.    Musculoskeletal: Negative.    Skin:  Negative for itching and rash.       Objective:     Physical Exam:  There were no vitals filed for this visit.    WD WN NAD  VSS  Normal resp effort  Chest: incisions clean dry and intact, staples on lateral aspect of both incisions. Drain with minimal serous output        Assessment:       No diagnosis found.    Plan:   62 y.o. female status post bilateral breast implant removal  - Doing well, no issues  - Drains were removed  - Return to clinic in 2  weeks. Will remove staples at that time      All questions were answered. The patient was advised to call the clinic with any questions or concerns prior to their next visit.       Shad Lopez MD  General Surgery, PGY-1

## 2022-12-14 ENCOUNTER — TELEPHONE (OUTPATIENT)
Dept: OBSTETRICS AND GYNECOLOGY | Facility: CLINIC | Age: 62
End: 2022-12-14
Payer: COMMERCIAL

## 2022-12-14 NOTE — TELEPHONE ENCOUNTER
----- Message from Rubi Valencia sent at 12/14/2022  9:10 AM CST -----  Patient is calling to make appt. for her annual. Please call pt# [phone]

## 2022-12-21 ENCOUNTER — OFFICE VISIT (OUTPATIENT)
Dept: PLASTIC SURGERY | Facility: CLINIC | Age: 62
End: 2022-12-21
Payer: COMMERCIAL

## 2022-12-21 VITALS — HEART RATE: 103 BPM | SYSTOLIC BLOOD PRESSURE: 181 MMHG | DIASTOLIC BLOOD PRESSURE: 86 MMHG

## 2022-12-21 DIAGNOSIS — Z09 SURGERY FOLLOW-UP EXAMINATION: Primary | ICD-10-CM

## 2022-12-21 PROCEDURE — 4010F ACE/ARB THERAPY RXD/TAKEN: CPT | Mod: CPTII,S$GLB,, | Performed by: SURGERY

## 2022-12-21 PROCEDURE — 99999 PR PBB SHADOW E&M-EST. PATIENT-LVL II: CPT | Mod: PBBFAC,,, | Performed by: SURGERY

## 2022-12-21 PROCEDURE — 1159F MED LIST DOCD IN RCRD: CPT | Mod: CPTII,S$GLB,, | Performed by: SURGERY

## 2022-12-21 PROCEDURE — 1159F PR MEDICATION LIST DOCUMENTED IN MEDICAL RECORD: ICD-10-PCS | Mod: CPTII,S$GLB,, | Performed by: SURGERY

## 2022-12-21 PROCEDURE — 99999 PR PBB SHADOW E&M-EST. PATIENT-LVL II: ICD-10-PCS | Mod: PBBFAC,,, | Performed by: SURGERY

## 2022-12-21 PROCEDURE — 3077F SYST BP >= 140 MM HG: CPT | Mod: CPTII,S$GLB,, | Performed by: SURGERY

## 2022-12-21 PROCEDURE — 99024 POSTOP FOLLOW-UP VISIT: CPT | Mod: S$GLB,,, | Performed by: SURGERY

## 2022-12-21 PROCEDURE — 3077F PR MOST RECENT SYSTOLIC BLOOD PRESSURE >= 140 MM HG: ICD-10-PCS | Mod: CPTII,S$GLB,, | Performed by: SURGERY

## 2022-12-21 PROCEDURE — 3079F DIAST BP 80-89 MM HG: CPT | Mod: CPTII,S$GLB,, | Performed by: SURGERY

## 2022-12-21 PROCEDURE — 4010F PR ACE/ARB THEARPY RXD/TAKEN: ICD-10-PCS | Mod: CPTII,S$GLB,, | Performed by: SURGERY

## 2022-12-21 PROCEDURE — 3079F PR MOST RECENT DIASTOLIC BLOOD PRESSURE 80-89 MM HG: ICD-10-PCS | Mod: CPTII,S$GLB,, | Performed by: SURGERY

## 2022-12-21 PROCEDURE — 99024 PR POST-OP FOLLOW-UP VISIT: ICD-10-PCS | Mod: S$GLB,,, | Performed by: SURGERY

## 2022-12-21 NOTE — PROGRESS NOTES
Plastic Surgery Clinic Postop Visit    Subjective:      Christal Posey is a 62 y.o. year old female who presents to the Plastic Surgery Clinic on 12/21/2022 for follow up visit status post bilateral breast implant removal on 11/28/22. Denies fever, chills, nausea, vomiting, or other systemic signs of infection. Drains removed at last clinic visit.      Vitals:    12/21/22 0910   BP: (!) 181/86   Pulse: 103        Review of patient's allergies indicates:   Allergen Reactions    Pravastatin Other (See Comments)     Severe muscle pain.  Muscle pain    Other reaction(s): Other (See Comments)  Severe muscle pain.    Shellfish containing products Anaphylaxis    Amitriptyline      Other reaction(s): dizzy    Codeine      Other reaction(s): Unknown    Doxycycline      Other reaction(s): Nausea    Erythromycin      Other reaction(s): Unknown    Iodine      Other reaction(s): Swelling  Other reaction(s): Unknown    Macrobid  [nitrofurantoin monohyd/m-cryst]      Other reaction(s): Unknown    Verapamil      Other reaction(s): Headache       Current Outpatient Medications on File Prior to Visit   Medication Sig Dispense Refill    albuterol (VENTOLIN HFA) 90 mcg/actuation inhaler Inhale 2 puffs into the lungs every 6 (six) hours as needed for Wheezing. Rescue 18 g 1    ALPRAZolam (XANAX) 0.25 MG tablet Take 1 tablet (0.25 mg total) by mouth nightly as needed for Insomnia. 30 tablet 0    atogepant (QULIPTA) 60 mg Tab Take 60 mg by mouth.      bacitracin 500 unit/gram ointment Apply topically 2 (two) times daily. 14 g 0    baclofen (LIORESAL) 10 MG tablet Take 1 tablet (10 mg total) by mouth 3 (three) times daily. 30 tablet 0    budesonide 1 mg/2 mL NbSp       butalbital-acetaminophen-caffeine -40 mg (FIORICET, ESGIC) -40 mg per tablet Take 1 tablet by mouth every 6 to 8 hours as needed. 60 tablet 0    cholestyramine (QUESTRAN) 4 gram packet Take 1 packet by mouth once daily.      CHOLESTYRAMINE LIGHT 4 gram  packet Take 1 packet by mouth once daily.      ciclopirox (LOPROX) 0.77 % Crea Apply topically 2 (two) times daily. 90 g 1    diclofenac sodium (VOLTAREN) 1 % Gel APPLY 2 GRAMS TOPICALLY 3 (THREE) TIMES DAILY. 100 g 3    fluticasone (FLONASE) 50 mcg/actuation nasal spray       gabapentin (NEURONTIN) 800 MG tablet Take 800 mg by mouth 3 (three) times daily.      hydrocortisone butyrate (LOCOID) 0.1 % Crea cream AAA bid 45 g 3    lansoprazole (PREVACID) 30 MG capsule Take 30 mg by mouth every morning. 1 Capsule, Delayed Release(E.C.) Oral Every day      losartan (COZAAR) 25 MG tablet TAKE 1 TABLET BY MOUTH EVERY DAY (Patient taking differently: Take 25 mg by mouth nightly.) 90 tablet 1    multivitamin (THERAGRAN) per tablet Take 1 tablet by mouth once daily.      NEILMED SINUS RINSE COMPLETE pkdv by Each Nostril route.      ondansetron (ZOFRAN) 4 MG tablet Take 1 tablet (4 mg total) by mouth every 8 (eight) hours as needed for Nausea. 1 Tablet Oral Every 6 hours 12 tablet 12    ondansetron (ZOFRAN-ODT) 4 MG TbDL Take 1 tablet (4 mg total) by mouth every 6 (six) hours as needed (nausea). 60 tablet 0    prasterone, dhea, (INTRAROSA) 6.5 mg Inst Place 6.5 mg vaginally every evening. 30 each 11    PREMARIN vaginal cream PLACE 1 GRAM VAGINALLY 2 TIMES A WK  12    sumatriptan (IMITREX) 20 mg/actuation nasal spray USE 1 SPRAY IN EACH NOSTRIL AT ONSET OF HEADACHE. MAY REPEAT ONCE IN 2 HOURS IF NO RELIEF. NO MORE THAN 2 SPRAYS PER 24 HOURS 6 each 12    tamoxifen (NOLVADEX) 20 MG Tab Take 1 tablet (20 mg total) by mouth once daily. 30 tablet 11    triamcinolone acetonide 0.025% (KENALOG) 0.025 % Oint Apply topically 2 (two) times daily. x 1-2 wks then prn flares only 80 g 2    vitamin D 1000 units Tab Take 185 mg by mouth every morning.       clindamycin (CLEOCIN) 300 MG capsule Take 1 capsule (300 mg total) by mouth every 8 (eight) hours. (Patient not taking: Reported on 12/21/2022) 21 capsule 0    HYDROcodone-acetaminophen  (NORCO) 5-325 mg per tablet Take 1 tablet by mouth every 4 (four) hours as needed for Pain. (Patient not taking: Reported on 12/21/2022) 28 tablet 0    mupirocin (BACTROBAN) 2 % ointment Apply topically 2 (two) times daily. (Patient not taking: Reported on 12/21/2022) 22 g 1     No current facility-administered medications on file prior to visit.       Patient Active Problem List   Diagnosis    Hiatal hernia    Irritable bowel syndrome    Migraine with aura and without status migrainosus, not intractable    Symptomatic menopausal or female climacteric states    Ductal carcinoma in situ (DCIS) of left breast    Preoperative testing    Arthralgia of both hands    Rheumatoid factor positive    Obesity (BMI 30-39.9)    Dyspareunia, female    Vitamin D deficiency    Colon polyp    Malignant neoplasm of lower-inner quadrant of left breast in female, estrogen receptor positive       Past Surgical History:   Procedure Laterality Date    BREAST BIOPSY Left 05/2016    DCIS    BREAST CAPSULECTOMY Bilateral 11/28/2022    Procedure: CAPSULECTOMY, BREAST;  Surgeon: Garland Gray MD;  Location: [address];  Service: Plastics;  Laterality: Bilateral;    BREAST LUMPECTOMY      ECTOPIC PREGNANCY SURGERY Right     HYSTERECTOMY  1998    ovaries left intact    INJECTION FOR SENTINEL NODE IDENTIFICATION Left 10/24/2022    Procedure: INJECTION, FOR SENTINEL NODE IDENTIFICATION LEFT;  Surgeon: JOVANNI Jama MD;  Location: [address];  Service: General;  Laterality: Left;    INSERTION OF BREAST IMPLANT Bilateral 10/24/2022    Procedure: INSERTION, BREAST IMPLANT BILATERAL;  Surgeon: Garland Gray MD;  Location: [address];  Service: Plastics;  Laterality: Bilateral;    MASTECTOMY Bilateral 10/24/2022    Procedure: MASTECTOMY BILATERAL;  Surgeon: JOVANNI Jama MD;  Location: [address];  Service: General;  Laterality: Bilateral;    REMOVAL OF BREAST IMPLANT Bilateral 11/28/2022    Procedure:  REMOVAL, IMPLANT, BREAST;  Surgeon: Garland Gray MD;  Location: [facility] OR [address] [place] [address];  Service: Plastics;  Laterality: Bilateral;    removal of ovarian cyst Right oct 1992    ovary left intact    SENTINEL LYMPH NODE BIOPSY Left 10/24/2022    Procedure: BIOPSY, LYMPH NODE, SENTINEL LEFT;  Surgeon: JOVANNI Jama MD;  Location: [facility] OR [address] [place] [address];  Service: General;  Laterality: Left;    TUBAL LIGATION      tubal reversal  july 1991       Social History     Socioeconomic History    Marital status:      Spouse name: Parrish    Number of children: 2   Tobacco Use    Smoking status: Never     Passive exposure: Never    Smokeless tobacco: Never   Substance and Sexual Activity    Alcohol use: No     Alcohol/week: 0.0 standard drinks    Drug use: No    Sexual activity: Yes     Partners: Male     Birth control/protection: Surgical, None     Comment:  to Parrish    Other Topics Concern    Are you pregnant or think you may be? No    Breast-feeding No           Review of Systems: Review of Systems   Constitutional:  Negative for chills and fever.   HENT: Negative.     Eyes: Negative.    Respiratory:  Negative for cough and shortness of breath.    Cardiovascular: Negative.  Negative for chest pain.   Gastrointestinal:  Negative for nausea and vomiting.   Genitourinary: Negative.    Musculoskeletal: Negative.    Skin:  Negative for itching and rash.       Objective:     Physical Exam:  Vitals:    12/21/22 0910   BP: (!) 181/86   Pulse: 103       WD WN NAD  VSS  Normal resp effort  Chest: incisions clean dry and intact, staples on lateral aspect of both incisions.         Assessment:       No diagnosis found.    Plan:   62 y.o. female status post bilateral breast implant removal  - Doing well, no issues  - Staples removed  - Return to clinic in 1 month      All questions were answered. The patient was advised to call the clinic with any questions or concerns prior to their next visit.       El Jim,  MD  PGY-1

## 2022-12-29 ENCOUNTER — PATIENT OUTREACH (OUTPATIENT)
Dept: ADMINISTRATIVE | Facility: HOSPITAL | Age: 62
End: 2022-12-29
Payer: COMMERCIAL

## 2022-12-29 ENCOUNTER — LAB VISIT (OUTPATIENT)
Dept: LAB | Facility: HOSPITAL | Age: 62
End: 2022-12-29
Attending: INTERNAL MEDICINE
Payer: COMMERCIAL

## 2022-12-29 ENCOUNTER — OFFICE VISIT (OUTPATIENT)
Dept: INTERNAL MEDICINE | Facility: CLINIC | Age: 62
End: 2022-12-29
Payer: COMMERCIAL

## 2022-12-29 VITALS
DIASTOLIC BLOOD PRESSURE: 72 MMHG | HEIGHT: 66 IN | BODY MASS INDEX: 33.66 KG/M2 | WEIGHT: 209.44 LBS | SYSTOLIC BLOOD PRESSURE: 120 MMHG

## 2022-12-29 DIAGNOSIS — Z00.00 ANNUAL PHYSICAL EXAM: Primary | ICD-10-CM

## 2022-12-29 DIAGNOSIS — E78.5 HYPERLIPIDEMIA, UNSPECIFIED HYPERLIPIDEMIA TYPE: ICD-10-CM

## 2022-12-29 DIAGNOSIS — Z00.00 ANNUAL PHYSICAL EXAM: ICD-10-CM

## 2022-12-29 DIAGNOSIS — D05.12 DUCTAL CARCINOMA IN SITU (DCIS) OF LEFT BREAST: ICD-10-CM

## 2022-12-29 DIAGNOSIS — K63.5 POLYP OF COLON, UNSPECIFIED PART OF COLON, UNSPECIFIED TYPE: ICD-10-CM

## 2022-12-29 DIAGNOSIS — Z12.11 COLON CANCER SCREENING: ICD-10-CM

## 2022-12-29 DIAGNOSIS — I10 HYPERTENSION, UNSPECIFIED TYPE: ICD-10-CM

## 2022-12-29 LAB
ALBUMIN SERPL BCP-MCNC: 3.8 G/DL (ref 3.5–5.2)
ALP SERPL-CCNC: 96 U/L (ref 55–135)
ALT SERPL W/O P-5'-P-CCNC: 24 U/L (ref 10–44)
ANION GAP SERPL CALC-SCNC: 10 MMOL/L (ref 8–16)
AST SERPL-CCNC: 22 U/L (ref 10–40)
BACTERIA #/AREA URNS AUTO: NORMAL /HPF
BASOPHILS # BLD AUTO: 0.04 K/UL (ref 0–0.2)
BASOPHILS NFR BLD: 0.6 % (ref 0–1.9)
BILIRUB SERPL-MCNC: 0.6 MG/DL (ref 0.1–1)
BILIRUB UR QL STRIP: NEGATIVE
BUN SERPL-MCNC: 13 MG/DL (ref 6–20)
CALCIUM SERPL-MCNC: 9.5 MG/DL (ref 8.7–10.5)
CHLORIDE SERPL-SCNC: 105 MMOL/L (ref 95–110)
CHOLEST SERPL-MCNC: 255 MG/DL (ref 120–199)
CHOLEST/HDLC SERPL: 3.4 {RATIO} (ref 2–5)
CLARITY UR REFRACT.AUTO: CLEAR
CO2 SERPL-SCNC: 29 MMOL/L (ref 23–29)
COLOR UR AUTO: COLORLESS
CREAT SERPL-MCNC: 1 MG/DL (ref 0.5–1.4)
DIFFERENTIAL METHOD: ABNORMAL
EOSINOPHIL # BLD AUTO: 0.5 K/UL (ref 0–0.5)
EOSINOPHIL NFR BLD: 7.3 % (ref 0–8)
ERYTHROCYTE [DISTWIDTH] IN BLOOD BY AUTOMATED COUNT: 13.1 % (ref 11.5–14.5)
EST. GFR  (NO RACE VARIABLE): >60 ML/MIN/1.73 M^2
GLUCOSE SERPL-MCNC: 95 MG/DL (ref 70–110)
GLUCOSE UR QL STRIP: NEGATIVE
HCT VFR BLD AUTO: 38.9 % (ref 37–48.5)
HDLC SERPL-MCNC: 74 MG/DL (ref 40–75)
HDLC SERPL: 29 % (ref 20–50)
HGB BLD-MCNC: 11.9 G/DL (ref 12–16)
HGB UR QL STRIP: NEGATIVE
IMM GRANULOCYTES # BLD AUTO: 0.01 K/UL (ref 0–0.04)
IMM GRANULOCYTES NFR BLD AUTO: 0.1 % (ref 0–0.5)
KETONES UR QL STRIP: NEGATIVE
LDLC SERPL CALC-MCNC: 151.4 MG/DL (ref 63–159)
LEUKOCYTE ESTERASE UR QL STRIP: NEGATIVE
LYMPHOCYTES # BLD AUTO: 1.9 K/UL (ref 1–4.8)
LYMPHOCYTES NFR BLD: 27.3 % (ref 18–48)
MCH RBC QN AUTO: 27.9 PG (ref 27–31)
MCHC RBC AUTO-ENTMCNC: 30.6 G/DL (ref 32–36)
MCV RBC AUTO: 91 FL (ref 82–98)
MICROSCOPIC COMMENT: NORMAL
MONOCYTES # BLD AUTO: 0.4 K/UL (ref 0.3–1)
MONOCYTES NFR BLD: 5.4 % (ref 4–15)
NEUTROPHILS # BLD AUTO: 4 K/UL (ref 1.8–7.7)
NEUTROPHILS NFR BLD: 59.3 % (ref 38–73)
NITRITE UR QL STRIP: NEGATIVE
NONHDLC SERPL-MCNC: 181 MG/DL
NRBC BLD-RTO: 0 /100 WBC
PH UR STRIP: 6 [PH] (ref 5–8)
PLATELET # BLD AUTO: 338 K/UL (ref 150–450)
PMV BLD AUTO: 10.7 FL (ref 9.2–12.9)
POTASSIUM SERPL-SCNC: 4.1 MMOL/L (ref 3.5–5.1)
PROT SERPL-MCNC: 7.7 G/DL (ref 6–8.4)
PROT UR QL STRIP: NEGATIVE
RBC # BLD AUTO: 4.26 M/UL (ref 4–5.4)
RBC #/AREA URNS AUTO: 0 /HPF (ref 0–4)
SODIUM SERPL-SCNC: 144 MMOL/L (ref 136–145)
SP GR UR STRIP: 1.01 (ref 1–1.03)
TRIGL SERPL-MCNC: 148 MG/DL (ref 30–150)
URN SPEC COLLECT METH UR: ABNORMAL
WBC # BLD AUTO: 6.82 K/UL (ref 3.9–12.7)
WBC #/AREA URNS AUTO: 0 /HPF (ref 0–5)

## 2022-12-29 PROCEDURE — 80053 COMPREHEN METABOLIC PANEL: CPT | Performed by: INTERNAL MEDICINE

## 2022-12-29 PROCEDURE — 3008F BODY MASS INDEX DOCD: CPT | Mod: CPTII,S$GLB,, | Performed by: INTERNAL MEDICINE

## 2022-12-29 PROCEDURE — 85025 COMPLETE CBC W/AUTO DIFF WBC: CPT | Performed by: INTERNAL MEDICINE

## 2022-12-29 PROCEDURE — 3074F SYST BP LT 130 MM HG: CPT | Mod: CPTII,S$GLB,, | Performed by: INTERNAL MEDICINE

## 2022-12-29 PROCEDURE — 1159F PR MEDICATION LIST DOCUMENTED IN MEDICAL RECORD: ICD-10-PCS | Mod: CPTII,S$GLB,, | Performed by: INTERNAL MEDICINE

## 2022-12-29 PROCEDURE — 4010F PR ACE/ARB THEARPY RXD/TAKEN: ICD-10-PCS | Mod: CPTII,S$GLB,, | Performed by: INTERNAL MEDICINE

## 2022-12-29 PROCEDURE — 3008F PR BODY MASS INDEX (BMI) DOCUMENTED: ICD-10-PCS | Mod: CPTII,S$GLB,, | Performed by: INTERNAL MEDICINE

## 2022-12-29 PROCEDURE — 4010F ACE/ARB THERAPY RXD/TAKEN: CPT | Mod: CPTII,S$GLB,, | Performed by: INTERNAL MEDICINE

## 2022-12-29 PROCEDURE — 3078F DIAST BP <80 MM HG: CPT | Mod: CPTII,S$GLB,, | Performed by: INTERNAL MEDICINE

## 2022-12-29 PROCEDURE — 3074F PR MOST RECENT SYSTOLIC BLOOD PRESSURE < 130 MM HG: ICD-10-PCS | Mod: CPTII,S$GLB,, | Performed by: INTERNAL MEDICINE

## 2022-12-29 PROCEDURE — 1159F MED LIST DOCD IN RCRD: CPT | Mod: CPTII,S$GLB,, | Performed by: INTERNAL MEDICINE

## 2022-12-29 PROCEDURE — 80061 LIPID PANEL: CPT | Performed by: INTERNAL MEDICINE

## 2022-12-29 PROCEDURE — 99396 PR PREVENTIVE VISIT,EST,40-64: ICD-10-PCS | Mod: S$GLB,,, | Performed by: INTERNAL MEDICINE

## 2022-12-29 PROCEDURE — 36415 COLL VENOUS BLD VENIPUNCTURE: CPT | Performed by: INTERNAL MEDICINE

## 2022-12-29 PROCEDURE — 99999 PR PBB SHADOW E&M-EST. PATIENT-LVL III: CPT | Mod: PBBFAC,,, | Performed by: INTERNAL MEDICINE

## 2022-12-29 PROCEDURE — 99999 PR PBB SHADOW E&M-EST. PATIENT-LVL III: ICD-10-PCS | Mod: PBBFAC,,, | Performed by: INTERNAL MEDICINE

## 2022-12-29 PROCEDURE — 99396 PREV VISIT EST AGE 40-64: CPT | Mod: S$GLB,,, | Performed by: INTERNAL MEDICINE

## 2022-12-29 PROCEDURE — 81001 URINALYSIS AUTO W/SCOPE: CPT | Performed by: INTERNAL MEDICINE

## 2022-12-29 PROCEDURE — 3078F PR MOST RECENT DIASTOLIC BLOOD PRESSURE < 80 MM HG: ICD-10-PCS | Mod: CPTII,S$GLB,, | Performed by: INTERNAL MEDICINE

## 2022-12-29 NOTE — PROGRESS NOTES
CC:  Annual exam     HPI:  The patient is a 62-year-old female with migraine headaches rheumatoid arthritis, interval bowel syndrome, ischemic colitis, colon polyps, hypertension and bilateral breast cancer presents today for annual exam.  Patient is status post bilateral mastectomies plus attempted reconstructive breast surgery.  The implants had to be removed.    Answers submitted by the patient for this visit:  Review of Systems Questionnaire (Submitted on 12/28/2022)  activity change: No  unexpected weight change: No  neck pain: No  hearing loss: No  rhinorrhea: No  trouble swallowing: No  eye discharge: No  visual disturbance: No  chest tightness: No  wheezing: No  chest pain: No  palpitations: No  blood in stool: No  constipation: No  vomiting: No  diarrhea: No  polydipsia: No  polyuria: No  difficulty urinating: No  hematuria: No  menstrual problem: No  dysuria: No  joint swelling: No  arthralgias: No  headaches: No  weakness: No  confusion: No  dysphoric mood: No    Physical exam:   General appearance:  No acute distress   HEENT: Conjunctiva is clear.  Pupils equal.  TMs are clear.  Nasal septum is midline without discharge.  Oropharynx without erythema.  Trachea is midline without JVD or thyromegaly.    Pulmonary:  Good inspiratory, expiratory breath sounds are heard.  Lungs are clear to auscultation.    Cardiovascular:  S1-S2, rhythm is normal.  2+ carotid pulse of bruits.  Extremities without edema.    GI: Abdomen is nontender, nondistended without hepatosplenomegaly  Lymphatics: No cervical adenopathy     Assessment:  1.  Annual exam  2.  Hypertension  3. Bilateral breast cancer status post bilateral mastectomies with failed reconstructive surgery  3.  Rheumatoid arthritis  5. Likely headaches   6.  Colon polyps    Plan:   1. Will schedule a CBC, CMP, lipid panel, UA

## 2023-01-02 ENCOUNTER — PATIENT MESSAGE (OUTPATIENT)
Dept: INTERNAL MEDICINE | Facility: CLINIC | Age: 63
End: 2023-01-02
Payer: COMMERCIAL

## 2023-01-03 ENCOUNTER — TELEPHONE (OUTPATIENT)
Dept: INTERNAL MEDICINE | Facility: CLINIC | Age: 63
End: 2023-01-03
Payer: COMMERCIAL

## 2023-01-03 NOTE — TELEPHONE ENCOUNTER
----- Message from Blane Brian MD sent at 12/31/2022 12:28 PM CST -----  Regarding: Fit kit  Please contact patient and schedule a fitkit. Order is in. She will be due for a colonoscopy next year.

## 2023-01-04 ENCOUNTER — PATIENT MESSAGE (OUTPATIENT)
Dept: OBSTETRICS AND GYNECOLOGY | Facility: CLINIC | Age: 63
End: 2023-01-04
Payer: COMMERCIAL

## 2023-01-04 ENCOUNTER — TELEPHONE (OUTPATIENT)
Dept: HEMATOLOGY/ONCOLOGY | Facility: CLINIC | Age: 63
End: 2023-01-04
Payer: COMMERCIAL

## 2023-01-04 ENCOUNTER — PATIENT MESSAGE (OUTPATIENT)
Dept: HEMATOLOGY/ONCOLOGY | Facility: CLINIC | Age: 63
End: 2023-01-04
Payer: COMMERCIAL

## 2023-01-04 DIAGNOSIS — Z17.0 MALIGNANT NEOPLASM OF LOWER-INNER QUADRANT OF LEFT BREAST IN FEMALE, ESTROGEN RECEPTOR POSITIVE: Primary | ICD-10-CM

## 2023-01-04 DIAGNOSIS — Z90.13 STATUS POST BILATERAL MASTECTOMY: ICD-10-CM

## 2023-01-04 DIAGNOSIS — M25.50 JOINT PAIN: ICD-10-CM

## 2023-01-04 DIAGNOSIS — C50.312 MALIGNANT NEOPLASM OF LOWER-INNER QUADRANT OF LEFT BREAST IN FEMALE, ESTROGEN RECEPTOR POSITIVE: Primary | ICD-10-CM

## 2023-01-04 DIAGNOSIS — Z79.810 SERM USE (SELECTIVE ESTROGEN RECEPTOR MODULATOR): ICD-10-CM

## 2023-01-04 DIAGNOSIS — M54.50 CHRONIC LOW BACK PAIN: ICD-10-CM

## 2023-01-04 DIAGNOSIS — G89.29 CHRONIC LOW BACK PAIN: ICD-10-CM

## 2023-01-04 LAB — FUNGUS SPEC CULT: NORMAL

## 2023-01-04 NOTE — NURSING
"ER+ NM+ HER2 (-) IDC Left Breast s/p bilateral mastectomy 10/24/22 w/failed reconstruction, declines LUIS FERNANDO flap revision at this time. Recent bilateral implant removal 11/28/22.     Using "knitted knockers" but notes difficulty adjusting to the prostheses. Desires to try bilateral mastectomy bra w/prostheses, fitting via [facility] preferred per patient.     Notes impaired upper extremity mobility and strength post surgery. spasms/pulling near surgical site. Interested in PT. OT yoga after completion of PT for fatigue, arthralgias and mobility.    Compliant with adjuvant Tamoxifen x 2 weeks. Notes heat intolerance thus far, severity 3-4 out of 10. Denies night sweats.    Vaginal dryness, hx of Premarin cream, has Intrarosa at home, patient was uncertain if this medication was safe given breast cancer diagnosis. Reassurance and education provided.     History of RA, bilateral joint pain to hands, migraines and chronic low back pain. Desires to try acupuncture.     Hx of colon polyps, patient is checking on date of her last colonoscopy given 3-5 year surveillance.    Hx of osteopenia to hips bilaterally, notes poor compliance with Vitamin D. Causes gastric upset. Will send [facility] Vitamin D supplement per protocol.   Last Dexa scan 02/19/2018. Benefit from repeat scan.    Patient's history reviewed with Dr. Mcintyre. Approval received for acupuncture, PT and DME. JUSTEN Hart.        "

## 2023-01-12 ENCOUNTER — OFFICE VISIT (OUTPATIENT)
Dept: OBSTETRICS AND GYNECOLOGY | Facility: CLINIC | Age: 63
End: 2023-01-12
Attending: OBSTETRICS & GYNECOLOGY
Payer: COMMERCIAL

## 2023-01-12 VITALS
HEART RATE: 79 BPM | BODY MASS INDEX: 33.59 KG/M2 | DIASTOLIC BLOOD PRESSURE: 93 MMHG | HEIGHT: 66 IN | SYSTOLIC BLOOD PRESSURE: 156 MMHG | WEIGHT: 209 LBS

## 2023-01-12 DIAGNOSIS — Z17.0 MALIGNANT NEOPLASM OF LOWER-INNER QUADRANT OF LEFT BREAST IN FEMALE, ESTROGEN RECEPTOR POSITIVE: ICD-10-CM

## 2023-01-12 DIAGNOSIS — C50.312 MALIGNANT NEOPLASM OF LOWER-INNER QUADRANT OF LEFT BREAST IN FEMALE, ESTROGEN RECEPTOR POSITIVE: ICD-10-CM

## 2023-01-12 DIAGNOSIS — Z01.419 ENCOUNTER FOR GYNECOLOGICAL EXAMINATION WITHOUT ABNORMAL FINDING: Primary | ICD-10-CM

## 2023-01-12 PROCEDURE — 99999 PR PBB SHADOW E&M-EST. PATIENT-LVL III: CPT | Mod: PBBFAC,,, | Performed by: OBSTETRICS & GYNECOLOGY

## 2023-01-12 PROCEDURE — 3077F PR MOST RECENT SYSTOLIC BLOOD PRESSURE >= 140 MM HG: ICD-10-PCS | Mod: CPTII,S$GLB,, | Performed by: OBSTETRICS & GYNECOLOGY

## 2023-01-12 PROCEDURE — 99999 PR PBB SHADOW E&M-EST. PATIENT-LVL III: ICD-10-PCS | Mod: PBBFAC,,, | Performed by: OBSTETRICS & GYNECOLOGY

## 2023-01-12 PROCEDURE — 3080F DIAST BP >= 90 MM HG: CPT | Mod: CPTII,S$GLB,, | Performed by: OBSTETRICS & GYNECOLOGY

## 2023-01-12 PROCEDURE — 99396 PREV VISIT EST AGE 40-64: CPT | Mod: S$GLB,,, | Performed by: OBSTETRICS & GYNECOLOGY

## 2023-01-12 PROCEDURE — 3008F BODY MASS INDEX DOCD: CPT | Mod: CPTII,S$GLB,, | Performed by: OBSTETRICS & GYNECOLOGY

## 2023-01-12 PROCEDURE — 1159F PR MEDICATION LIST DOCUMENTED IN MEDICAL RECORD: ICD-10-PCS | Mod: CPTII,S$GLB,, | Performed by: OBSTETRICS & GYNECOLOGY

## 2023-01-12 PROCEDURE — 3077F SYST BP >= 140 MM HG: CPT | Mod: CPTII,S$GLB,, | Performed by: OBSTETRICS & GYNECOLOGY

## 2023-01-12 PROCEDURE — 3008F PR BODY MASS INDEX (BMI) DOCUMENTED: ICD-10-PCS | Mod: CPTII,S$GLB,, | Performed by: OBSTETRICS & GYNECOLOGY

## 2023-01-12 PROCEDURE — 1159F MED LIST DOCD IN RCRD: CPT | Mod: CPTII,S$GLB,, | Performed by: OBSTETRICS & GYNECOLOGY

## 2023-01-12 PROCEDURE — 99396 PR PREVENTIVE VISIT,EST,40-64: ICD-10-PCS | Mod: S$GLB,,, | Performed by: OBSTETRICS & GYNECOLOGY

## 2023-01-12 PROCEDURE — 3080F PR MOST RECENT DIASTOLIC BLOOD PRESSURE >= 90 MM HG: ICD-10-PCS | Mod: CPTII,S$GLB,, | Performed by: OBSTETRICS & GYNECOLOGY

## 2023-01-12 NOTE — PROGRESS NOTES
SUBJECTIVE:   62 y.o. female   for annual routine checkup. No LMP recorded (lmp unknown). Patient has had a hysterectomy..  She has ER positive AR positive HER2 negative infiltrating ductal carcinoma of the left breast.  She had bilateral mastectomy with reconstruction on 10/24/2022.  She had a wound infection and implants were removed on 2022.  She is not happy with the way she looks but declines further surgery.  She has lost range of motion bilaterally and her arms and reports that she even has trouble lifting her arms to drive.  Her  has Parkinson's is and was recently diagnosed with prostate cancer.  She is interested in physical therapy to improve range of motion.        Past Medical History:   Diagnosis Date    Allergy     Breast cancer 2016    Left breast low grade DCIS, ER/AR positive    Colon polyp 10/31/2018    Ischemic colitis suspected as well ( area biopsied).    GERD (gastroesophageal reflux disease)     Hiatal hernia     Hyperlipidemia     Irritable bowel syndrome     Migraine headache     PONV (postoperative nausea and vomiting)     RA (rheumatoid arthritis)     Squamous cell carcinoma excised 14    in situ, R forearm     Past Surgical History:   Procedure Laterality Date    BREAST BIOPSY Left 2016    DCIS    BREAST CAPSULECTOMY Bilateral 2022    Procedure: CAPSULECTOMY, BREAST;  Surgeon: Garland Gray MD;  Location: [facility] OR [address] [place] [address];  Service: Plastics;  Laterality: Bilateral;    BREAST LUMPECTOMY      ECTOPIC PREGNANCY SURGERY Right     HYSTERECTOMY  1998    ovaries left intact    INJECTION FOR SENTINEL NODE IDENTIFICATION Left 10/24/2022    Procedure: INJECTION, FOR SENTINEL NODE IDENTIFICATION LEFT;  Surgeon: JOVANNI Jama MD;  Location: [facility] OR [address] [place] [address];  Service: General;  Laterality: Left;    INSERTION OF BREAST IMPLANT Bilateral 10/24/2022    Procedure: INSERTION, BREAST IMPLANT BILATERAL;  Surgeon: Garland Gray MD;  Location: [facility]  OR 2ND FLR;  Service: Plastics;  Laterality: Bilateral;    MASTECTOMY Bilateral 10/24/2022    Procedure: MASTECTOMY BILATERAL;  Surgeon: JOVANNI Jama MD;  [facility];  Service: General;  Laterality: Bilateral;    REMOVAL OF BREAST IMPLANT Bilateral 2022    Procedure: REMOVAL, IMPLANT, BREAST;  Surgeon: Garland Gray MD;  [facility];  Service: Plastics;  Laterality: Bilateral;    removal of ovarian cyst Right oct 1992    ovary left intact    SENTINEL LYMPH NODE BIOPSY Left 10/24/2022    Procedure: BIOPSY, LYMPH NODE, SENTINEL LEFT;  Surgeon: JOVANNI Jama MD;  [facility];  Service: General;  Laterality: Left;    TUBAL LIGATION      tubal reversal  1991     Social History     Socioeconomic History    Marital status:      Spouse name: Parrish    Number of children: 2   Tobacco Use    Smoking status: Never     Passive exposure: Never    Smokeless tobacco: Never   Substance and Sexual Activity    Alcohol use: No     Alcohol/week: 0.0 standard drinks    Drug use: No    Sexual activity: Yes     Partners: Male     Birth control/protection: Surgical, None     Comment:  to Parrish    Other Topics Concern    Are you pregnant or think you may be? No    Breast-feeding No     Family History   Problem Relation Age of Onset    Migraines Sister     Migraines Brother     Seizures Brother     Migraines Maternal Grandmother     Seizures Maternal Grandmother     Cancer Father 77        Lung cancer    Cancer Paternal Uncle         lung    Cancer Maternal Uncle         prostate ca    Melanoma Neg Hx     Breast cancer Neg Hx     Colon cancer Neg Hx     Ovarian cancer Neg Hx      OB History    Para Term  AB Living   3 2 2   1 2   SAB IAB Ectopic Multiple Live Births       1   2      # Outcome Date GA Lbr Garrett/2nd Weight Sex Delivery Anes PTL Lv   3 Ectopic     U       2 Term     F Vag-Spont   NILTON   1 Term     F Vag-Spont   NILTON           Current  Outpatient Medications   Medication Sig Dispense Refill    albuterol (VENTOLIN HFA) 90 mcg/actuation inhaler Inhale 2 puffs into the lungs every 6 (six) hours as needed for Wheezing. Rescue 18 g 1    ALPRAZolam (XANAX) 0.25 MG tablet Take 1 tablet (0.25 mg total) by mouth nightly as needed for Insomnia. 30 tablet 0    atogepant (QULIPTA) 60 mg Tab Take 60 mg by mouth.      bacitracin 500 unit/gram ointment Apply topically 2 (two) times daily. 14 g 0    baclofen (LIORESAL) 10 MG tablet Take 1 tablet (10 mg total) by mouth 3 (three) times daily. 30 tablet 0    budesonide 1 mg/2 mL NbSp       butalbital-acetaminophen-caffeine -40 mg (FIORICET, ESGIC) -40 mg per tablet Take 1 tablet by mouth every 6 to 8 hours as needed. 60 tablet 0    cholestyramine (QUESTRAN) 4 gram packet Take 1 packet by mouth once daily.      CHOLESTYRAMINE LIGHT 4 gram packet Take 1 packet by mouth once daily.      ciclopirox (LOPROX) 0.77 % Crea Apply topically 2 (two) times daily. 90 g 1    clindamycin (CLEOCIN) 300 MG capsule Take 1 capsule (300 mg total) by mouth every 8 (eight) hours. (Patient not taking: Reported on 12/21/2022) 21 capsule 0    diclofenac sodium (VOLTAREN) 1 % Gel APPLY 2 GRAMS TOPICALLY 3 (THREE) TIMES DAILY. 100 g 3    fluticasone (FLONASE) 50 mcg/actuation nasal spray       gabapentin (NEURONTIN) 800 MG tablet Take 800 mg by mouth 3 (three) times daily.      HYDROcodone-acetaminophen (NORCO) 5-325 mg per tablet Take 1 tablet by mouth every 4 (four) hours as needed for Pain. 28 tablet 0    hydrocortisone butyrate (LOCOID) 0.1 % Crea cream AAA bid 45 g 3    lansoprazole (PREVACID) 30 MG capsule Take 30 mg by mouth every morning. 1 Capsule, Delayed Release(E.C.) Oral Every day      losartan (COZAAR) 25 MG tablet TAKE 1 TABLET BY MOUTH EVERY DAY (Patient taking differently: Take 25 mg by mouth nightly.) 90 tablet 1    multivitamin (THERAGRAN) per tablet Take 1 tablet by mouth once daily.      mupirocin (BACTROBAN)  2 % ointment Apply topically 2 (two) times daily. 22 g 1    NEILMED SINUS RINSE COMPLETE pkdv by Each Nostril route.      ondansetron (ZOFRAN) 4 MG tablet Take 1 tablet (4 mg total) by mouth every 8 (eight) hours as needed for Nausea. 1 Tablet Oral Every 6 hours 12 tablet 12    ondansetron (ZOFRAN-ODT) 4 MG TbDL Take 1 tablet (4 mg total) by mouth every 6 (six) hours as needed (nausea). 60 tablet 0    prasterone, dhea, (INTRAROSA) 6.5 mg Inst Place 6.5 mg vaginally every evening. 30 each 11    PREMARIN vaginal cream PLACE 1 GRAM VAGINALLY 2 TIMES A WK  12    sumatriptan (IMITREX) 20 mg/actuation nasal spray USE 1 SPRAY IN EACH NOSTRIL AT ONSET OF HEADACHE. MAY REPEAT ONCE IN 2 HOURS IF NO RELIEF. NO MORE THAN 2 SPRAYS PER 24 HOURS 6 each 12    tamoxifen (NOLVADEX) 20 MG Tab Take 1 tablet (20 mg total) by mouth once daily. 30 tablet 11    triamcinolone acetonide 0.025% (KENALOG) 0.025 % Oint Apply topically 2 (two) times daily. x 1-2 wks then prn flares only 80 g 2    vitamin D 1000 units Tab Take 185 mg by mouth every morning.        No current facility-administered medications for this visit.     Allergies: Pravastatin, Shellfish containing products, Amitriptyline, Codeine, Doxycycline, Erythromycin, Iodine, Macrobid  [nitrofurantoin monohyd/m-cryst], and Verapamil     The 10-year ASCVD risk score (Christal COTTO, et al., 2019) is: 6.5%    Values used to calculate the score:      Age: 62 years      Sex: Female      Is Non- : No      Diabetic: No      Tobacco smoker: No      Systolic Blood Pressure: 156 mmHg      Is BP treated: Yes      HDL Cholesterol: 74 mg/dL      Total Cholesterol: 255 mg/dL      ROS:  Constitutional: no weight loss, weight gain, fever, fatigue  Eyes:  No vision changes, glasses/contacts  ENT/Mouth: No ulcers, sinus problems, ears ringing, +headache  Cardiovascular: No inability to lie flat, chest pain, exercise intolerance, swelling, heart palpitations  Respiratory: No  wheezing, coughing blood, shortness of breath, or cough  Gastrointestinal: No diarrhea, bloody stool, nausea/vomiting, constipation, gas, hemorrhoids  Genitourinary: No blood in urine, painful urination, urgency of urination, frequency of urination, incomplete emptying, incontinence, abnormal bleeding, painful periods, heavy periods, vaginal discharge, vaginal odor, painful intercourse, sexual problems, bleeding after intercourse, +vaginal dryness  Musculoskeletal: No muscle weakness, decreased range of motion, +back pain  Skin/Breast: No painful breasts, nipple discharge, masses, rash, ulcers, +breast cancer  Neurological: No passing out, seizures, numbness, headache  Endocrine: No diabetes, hypothyroid, hyperthyroid, hot flashes, hair loss, abnormal hair growth, acne, +osteopenia  Psychiatric: No depression, crying  Hematologic: No bruises, bleeding, swollen lymph nodes, anemia.      Physical Exam:   Constitutional: She is oriented to person, place, and time. She appears well-developed and well-nourished.      Neck: No tracheal deviation present. No thyromegaly present.    Cardiovascular:       Exam reveals no edema.        Pulmonary/Chest: Effort normal. She exhibits no mass, no tenderness, no deformity and no retraction. Right breast exhibits absence. Left breast exhibits absence. Breasts are symmetrical.        Abdominal: Soft. She exhibits no distension and no mass. There is no abdominal tenderness. There is no rebound and no guarding. No hernia. Hernia confirmed negative in the left inguinal area.     Genitourinary: Rectum:      No external hemorrhoid.   There is no rash, tenderness or lesion on the right labia. There is no rash, tenderness or lesion on the left labia. No no adexnal prolapse. Right adnexum displays no mass, no tenderness and no fullness. Left adnexum displays no mass, no tenderness and no fullness. Vaginal cuff normal.  No  no vaginal discharge, tenderness, bleeding, rectocele, cystocele or  unspecified prolapse of vaginal walls in the vagina. Cervix is absent.Uterus is absent.           Musculoskeletal: Normal range of motion and moves all extremeties. No edema.       Neurological: She is alert and oriented to person, place, and time.    Skin: No rash noted. No erythema. No pallor.    Psychiatric: She has a normal mood and affect. Her behavior is normal. Judgment and thought content normal.     Nipples bilaterally in place- dimpling of the skin secondary to scarring  ASSESSMENT:   well woman  Breast cancer  Vaginal dryness  PLAN:   Continue intrarosa  Wii order PT to help with decrease range of Motion  Will fax order for prostheses to [facility]  Will place order for acupuncture and OT/yoga  She declines psychology appt  return annually or prn

## 2023-01-16 LAB
ACID FAST MOD KINY STN SPEC: NORMAL
MYCOBACTERIUM SPEC QL CULT: NORMAL

## 2023-01-18 ENCOUNTER — OFFICE VISIT (OUTPATIENT)
Dept: PLASTIC SURGERY | Facility: CLINIC | Age: 63
End: 2023-01-18
Payer: COMMERCIAL

## 2023-01-18 VITALS — HEART RATE: 101 BPM | SYSTOLIC BLOOD PRESSURE: 161 MMHG | DIASTOLIC BLOOD PRESSURE: 78 MMHG

## 2023-01-18 DIAGNOSIS — Z09 SURGERY FOLLOW-UP EXAMINATION: Primary | ICD-10-CM

## 2023-01-18 PROCEDURE — 1159F MED LIST DOCD IN RCRD: CPT | Mod: CPTII,S$GLB,, | Performed by: SURGERY

## 2023-01-18 PROCEDURE — 99024 PR POST-OP FOLLOW-UP VISIT: ICD-10-PCS | Mod: S$GLB,,, | Performed by: SURGERY

## 2023-01-18 PROCEDURE — 1160F PR REVIEW ALL MEDS BY PRESCRIBER/CLIN PHARMACIST DOCUMENTED: ICD-10-PCS | Mod: CPTII,S$GLB,, | Performed by: SURGERY

## 2023-01-18 PROCEDURE — 3078F PR MOST RECENT DIASTOLIC BLOOD PRESSURE < 80 MM HG: ICD-10-PCS | Mod: CPTII,S$GLB,, | Performed by: SURGERY

## 2023-01-18 PROCEDURE — 1160F RVW MEDS BY RX/DR IN RCRD: CPT | Mod: CPTII,S$GLB,, | Performed by: SURGERY

## 2023-01-18 PROCEDURE — 3077F SYST BP >= 140 MM HG: CPT | Mod: CPTII,S$GLB,, | Performed by: SURGERY

## 2023-01-18 PROCEDURE — 99999 PR PBB SHADOW E&M-EST. PATIENT-LVL II: CPT | Mod: PBBFAC,,, | Performed by: SURGERY

## 2023-01-18 PROCEDURE — 99024 POSTOP FOLLOW-UP VISIT: CPT | Mod: S$GLB,,, | Performed by: SURGERY

## 2023-01-18 PROCEDURE — 1159F PR MEDICATION LIST DOCUMENTED IN MEDICAL RECORD: ICD-10-PCS | Mod: CPTII,S$GLB,, | Performed by: SURGERY

## 2023-01-18 PROCEDURE — 99999 PR PBB SHADOW E&M-EST. PATIENT-LVL II: ICD-10-PCS | Mod: PBBFAC,,, | Performed by: SURGERY

## 2023-01-18 PROCEDURE — 3078F DIAST BP <80 MM HG: CPT | Mod: CPTII,S$GLB,, | Performed by: SURGERY

## 2023-01-18 PROCEDURE — 3077F PR MOST RECENT SYSTOLIC BLOOD PRESSURE >= 140 MM HG: ICD-10-PCS | Mod: CPTII,S$GLB,, | Performed by: SURGERY

## 2023-01-18 NOTE — PROGRESS NOTES
Plastic Surgery Clinic Postop Visit    Subjective:      Christal Posey is a 62 y.o. year old female who presents to the Plastic Surgery Clinic on 01/18/2023 for follow up visit status post bilateral breast implant removal on 11/28/22. Pt reports no new complaints. Denies fever, chills, nausea, vomiting, or other systemic signs of infection.     Pt was seen by myself and Dr. Gray.     Vitals:    01/18/23 0940   BP: (!) 161/78   Pulse: 101        Review of patient's allergies indicates:   Allergen Reactions    Pravastatin Other (See Comments)     Severe muscle pain.  Muscle pain    Other reaction(s): Other (See Comments)  Severe muscle pain.    Shellfish containing products Anaphylaxis    Amitriptyline      Other reaction(s): dizzy    Codeine      Other reaction(s): Unknown    Doxycycline      Other reaction(s): Nausea    Erythromycin      Other reaction(s): Unknown    Iodine      Other reaction(s): Swelling  Other reaction(s): Unknown    Macrobid  [nitrofurantoin monohyd/m-cryst]      Other reaction(s): Unknown    Verapamil      Other reaction(s): Headache       Current Outpatient Medications on File Prior to Visit   Medication Sig Dispense Refill    albuterol (VENTOLIN HFA) 90 mcg/actuation inhaler Inhale 2 puffs into the lungs every 6 (six) hours as needed for Wheezing. Rescue 18 g 1    ALPRAZolam (XANAX) 0.25 MG tablet Take 1 tablet (0.25 mg total) by mouth nightly as needed for Insomnia. 30 tablet 0    atogepant (QULIPTA) 60 mg Tab Take 60 mg by mouth.      bacitracin 500 unit/gram ointment Apply topically 2 (two) times daily. 14 g 0    baclofen (LIORESAL) 10 MG tablet Take 1 tablet (10 mg total) by mouth 3 (three) times daily. 30 tablet 0    budesonide 1 mg/2 mL NbSp       butalbital-acetaminophen-caffeine -40 mg (FIORICET, ESGIC) -40 mg per tablet Take 1 tablet by mouth every 6 to 8 hours as needed. 60 tablet 0    cholestyramine (QUESTRAN) 4 gram packet Take 1 packet by mouth once daily.       CHOLESTYRAMINE LIGHT 4 gram packet Take 1 packet by mouth once daily.      ciclopirox (LOPROX) 0.77 % Crea Apply topically 2 (two) times daily. 90 g 1    clindamycin (CLEOCIN) 300 MG capsule Take 1 capsule (300 mg total) by mouth every 8 (eight) hours. (Patient not taking: Reported on 12/21/2022) 21 capsule 0    diclofenac sodium (VOLTAREN) 1 % Gel APPLY 2 GRAMS TOPICALLY 3 (THREE) TIMES DAILY. 100 g 3    fluticasone (FLONASE) 50 mcg/actuation nasal spray       gabapentin (NEURONTIN) 800 MG tablet Take 800 mg by mouth 3 (three) times daily.      HYDROcodone-acetaminophen (NORCO) 5-325 mg per tablet Take 1 tablet by mouth every 4 (four) hours as needed for Pain. 28 tablet 0    hydrocortisone butyrate (LOCOID) 0.1 % Crea cream AAA bid 45 g 3    lansoprazole (PREVACID) 30 MG capsule Take 30 mg by mouth every morning. 1 Capsule, Delayed Release(E.C.) Oral Every day      losartan (COZAAR) 25 MG tablet TAKE 1 TABLET BY MOUTH EVERY DAY (Patient taking differently: Take 25 mg by mouth nightly.) 90 tablet 1    multivitamin (THERAGRAN) per tablet Take 1 tablet by mouth once daily.      mupirocin (BACTROBAN) 2 % ointment Apply topically 2 (two) times daily. 22 g 1    NEILMED SINUS RINSE COMPLETE pkdv by Each Nostril route.      ondansetron (ZOFRAN) 4 MG tablet Take 1 tablet (4 mg total) by mouth every 8 (eight) hours as needed for Nausea. 1 Tablet Oral Every 6 hours 12 tablet 12    ondansetron (ZOFRAN-ODT) 4 MG TbDL Take 1 tablet (4 mg total) by mouth every 6 (six) hours as needed (nausea). 60 tablet 0    prasterone, dhea, (INTRAROSA) 6.5 mg Inst Place 6.5 mg vaginally every evening. 30 each 11    PREMARIN vaginal cream PLACE 1 GRAM VAGINALLY 2 TIMES A WK  12    sumatriptan (IMITREX) 20 mg/actuation nasal spray USE 1 SPRAY IN EACH NOSTRIL AT ONSET OF HEADACHE. MAY REPEAT ONCE IN 2 HOURS IF NO RELIEF. NO MORE THAN 2 SPRAYS PER 24 HOURS 6 each 12    tamoxifen (NOLVADEX) 20 MG Tab Take 1 tablet (20 mg total) by mouth once  daily. 30 tablet 11    triamcinolone acetonide 0.025% (KENALOG) 0.025 % Oint Apply topically 2 (two) times daily. x 1-2 wks then prn flares only 80 g 2    vitamin D 1000 units Tab Take 185 mg by mouth every morning.        No current facility-administered medications on file prior to visit.       Patient Active Problem List   Diagnosis    Hiatal hernia    Irritable bowel syndrome    Migraine with aura and without status migrainosus, not intractable    Symptomatic menopausal or female climacteric states    Ductal carcinoma in situ (DCIS) of left breast    Preoperative testing    Arthralgia of both hands    Rheumatoid factor positive    Obesity (BMI 30-39.9)    Dyspareunia, female    Vitamin D deficiency    Colon polyp    Malignant neoplasm of lower-inner quadrant of left breast in female, estrogen receptor positive       Past Surgical History:   Procedure Laterality Date    BREAST BIOPSY Left 05/2016    DCIS    BREAST CAPSULECTOMY Bilateral 11/28/2022    Procedure: CAPSULECTOMY, BREAST;  Surgeon: Garalnd Gray MD;  Location: [address];  Service: Plastics;  Laterality: Bilateral;    BREAST LUMPECTOMY      ECTOPIC PREGNANCY SURGERY Right     HYSTERECTOMY  1998    ovaries left intact    INJECTION FOR SENTINEL NODE IDENTIFICATION Left 10/24/2022    Procedure: INJECTION, FOR SENTINEL NODE IDENTIFICATION LEFT;  Surgeon: JOVANNI Jama MD;  Location: [facility] OR [address] [place] [address];  Service: General;  Laterality: Left;    INSERTION OF BREAST IMPLANT Bilateral 10/24/2022    Procedure: INSERTION, BREAST IMPLANT BILATERAL;  Surgeon: Garland Gray MD;  Location: [facility] OR [address] [place] [address];  Service: Plastics;  Laterality: Bilateral;    MASTECTOMY Bilateral 10/24/2022    Procedure: MASTECTOMY BILATERAL;  Surgeon: JOVANNI Jama MD;  Location: [address];  Service: General;  Laterality: Bilateral;    REMOVAL OF BREAST IMPLANT Bilateral 11/28/2022    Procedure: REMOVAL, IMPLANT, BREAST;  Surgeon: Garland Gray MD;   Location: [facility] OR [address] [place] [address];  Service: Plastics;  Laterality: Bilateral;    removal of ovarian cyst Right oct 1992    ovary left intact    SENTINEL LYMPH NODE BIOPSY Left 10/24/2022    Procedure: BIOPSY, LYMPH NODE, SENTINEL LEFT;  Surgeon: JOVANNI Jama MD;  Location: [facility] OR [address] [place] [address];  Service: General;  Laterality: Left;    TUBAL LIGATION      tubal reversal  july 1991       Social History     Socioeconomic History    Marital status:      Spouse name: Parrish    Number of children: 2   Tobacco Use    Smoking status: Never     Passive exposure: Never    Smokeless tobacco: Never   Substance and Sexual Activity    Alcohol use: No     Alcohol/week: 0.0 standard drinks    Drug use: No    Sexual activity: Yes     Partners: Male     Birth control/protection: Surgical, None     Comment:  to Parrish    Other Topics Concern    Are you pregnant or think you may be? No    Breast-feeding No           Review of Systems: Review of Systems   Constitutional:  Negative for chills and fever.   HENT: Negative.     Eyes: Negative.    Respiratory:  Negative for cough and shortness of breath.    Cardiovascular: Negative.  Negative for chest pain.   Gastrointestinal:  Negative for nausea and vomiting.   Genitourinary: Negative.    Musculoskeletal: Negative.    Skin:  Negative for itching and rash.       Objective:     Physical Exam:  Vitals:    01/18/23 0940   BP: (!) 161/78   Pulse: 101       WD WN NAD  VSS  Normal resp effort  Chest: incision CDI. No erythema or discharge.         Assessment:       1. Surgery follow-up examination        Plan:   62 y.o. female status post bilateral breast implant removal  - Doing well, no issues  - Discussed flap vs soft tissue excision w/ nipple graft. Pt unsure at this pt. Pt will call when she decides.  - Return to clinic in 3 mo      All questions were answered. The patient was advised to call the clinic with any questions or concerns prior to their next visit.

## 2023-01-20 ENCOUNTER — LAB VISIT (OUTPATIENT)
Dept: LAB | Facility: HOSPITAL | Age: 63
End: 2023-01-20
Attending: INTERNAL MEDICINE
Payer: COMMERCIAL

## 2023-01-20 DIAGNOSIS — Z12.11 COLON CANCER SCREENING: ICD-10-CM

## 2023-01-20 PROCEDURE — 82274 ASSAY TEST FOR BLOOD FECAL: CPT | Performed by: INTERNAL MEDICINE

## 2023-01-25 ENCOUNTER — CLINICAL SUPPORT (OUTPATIENT)
Dept: REHABILITATION | Facility: HOSPITAL | Age: 63
End: 2023-01-25
Attending: OBSTETRICS & GYNECOLOGY
Payer: COMMERCIAL

## 2023-01-25 DIAGNOSIS — C50.312 MALIGNANT NEOPLASM OF LOWER-INNER QUADRANT OF LEFT BREAST IN FEMALE, ESTROGEN RECEPTOR POSITIVE: ICD-10-CM

## 2023-01-25 DIAGNOSIS — Z90.13 STATUS POST BILATERAL MASTECTOMY: ICD-10-CM

## 2023-01-25 DIAGNOSIS — R29.3 POOR POSTURE: ICD-10-CM

## 2023-01-25 DIAGNOSIS — Z74.09 IMPAIRED FUNCTIONAL MOBILITY AND ACTIVITY TOLERANCE: ICD-10-CM

## 2023-01-25 DIAGNOSIS — Z91.89 AT RISK FOR LYMPHEDEMA: ICD-10-CM

## 2023-01-25 DIAGNOSIS — M25.612 DECREASED ROM OF LEFT SHOULDER: Primary | ICD-10-CM

## 2023-01-25 DIAGNOSIS — M25.611 DECREASED ROM OF RIGHT SHOULDER: ICD-10-CM

## 2023-01-25 DIAGNOSIS — Z17.0 MALIGNANT NEOPLASM OF LOWER-INNER QUADRANT OF LEFT BREAST IN FEMALE, ESTROGEN RECEPTOR POSITIVE: ICD-10-CM

## 2023-01-25 PROCEDURE — 97110 THERAPEUTIC EXERCISES: CPT

## 2023-01-25 PROCEDURE — 97162 PT EVAL MOD COMPLEX 30 MIN: CPT

## 2023-01-25 PROCEDURE — 97530 THERAPEUTIC ACTIVITIES: CPT

## 2023-01-25 NOTE — PATIENT INSTRUCTIONS
When to call your doctor   - if any part of your affected arm or axilla feels hot, is reddened or has increased swelling   - if you develop a temperature over 101 degrees Fahrenheit      Lymphedema - Identification and Prevention     Lymphedema - is the swelling of a body area or extremity caused by the accumulation of lymphatic fluid.  There is a risk for lymphedema with the removal of lymph nodes, trauma or radiation therapy.  Treatment of breast cancer often involves surgery: mastectomy or lumpectomy. Some of the lymph nodes in the underarm (called axillary lymph nodes) may be removed and checked to see if they contain cancer cells.     During breast surgery when axillary lymph nodes are removed (with sentinel node biopsy or axillary dissection) or are treated with radiation therapy, the lymphatic system may become impaired. This may prevent lymphatic fluid from leaving the area therefore, causing lymphedema.     Lymphatic fluid is a normal part of the circulatory system. Its function is to remove waste products and to produce cells vital to fighting infection. Swelling occurs when the vessels become restricted and the lymphatic fluid is unable to freely flow through them.  If lymphedema is left untreated, the affected limb could progressively become more swollen, which could lead to hardening of the skin, bulkiness in the limb, infection and impaired wound healing.         There are things you can do to decrease the chance of developing lymphedema.                                          [email or web address].org/riskreduction                                                                                                                                                  The information presented is intended for general information and educational purposes. It is not intended to replace the  advice of your health care provider. Contact your health care provider if you believe you have a health  problem.                                          Scapular Retraction (Standing)    With arms at sides, squeeze shoulder blades together. Do not shrug shoulders and do not hold your breath. Hold 5 seconds. Repeat 10 times, 2-3 sets, 2 x day.       Exaggerated Breathing and Relaxation      Practice deep breathing frequently in the first few days following surgery even before you begin exercising. This exercise helps with tissue extensibility in the chest wall.  Inhale slowly and deeply through the nose and exhale through pursed lips. Concentrate on relaxing as you let the air out of your lungs. Repeat three (3) to four (4) times, remembering to breath in deeply and then relaxing. This exercise helps to ease the sensation of pulling and discomfort that may be experienced while exercising.                              ROM: Flexion - Wand (Supine)        Lie on back holding wand. Raise arms over head.   Repeat __10__ times per set. Do __1__ set per session. Do __1-2__ sessions per day.  Copyright © [employer]. All rights reserved.       https://[email or web address].[employer].us/928                  Wall Walk (Shoulder Abduction)  Using your affected arm, slide your hand up  a wall straight out to the side, as high as you  are comfortably able. You can use a towel or pillowcase under your hand to decrease friction.    Perform 10 reps on each arm, 1-2x/day.      Copyright © 5688-8946 [employer]

## 2023-01-25 NOTE — PLAN OF CARE
[facility]  Physical Therapy Initial Evaluation    Date: 1/25/2023   Name: Christal Posey  Clinic Number: [id number]    Therapy Diagnosis:   Encounter Diagnoses   Name Primary?    Malignant neoplasm of lower-inner quadrant of left breast in female, estrogen receptor positive     Status post bilateral mastectomy     Decreased ROM of left shoulder Yes    Decreased ROM of right shoulder     At risk for lymphedema     Impaired functional mobility and activity tolerance     Poor posture      Physician: Renetta Mcintyre, *    Physician Orders: PT Eval and Treat   Medical Diagnosis: Malignant neoplasm of lower-inner quadrant of left breast in female, estrogen receptor positive [C50.312, Z17.0], Status post bilateral mastectomy   Evaluation Date: 1/25/2023  Authorization Period Expiration: 1/4/24  Plan of Care Certification Period: 3/24/23  Visit # / Visits authorized: 1/ 1  Insurance: [employer]  FOTO: 1/3    Time In: 9:02 AM  Time Out: 10:02 AM  Total Billable Time: 60 minutes    Precautions: Standard and cancer      History   History of Present Illness: Christal is a 62 y.o. female that presents to  [facility] at the [facility] clinic s/p B breast surgery.  Pt with history of ER positive CO positive HER2 negative infiltrating ductal carcinoma of the left breast.  She had bilateral mastectomy with tissue expander (TE) reconstruction and left SLNB on 10/24/2022.  She had a wound infection and TE's were removed on 11/28/2022. No plan for additional surgery.  Diagnosis: Malignant neoplasm of lower-inner quadrant of left breast in female, estrogen receptor positive [C50.312, Z17.0], Status post bilateral mastectomy   Chief complaint: I don't have complete range of motion. I feel pulling when I reach. Lifting anything heavy is difficult. Tightness reported across chest- most noted with housework. Difficulty driving and lowering  cherise.  Surgical History:  Christal Posey  has a past surgical history that includes Tubal ligation; Ectopic pregnancy surgery (Right); tubal reversal (july 1991); removal of ovarian cyst (Right, oct 1992); Hysterectomy (1998); Breast biopsy (Left, 05/2016); Breast lumpectomy; Mastectomy (Bilateral, 10/24/2022); [place] lymph node biopsy (Left, 10/24/2022); Injection for sentinel node identification (Left, 10/24/2022); Insertion of breast implant (Bilateral, 10/24/2022); Removal of breast implant (Bilateral, 11/28/2022); and Breast Capsulectomy (Bilateral, 11/28/2022).    Chemotherapy: None  Radiation: None  Endocrine Therapy: pt started Tamoxifen in December 2022. She will be taking it for 5 years.     Past Medical History:   Diagnosis Date    Allergy     Breast cancer 05/2016    Left breast low grade DCIS, ER/NV positive    Colon polyp 10/31/2018    Ischemic colitis suspected as well ( area biopsied).    GERD (gastroesophageal reflux disease)     Hiatal hernia     Hyperlipidemia     Irritable bowel syndrome     Migraine headache     PONV (postoperative nausea and vomiting)     RA (rheumatoid arthritis)     Squamous cell carcinoma excised 11/12/14    in situ, R forearm          Medications:  Christal has a current medication list which includes the following prescription(s): albuterol, alprazolam, qulipta, bacitracin, baclofen, budesonide, butalbital-acetaminophen-caffeine -40 mg, cholestyramine, cholestyramine light, ciclopirox, clindamycin, diclofenac sodium, fluticasone propionate, gabapentin, hydrocodone-acetaminophen, hydrocortisone butyrate, lansoprazole, losartan, multivitamin, mupirocin, neilmed sinus rinse complete, ondansetron, ondansetron, prasterone (dhea), premarin, sumatriptan, tamoxifen, triamcinolone acetonide 0.025%, and vitamin d.    Allergies:  Review of patient's allergies indicates:   Allergen Reactions    Pravastatin Other (See Comments)     Severe muscle pain.  Muscle pain    Other  "reaction(s): Other (See Comments)  Severe muscle pain.    Shellfish containing products Anaphylaxis    Amitriptyline      Other reaction(s): dizzy    Codeine      Other reaction(s): Unknown    Doxycycline      Other reaction(s): Nausea    Erythromycin      Other reaction(s): Unknown    Iodine      Other reaction(s): Swelling  Other reaction(s): Unknown    Macrobid  [nitrofurantoin monohyd/m-cryst]      Other reaction(s): Unknown    Verapamil      Other reaction(s): Headache        Prior Therapy: none  Social History: pt lives with their spouse and daughter  Occupation: "Basically retired"   Prior Level of Function: completely independent. No restrictions or limitation  Current Level of Function: decreased ROM, left feels tighter than right, pt unable to lift heavy items overhead; see chief complaints  Exercise routine prior to onset: none  Hand dominance: right    Patient's Goals: improving ROM in her shoulders and loosen up some of the muscles. Get back to doing what I was doing before the surgery. She would like to be able to lift up her grand kids (they weight 35#)        Subjective   Pt states: denies pain. Mostly soreness and tightness. She's been taking Aleve  Pain: 0/10 on VAS currently.   Best: 0/10  Worst: 0/10   Pain location: bilateral shoulders and chest   Objective   Mental status :A+O  x 3, pleasant, appropriate    Postural examination/scapula alignment: Rounded shoulder and Head forward    Skin Integrity:   Scar Location: B chest wall  Appearance: no drainage, healed  Signs of infection: No  Drainage: N/A  Color:N/A    Edema: none noted  Location: N/A    Axillary Web Syndrome/Cording:   Location: N/A  Degree of Cording: N/A (mild, moderate etc...)   Number of cords present: 0    Sensation: numbness noted near scars at chest and underarms          Range of Motion:      Shoulder Range of Motion:   Active /Passive ROM Right Left   Flexion 155* 140*   Abduction 135* 135* " "  Extension TBA TBA   IR/90deg 90 90   ER/90deg 60* 70*          Strength: manual muscle test grades below   Upper Extremity Strength   (R) UE (L) UE   Shoulder flexion: 5/5 5/5   Shoulder Abduction: 5/5 5/5   Shoulder IR 5/5 5/5   Shoulder ER 5/5 5/5   Elbow flexion: 5/5 5/5   Elbow extension: 5/5 5/5   Wrist flexion: 5/5 5/5   Wrist extension: 5/5 5/5    good fair       Baseline Measurements of BL UE's for early detection of Lymphedema:     LANDMARK RIGHT UE LEFT UE DIFFERENCE   E + 8" 40 cm 39 cm 1 cm   E + 6" 38 cm 38 cm 0 cm   E + 4" 36 cm 37 cm 1 cm   E + 2" 32.5 cm 34 cm 1.5 cm   Elbow 28 cm 29 cm 1 cm   W+ 8" 28.5 cm 28.5 cm 0 cm   W +  6" 27 cm 27 cm 0 cm   W + 4" 22 cm 22 cm 0 cm   Wrist 16 cm 16 cm 0 cm   DPC 19 cm 19.5 cm 0.5 cm   IP Thumb 6.5 cm 6.5 cm 0 cm     Functional Mobility (Bed mobility, transfers)  Mod I to independent    ADL's:  Mod I to independent    Gait Assessment:   - AD used: none  - Assistance: independent  - Distance: community distances     Patient Education Provided   - role of therapy in multi - disciplinary team, goals for therapy  -postural awareness with towel roll support along length of spine  -Exercise technique, plan of care, scheduling      Pt has no cultural, educational or language barriers to learning provided.  Treatment and Instruction of Home Exercise Program      Total Time Separate from Evaluation: 23 minutes    Christal received individual therapeutic exercises to improve postural correction and alignment, stretching and soft tissue mobility, and strengthening for 15 minutes including the following:     Pt performs 5 reps of following exercises:  -butterflies  -LTR with hands behind head  -shoulder flexion with 1# wand  -scapular retractions  -shoulder ABD at wall, 5 on each arm  -Shoulder flexion at wall    Pt was educated on how to increase exercise (pt preference is stationary bike)- recommended how to build up to 30 min, 5 days/week    Christal received the " following 1:1 therapeutic activity  for 8 minutes    - Pt was educated in lymphedema etiology and management plans.    - Pt was provided with written risk reductions and precautions for managing lymphedema.     Written Home Exercises Provided: yes.  Exercises were reviewed and Christal was able to demonstrate them prior to the end of the session.  Christal demonstrated good  understanding of the education provided.     See EMR under Patient Instructions for exercises provided 1/25/2023.      Functional Limitations Reporting      CMS Impairment/Limitation/Restriction for FOTO Shoulder Survey    Therapist reviewed FOTO scores for Christal Posey on 1/25/2023.   FOTO documents entered into [employer] - see Media section.    Limitations Score: 37%  Category: Carrying           Assessment   This is a 62 y.o. female referred to outpatient physical therapy and presents with a medical diagnosis of left breast cancer and s/p B total mastectomy with L SLNB. Pt subsequently underwent removal of her tissue expanders was seen today post-operatively to establish PT plan of care for impairments following surgery including: poor posture, decreased ROM, decreased endurance, impaired functional mobility, and is at risk for lymphedema.    Pt instructed in HEP this session and was able to perform all exercises given independently. Pt instructed to follow up with therapist if any concerns arise with program established. Pt will continue to benefit from skilled physical therapy to address the impairments stated in chart below, provide patient/family education and to maximize pt's level of independence in home and community environment     Pt prognosis is Good.    Anticipated barriers to physical therapy: none anticipated     Pt's spiritual, cultural and educational needs considered and pt agreeable to plan of care and goals as stated below:     Medical necessity is demonstrated by the following IMPAIRMENTS/PROMBLEM LIST:    History  Co-morbidities  and personal factors that may impact the plan of care Co-morbidities:   high BMI and history of cancer    Personal Factors:   no deficits     moderate   Examination  Body Structures and Functions, activity limitations and participation restrictions that may impact the plan of care Body Regions:   upper extremities  trunk    Body Systems:    ROM  strength  edema  skin integrity  Posture, at risk for lymphedema    Participation Restrictions:   None anticipated    Activity limitations:   Learning and applying knowledge  no deficits    General Tasks and Commands  no deficits    Communication  no deficits    Mobility  lifting and carrying objects  driving (bike, car, motorcycle)    Self care  dressing    Domestic Life  shopping  cooking  doing house work (cleaning house, washing dishes, laundry)    Interactions/Relationships  no deficits    Life Areas  no deficits    Community and Social Life  community life  recreation and leisure         high   Clinical Presentation evolving clinical presentation with changing clinical characteristics moderate   Decision Making/ Complexity Score: moderate       Goals: Pt agrees with goals set    Short Term goals: 4 weeks  1. Patient will demonstrate 100% understanding of lymphedema risk reduction practices to include self monitoring for lymphedema. (progressing, not met)  2. Patient will demonstrate independence with Home Exercise program established. (progressing, not met)  3. Pt will increase AROM/PROM in shoulder abduction ROM to >/=150 degrees bilaterally to improve functional reach, carry, push, pull pain free. (progressing, not met)  4. Pt will increase AROM/PROM in shoulder flexion to >/=170 degrees bilaterally to improve functional reach, carry, push, pull pain free.(progressing, not met)      Long Term Goals: 8 weeks   1.  Pt will increase AROM/PROM in shoulder ER at 90 to 90 degrees bilaterally to improve functional reach, carry, push, pull pain free. (progressing, not  met)  2. Pt will increase strength to 4+/5 in gross UE musculature to improve tolerance to all functional activities pain free. (progressing, not met)  3. Pt will demonstrate full/maximized tissue mobility to increase ROM and promote healthy tissue to be pain free at discharge. (progressing, not met)  4. Pt will increase AROM/PROM in shoulder abduction ROM to >/=170 degrees bilaterally to improve functional reach, carry, push, pull pain free. (progressing, not met)  5. Patient will report compliance with stationary bike 5x week for 30 min each day to improve overall cardiovascular function and decrease cancer related fatigue at discharge. (progressing, not met)      Plan   Outpatient physical therapy 2x week for 8 weeks to include the following:   Manual Therapy, Neuromuscular Re-ed, Orthotic Management and Training, Patient Education, Self Care, Therapeutic Activities, and Therapeutic Exercise.    Plan of care Certification Period: 1/25/2023 to 3/24/23.    Therapist: Dayana Lara, PT  1/25/2023

## 2023-01-26 ENCOUNTER — OFFICE VISIT (OUTPATIENT)
Dept: HEMATOLOGY/ONCOLOGY | Facility: CLINIC | Age: 63
End: 2023-01-26
Payer: COMMERCIAL

## 2023-01-26 ENCOUNTER — LAB VISIT (OUTPATIENT)
Dept: LAB | Facility: HOSPITAL | Age: 63
End: 2023-01-26
Attending: INTERNAL MEDICINE
Payer: COMMERCIAL

## 2023-01-26 VITALS
HEIGHT: 65 IN | DIASTOLIC BLOOD PRESSURE: 79 MMHG | WEIGHT: 211.19 LBS | HEART RATE: 97 BPM | OXYGEN SATURATION: 100 % | BODY MASS INDEX: 35.18 KG/M2 | SYSTOLIC BLOOD PRESSURE: 159 MMHG | TEMPERATURE: 97 F | RESPIRATION RATE: 17 BRPM

## 2023-01-26 DIAGNOSIS — C50.312 MALIGNANT NEOPLASM OF LOWER-INNER QUADRANT OF LEFT BREAST IN FEMALE, ESTROGEN RECEPTOR POSITIVE: Primary | ICD-10-CM

## 2023-01-26 DIAGNOSIS — Z17.0 MALIGNANT NEOPLASM OF LOWER-INNER QUADRANT OF LEFT BREAST IN FEMALE, ESTROGEN RECEPTOR POSITIVE: ICD-10-CM

## 2023-01-26 DIAGNOSIS — Z17.0 MALIGNANT NEOPLASM OF LOWER-INNER QUADRANT OF LEFT BREAST IN FEMALE, ESTROGEN RECEPTOR POSITIVE: Primary | ICD-10-CM

## 2023-01-26 DIAGNOSIS — C50.312 MALIGNANT NEOPLASM OF LOWER-INNER QUADRANT OF LEFT BREAST IN FEMALE, ESTROGEN RECEPTOR POSITIVE: ICD-10-CM

## 2023-01-26 DIAGNOSIS — D05.12 DUCTAL CARCINOMA IN SITU (DCIS) OF LEFT BREAST: Chronic | ICD-10-CM

## 2023-01-26 LAB
ALBUMIN SERPL BCP-MCNC: 3.7 G/DL (ref 3.5–5.2)
ALBUMIN SERPL BCP-MCNC: 3.7 G/DL (ref 3.5–5.2)
ALP SERPL-CCNC: 83 U/L (ref 55–135)
ALP SERPL-CCNC: 83 U/L (ref 55–135)
ALT SERPL W/O P-5'-P-CCNC: 22 U/L (ref 10–44)
ALT SERPL W/O P-5'-P-CCNC: 22 U/L (ref 10–44)
ANION GAP SERPL CALC-SCNC: 8 MMOL/L (ref 8–16)
ANION GAP SERPL CALC-SCNC: 8 MMOL/L (ref 8–16)
AST SERPL-CCNC: 20 U/L (ref 10–40)
AST SERPL-CCNC: 20 U/L (ref 10–40)
BASOPHILS # BLD AUTO: 0.04 K/UL (ref 0–0.2)
BASOPHILS # BLD AUTO: 0.04 K/UL (ref 0–0.2)
BASOPHILS NFR BLD: 0.4 % (ref 0–1.9)
BASOPHILS NFR BLD: 0.4 % (ref 0–1.9)
BILIRUB SERPL-MCNC: 0.3 MG/DL (ref 0.1–1)
BILIRUB SERPL-MCNC: 0.3 MG/DL (ref 0.1–1)
BUN SERPL-MCNC: 14 MG/DL (ref 6–20)
BUN SERPL-MCNC: 14 MG/DL (ref 6–20)
CALCIUM SERPL-MCNC: 9.3 MG/DL (ref 8.7–10.5)
CALCIUM SERPL-MCNC: 9.3 MG/DL (ref 8.7–10.5)
CHLORIDE SERPL-SCNC: 106 MMOL/L (ref 95–110)
CHLORIDE SERPL-SCNC: 106 MMOL/L (ref 95–110)
CO2 SERPL-SCNC: 28 MMOL/L (ref 23–29)
CO2 SERPL-SCNC: 28 MMOL/L (ref 23–29)
CREAT SERPL-MCNC: 1.1 MG/DL (ref 0.5–1.4)
CREAT SERPL-MCNC: 1.1 MG/DL (ref 0.5–1.4)
DIFFERENTIAL METHOD: ABNORMAL
DIFFERENTIAL METHOD: ABNORMAL
EOSINOPHIL # BLD AUTO: 0.2 K/UL (ref 0–0.5)
EOSINOPHIL # BLD AUTO: 0.2 K/UL (ref 0–0.5)
EOSINOPHIL NFR BLD: 2.3 % (ref 0–8)
EOSINOPHIL NFR BLD: 2.3 % (ref 0–8)
ERYTHROCYTE [DISTWIDTH] IN BLOOD BY AUTOMATED COUNT: 13.2 % (ref 11.5–14.5)
ERYTHROCYTE [DISTWIDTH] IN BLOOD BY AUTOMATED COUNT: 13.2 % (ref 11.5–14.5)
EST. GFR  (NO RACE VARIABLE): 57.5 ML/MIN/1.73 M^2
EST. GFR  (NO RACE VARIABLE): 57.5 ML/MIN/1.73 M^2
GLUCOSE SERPL-MCNC: 123 MG/DL (ref 70–110)
GLUCOSE SERPL-MCNC: 123 MG/DL (ref 70–110)
HCT VFR BLD AUTO: 39.4 % (ref 37–48.5)
HCT VFR BLD AUTO: 39.4 % (ref 37–48.5)
HEMOCCULT STL QL IA: NEGATIVE
HGB BLD-MCNC: 11.9 G/DL (ref 12–16)
HGB BLD-MCNC: 11.9 G/DL (ref 12–16)
IMM GRANULOCYTES # BLD AUTO: 0.01 K/UL (ref 0–0.04)
IMM GRANULOCYTES # BLD AUTO: 0.01 K/UL (ref 0–0.04)
IMM GRANULOCYTES NFR BLD AUTO: 0.1 % (ref 0–0.5)
IMM GRANULOCYTES NFR BLD AUTO: 0.1 % (ref 0–0.5)
LYMPHOCYTES # BLD AUTO: 2.5 K/UL (ref 1–4.8)
LYMPHOCYTES # BLD AUTO: 2.5 K/UL (ref 1–4.8)
LYMPHOCYTES NFR BLD: 28.4 % (ref 18–48)
LYMPHOCYTES NFR BLD: 28.4 % (ref 18–48)
MCH RBC QN AUTO: 26.6 PG (ref 27–31)
MCH RBC QN AUTO: 26.6 PG (ref 27–31)
MCHC RBC AUTO-ENTMCNC: 30.2 G/DL (ref 32–36)
MCHC RBC AUTO-ENTMCNC: 30.2 G/DL (ref 32–36)
MCV RBC AUTO: 88 FL (ref 82–98)
MCV RBC AUTO: 88 FL (ref 82–98)
MONOCYTES # BLD AUTO: 0.3 K/UL (ref 0.3–1)
MONOCYTES # BLD AUTO: 0.3 K/UL (ref 0.3–1)
MONOCYTES NFR BLD: 3.6 % (ref 4–15)
MONOCYTES NFR BLD: 3.6 % (ref 4–15)
NEUTROPHILS # BLD AUTO: 5.8 K/UL (ref 1.8–7.7)
NEUTROPHILS # BLD AUTO: 5.8 K/UL (ref 1.8–7.7)
NEUTROPHILS NFR BLD: 65.2 % (ref 38–73)
NEUTROPHILS NFR BLD: 65.2 % (ref 38–73)
NRBC BLD-RTO: 0 /100 WBC
NRBC BLD-RTO: 0 /100 WBC
PLATELET # BLD AUTO: 330 K/UL (ref 150–450)
PLATELET # BLD AUTO: 330 K/UL (ref 150–450)
PMV BLD AUTO: 11.2 FL (ref 9.2–12.9)
PMV BLD AUTO: 11.2 FL (ref 9.2–12.9)
POTASSIUM SERPL-SCNC: 3.9 MMOL/L (ref 3.5–5.1)
POTASSIUM SERPL-SCNC: 3.9 MMOL/L (ref 3.5–5.1)
PROT SERPL-MCNC: 7.6 G/DL (ref 6–8.4)
PROT SERPL-MCNC: 7.6 G/DL (ref 6–8.4)
RBC # BLD AUTO: 4.47 M/UL (ref 4–5.4)
RBC # BLD AUTO: 4.47 M/UL (ref 4–5.4)
SODIUM SERPL-SCNC: 142 MMOL/L (ref 136–145)
SODIUM SERPL-SCNC: 142 MMOL/L (ref 136–145)
WBC # BLD AUTO: 8.94 K/UL (ref 3.9–12.7)
WBC # BLD AUTO: 8.94 K/UL (ref 3.9–12.7)

## 2023-01-26 PROCEDURE — 36415 COLL VENOUS BLD VENIPUNCTURE: CPT | Performed by: INTERNAL MEDICINE

## 2023-01-26 PROCEDURE — 3077F PR MOST RECENT SYSTOLIC BLOOD PRESSURE >= 140 MM HG: ICD-10-PCS | Mod: CPTII,S$GLB,, | Performed by: INTERNAL MEDICINE

## 2023-01-26 PROCEDURE — 99999 PR PBB SHADOW E&M-EST. PATIENT-LVL III: CPT | Mod: PBBFAC,,, | Performed by: INTERNAL MEDICINE

## 2023-01-26 PROCEDURE — 3008F BODY MASS INDEX DOCD: CPT | Mod: CPTII,S$GLB,, | Performed by: INTERNAL MEDICINE

## 2023-01-26 PROCEDURE — 1159F PR MEDICATION LIST DOCUMENTED IN MEDICAL RECORD: ICD-10-PCS | Mod: CPTII,S$GLB,, | Performed by: INTERNAL MEDICINE

## 2023-01-26 PROCEDURE — 99214 OFFICE O/P EST MOD 30 MIN: CPT | Mod: S$GLB,,, | Performed by: INTERNAL MEDICINE

## 2023-01-26 PROCEDURE — 85025 COMPLETE CBC W/AUTO DIFF WBC: CPT | Performed by: INTERNAL MEDICINE

## 2023-01-26 PROCEDURE — 3077F SYST BP >= 140 MM HG: CPT | Mod: CPTII,S$GLB,, | Performed by: INTERNAL MEDICINE

## 2023-01-26 PROCEDURE — 3078F DIAST BP <80 MM HG: CPT | Mod: CPTII,S$GLB,, | Performed by: INTERNAL MEDICINE

## 2023-01-26 PROCEDURE — 1160F RVW MEDS BY RX/DR IN RCRD: CPT | Mod: CPTII,S$GLB,, | Performed by: INTERNAL MEDICINE

## 2023-01-26 PROCEDURE — 99999 PR PBB SHADOW E&M-EST. PATIENT-LVL III: ICD-10-PCS | Mod: PBBFAC,,, | Performed by: INTERNAL MEDICINE

## 2023-01-26 PROCEDURE — 3078F PR MOST RECENT DIASTOLIC BLOOD PRESSURE < 80 MM HG: ICD-10-PCS | Mod: CPTII,S$GLB,, | Performed by: INTERNAL MEDICINE

## 2023-01-26 PROCEDURE — 3008F PR BODY MASS INDEX (BMI) DOCUMENTED: ICD-10-PCS | Mod: CPTII,S$GLB,, | Performed by: INTERNAL MEDICINE

## 2023-01-26 PROCEDURE — 1160F PR REVIEW ALL MEDS BY PRESCRIBER/CLIN PHARMACIST DOCUMENTED: ICD-10-PCS | Mod: CPTII,S$GLB,, | Performed by: INTERNAL MEDICINE

## 2023-01-26 PROCEDURE — 80053 COMPREHEN METABOLIC PANEL: CPT | Performed by: INTERNAL MEDICINE

## 2023-01-26 PROCEDURE — 1159F MED LIST DOCD IN RCRD: CPT | Mod: CPTII,S$GLB,, | Performed by: INTERNAL MEDICINE

## 2023-01-26 PROCEDURE — 99214 PR OFFICE/OUTPT VISIT, EST, LEVL IV, 30-39 MIN: ICD-10-PCS | Mod: S$GLB,,, | Performed by: INTERNAL MEDICINE

## 2023-01-26 NOTE — PROGRESS NOTES
Subjective:       Patient ID: Christal Posey is a 62 y.o. female.    Chief Complaint: Ductal carcinoma in situ (DCIS) of left breast and Breast Cancer    HPI    Returns for follow up on Tamoxifen  Hot flashes with Tamoxifen with Tamoxifen- starts acupuncture next week    In the interval required implants out due to infection and contracture  - 11/28/2022   Diagnosis:  Preoperative diagnosis cellulitis and capsular contracture of the right breast  Procedure performed  1. Bilateral removal of implants (tissue expanders)   2. Total bilateral capsulectomy  3. Wide excision soft tissue of right axillary region measuring 16 cm x 6 cm with layered closure final incision 16 cm  4. Wide excision soft tissue of the left axillary region measuring 16 cm x 6 cm with layered closure final incision 16 cm  Reports concerned as asked for nipples to be removed as she did not plan for additional reconstruction and this was not done    Fatigue notes as recovers  Is doing PT for ROM currently    No significant pain other than at recent infection/surgical site and improved    Oncology History:  - 8/12/2022 Breast MRI:  Findings:  Left  Numerous scattered foci and small masses are present, most of which appear similar to eachother.    There is an 8 mm x 7 mm x 7 mm irregularly shaped, heterogeneous mass seen in the left breast at 6 o'clock, 2.4 cm from the nipple and 8 cm from the chest wall. Delayed phase is washout.   There is a 9 mm x 7 mm x 7 mm irregularly shaped, homogeneous mass with irregular margins seen in the upper inner quadrant of the right breast, 2.8 cm from the nipple and 6.7 cm from the chest wall. Delayed phase is plateau. This is best seen on Series 98143: Image 50.  This is approximately 3 cm anterior and superior to the signal void related to the 9 o'clock biopsy marker.   There is no suspicious enhancement associated with either biopsy marker.   Right  Numerous scattered foci and small masses are present, most of  which appear similar to eachother.    There is a 6 mm oval, homogeneous mass with circumscribed margins seen in the lower central region of the right breast, 2.3 cm from the nipple and 9.5 cm from the chest wall. Delayed phase is washout. This is best seen in Series 32185: Image 70.   There is no internal mammary or axillary adenopathy.  Impression:  Left  Mass: Left breast 9 mm x 7 mm x 7 mm mass at the upper inner position. Assessment: 4 - Suspicious finding. Biopsy is recommended.   Mass: Left breast 8 mm x 7 mm x 7 mm mass at the 6 o'clock position. Assessment: 4 - Suspicious finding. Biopsy is recommended.   There is no suspicious enhancement associated with either biopsy marker.   Right  Mass: Right breast  6 mm mass at the lower central position. Assessment: 4 - Suspicious finding. Biopsy is recommended.   BI-RADS Category:   Overall: 4 - Suspicious     - 9/28/2022 MRI guided breast biopsy:  BREAST, RIGHT, LOWER CENTRAL MASS, BIOPSY:   - Intraductal papilloma, benign.   - Benign breast tissue with predominantly fatty stroma and blood clot.   - Microcalcifications: Focally seen in association with benign breast ducts.   - No atypia or malignancy.   - Additional deeper sections have been examined.      - 10/24/2022 Bilateral Mastectomy:  1. BREAST, RIGHT, MASTECTOMY:   - Negative for malignancy.   - Atypical ductal hyperplasia (ADH), focal.   - No residual intraductal papilloma present.   - Previous biopsy site changes and biopsy clip.   - Benign subareolar nipple ducts.   - One benign intramammary lymph node.   2. AXILLARY SENTINEL LYMPH NODE, LEFT, # 1, HOT BLUE 1451, EXCISION:   - One benign axillary sentinel lymph node, negative for metastatic carcinoma   (0/1).   - Immunohistochemical stains for CK WSK, Cam 5.2, and CK AE/1/AE3 are   negative, supporting the diagnosis.   3. BREAST, LEFT, NIPPLE SPARING MASTECTOMY:   - Invasive ductal carcinoma of breast with extensive intraductal component,   see Tumor  Synoptic.   - Size of invasive carcinoma: 9 MM (measured histologically on slide 3L).   - [place] Histologic Score: Grade 1 of 3.                     Tubule Formation:  2                     Nuclear Pleomorphism:  2                     Mitotic Activity:  1   - Associated (DCIS), intermediate nuclear grade, cribriform type, with   central necrosis.   - Size of DCIS: at least 10 MM.   - No lymphovascular invasion.   - Margins:   ·tab Invasive carcinoma and DCIS are greater than 10 MM from all margins.   - Benign subareolar nipple ducts.   - Both biopsy clips are identified and corresponding areas sampled.   - Microcalcifications: Seen in association with benign breast ducts.   - Pathologic staging: pT1b (sn)N0   4. BREAST, LEFT, ADDITIONAL INFERIOR LATERAL MARGIN, NIPPLE SPARING   MASTECTOMY:   - Negative for atypia or malignancy.   - Benign breast tissue with predominantly fatty stroma.   5. BREAST, LEFT, NEW SUPERIOR MARGIN, NIPPLE SPARING MASTECTOMY:   - Negative for atypia or malignancy.   - Benign breast tissue with predominantly fatty stroma.   TUMOR SYNOPTIC: (INVASIVE CARCINOMA OF THE BREAST: Resection)   Standard(s) : AJCC-UICC 8   SPECIMEN   Procedure   Total mastectomy (including nipple-sparing and skin-sparing mastectomy)   Specimen Laterality   Left   TUMOR   +Tumor Site   Upper inner quadrant   Histologic Type   Invasive carcinoma of no special type (ductal)   Histologic Grade ([facility] Histologic Score)   [place] Score   Glandular (Acinar) / Tubular Differentiation     Score 2 (10 to 75% of tumor area forming glandular / tubular structures)   Nuclear Pleomorphism     Score 2 (Cells larger than normal with open vesicular nuclei, visible   nucleoli, and moderate     variability in both size and shape)   Mitotic Rate     Score 1   Overall Grade     Grade 1 (scores of 3, 4 or 5)   Tumor Size   Greatest dimension of largest invasive focus greater than 1 mm (specify exact   measurement in Millimeters  "(mm)): 9 mm   +Tumor Focality   Single focus of invasive carcinoma   Ductal Carcinoma In Situ (DCIS)   Present   Positive for extensive intraductal component (EIC)   +Size (Extent) of DCIS     Estimated size (extent) of DCIS is at least in Millimeters (mm): at least   10 mm (multiple foci)   +Architectural Patterns     Cribriform   +Nuclear Grade     Grade II (intermediate)   +Necrosis     Present, central (expansive "comedo" necrosis)   +Lobular Carcinoma In Situ (LCIS)   Not identified   Tumor Extent  Tumor Extent (required only if skin, nipple, or skeletal muscle   are present and involved)     Not applicable (skin, nipple, and skeletal muscle are absent OR are   uninvolved)   Lymphovascular Invasion   Not identified   +Dermal Lymphovascular Invasion   No skin present   +Microcalcifications   Present in non-neoplastic tissue   Treatment Effect in the Breast   No known presurgical therapy   MARGINS   Margin Status for Invasive Carcinoma   All margins negative for invasive carcinoma   Distance from Invasive   Carcinoma to Closest Margin     Specify in Millimeters (mm)     Greater than: 10 mm   +Closest Margin(s) to Invasive Carcinoma  (select all that apply)     Cannot be determined (explain): invasive carcinoma is in a random section   of the breast which did not get gross measurements to the nearest margin   Margin Status for DCIS   All margins negative for DCIS   Distance from DCIS to Closest Margin     Specify in Millimeters (mm)     Greater than: 10 mm   Closest Margin(s) to DCIS     Other (specify): nipple margin is closest gross margin   REGIONAL LYMPH NODES   Regional Lymph Node Status   Regional lymph nodes present   All regional lymph nodes negative for tumor   Total Number of Lymph Nodes Examined (sentinel and non-sentinel)     Exact number (specify): 1   Number of [place] Nodes Examined     Exact number (specify): 1   PATHOLOGIC STAGE CLASSIFICATION (pTNM, AJCC 8th Edition)   pT Category   pT1b: Tumor " greater than 5 mm but less than or equal to 10 mm in greatest   dimension   Regional Lymph Nodes Modifier   (sn): [place] node(s) evaluated. If 6 or more nodes (sentinel or   nonsentinel) are removed, this modifier should not be used.   pN Category   pN0: No regional lymph node metastasis identified or ITCs only   SPECIAL STUDIES   BREAST BIOMARKERS (performed on the current specimen):   ER:  Positive (95% of tumor cells demonstrate moderate nuclear   immunoreactivity).   MO:  Negative.   PWQ2IHU:  Equivocal (2+, HER2 FISH is pending).   Ki-67 proliferative index:  15%.      Prior history of DCIS  Monitored by surveillance- opted out of adjuvant endocrine with low volume disease  DCIS History:  5/16/16 Screening mammography:  Calcifications in the left breast require additional evaluation.    ACR BI-RADS Category 0: Incomplete: Need Additional Imaging Evaluation  - 5/17/16 Diagnostic mammogram  Calcifications in the left breast are suspicious.  Histology using core biopsy  is recommended.  ACR BI-RADS Category 4: Suspicious Abnormality  - 5/20/16 biopsy  1, 2. LEFT BREAST, WITH CALCIFICATIONS AND WITHOUT CALCIFICATIONS, UPPER INNER QUADRANT  (NEEDLE BIOPSY):  -Low-grade ductal carcinoma in situ (DCIS)  -Nuclear grade 1 out of 3  -Cribriform pattern  -Associated with microcalcifications  %, % positive  - 6/10/2016 Lumpectomy  Pathology:  Procedure - Excision with wire guided localization  Lymph node sampling - Not sampled  Specimen laterality - Left  Tumor site - Not specified  Size of DCIS - 2mm, including findings from the biopsy report  Histologic type - Ductal carcinoma in situ  Architectural pattern - Solid and cribriforming  Nuclear grade - Low  Necrosis - Absent  Margins - Negative, inferior is 2mm  Pathologc staging - p Tis Nx Mx  Hormone receptors (See Breast Biopsy report; FB61 - [id number])  Estrogen receptor - Positive, strong, 100%  Progesterone receptors - Positive, strong, 100%     SUPPLEMENTAL  #1:   HER2 AMPLIFICATION ASSOCIATED WITH BREAST CANCER, FISH, TISSUE:   Result Summary   Negative      Additional PMH:  Bilateral cataract surgery     FH:  No new cancers    Review of Systems   Constitutional:  Negative for activity change, appetite change, chills, diaphoresis, fatigue, fever and unexpected weight change.   HENT:  Negative for nasal congestion, dental problem, ear pain, hearing loss, mouth sores, nosebleeds, rhinorrhea, tinnitus and trouble swallowing.    Eyes:  Negative for visual disturbance.   Respiratory:  Negative for cough, chest tightness, shortness of breath and wheezing.    Cardiovascular:  Negative for chest pain, palpitations and leg swelling.   Gastrointestinal:  Negative for abdominal distention, abdominal pain, blood in stool, constipation, diarrhea, nausea and vomiting.   Genitourinary:  Negative for decreased urine volume, difficulty urinating, dysuria, frequency and hematuria.   Musculoskeletal:  Negative for arthralgias, back pain, gait problem, joint swelling and neck pain.   Integumentary:  Negative for pallor and rash.   Neurological:  Negative for dizziness, weakness, light-headedness, numbness and headaches.   Hematological:  Negative for adenopathy. Does not bruise/bleed easily.   Psychiatric/Behavioral:  Negative for confusion, dysphoric mood and sleep disturbance.        Objective:      Physical Exam  Vitals and nursing note reviewed.   Constitutional:       General: She is not in acute distress.     Appearance: Normal appearance. She is well-developed.      Comments: Presents alone  Very pleasant   HENT:      Head: Normocephalic and atraumatic.   Eyes:      General: No scleral icterus.     Extraocular Movements: Extraocular movements intact.      Conjunctiva/sclera: Conjunctivae normal.      Pupils: Pupils are equal, round, and reactive to light.      Right eye: Pupil is round and reactive.      Left eye: Pupil is round and reactive.   Neck:      Thyroid: No thyromegaly.       Vascular: No JVD.      Trachea: No tracheal deviation.   Cardiovascular:      Rate and Rhythm: Normal rate and regular rhythm.      Heart sounds: Normal heart sounds. No murmur heard.    No friction rub. No gallop.   Pulmonary:      Effort: Pulmonary effort is normal. No respiratory distress.      Breath sounds: Normal breath sounds. No wheezing or rales.      Comments: Bilateral mastectomies  Healing well  Abdominal:      General: Bowel sounds are normal. There is no distension.      Palpations: Abdomen is soft. There is no mass.      Tenderness: There is no abdominal tenderness. There is no guarding or rebound.      Comments: No organomegaly   Musculoskeletal:         General: No swelling, tenderness or deformity. Normal range of motion.      Cervical back: Normal range of motion and neck supple. No rigidity or tenderness.      Right lower leg: No edema.      Left lower leg: No edema.   Lymphadenopathy:      Head:      Right side of head: No submandibular adenopathy.      Left side of head: No submandibular adenopathy.      Cervical: No cervical adenopathy.      Right cervical: No superficial, deep or posterior cervical adenopathy.     Left cervical: No superficial, deep or posterior cervical adenopathy.      Upper Body:      Right upper body: No supraclavicular adenopathy.      Left upper body: No supraclavicular adenopathy.   Skin:     General: Skin is warm and dry.      Coloration: Skin is not jaundiced or pale.      Findings: No bruising, erythema, lesion, petechiae or rash.   Neurological:      General: No focal deficit present.      Mental Status: She is alert and oriented to person, place, and time. Mental status is at baseline.      Sensory: No sensory deficit.      Motor: No weakness.      Coordination: Coordination normal.      Gait: Gait normal.      Deep Tendon Reflexes: Reflexes are normal and symmetric.   Psychiatric:         Mood and Affect: Mood normal. Mood is not anxious or depressed.          Behavior: Behavior normal.         Thought Content: Thought content normal.         Judgment: Judgment normal.       Labs- reviewed    Assessment:       Problem List Items Addressed This Visit       Ductal carcinoma in situ (DCIS) of left breast (Chronic)    Malignant neoplasm of lower-inner quadrant of left breast in female, estrogen receptor positive - Primary       Plan:     Continue Tamoxifen  No breast imaging needed as bilateral mastectomy  RTC 3 months  Knows to call for any issues    30 minutes total on encounter    Route Chart for Scheduling    Med Onc Chart Routing      Follow up with physician 6 months. cbc, cmp prior   Follow up with [facility] 3 months. cbc, cmp prior, bmd prior   Infusion scheduling note    Injection scheduling note    Labs    Imaging    Pharmacy appointment    Other referrals                        0.7

## 2023-01-31 ENCOUNTER — CLINICAL SUPPORT (OUTPATIENT)
Dept: REHABILITATION | Facility: HOSPITAL | Age: 63
End: 2023-01-31
Payer: COMMERCIAL

## 2023-01-31 DIAGNOSIS — M25.611 DECREASED ROM OF RIGHT SHOULDER: ICD-10-CM

## 2023-01-31 DIAGNOSIS — Z91.89 AT RISK FOR LYMPHEDEMA: ICD-10-CM

## 2023-01-31 DIAGNOSIS — Z74.09 IMPAIRED FUNCTIONAL MOBILITY AND ACTIVITY TOLERANCE: ICD-10-CM

## 2023-01-31 DIAGNOSIS — R29.3 POOR POSTURE: ICD-10-CM

## 2023-01-31 DIAGNOSIS — M25.612 DECREASED ROM OF LEFT SHOULDER: Primary | ICD-10-CM

## 2023-01-31 PROCEDURE — 97110 THERAPEUTIC EXERCISES: CPT

## 2023-01-31 PROCEDURE — 97140 MANUAL THERAPY 1/> REGIONS: CPT

## 2023-01-31 NOTE — PROGRESS NOTES
Physical Therapy Daily Treatment Note     Date: 1/31/2023   Name: Chritsal Posey  Clinic Number: [id number]    Therapy Diagnosis:   Encounter Diagnoses   Name Primary?    Decreased ROM of left shoulder Yes    Decreased ROM of right shoulder     At risk for lymphedema     Impaired functional mobility and activity tolerance     Poor posture      Physician: Renetta Mcintyre, *    Physician Orders: PT Eval and Treat  Medical Diagnosis: Malignant neoplasm of lower-inner quadrant of left breast in female, estrogen receptor positive [C50.312, Z17.0], Status post bilateral mastectomy   Evaluation Date: 1/31/2023  Authorization Period Expiration: 01/04/2024  Plan of Care Certification Period: 03/24/2023  Visit # / Visits Authorized: 1 / 20 (plus eval)  Insurance: [employer]  FOTO: 1/3    Time In: 8:57 AM  Time Out: 10:00 AM  Total Billable Time: 63 minutes    Precautions: Standard and cancer      Subjective     Patient reports: doing well overall but experiencing tightness through right breast / axilla area, most noticeable when reaching overhead and upon sitting from supine position. Patient also having difficulty sleeping secondary to discomfort in (preferred) right side-lying position and (unfamiliar) supine position. Patient to have bone density scan tomorrow (02/01/2023) and scheduled to begin acupuncture this Friday (03/03/2023).    She was compliant with home exercise program.  Response to Previous Treatment: slight muscle soreness, relieved with rest.     Pain Scale: Christal rates pain on a scale of 1/10 on VAS.   Pain Location: right breast    Fatigue: 3/10  Functional Change: no noticeable change since initial eval    Treatment:   Chemotherapy: none  Radiation: none  Endocrine Therapy: began Tamoxifen 12/2022, planning 5 years     Surgery Date:   - 10/24/2022: connor mastectomy with SLNB and TE reconstruction  - 11/28/2022: tissue expansion removal  (secondary to infection)      Objective     Objective Measures updated at progress report unless specified.     Shoulder Range of Motion: assessed 01/25/2023, 01/31/2023  Active ROM Right Left   Flexion 155* 140*   Abduction 135* 135*   Extension 50 60   IR/90deg 90 90   ER/90deg 60* 70*       Treatment     Christal received individual therapeutic exercises to improve postural correction and alignment, stretching and soft tissue mobility, and strengthening for 55 minutes including the following:     Seated Exercises:  - Overhead pulleys (flex, abd)   2 min  - Scapular retractions    1 set x 10 reps  - Bicep curls, 2#    2 set x 10 reps    Mat Exercises:  - LTR with hands behind head  2 min  - Pec stretch on towel roll   2 min  - PROM into R shoulder flex, abd, ER 1 set x 10 reps each    Standing Exercises:  - Wall slides (flexion, abduction)  1 set x 10 reps each  - Rows (red band)    2 set x 10 reps  - Shoulder extensions (red band)  2 set x 10 reps  - Triceps extensions (red band)  2 set x 10 reps      Christal received the following manual therapy techniques were performed to increased myofascial/soft tissue length, mobility and pliability, increase PROM, AROM and function as well as to decrease pain for 8 minutes:    - Gentle soft tissue mobilization to R pec minor    Home Exercise Program and Patient Education     Education Provided Regarding:  - Role of physical therapy in multi-disciplinary team  - Physical therapy goals, progress towards goals  - Exercise technique, plan of care, scheduling    Written Home Exercises Provided: Patient instructed to cont prior HEP. Exercises were reviewed and Christal was able to demonstrate them prior to the end of the session. Christal demonstrated good  understanding of the education provided.     See EMR under Patient Instructions for exercises provided prior visit.    Patient has no cultural, educational or language barriers to learning provided.    Assessment     Patient is  responding well to physical therapy treatment. Patient able to perform new exercises and exercise progressions without increase in symptoms prior to leaving the clinic. Verbal cues, demonstration to avoid upper trapezius compensation with resistance band exercises (good carryover). Improved soft tissue mobility with soft tissue mobilization to R pec minor. Firm end feel appreciated with passive range of motion techniques. Will continue to progress as tolerated.     Patient prognosis is Good. Patient will continue to benefit from skilled outpatient physical therapy to address the deficits listed in the problem list chart on initial evaluation, provide patient / family education and to maximize patient's level of independence in the home and community environment.     Anticipated barriers to physical therapy: none anticipated    Goals as Follows:  Short Term Goals: 4 Weeks   Patient will demonstrate 100% understanding of lymphedema risk reduction practices to include self monitoring for lymphedema (progressing, not met).  Patient will demonstrate independence with Home Exercise program established (progressing, not met).  Patient will increase AROM/PROM in shoulder abduction ROM to >/=150 degrees bilaterally to improve functional reach, carry, push, pull pain free (progressing, not met).  Patient will increase AROM/PROM in shoulder flexion to >/=170 degrees bilaterally to improve functional reach, carry, push, pull pain free (progressing, not met).    Long Term Goals: 8 Weeks   Patient will increase AROM/PROM in shoulder ER at 90 to 90 degrees bilaterally to improve functional reach, carry, push, pull pain free (progressing, not met).  Patient will increase strength to 4+/5 in gross UE musculature to improve tolerance to all functional activities pain free (progressing, not met).  Patient will demonstrate full/maximized tissue mobility to increase ROM and promote healthy tissue to be pain free at discharge  (progressing, not met).  Patient will increase AROM/PROM in shoulder abduction ROM to >/=170 degrees bilaterally to improve functional reach, carry, push, pull pain free (progressing, not met).  Patient will report compliance with stationary bike 5x week for 30 min each day to improve overall cardiovascular function and decrease cancer related fatigue at discharge (progressing, not met).     Plan     Outpatient physical therapy 2x week for 8 weeks to include the following: Manual Therapy, Neuromuscular Re-ed, Orthotic Management and Training, Patient Education, Self Care, Therapeutic Activities, and Therapeutic Exercise.    Therapist: Oxana Radford, PT  1/31/2023

## 2023-02-01 ENCOUNTER — HOSPITAL ENCOUNTER (OUTPATIENT)
Dept: RADIOLOGY | Facility: CLINIC | Age: 63
Discharge: HOME | End: 2023-02-01
Attending: INTERNAL MEDICINE
Payer: COMMERCIAL

## 2023-02-01 DIAGNOSIS — C50.312 MALIGNANT NEOPLASM OF LOWER-INNER QUADRANT OF LEFT BREAST IN FEMALE, ESTROGEN RECEPTOR POSITIVE: ICD-10-CM

## 2023-02-01 DIAGNOSIS — Z17.0 MALIGNANT NEOPLASM OF LOWER-INNER QUADRANT OF LEFT BREAST IN FEMALE, ESTROGEN RECEPTOR POSITIVE: ICD-10-CM

## 2023-02-01 DIAGNOSIS — D05.12 DUCTAL CARCINOMA IN SITU (DCIS) OF LEFT BREAST: Chronic | ICD-10-CM

## 2023-02-01 PROCEDURE — 77080 DEXA BONE DENSITY SPINE HIP: ICD-10-PCS | Mod: 26,,, | Performed by: INTERNAL MEDICINE

## 2023-02-01 PROCEDURE — 77080 DXA BONE DENSITY AXIAL: CPT | Mod: 26,,, | Performed by: INTERNAL MEDICINE

## 2023-02-01 PROCEDURE — 77080 DXA BONE DENSITY AXIAL: CPT | Mod: TC

## 2023-02-03 ENCOUNTER — CLINICAL SUPPORT (OUTPATIENT)
Dept: HEMATOLOGY/ONCOLOGY | Facility: CLINIC | Age: 63
End: 2023-02-03
Payer: COMMERCIAL

## 2023-02-03 DIAGNOSIS — M54.50 CHRONIC LOW BACK PAIN: ICD-10-CM

## 2023-02-03 DIAGNOSIS — G89.29 CHRONIC LOW BACK PAIN: ICD-10-CM

## 2023-02-03 DIAGNOSIS — M25.50 JOINT PAIN: ICD-10-CM

## 2023-02-03 DIAGNOSIS — Z79.810 SERM USE (SELECTIVE ESTROGEN RECEPTOR MODULATOR): ICD-10-CM

## 2023-02-03 PROCEDURE — 97813 ACUP 1/> W/ESTIM 1ST 15 MIN: CPT | Mod: S$GLB,,, | Performed by: ACUPUNCTURIST

## 2023-02-03 PROCEDURE — 99999 PR PBB SHADOW E&M-EST. PATIENT-LVL I: ICD-10-PCS | Mod: PBBFAC,,, | Performed by: ACUPUNCTURIST

## 2023-02-03 PROCEDURE — 97813 PR ACUPUNCT W/ ELEC STIMUL 15 MIN: ICD-10-PCS | Mod: S$GLB,,, | Performed by: ACUPUNCTURIST

## 2023-02-03 PROCEDURE — 97814 PR ACUPUNCT W/ ELEC STIMUL ADDL 15M: ICD-10-PCS | Mod: S$GLB,,, | Performed by: ACUPUNCTURIST

## 2023-02-03 PROCEDURE — 97814 ACUP 1/> W/ESTIM EA ADDL 15: CPT | Mod: S$GLB,,, | Performed by: ACUPUNCTURIST

## 2023-02-03 PROCEDURE — 99999 PR PBB SHADOW E&M-EST. PATIENT-LVL I: CPT | Mod: PBBFAC,,, | Performed by: ACUPUNCTURIST

## 2023-02-03 NOTE — PROGRESS NOTES
Subjective:       Patient ID: Christal Posey is a 63 y.o. female.    Chief Complaint: Results (Hot flashes) and Pain (joint)    New patient establishing care for side effects due to AI use. Hot flashes are present - patient states she gets about 3 a day. Denies night sweats. Patient states she isn't getting any sweating. During the day she states she is getting sensations of heat. Patient states they come on suddenly and last about 10 minutes. Patient denies any triggers. States she hasn't tried anything to help besides turning down air / cold beverages. Joint pain knees and thumbs. Patient states these symtpoms are an ache. Patient states that topical aspercreame/ lidacane helps with the pain. Worse when she first gets up and feels stiff. Aggravated with long periods of standing and walking. Patient does not use heat or ice. Takes aleve to help with symptoms about once a day.     Review of Systems   Genitourinary:  Positive for hot flashes.   Musculoskeletal:  Positive for arthralgias.       Objective:      Physical Exam    Assessment:       Problem List Items Addressed This Visit    None  Visit Diagnoses       Chronic low back pain        Joint pain        SERM use (selective estrogen receptor modulator)                  Plan:       1x a week         Pre-Symptom Score: NA  Post-Symptom Score: NA     Results (Hot flashes) and Pain (joint)       Encounter Diagnoses   Name Primary?    Chronic low back pain     Joint pain     SERM use (selective estrogen receptor modulator)        Acupuncture points used:  4 TOWNSEND, Du20, Gb34, Kd10, Li11, Lu7, REN4, REN6, VELASQUEZ MEN, Sp10, Sp6, Sp9, St36, St40, and YIN CARRASCO + heding, xiyan, St34, renan around thumb    STIM USED @ [facility]      NEEDLES IN: 28  NEEDLES OUT: 28    NEEDLES W/ STIM  AT: 11:15 AM  NEEDLES W/ STIM REMOVED AT: 11:45 AM

## 2023-02-07 ENCOUNTER — CLINICAL SUPPORT (OUTPATIENT)
Dept: REHABILITATION | Facility: HOSPITAL | Age: 63
End: 2023-02-07
Payer: COMMERCIAL

## 2023-02-07 DIAGNOSIS — M25.611 DECREASED ROM OF RIGHT SHOULDER: ICD-10-CM

## 2023-02-07 DIAGNOSIS — M25.612 DECREASED ROM OF LEFT SHOULDER: Primary | ICD-10-CM

## 2023-02-07 DIAGNOSIS — Z74.09 IMPAIRED FUNCTIONAL MOBILITY AND ACTIVITY TOLERANCE: ICD-10-CM

## 2023-02-07 DIAGNOSIS — R29.3 POOR POSTURE: ICD-10-CM

## 2023-02-07 DIAGNOSIS — Z91.89 AT RISK FOR LYMPHEDEMA: ICD-10-CM

## 2023-02-07 PROCEDURE — 97140 MANUAL THERAPY 1/> REGIONS: CPT

## 2023-02-07 PROCEDURE — 97110 THERAPEUTIC EXERCISES: CPT

## 2023-02-07 PROCEDURE — 97112 NEUROMUSCULAR REEDUCATION: CPT

## 2023-02-07 NOTE — PROGRESS NOTES
Physical Therapy Daily Treatment Note     Date: 2/7/2023   Name: Christal Posey  Clinic Number: [id number]    Therapy Diagnosis:   Encounter Diagnoses   Name Primary?    Decreased ROM of left shoulder Yes    Decreased ROM of right shoulder     At risk for lymphedema     Impaired functional mobility and activity tolerance     Poor posture      Physician: Renetta Mcintyre, *    Physician Orders: PT Eval and Treat  Medical Diagnosis: Malignant neoplasm of lower-inner quadrant of left breast in female, estrogen receptor positive [C50.312, Z17.0], Status post bilateral mastectomy   Evaluation Date: 2/7/2023  Authorization Period Expiration: 12/31/2023  Plan of Care Certification Period: 03/24/2023  Visit # / Visits Authorized: 2 / 20 (plus eval)  Insurance: [employer]  FOTO: 1/3    Time In: 8:53 AM  Time Out: 9:51 AM  Total Billable Time: 58 minutes    Precautions: Standard and cancer      Subjective     Patient reports: felt looser after the last visit. She had some muscle spasms over the weekend when she was outside grilling and when laughing. She has been able to sleep on her side a bit.  She was compliant with home exercise program.  Response to Previous Treatment: felt good.   Pain Scale: Christal rates pain on a scale of 1/10 on VAS.   Pain Location: right breast    Fatigue: 5/10 -over the weekend  Functional Change: able to do a little more housework,  things easier    Treatment:   Chemotherapy: none  Radiation: none  Endocrine Therapy: began Tamoxifen 12/2022, planning 5 years     Surgery Date:   - 10/24/2022: connor mastectomy with SLNB and TE reconstruction  - 11/28/2022: tissue expansion removal (secondary to infection)      Objective     Objective Measures updated at progress report unless specified.     Shoulder Range of Motion: assessed 2/7/23  Active ROM Right Left   Flexion 170 180   Abduction 180 175   Extension 50 60   IR/90deg 90 90    ER/90deg 90 90       Treatment     Christal received individual therapeutic exercises to improve postural correction and alignment, stretching and soft tissue mobility, and strengthening for 28 minutes including the following:     Seated Exercises:  - Overhead pulleys (flex, abd)   2 min  - Bicep curls, 2#    2 set x 10 reps    Mat Exercises:  - LTR with hands behind head  2 min  - Pec stretch on towel roll   2 min  - PROM into R shoulder flex, abd, ER 1 set x 10 reps each    Standing Exercises:  - Shoulder extensions (red band)  2 set x 10 reps  - Triceps extensions (red band)  2 set x 10 reps    Christal participated in neuromuscular re-education activities to improve: Balance, Coordination, Proprioception and Posture for 15 minutes. The following activities were included:  - Scapular retractions    1 set x 10 reps  - Rows (red band)    2 set x 10 reps  - B shoulder ER(red band)   1 set x 10 reps  - Horizontal abd (red band)   1 set x 10 reps    Christal received the following manual therapy techniques were performed to increased myofascial/soft tissue length, mobility and pliability, increase PROM, AROM and function as well as to decrease pain for 15 minutes:    - Gentle soft tissue mobilization to B pec minor    Home Exercise Program and Patient Education     Education Provided Regarding:  - Role of physical therapy in multi-disciplinary team  - Physical therapy goals, progress towards goals  - Exercise technique, plan of care, scheduling    Written Home Exercises Provided: Patient instructed to cont prior HEP. Exercises were reviewed and Christal was able to demonstrate them prior to the end of the session. Christal demonstrated good  understanding of the education provided.     See EMR under Patient Instructions for exercises provided  1/25/23 and 2/7/23 .    Patient has no cultural, educational or language barriers to learning provided.    Assessment     Patient is responding well to physical therapy treatment. She  demonstrated AROM of both shoulders WFL with minimal complaints of pulling at end range flexion. She continues to respond well to manual therapy techniques as she demonstrated increased ROM and reported feeling looser afterwards. She was able to perform all of today's progressions and new exercises with no increase in symptoms prior to leaving the clinic. Increase weights with biceps curls next visit. Will continue to progress as tolerated.     Patient prognosis is Good. Patient will continue to benefit from skilled outpatient physical therapy to address the deficits listed in the problem list chart on initial evaluation, provide patient / family education and to maximize patient's level of independence in the home and community environment.     Anticipated barriers to physical therapy: none anticipated    Goals as Follows:  Short Term Goals: 4 Weeks   Patient will demonstrate 100% understanding of lymphedema risk reduction practices to include self monitoring for lymphedema (progressing, not met).  Patient will demonstrate independence with Home Exercise program established (progressing, not met).  Patient will increase AROM/PROM in shoulder abduction ROM to >/=150 degrees bilaterally to improve functional reach, carry, push, pull pain free (exceeded, 2/7/23).  Patient will increase AROM/PROM in shoulder flexion to >/=170 degrees bilaterally to improve functional reach, carry, push, pull pain free (met, 2/7/23).    Long Term Goals: 8 Weeks   Patient will increase AROM/PROM in shoulder ER at 90 to 90 degrees bilaterally to improve functional reach, carry, push, pull pain free (progressing, not met).  Patient will increase strength to 4+/5 in gross UE musculature to improve tolerance to all functional activities pain free (progressing, not met).  Patient will demonstrate full/maximized tissue mobility to increase ROM and promote healthy tissue to be pain free at discharge (progressing, not met).  Patient will  increase AROM/PROM in shoulder abduction ROM to >/=170 degrees bilaterally to improve functional reach, carry, push, pull pain free (progressing, not met).  Patient will report compliance with stationary bike 5x week for 30 min each day to improve overall cardiovascular function and decrease cancer related fatigue at discharge (progressing, not met).     Plan     Outpatient physical therapy 2x week for 8 weeks to include the following: Manual Therapy, Neuromuscular Re-ed, Orthotic Management and Training, Patient Education, Self Care, Therapeutic Activities, and Therapeutic Exercise.    Therapist: Silvia Lombardo, PT  2/7/2023

## 2023-02-07 NOTE — PATIENT INSTRUCTIONS
Theraband shoulder external rotation    Hold the band out to the front with both hands, elbows at your sides and bent 90 degrees.     Stretch the band apart as far as you can without pain. Keep the elbows in contact with your ribs. Squeeze the shoulder blades together.   Then return to start position.  Do 10 reps per set. Do 3 sets per session. Do 2 sessions per day          ELASTIC BAND EXTENSION BILATERAL SHOULDER    While holding an elastic band with both arms in front of you with your elbows straight, pull the band downwards and back towards your side.  Reps 10 per set. Do 3 sets per session. Do 1 session per day        ELASTIC BAND ROWS     Holding elastic band with both hands, draw back the band as you bend your elbows. Keep your elbows near the side of your body. Do 10 reps per set. Do 3 sets per session. Do 1 session per day.        ELASTIC BAND BILATERAL HORIZONTAL ABDUCTION    Start by holding an elastic band in front of your chest with your elbows straight. Then, pull your arms apart and towards the side. Return to starting position and repeat.   Do 10 reps per set. Do 3 sets per session. Do 1 session per day.        ELASTIC BAND TRICEPS EXTENSION - SELF FIXATION    While seated, hold and fixate one end of an elastic band against your chest. Hold the other end with your opposite hand with your elbow bent and arm by your side.     Start by pulling the band downward so that the elbow goes from a bent position to a straightened position as shown. Return to starting position and repeat.   Do 10 reps per set. Do 3 sets per session. Do 1 session per day.

## 2023-02-08 DIAGNOSIS — C50.312 MALIGNANT NEOPLASM OF LOWER-INNER QUADRANT OF LEFT BREAST IN FEMALE, ESTROGEN RECEPTOR POSITIVE: Primary | ICD-10-CM

## 2023-02-08 DIAGNOSIS — Z17.0 MALIGNANT NEOPLASM OF LOWER-INNER QUADRANT OF LEFT BREAST IN FEMALE, ESTROGEN RECEPTOR POSITIVE: Primary | ICD-10-CM

## 2023-02-08 DIAGNOSIS — C50.312 MALIGNANT NEOPLASM OF LOWER-INNER QUADRANT OF LEFT BREAST IN FEMALE, ESTROGEN RECEPTOR POSITIVE: ICD-10-CM

## 2023-02-08 DIAGNOSIS — Z17.0 MALIGNANT NEOPLASM OF LOWER-INNER QUADRANT OF LEFT BREAST IN FEMALE, ESTROGEN RECEPTOR POSITIVE: ICD-10-CM

## 2023-02-13 NOTE — PROGRESS NOTES
"                                                    Physical Therapy Daily Treatment Note     Date: 2/14/2023   Name: Christal Posey  Clinic Number: [id number]    Therapy Diagnosis:   Encounter Diagnoses   Name Primary?    Decreased ROM of left shoulder Yes    Decreased ROM of right shoulder     At risk for lymphedema     Impaired functional mobility and activity tolerance     Poor posture        Physician: Renetta Mcintyre, *    Physician Orders: PT Eval and Treat  Medical Diagnosis: Malignant neoplasm of lower-inner quadrant of left breast in female, estrogen receptor positive [C50.312, Z17.0], Status post bilateral mastectomy   Evaluation Date: 2/14/2023  Authorization Period Expiration: 12/31/2023  Plan of Care Certification Period: 03/24/2023  Visit # / Visits Authorized: 3 / 20 (plus eval)  Insurance: [employer]  FOTO: 1/3    Time In: 8:53 AM  Time Out: 9:54 AM  Total Billable Time: 61 minutes    Precautions: Standard and cancer      Subjective     Patient reports: no pain this morning but continued tightness across chest upon waking in AM and sometimes in afternoon. Patient reports receiving and using overhead pulleys at home between therapy sessions. Patient scheduled to receive acupuncture following PT session.    She was compliant with home exercise program.   Response to Previous Treatment: no adverse events    Pain Scale: Christal rates pain on a scale of 0/10 on VAS.   Pain Location: N/A    Fatigue: "a little tired, but not as much"  Functional Change: less difficulty carrying items; able to reach farther    Treatment:   Chemotherapy: none  Radiation: none  Endocrine Therapy: began Tamoxifen 12/2022, planning 5 years     Surgery Date:   - 10/24/2022: connor mastectomy with SLNB and TE reconstruction  - 11/28/2022: tissue expansion removal (secondary to infection)    Objective     Objective Measures updated at progress report unless specified.     Shoulder Range of Motion: assessed 2/7/23  Active " ROM Right Left   Flexion 170 180   Abduction 180 175   Extension 50 60   IR/90deg 90 90   ER/90deg 90 90       Treatment     Christal received individual therapeutic exercises to improve postural correction and alignment, stretching and soft tissue mobility, and strengthening for 15 minutes including the following:     UBE: Seat 5, Level 1 (fwd/bkwd)  2 min each    Standing Exercise:  - Thread the needle, connor   10 sec x 5 reps    Seated Exercises:  - Bicep curls, 3#    2 set x 10 reps    Mat Exercises:  - LTR with hands behind head  2 min  - Pec stretch on towel roll   2 min  - PROM into R shoulder flex, abd, ER 1 set x 10 reps each    Standing Exercises:  - Shoulder extensions (green band)  2 set x 10 reps  - Triceps extensions (green band)  2 set x 10 reps      Christal participated in neuromuscular re-education activities to improve: Balance, Coordination, Proprioception and Posture for 15 minutes. The following activities were included:    Seated on Yoga Ball:   - Rows (green band)    2 set x 10 reps  - B shoulder ER (green band)   2 set x 10 reps  - Horizontal abd (green band)   2 set x 10 reps      Christal received the following dynamic therapeutic activities to improve tolerance to functional lifting and carrying activities of daily living for 15 minutes:    - Shelf lifts, 1# weight (Floor mirror)   1 set x 10 reps  - Crate lifts (floor to high/low mat), 5# weight  1 set x 5 reps      Christal received the following manual therapy techniques were performed to increase myofascial/soft tissue length, mobility and pliability, increase PROM, AROM and function as well as to decrease pain for 16 minutes:    - Gentle soft tissue mobilization to B pec minor    Home Exercise Program and Patient Education     Education Provided Regarding:  - Role of physical therapy in multi-disciplinary team  - Physical therapy goals, progress towards goals  - Exercise technique, plan of care, scheduling  - Proper lifting technique    Written  Home Exercises Provided: Patient instructed to cont prior HEP. Exercises were reviewed and Christal was able to demonstrate them prior to the end of the session. Christal demonstrated good  understanding of the education provided.     See EMR under Patient Instructions for exercises provided  1/25/23 and 2/7/23 .    Patient has no cultural, educational or language barriers to learning provided.    Assessment     Patient is responding well to physical therapy treatment. Patient was able to perform new exercises and exercise progressions without increase in symptoms prior to leaving the clinic. Improved strength demonstrated with increased resistance of theraband exercises. Improved tolerance to resisted lifting and reaching with therapeutic activities. Improved range of motion and soft tissue mobility with manual techniques. Will continue to progress as tolerated.     Patient prognosis is Good. Patient will continue to benefit from skilled outpatient physical therapy to address the deficits listed in the problem list chart on initial evaluation, provide patient / family education and to maximize patient's level of independence in the home and community environment.     Anticipated barriers to physical therapy: none anticipated    Goals as Follows:  Short Term Goals: 4 Weeks   Patient will demonstrate 100% understanding of lymphedema risk reduction practices to include self monitoring for lymphedema (progressing, not met).  Patient will demonstrate independence with Home Exercise program established (progressing, not met).  Patient will increase AROM/PROM in shoulder abduction ROM to >/=150 degrees bilaterally to improve functional reach, carry, push, pull pain free (exceeded, 2/7/23).  Patient will increase AROM/PROM in shoulder flexion to >/=170 degrees bilaterally to improve functional reach, carry, push, pull pain free (met, 2/7/23).    Long Term Goals: 8 Weeks   Patient will increase AROM/PROM in shoulder ER at 90 to  90 degrees bilaterally to improve functional reach, carry, push, pull pain free (progressing, not met).  Patient will increase strength to 4+/5 in gross UE musculature to improve tolerance to all functional activities pain free (progressing, not met).  Patient will demonstrate full/maximized tissue mobility to increase ROM and promote healthy tissue to be pain free at discharge (progressing, not met).  Patient will increase AROM/PROM in shoulder abduction ROM to >/=170 degrees bilaterally to improve functional reach, carry, push, pull pain free (progressing, not met).  Patient will report compliance with stationary bike 5x week for 30 min each day to improve overall cardiovascular function and decrease cancer related fatigue at discharge (progressing, not met).     Plan     Outpatient physical therapy 2x week for 8 weeks to include the following: Manual Therapy, Neuromuscular Re-ed, Orthotic Management and Training, Patient Education, Self Care, Therapeutic Activities, and Therapeutic Exercise.    Therapist: Oxana Radford, PT  2/14/2023

## 2023-02-14 ENCOUNTER — CLINICAL SUPPORT (OUTPATIENT)
Dept: REHABILITATION | Facility: HOSPITAL | Age: 63
End: 2023-02-14
Payer: COMMERCIAL

## 2023-02-14 DIAGNOSIS — C50.312 MALIGNANT NEOPLASM OF LOWER-INNER QUADRANT OF LEFT BREAST IN FEMALE, ESTROGEN RECEPTOR POSITIVE: Primary | ICD-10-CM

## 2023-02-14 DIAGNOSIS — Z91.89 AT RISK FOR LYMPHEDEMA: ICD-10-CM

## 2023-02-14 DIAGNOSIS — M25.612 DECREASED ROM OF LEFT SHOULDER: Primary | ICD-10-CM

## 2023-02-14 DIAGNOSIS — M25.611 DECREASED ROM OF RIGHT SHOULDER: ICD-10-CM

## 2023-02-14 DIAGNOSIS — Z74.09 IMPAIRED FUNCTIONAL MOBILITY AND ACTIVITY TOLERANCE: ICD-10-CM

## 2023-02-14 DIAGNOSIS — R29.3 POOR POSTURE: ICD-10-CM

## 2023-02-14 DIAGNOSIS — Z17.0 MALIGNANT NEOPLASM OF LOWER-INNER QUADRANT OF LEFT BREAST IN FEMALE, ESTROGEN RECEPTOR POSITIVE: Primary | ICD-10-CM

## 2023-02-14 PROCEDURE — 97140 MANUAL THERAPY 1/> REGIONS: CPT

## 2023-02-14 PROCEDURE — 97112 NEUROMUSCULAR REEDUCATION: CPT

## 2023-02-14 PROCEDURE — 97813 ACUP 1/> W/ESTIM 1ST 15 MIN: CPT | Performed by: ACUPUNCTURIST

## 2023-02-14 PROCEDURE — 97530 THERAPEUTIC ACTIVITIES: CPT

## 2023-02-14 PROCEDURE — 97110 THERAPEUTIC EXERCISES: CPT

## 2023-02-14 PROCEDURE — 97814 ACUP 1/> W/ESTIM EA ADDL 15: CPT | Performed by: ACUPUNCTURIST

## 2023-02-14 NOTE — PROGRESS NOTES
Acupuncture follow-up     Patient Name: Christal Posey  Date of Visit: 2/14/2023  MRN: [id number]  Diagnosis:   Encounter Diagnosis   Name Primary?    Malignant neoplasm of lower-inner quadrant of left breast in female, estrogen receptor positive Yes     Referring Physician: No ref. provider found      Assessment  No change since initial treatment in hot flashes although joint pain feels OK today . Continue with protocol and evaluate progress.          Pre-Symptom Score: NA  Post-Symptom Score: NA     Pain (joint) and Hot Flashes       Encounter Diagnoses   Name Primary?    Malignant neoplasm of lower-inner quadrant of left breast in female, estrogen receptor positive Yes       Acupuncture points used:  4 TOWNSEND, Du20, ER GENO, Gb34, Kd10, Li11, REN4, REN6, Sp10, Sp6, Sp9, St36, and YIN CARRASCO + chidi zheng    ADD STIM (Lr3/Sp6)      NEEDLES IN: 28  NEEDLES OUT: 28    NEEDLES W/ STIM  AT: 10:45 AM  NEEDLES W/ STIM REMOVED AT: 11:15 AM

## 2023-02-24 ENCOUNTER — HOSPITAL ENCOUNTER (OUTPATIENT)
Dept: RADIOLOGY | Facility: HOSPITAL | Age: 63
Discharge: HOME | End: 2023-02-24
Attending: INTERNAL MEDICINE
Payer: COMMERCIAL

## 2023-02-24 ENCOUNTER — OFFICE VISIT (OUTPATIENT)
Dept: INTERNAL MEDICINE | Facility: CLINIC | Age: 63
End: 2023-02-24
Payer: COMMERCIAL

## 2023-02-24 VITALS
WEIGHT: 207 LBS | DIASTOLIC BLOOD PRESSURE: 94 MMHG | SYSTOLIC BLOOD PRESSURE: 146 MMHG | OXYGEN SATURATION: 97 % | TEMPERATURE: 98 F | HEIGHT: 66 IN | BODY MASS INDEX: 33.27 KG/M2 | HEART RATE: 89 BPM

## 2023-02-24 DIAGNOSIS — J18.9 PNEUMONIA DUE TO INFECTIOUS ORGANISM, UNSPECIFIED LATERALITY, UNSPECIFIED PART OF LUNG: ICD-10-CM

## 2023-02-24 DIAGNOSIS — J18.9 PNEUMONIA DUE TO INFECTIOUS ORGANISM, UNSPECIFIED LATERALITY, UNSPECIFIED PART OF LUNG: Primary | ICD-10-CM

## 2023-02-24 DIAGNOSIS — R06.2 WHEEZING: ICD-10-CM

## 2023-02-24 PROBLEM — R29.3 POOR POSTURE: Status: RESOLVED | Noted: 2023-01-25 | Resolved: 2023-02-24

## 2023-02-24 PROCEDURE — 3080F PR MOST RECENT DIASTOLIC BLOOD PRESSURE >= 90 MM HG: ICD-10-PCS | Mod: CPTII,S$GLB,, | Performed by: INTERNAL MEDICINE

## 2023-02-24 PROCEDURE — 3008F PR BODY MASS INDEX (BMI) DOCUMENTED: ICD-10-PCS | Mod: CPTII,S$GLB,, | Performed by: INTERNAL MEDICINE

## 2023-02-24 PROCEDURE — 71046 X-RAY EXAM CHEST 2 VIEWS: CPT | Mod: 26,,, | Performed by: RADIOLOGY

## 2023-02-24 PROCEDURE — 1160F PR REVIEW ALL MEDS BY PRESCRIBER/CLIN PHARMACIST DOCUMENTED: ICD-10-PCS | Mod: CPTII,S$GLB,, | Performed by: INTERNAL MEDICINE

## 2023-02-24 PROCEDURE — 3077F SYST BP >= 140 MM HG: CPT | Mod: CPTII,S$GLB,, | Performed by: INTERNAL MEDICINE

## 2023-02-24 PROCEDURE — 3008F BODY MASS INDEX DOCD: CPT | Mod: CPTII,S$GLB,, | Performed by: INTERNAL MEDICINE

## 2023-02-24 PROCEDURE — 4010F ACE/ARB THERAPY RXD/TAKEN: CPT | Mod: CPTII,S$GLB,, | Performed by: INTERNAL MEDICINE

## 2023-02-24 PROCEDURE — 99214 PR OFFICE/OUTPT VISIT, EST, LEVL IV, 30-39 MIN: ICD-10-PCS | Mod: S$GLB,,, | Performed by: INTERNAL MEDICINE

## 2023-02-24 PROCEDURE — 99214 OFFICE O/P EST MOD 30 MIN: CPT | Mod: S$GLB,,, | Performed by: INTERNAL MEDICINE

## 2023-02-24 PROCEDURE — 99999 PR PBB SHADOW E&M-EST. PATIENT-LVL V: CPT | Mod: PBBFAC,,, | Performed by: INTERNAL MEDICINE

## 2023-02-24 PROCEDURE — 1160F RVW MEDS BY RX/DR IN RCRD: CPT | Mod: CPTII,S$GLB,, | Performed by: INTERNAL MEDICINE

## 2023-02-24 PROCEDURE — 3080F DIAST BP >= 90 MM HG: CPT | Mod: CPTII,S$GLB,, | Performed by: INTERNAL MEDICINE

## 2023-02-24 PROCEDURE — 71046 X-RAY EXAM CHEST 2 VIEWS: CPT | Mod: TC

## 2023-02-24 PROCEDURE — 99999 PR PBB SHADOW E&M-EST. PATIENT-LVL V: ICD-10-PCS | Mod: PBBFAC,,, | Performed by: INTERNAL MEDICINE

## 2023-02-24 PROCEDURE — 1159F MED LIST DOCD IN RCRD: CPT | Mod: CPTII,S$GLB,, | Performed by: INTERNAL MEDICINE

## 2023-02-24 PROCEDURE — 71046 XR CHEST PA AND LATERAL: ICD-10-PCS | Mod: 26,,, | Performed by: RADIOLOGY

## 2023-02-24 PROCEDURE — 3077F PR MOST RECENT SYSTOLIC BLOOD PRESSURE >= 140 MM HG: ICD-10-PCS | Mod: CPTII,S$GLB,, | Performed by: INTERNAL MEDICINE

## 2023-02-24 PROCEDURE — 4010F PR ACE/ARB THEARPY RXD/TAKEN: ICD-10-PCS | Mod: CPTII,S$GLB,, | Performed by: INTERNAL MEDICINE

## 2023-02-24 PROCEDURE — 1159F PR MEDICATION LIST DOCUMENTED IN MEDICAL RECORD: ICD-10-PCS | Mod: CPTII,S$GLB,, | Performed by: INTERNAL MEDICINE

## 2023-02-24 RX ORDER — IPRATROPIUM BROMIDE AND ALBUTEROL SULFATE 2.5; .5 MG/3ML; MG/3ML
3 SOLUTION RESPIRATORY (INHALATION)
Status: DISCONTINUED | OUTPATIENT
Start: 2023-02-24 | End: 2024-01-03

## 2023-02-24 RX ORDER — CEFPODOXIME PROXETIL 100 MG/1
100 TABLET, FILM COATED ORAL EVERY 12 HOURS
Qty: 20 TABLET | Refills: 0 | Status: SHIPPED | OUTPATIENT
Start: 2023-02-24 | End: 2023-03-06

## 2023-02-24 NOTE — PROGRESS NOTES
Subjective:       Patient ID: Christal Posey is a 63 y.o. female.    Chief Complaint:   Cough, Sinusitis, and Wheezing    Cough  This is a new problem. The current episode started 1 to 4 weeks ago. The problem has been gradually improving. The problem occurs every few minutes. The cough is Non-productive. Associated symptoms include headaches, nasal congestion, postnasal drip, rhinorrhea, shortness of breath and wheezing. Pertinent negatives include no chest pain, chills, ear congestion, ear pain, fever, heartburn, hemoptysis, myalgias, rash, sore throat, sweats or weight loss. The symptoms are aggravated by dust, exercise, fumes, lying down and pollens. She has tried OTC cough suppressant, body position changes and cool air for the symptoms. The treatment provided no relief. Her past medical history is significant for bronchitis and environmental allergies. There is no history of asthma, bronchiectasis, COPD, emphysema or pneumonia.  - she has had 9 days of cough, runny nose, watery eyes.  She has tested twice for COVID and it has been negative at home.  She has not had a fever or myalgias.  She notes worsening wheezing at night.  At times, she feels like she is drowning in fluid.  Bubbly sensation.  Cough is dry.  She has not had this before.    Pmh/meds:  Reviewed and reconciled in EPIC with patient during visit today.    Review of Systems   Constitutional:  Negative for chills, fever and weight loss.   HENT:  Positive for postnasal drip and rhinorrhea. Negative for ear pain and sore throat.    Respiratory:  Positive for cough, shortness of breath and wheezing. Negative for hemoptysis.    Cardiovascular:  Negative for chest pain.   Gastrointestinal:  Negative for abdominal pain and heartburn.   Genitourinary:  Negative for frequency.   Musculoskeletal:  Negative for myalgias.   Skin:  Negative for rash.   Allergic/Immunologic: Positive for environmental allergies.   Neurological:  Positive for headaches.    Psychiatric/Behavioral:  Positive for sleep disturbance.      Objective:      Physical Exam  Vitals reviewed.   Constitutional:       Appearance: She is well-developed.   HENT:      Head: Normocephalic and atraumatic.   Cardiovascular:      Rate and Rhythm: Normal rate and regular rhythm.      Heart sounds: Normal heart sounds. No murmur heard.    No friction rub. No gallop.   Pulmonary:      Effort: Pulmonary effort is normal. No respiratory distress.      Breath sounds: Wheezing, rhonchi and rales present.   Skin:     General: Skin is warm and dry.      Findings: No erythema.   Neurological:      General: No focal deficit present.      Mental Status: She is alert.   Psychiatric:         Mood and Affect: Mood normal.       Assessment:       1. Pneumonia due to infectious organism, unspecified laterality, unspecified part of lung    2. Wheezing          Plan:       Christal was seen today for cough, sinusitis and wheezing.    Diagnoses and all orders for this visit:    Pneumonia due to infectious organism, unspecified laterality, unspecified part of lung - while the chest x-ray was officially read by radiology as showing no infiltrate, I see something at the right base and at the left base.  Given her long duration of symptoms, the signs on exam, and chest x-ray, I will treat for pneumonia.  -     X-Ray Chest PA And Lateral; Future  -     cefpodoxime (VANTIN) 100 MG tablet; Take 1 tablet (100 mg total) by mouth every 12 (twelve) hours. for 10 days    Wheezing -she agreed to a nebulizer treatment today.  Felt better afterwards.  She should use her inhaler more often at home.  -     albuterol-ipratropium 2.5 mg-0.5 mg/3 mL nebulizer solution 3 mL    Rtc prn; come to ER for fever, worsening symptoms    JULIO Mccormick MD MPH  Staff Internist

## 2023-03-02 ENCOUNTER — PATIENT MESSAGE (OUTPATIENT)
Dept: INTERNAL MEDICINE | Facility: CLINIC | Age: 63
End: 2023-03-02
Payer: COMMERCIAL

## 2023-03-03 ENCOUNTER — PATIENT MESSAGE (OUTPATIENT)
Dept: INTERNAL MEDICINE | Facility: CLINIC | Age: 63
End: 2023-03-03
Payer: COMMERCIAL

## 2023-03-06 ENCOUNTER — OFFICE VISIT (OUTPATIENT)
Dept: RHEUMATOLOGY | Facility: CLINIC | Age: 63
End: 2023-03-06
Payer: COMMERCIAL

## 2023-03-06 VITALS
BODY MASS INDEX: 34.82 KG/M2 | HEART RATE: 73 BPM | SYSTOLIC BLOOD PRESSURE: 148 MMHG | DIASTOLIC BLOOD PRESSURE: 88 MMHG | WEIGHT: 216.69 LBS | HEIGHT: 66 IN

## 2023-03-06 DIAGNOSIS — M25.50 POLYARTHRALGIA: Primary | ICD-10-CM

## 2023-03-06 PROCEDURE — 3008F BODY MASS INDEX DOCD: CPT | Mod: CPTII,S$GLB,, | Performed by: INTERNAL MEDICINE

## 2023-03-06 PROCEDURE — 99999 PR PBB SHADOW E&M-EST. PATIENT-LVL IV: ICD-10-PCS | Mod: PBBFAC,,, | Performed by: INTERNAL MEDICINE

## 2023-03-06 PROCEDURE — 1159F MED LIST DOCD IN RCRD: CPT | Mod: CPTII,S$GLB,, | Performed by: INTERNAL MEDICINE

## 2023-03-06 PROCEDURE — 4010F PR ACE/ARB THEARPY RXD/TAKEN: ICD-10-PCS | Mod: CPTII,S$GLB,, | Performed by: INTERNAL MEDICINE

## 2023-03-06 PROCEDURE — 3008F PR BODY MASS INDEX (BMI) DOCUMENTED: ICD-10-PCS | Mod: CPTII,S$GLB,, | Performed by: INTERNAL MEDICINE

## 2023-03-06 PROCEDURE — 1159F PR MEDICATION LIST DOCUMENTED IN MEDICAL RECORD: ICD-10-PCS | Mod: CPTII,S$GLB,, | Performed by: INTERNAL MEDICINE

## 2023-03-06 PROCEDURE — 3079F DIAST BP 80-89 MM HG: CPT | Mod: CPTII,S$GLB,, | Performed by: INTERNAL MEDICINE

## 2023-03-06 PROCEDURE — 3079F PR MOST RECENT DIASTOLIC BLOOD PRESSURE 80-89 MM HG: ICD-10-PCS | Mod: CPTII,S$GLB,, | Performed by: INTERNAL MEDICINE

## 2023-03-06 PROCEDURE — 3077F PR MOST RECENT SYSTOLIC BLOOD PRESSURE >= 140 MM HG: ICD-10-PCS | Mod: CPTII,S$GLB,, | Performed by: INTERNAL MEDICINE

## 2023-03-06 PROCEDURE — 99214 OFFICE O/P EST MOD 30 MIN: CPT | Mod: S$GLB,,, | Performed by: INTERNAL MEDICINE

## 2023-03-06 PROCEDURE — 99999 PR PBB SHADOW E&M-EST. PATIENT-LVL IV: CPT | Mod: PBBFAC,,, | Performed by: INTERNAL MEDICINE

## 2023-03-06 PROCEDURE — 4010F ACE/ARB THERAPY RXD/TAKEN: CPT | Mod: CPTII,S$GLB,, | Performed by: INTERNAL MEDICINE

## 2023-03-06 PROCEDURE — 99214 PR OFFICE/OUTPT VISIT, EST, LEVL IV, 30-39 MIN: ICD-10-PCS | Mod: S$GLB,,, | Performed by: INTERNAL MEDICINE

## 2023-03-06 PROCEDURE — 3077F SYST BP >= 140 MM HG: CPT | Mod: CPTII,S$GLB,, | Performed by: INTERNAL MEDICINE

## 2023-03-06 NOTE — PROGRESS NOTES
Rapid3 Question Responses and Scores 3/2/2023   MDHAQ Score 0.6   Psychologic Score 1.1   Pain Score 3   When you awakened in the morning OVER THE LAST WEEK, did you feel stiff? Yes   If Yes, please indicate the number of hours until you are as limber as you will be for the day 1   Fatigue Score 5   Global Health Score 4   RAPID3 Score 3     Answers submitted by the patient for this visit:  Rheumatology Questionnaire (Submitted on 3/2/2023)  fever: No  eye redness: No  mouth sores: No  headaches: No  shortness of breath: No  chest pain: No  trouble swallowing: No  diarrhea: No  constipation: No  unexpected weight change: No  genital sore: No  dysuria: No  During the last 3 days, have you had a skin rash?: No  Bruises or bleeds easily: Yes  cough: Yes

## 2023-03-06 NOTE — PROGRESS NOTES
" Patient ID: Christal Posey is a 57 y.o. female.     Chief Complaint: Follow-up     HPI::   Initial history: 55 yo F with PMH Migraines, IBS, GERD, breast CA s/p lumpectomy 6/2016 seen in consult for joint pain and swelling with positive RF. She states that her hand pain began about a years ago. It is mostly in the PIPs and she does sometimes report some swelling. She feels stiff for about 1 hour in the AM. She also has pain in the joints in her feet for about the last year as well. She has some knee pain and "cracking" without swelling that is more chronic. She does not sleep well and is fatigued. She has had a dry cough for about a year as well, but no other extra-articular manifestations. She was previously on Premarin that was stopped when the breast CA was found and thinks her symptoms are worse since stopping it. She uses Aleve which helps her pain as does ibuprofen though not quite as much, tylenol does not help. She has never smoked. Her paternal uncle has "arthritis." She took prednisone once is the past for sciatica and it made her "sick" where she felt very hot and just "not good."         Interval history:   She was diagnosed with recurrence of left sided breast cancer now s/p bilateral mastectomy in October 2022.  She stopped meloxicam since aleve works better for her. She takes 2 aleve a day a few times a week,  She has pain in neck and lower back. She has pain in both sides, worse on the left.  Reports pain in elbows on occasion. Sometimes, she notices mild swelling in ankles.  She has pain in left mtp.   Pain level is 4/10, non-radiating, and aching.     Review of Systems   Constitutional: Negative for activity change, chills, fatigue and fever.   HENT: Negative for mouth sores and trouble swallowing.    Eyes: Negative for pain and redness.   Respiratory: Negative for cough, chest tightness and shortness of breath.    Cardiovascular: Negative for chest pain.   Gastrointestinal: Negative for abdominal " pain, diarrhea and nausea.   Genitourinary: Negative for dysuria.   Musculoskeletal: Positive for arthralgias and neck stiffness. Negative for joint swelling, myalgias and neck pain.   Skin: Negative for color change and rash.   Neurological: Negative for weakness and numbness.   Hematological: Negative for adenopathy.   Psychiatric/Behavioral: Negative for sleep disturbance.          Objective:           Physical Exam   Constitutional: She is oriented to person, place, and time and well-developed, well-nourished, and in no distress. No distress.   HENT:   Head: Normocephalic and atraumatic.   Mouth/Throat: No oropharyngeal exudate.   Eyes: Conjunctivae are normal. No scleral icterus.   Neck: Normal range of motion. Neck supple. No thyromegaly present.   Cardiovascular: Normal rate, regular rhythm and normal heart sounds.    Pulmonary/Chest: Effort normal and breath sounds normal.   Abdominal: Soft. There is no tenderness.         Right Side Rheumatological Exam     Examination finds the shoulder, elbow, wrist, knee, 1st PIP, 1st MCP, 2nd PIP, 2nd MCP, 3rd PIP, 3rd MCP, 4th PIP, 4th MCP, 5th PIP and 5th MCP normal.     Left Side Rheumatological Exam     Examination finds the shoulder, elbow, wrist, knee, 1st PIP, 1st MCP, 2nd PIP, 2nd MCP, 3rd PIP, 3rd MCP, 4th PIP, 4th MCP, 5th PIP and 5th MCP normal.       Lymphadenopathy:     She has no cervical adenopathy.   Neurological: She is alert and oriented to person, place, and time.   Skin: Skin is warm and dry. No rash noted.   Musculoskeletal: Normal range of motion. She exhibits no edema or tenderness.        labs:  Reviewed;  Tammy-negative  Ccp-negative  RF-49    Arthritis survey (2017): I personally reviewed;  No significant detrimental interval change since the examination of 9/26/16 is appreciated    Assessment:    62 yo old female with PMH of hyperlipidemia, Migraines, left breast cancer status post lumpectomy (june 2016), HTN,    here for evaluation of joint  pain and +RF. She was diagnosed with recurrence of left sided breast cancer now s/p bilateral mastectomy in October 2022.  I suspect patient has early RA as she is having more episodes of hand stiffness.I told her that +RF does not indicate RA but we will continue to monitor her.      Plan:     -continue baclofen 10mg qhs prn for neck stiffness  Continue voltaren gel QID PRN  Labs  Consider MRI of left foot and right hand  Encouraged weight loss  -discussed diet that is low in fat and carbohydrates.      #obesity: encourage weight loss     rtc in 8   months    30 * minutes of total time spent on the encounter, which includes face to face time and non-face to face time preparing to see the patient (eg, review of tests), Obtaining and/or reviewing separately obtained history, Documenting clinical information in the electronic or other health record, Independently interpreting results (not separately reported) and communicating results to the patient/family/caregiver, or Care coordination (not separately reported).

## 2023-03-07 ENCOUNTER — PATIENT MESSAGE (OUTPATIENT)
Dept: OBSTETRICS AND GYNECOLOGY | Facility: CLINIC | Age: 63
End: 2023-03-07
Payer: COMMERCIAL

## 2023-03-10 ENCOUNTER — CLINICAL SUPPORT (OUTPATIENT)
Dept: REHABILITATION | Facility: HOSPITAL | Age: 63
End: 2023-03-10
Payer: COMMERCIAL

## 2023-03-10 DIAGNOSIS — M25.612 DECREASED ROM OF LEFT SHOULDER: ICD-10-CM

## 2023-03-10 DIAGNOSIS — M25.611 DECREASED ROM OF RIGHT SHOULDER: ICD-10-CM

## 2023-03-10 DIAGNOSIS — M25.50 ARTHRALGIA, UNSPECIFIED JOINT: ICD-10-CM

## 2023-03-10 DIAGNOSIS — Z91.89 AT RISK FOR LYMPHEDEMA: Primary | ICD-10-CM

## 2023-03-10 DIAGNOSIS — G89.29 CHRONIC BILATERAL LOW BACK PAIN WITHOUT SCIATICA: Primary | ICD-10-CM

## 2023-03-10 DIAGNOSIS — Z79.810 SERM USE (SELECTIVE ESTROGEN RECEPTOR MODULATOR): ICD-10-CM

## 2023-03-10 DIAGNOSIS — R29.3 POOR POSTURE: ICD-10-CM

## 2023-03-10 DIAGNOSIS — M54.50 CHRONIC BILATERAL LOW BACK PAIN WITHOUT SCIATICA: Primary | ICD-10-CM

## 2023-03-10 DIAGNOSIS — Z74.09 IMPAIRED FUNCTIONAL MOBILITY AND ACTIVITY TOLERANCE: ICD-10-CM

## 2023-03-10 PROCEDURE — 97813 ACUP 1/> W/ESTIM 1ST 15 MIN: CPT | Performed by: ACUPUNCTURIST

## 2023-03-10 PROCEDURE — 97112 NEUROMUSCULAR REEDUCATION: CPT

## 2023-03-10 PROCEDURE — 97110 THERAPEUTIC EXERCISES: CPT

## 2023-03-10 PROCEDURE — 97814 ACUP 1/> W/ESTIM EA ADDL 15: CPT | Performed by: ACUPUNCTURIST

## 2023-03-10 PROCEDURE — 97530 THERAPEUTIC ACTIVITIES: CPT

## 2023-03-10 NOTE — PROGRESS NOTES
"                                                    Physical Therapy Daily Treatment Note     Date: 3/10/2023   Name: Christal Posey  Clinic Number: [id number]    Therapy Diagnosis:   Encounter Diagnoses   Name Primary?    At risk for lymphedema Yes    Decreased ROM of left shoulder     Decreased ROM of right shoulder     Impaired functional mobility and activity tolerance     Poor posture          Physician: Renetta Mcintyre, *    Physician Orders: PT Eval and Treat  Medical Diagnosis: Malignant neoplasm of lower-inner quadrant of left breast in female, estrogen receptor positive [C50.312, Z17.0], Status post bilateral mastectomy   Evaluation Date: 3/10/2023  Authorization Period Expiration: 12/31/2023  Plan of Care Certification Period: 03/24/2023  Visit # / Visits Authorized: 4 / 20 (plus eval)  Insurance: [employer] Blue [employer]  FOTO: 1/3    Time In: 10:00 AM  Time Out: 10:47 AM  Total Billable Time: 47 minutes    Precautions: Standard and cancer      Subjective     Patient reports: was able to cut the grass this weekend with no difficulty. She mopped her house with no difficulty. She feels she is doing very well at this time.    She was compliant with home exercise program.   Response to Previous Treatment: no adverse events    Pain Scale: Christal rates pain on a scale of 0/10 on VAS.   Pain Location: N/A    Fatigue: "a little tired, but not as much"  Functional Change: less difficulty carrying items; able to reach farther    Treatment:   Chemotherapy: none  Radiation: none  Endocrine Therapy: began Tamoxifen 12/2022, planning 5 years     Surgery Date:   - 10/24/2022: connor mastectomy with SLNB and TE reconstruction  - 11/28/2022: tissue expansion removal (secondary to infection)    Objective     Objective Measures updated at progress report unless specified.     Shoulder Range of Motion: assessed 3/10/23  Active ROM Right Left   Flexion 180 180   Abduction 180 180   Extension 60 60   IR/90deg 90 90   ER/90deg 90 " 90     Strength: manual muscle test grades below   Upper Extremity Strength 3/10/23    (R) UE (L) UE   Shoulder flexion: 5/5 5/5   Shoulder Abduction: 5/5 5/5   Shoulder IR 5/5 5/5   Shoulder ER 5/5 5/5   Elbow flexion: 5/5 5/5   Elbow extension: 5/5 5/5   Wrist flexion: 5/5 5/5   Wrist extension: 5/5 5/5    good good         Treatment     Christal received individual therapeutic exercises to improve postural correction and alignment, stretching and soft tissue mobility, and strengthening for 15 minutes including the following:     UBE: Seat 5, Level 1 (fwd/bkwd)  2 min each    Standing Exercise:  - Thread the needle, connor   10 sec x 5 reps    Seated Exercises:  - Bicep curls, 4#    2 set x 10 reps    Mat Exercises:  - LTR with hands behind head  2 min  - Pec stretch on towel roll   2 min    Standing Exercises:  - Shoulder extensions (green band)  2 set x 10 reps  - Triceps extensions (green band)  2 set x 10 reps      Christal participated in neuromuscular re-education activities to improve: Balance, Coordination, Proprioception and Posture for 15 minutes. The following activities were included:    Seated on Yoga Ball:   - Rows (green band)    2 set x 10 reps  - B shoulder ER (green band)   2 set x 10 reps  - Horizontal abd (green band)   2 set x 10 reps      Christal received the following dynamic therapeutic activities to improve tolerance to functional lifting and carrying activities of daily living for 15 minutes:    - Shelf lifts, 2# weight (Floor mirror)   1 set x 10 reps  - Crate lifts (floor to high/low mat), 6# weight  1 set x 5 reps    Home Exercise Program and Patient Education     Education Provided Regarding:  - Role of physical therapy in multi-disciplinary team  - Physical therapy goals, progress towards goals  - Exercise technique, plan of care, scheduling  - Proper lifting technique    Written Home Exercises Provided: Patient instructed to cont prior HEP. Exercises were reviewed and Christal was able to  demonstrate them prior to the end of the session. Christal demonstrated good  understanding of the education provided.     See EMR under Patient Instructions for exercises provided  1/25/23 and 2/7/23 .    Patient has no cultural, educational or language barriers to learning provided.    Assessment     Patient is responding well to physical therapy treatment. Patient is in agreement to hold on her next physical therapy visit to see if she is able to maintain her gains for two weeks. If she continues to do well the plan is to discharge her to her [facility]. Patient is able to perform all of today's progressions with no increase in symptoms prior to leaving the clinic. She displayed AROM of both shoulder WFL and pain free. Will continue to progress as tolerated.     Patient prognosis is Good. Patient will continue to benefit from skilled outpatient physical therapy to address the deficits listed in the problem list chart on initial evaluation, provide patient / family education and to maximize patient's level of independence in the home and community environment.     Anticipated barriers to physical therapy: none anticipated    Goals as Follows:  Short Term Goals: 4 Weeks   Patient will demonstrate 100% understanding of lymphedema risk reduction practices to include self monitoring for lymphedema (progressing, not met).  Patient will demonstrate independence with Home Exercise program established (progressing, not met).  Patient will increase AROM/PROM in shoulder abduction ROM to >/=150 degrees bilaterally to improve functional reach, carry, push, pull pain free (exceeded, 2/7/23).  Patient will increase AROM/PROM in shoulder flexion to >/=170 degrees bilaterally to improve functional reach, carry, push, pull pain free (met, 2/7/23).    Long Term Goals: 8 Weeks   Patient will increase AROM/PROM in shoulder ER at 90 to 90 degrees bilaterally to improve functional reach, carry, push, pull pain free (met, 3/10/23).  Patient will  increase strength to 4+/5 in gross UE musculature to improve tolerance to all functional activities pain free (progressing, not met).  Patient will demonstrate full/maximized tissue mobility to increase ROM and promote healthy tissue to be pain free at discharge (progressing, not met).  Patient will increase AROM/PROM in shoulder abduction ROM to >/=170 degrees bilaterally to improve functional reach, carry, push, pull pain free (progressing, not met).  Patient will report compliance with stationary bike 5x week for 30 min each day to improve overall cardiovascular function and decrease cancer related fatigue at discharge (progressing, not met).     Plan     Plan of care Certification Period: 1/25/2023 to 3/24/23    Outpatient physical therapy 2x week for 8 weeks to include the following: Manual Therapy, Neuromuscular Re-ed, Orthotic Management and Training, Patient Education, Self Care, Therapeutic Activities, and Therapeutic Exercise.    Therapist: Silvia Lombardo, PT  3/10/2023

## 2023-03-20 ENCOUNTER — OFFICE VISIT (OUTPATIENT)
Dept: SURGERY | Facility: CLINIC | Age: 63
End: 2023-03-20
Payer: COMMERCIAL

## 2023-03-20 VITALS
DIASTOLIC BLOOD PRESSURE: 81 MMHG | HEART RATE: 94 BPM | SYSTOLIC BLOOD PRESSURE: 170 MMHG | HEIGHT: 66 IN | OXYGEN SATURATION: 100 % | BODY MASS INDEX: 35.52 KG/M2 | TEMPERATURE: 98 F | WEIGHT: 221 LBS

## 2023-03-20 DIAGNOSIS — D05.12 DUCTAL CARCINOMA IN SITU (DCIS) OF LEFT BREAST: Primary | ICD-10-CM

## 2023-03-20 PROCEDURE — 1159F MED LIST DOCD IN RCRD: CPT | Mod: CPTII,S$GLB,, | Performed by: SURGERY

## 2023-03-20 PROCEDURE — 4010F ACE/ARB THERAPY RXD/TAKEN: CPT | Mod: CPTII,S$GLB,, | Performed by: SURGERY

## 2023-03-20 PROCEDURE — 1160F PR REVIEW ALL MEDS BY PRESCRIBER/CLIN PHARMACIST DOCUMENTED: ICD-10-PCS | Mod: CPTII,S$GLB,, | Performed by: SURGERY

## 2023-03-20 PROCEDURE — 99212 OFFICE O/P EST SF 10 MIN: CPT | Mod: S$GLB,,, | Performed by: SURGERY

## 2023-03-20 PROCEDURE — 3077F PR MOST RECENT SYSTOLIC BLOOD PRESSURE >= 140 MM HG: ICD-10-PCS | Mod: CPTII,S$GLB,, | Performed by: SURGERY

## 2023-03-20 PROCEDURE — 3079F PR MOST RECENT DIASTOLIC BLOOD PRESSURE 80-89 MM HG: ICD-10-PCS | Mod: CPTII,S$GLB,, | Performed by: SURGERY

## 2023-03-20 PROCEDURE — 1160F RVW MEDS BY RX/DR IN RCRD: CPT | Mod: CPTII,S$GLB,, | Performed by: SURGERY

## 2023-03-20 PROCEDURE — 1159F PR MEDICATION LIST DOCUMENTED IN MEDICAL RECORD: ICD-10-PCS | Mod: CPTII,S$GLB,, | Performed by: SURGERY

## 2023-03-20 PROCEDURE — 3079F DIAST BP 80-89 MM HG: CPT | Mod: CPTII,S$GLB,, | Performed by: SURGERY

## 2023-03-20 PROCEDURE — 3008F PR BODY MASS INDEX (BMI) DOCUMENTED: ICD-10-PCS | Mod: CPTII,S$GLB,, | Performed by: SURGERY

## 2023-03-20 PROCEDURE — 99212 PR OFFICE/OUTPT VISIT, EST, LEVL II, 10-19 MIN: ICD-10-PCS | Mod: S$GLB,,, | Performed by: SURGERY

## 2023-03-20 PROCEDURE — 3077F SYST BP >= 140 MM HG: CPT | Mod: CPTII,S$GLB,, | Performed by: SURGERY

## 2023-03-20 PROCEDURE — 3008F BODY MASS INDEX DOCD: CPT | Mod: CPTII,S$GLB,, | Performed by: SURGERY

## 2023-03-20 PROCEDURE — 99999 PR PBB SHADOW E&M-EST. PATIENT-LVL V: CPT | Mod: PBBFAC,,, | Performed by: SURGERY

## 2023-03-20 PROCEDURE — 4010F PR ACE/ARB THEARPY RXD/TAKEN: ICD-10-PCS | Mod: CPTII,S$GLB,, | Performed by: SURGERY

## 2023-03-20 PROCEDURE — 99999 PR PBB SHADOW E&M-EST. PATIENT-LVL V: ICD-10-PCS | Mod: PBBFAC,,, | Performed by: SURGERY

## 2023-03-22 ENCOUNTER — CLINICAL SUPPORT (OUTPATIENT)
Dept: REHABILITATION | Facility: HOSPITAL | Age: 63
End: 2023-03-22
Payer: COMMERCIAL

## 2023-03-22 DIAGNOSIS — M25.612 DECREASED ROM OF LEFT SHOULDER: Primary | ICD-10-CM

## 2023-03-22 DIAGNOSIS — R29.3 POOR POSTURE: ICD-10-CM

## 2023-03-22 DIAGNOSIS — Z91.89 AT RISK FOR LYMPHEDEMA: ICD-10-CM

## 2023-03-22 DIAGNOSIS — M25.611 DECREASED ROM OF RIGHT SHOULDER: ICD-10-CM

## 2023-03-22 DIAGNOSIS — Z74.09 IMPAIRED FUNCTIONAL MOBILITY AND ACTIVITY TOLERANCE: ICD-10-CM

## 2023-03-22 PROCEDURE — 97110 THERAPEUTIC EXERCISES: CPT

## 2023-03-22 PROCEDURE — 97530 THERAPEUTIC ACTIVITIES: CPT

## 2023-03-22 PROCEDURE — 97112 NEUROMUSCULAR REEDUCATION: CPT

## 2023-03-22 NOTE — PLAN OF CARE
[facility]  Physical Therapy Discharge Note    Name: Christal Posey  Clinic Number: [id number]    Therapy Diagnosis:   Encounter Diagnoses   Name Primary?    Decreased ROM of left shoulder Yes    Decreased ROM of right shoulder     At risk for lymphedema     Impaired functional mobility and activity tolerance     Poor posture      Physician: Renetta Mcintyre, *    Physician Orders: PT Eval and Treat  Medical Diagnosis: Malignant neoplasm of lower-inner quadrant of left breast in female, estrogen receptor positive [C50.312, Z17.0], Status post bilateral mastectomy   Evaluation Date: 3/22/2023      Date of Last visit: 3/22/23  Total Visits Received: 6    ASSESSMENT      Patient was able to demonstrate AROM of both shoulder WNL and pain free. Her B UE strength is 5/5 with no complaints of pain. She is independent with her HEP and has met all goals set for her at this time. She has returned to her PLOF at this time and is ready for discharge to her HEP.    Discharge reason: Patient has met all of his/her goals    Discharge FOTO Score: 33% impaired, limited, or restricted    Goals:   Short Term Goals: 4 Weeks   Patient will demonstrate 100% understanding of lymphedema risk reduction practices to include self monitoring for lymphedema (met, 3/22/23).  Patient will demonstrate independence with Home Exercise program established (met, 3/22/23).  Patient will increase AROM/PROM in shoulder abduction ROM to >/=150 degrees bilaterally to improve functional reach, carry, push, pull pain free (exceeded, 2/7/23).  Patient will increase AROM/PROM in shoulder flexion to >/=170 degrees bilaterally to improve functional reach, carry, push, pull pain free (met, 2/7/23).     Long Term Goals: 8 Weeks   Patient will increase AROM/PROM in shoulder ER at 90 to 90 degrees bilaterally to improve functional reach, carry, push, pull pain free (met, 3/10/23).  Patient will increase strength to 4+/5 in gross UE  musculature to improve tolerance to all functional activities pain free (met, 3/22/23).  Patient will demonstrate full/maximized tissue mobility to increase ROM and promote healthy tissue to be pain free at discharge (met, 3/22/23).  Patient will increase AROM/PROM in shoulder abduction ROM to >/=170 degrees bilaterally to improve functional reach, carry, push, pull pain free (met, 3/22/23).  Patient will report compliance with stationary bike 5x week for 30 min each day to improve overall cardiovascular function and decrease cancer related fatigue at discharge (met, 3/22/23).    PLAN   This patient is discharged from Physical Therapy      Silvia Lombardo, PT

## 2023-03-22 NOTE — PROGRESS NOTES
Physical Therapy Daily Treatment Note     Date: 3/22/2023   Name: Christal Posey  Clinic Number: [id number]    Therapy Diagnosis:   Encounter Diagnoses   Name Primary?    Decreased ROM of left shoulder Yes    Decreased ROM of right shoulder     At risk for lymphedema     Impaired functional mobility and activity tolerance     Poor posture      Physician: Renetta Mcintyre, *    Physician Orders: PT Eval and Treat  Medical Diagnosis: Malignant neoplasm of lower-inner quadrant of left breast in female, estrogen receptor positive [C50.312, Z17.0], Status post bilateral mastectomy   Evaluation Date: 3/22/2023  Authorization Period Expiration: 12/31/2023  Plan of Care Certification Period: 03/24/2023  Visit # / Visits Authorized: 5 / 20 (plus eval)  Insurance: Blue Cross Blue Shield  FOTO: 2/3    Time In: 1:50 pm  Time Out: 2:30 pm  Total Billable Time: 40 minutes    Precautions: Standard and cancer      Subjective     Patient reports: doing good today, feels she is back to normal    She was compliant with home exercise program.   Response to Previous Treatment: no adverse events    Pain Scale: Christal rates pain on a scale of 0/10 on VAS.   Pain Location: N/A    Fatigue: it's better  Functional Change: less difficulty carrying items; able to reach farther    Treatment:   Chemotherapy: none  Radiation: none  Endocrine Therapy: began Tamoxifen 12/2022, planning 5 years     Surgery Date:   - 10/24/2022: connor mastectomy with SLNB and TE reconstruction  - 11/28/2022: tissue expansion removal (secondary to infection)    Objective     Objective Measures updated at progress report unless specified.     Shoulder Range of Motion: assessed 3/22/23  Active ROM Right Left   Flexion 180 180   Abduction 180 180   Extension 60 60   IR/90deg 90 90   ER/90deg 90 90     Strength: manual muscle test grades below   Upper Extremity Strength 3/22/23    (R) UE (L) UE   Shoulder flexion: 5/5 5/5    Shoulder Abduction: 5/5 5/5   Shoulder IR 5/5 5/5   Shoulder ER 5/5 5/5   Elbow flexion: 5/5 5/5   Elbow extension: 5/5 5/5   Wrist flexion: 5/5 5/5   Wrist extension: 5/5 5/5    good good      CMS Impairment/Limitation/Restriction for FOTO Shoulder Survey     Therapist reviewed FOTO scores for Christal Posey on 3/22/2023.   FOTO documents entered into [employer] - see Media section.     Limitations Score: 33%  Category: Carrying  Treatment     Christal received individual therapeutic exercises to improve postural correction and alignment, stretching and soft tissue mobility, and strengthening for 20 minutes including the following:     UBE: Seat 5, Level 1 (fwd/bkwd)  2 min each    Standing Exercise:  - Thread the needle, connor   10 sec x 5 reps    Seated Exercises:  - Bicep curls, 5#    2 set x 10 reps    Mat Exercises:  - LTR with hands behind head  2 min  - Pec stretch on towel roll   2 min    Standing Exercises:  - Shoulder extensions (green band)  2 set x 10 reps  - Triceps extensions (green band)  2 set x 10 reps      Christal participated in neuromuscular re-education activities to improve: Balance, Coordination, Proprioception and Posture for 10 minutes. The following activities were included:    Seated on Yoga Ball:   - Rows (green band)    2 set x 10 reps  - B shoulder ER (green band)   2 set x 10 reps  - Horizontal abd (green band)   2 set x 10 reps      Christal received the following dynamic therapeutic activities to improve tolerance to functional lifting and carrying activities of daily living for 10 minutes:    - Shelf lifts, 2# weight (Floor mirror)   2 set x 10 reps  - Crate lifts (floor to high/low mat), 7# weight  1 set x 5 reps    Home Exercise Program and Patient Education     Education Provided Regarding:  - Role of physical therapy in multi-disciplinary team  - Physical therapy goals, progress towards goals  - Exercise technique, plan of care, scheduling  - Proper lifting technique    Written Home  Exercises Provided: Patient instructed to cont prior HEP. Exercises were reviewed and Christal was able to demonstrate them prior to the end of the session. Christal demonstrated good  understanding of the education provided.     See EMR under Patient Instructions for exercises provided  1/25/23 and 2/7/23 .    Patient has no cultural, educational or language barriers to learning provided.    Assessment     Patient was able to demonstrate AROM of both shoulder WNL and pain free. Her B UE strength is 5/5 with no complaints of pain. She is independent with her HEP and has met all goals set for her at this time. She has returned to her PLOF at this time and is ready for discharge to her HEP.     Patient prognosis is Good.     Anticipated barriers to physical therapy: none anticipated    Goals as Follows:  Short Term Goals: 4 Weeks   Patient will demonstrate 100% understanding of lymphedema risk reduction practices to include self monitoring for lymphedema (met, 3/22/23).  Patient will demonstrate independence with Home Exercise program established (met, 3/22/23).  Patient will increase AROM/PROM in shoulder abduction ROM to >/=150 degrees bilaterally to improve functional reach, carry, push, pull pain free (exceeded, 2/7/23).  Patient will increase AROM/PROM in shoulder flexion to >/=170 degrees bilaterally to improve functional reach, carry, push, pull pain free (met, 2/7/23).    Long Term Goals: 8 Weeks   Patient will increase AROM/PROM in shoulder ER at 90 to 90 degrees bilaterally to improve functional reach, carry, push, pull pain free (met, 3/10/23).  Patient will increase strength to 4+/5 in gross UE musculature to improve tolerance to all functional activities pain free (met, 3/22/23).  Patient will demonstrate full/maximized tissue mobility to increase ROM and promote healthy tissue to be pain free at discharge (met, 3/22/23).  Patient will increase AROM/PROM in shoulder abduction ROM to >/=170 degrees bilaterally  to improve functional reach, carry, push, pull pain free (met, 3/22/23).  Patient will report compliance with stationary bike 5x week for 30 min each day to improve overall cardiovascular function and decrease cancer related fatigue at discharge (met, 3/22/23).     Plan     Discharge to [facility].    Therapist: Silvia Lombardo, PT  3/22/2023

## 2023-03-22 NOTE — PROGRESS NOTES
"Date of Service:3/20/2023    DIAGNOSIS:   This is a 63 y.o. 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    TREATMENT:   Bilateral mastectomy with left sentinel node biopsy on 10/24/2022. Diana Jama M.D. Surgical Oncology  Adjuvant endocrine therapy, tamoxifen Latanya Julian M.D. Medical Oncology     HISTORY OF PRESENT ILLNESS:   Christal Posey is a 63 y.o. female comes in for oncological follow up.  She denies change in her chest wall self-exam specifically denying new masses, skin or nipple changes, or nipple discharge. Past medical and surgical history is updated with no new changes.   After initial bilateral nipple sparing mastectomy with tissue expander reconstruction she had unfortunately infection requiring explant of the tissue expanders.     IMAGING:   Not indicated       PHYSICAL EXAM:   BP (!) 170/81 (BP Location: Right arm, Patient Position: Sitting, BP Method: Large (Automatic))   Pulse 94   Temp 97.8 °F (36.6 °C) (Oral)   Ht 5' 6" (1.676 m)   Wt 100.2 kg (221 lb)   LMP  (LMP Unknown)   SpO2 100%   BMI 35.67 kg/m²   General: The patient appears well and is in no acute distress.   Neuro: Alert & oriented x3.   Breasts: The exam was done with the patient seated and supine. Left breast -status post bilateral mastectomy with nipple preservation but without reconstruction at this time otherwise within normal limits. No palpable masses and no abnormal skin or nipple findings. No supraclavicular or axillary lymphadenopathy on the left side.   Right breast - status post bilateral mastectomy with nipple preservation but without reconstruction at this time otherwise within normal limits. No palpable masses and no abnormal skin or nipple findings. No supraclavicular or axillary lymphadenopathy on the right side.  Extremities:  No evidence of lymphedema    ASSESSMENT:   This is a 63 y.o. female without evidence of recurrence by exam, or " history.     PLAN:   We discussed the current state of her breast reconstruction in the setting of explant of her bilateral tissue expanders.  She currently has excess skin and nipple preservation without reconstruction.  We discussed the options in this setting.  Due to the complications with her initial attempt at reconstruction she expressed frustration and apprehension about proceeding with additional reconstruction.  We discussed options at this point would be no intervention leaving the excess skin, return to surgery for removal of excess skin and nipple-areolar complex for a aesthetic flat closure, referral to Plastic surgery to discuss additional options for breast reconstruction including implant or tissue based reconstruction.  We discussed timing of this intervention.  She could do this soon or wait to have the reconstruction.  She would like to spend more time recovering from her prior operation and consider her options.  She is leaning towards having the excess skin and nipple-areolar complex removed for anesthetic flat closure.  This closure could be done by myself or Plastic surgery.  She would be most interested in having me perform the procedure.  We plan to revisit this conversation her next follow up in 6 months.      We discussed importance of continued monitoring with clinical breast exam.  We discussed his recommend every 6 months.  We will plan to see her back in 6 months for clinical breast exam.  At some point we may alternate these exams with Medical Oncology and see her once a year.

## 2023-03-23 NOTE — PROGRESS NOTES
Acupuncture Evaluation Note     Name: Christal Posey  [facility] Number: [id number]    Traditional Chinese Medicine (TCM) Diagnosis: Qi Stagnation  Medical Diagnosis:   Encounter Diagnoses   Name Primary?    Chronic bilateral low back pain without sciatica Yes    SERM use (selective estrogen receptor modulator)     Arthralgia, unspecified joint         Evaluation Date: 3/23/2023    Visit #/Visits authorized: [unfilled]     Precautions: Standard and cancer    Subjective     Chief Concern: joint    Medical necessity is demonstrated by the following IMPAIRMENTS: Medical Necessity: Decreased quality of life                Sleep: ok    Energy Levels:  low, recovering from pneumonia still    Other Symptoms: increased joint pain        Treatment     Treatment Principles:  Move Qi    Acupuncture points used:  4 TOWNSEND, Du20, ER GENO, Gb34, Kd10, Li11, REN4, REN6, VELASQUEZ MEN, Sp10, Sp6, Sp9, St36, and YIN CARRASCO  + heding, xiyan, renan  Needles In: 28  Needles Out: 30  [place] W/ STIM placed: 9:15 AM  Needles W/ STIM removed: 9:45 AM    Assessment       Patient prognosis is Good.     Patient will continue to benefit from acupuncture treatment to address the deficits listed in the problem list box on initial evaluation, provide patient family education and to maximize pt's level of independence in the home and community environment.     Patient's spiritual, cultural and educational needs considered and pt agreeable to plan of care and goals.     Anticipated barriers to treatment: sickness / medication changes    Plan     Recommend 1 /week for 3 sessions then re-assess.      Education:  Patient is aware of cumulative benefit of acupuncture    EVAL AS NEEDED

## 2023-05-03 ENCOUNTER — PATIENT MESSAGE (OUTPATIENT)
Dept: RHEUMATOLOGY | Facility: CLINIC | Age: 63
End: 2023-05-03
Payer: COMMERCIAL

## 2023-05-04 ENCOUNTER — OFFICE VISIT (OUTPATIENT)
Dept: HEMATOLOGY/ONCOLOGY | Facility: CLINIC | Age: 63
End: 2023-05-04
Payer: COMMERCIAL

## 2023-05-04 ENCOUNTER — LAB VISIT (OUTPATIENT)
Dept: LAB | Facility: HOSPITAL | Age: 63
End: 2023-05-04
Payer: COMMERCIAL

## 2023-05-04 ENCOUNTER — PATIENT MESSAGE (OUTPATIENT)
Dept: OBSTETRICS AND GYNECOLOGY | Facility: CLINIC | Age: 63
End: 2023-05-04
Payer: COMMERCIAL

## 2023-05-04 VITALS
TEMPERATURE: 98 F | DIASTOLIC BLOOD PRESSURE: 78 MMHG | WEIGHT: 211.88 LBS | HEIGHT: 66 IN | HEART RATE: 75 BPM | SYSTOLIC BLOOD PRESSURE: 159 MMHG | BODY MASS INDEX: 34.05 KG/M2 | OXYGEN SATURATION: 97 % | RESPIRATION RATE: 18 BRPM

## 2023-05-04 DIAGNOSIS — C50.312 MALIGNANT NEOPLASM OF LOWER-INNER QUADRANT OF LEFT BREAST IN FEMALE, ESTROGEN RECEPTOR POSITIVE: ICD-10-CM

## 2023-05-04 DIAGNOSIS — C50.312 MALIGNANT NEOPLASM OF LOWER-INNER QUADRANT OF LEFT BREAST IN FEMALE, ESTROGEN RECEPTOR POSITIVE: Primary | ICD-10-CM

## 2023-05-04 DIAGNOSIS — R07.81 RIB PAIN ON RIGHT SIDE: ICD-10-CM

## 2023-05-04 DIAGNOSIS — M54.50 ACUTE RIGHT-SIDED LOW BACK PAIN WITHOUT SCIATICA: ICD-10-CM

## 2023-05-04 DIAGNOSIS — Z17.0 MALIGNANT NEOPLASM OF LOWER-INNER QUADRANT OF LEFT BREAST IN FEMALE, ESTROGEN RECEPTOR POSITIVE: ICD-10-CM

## 2023-05-04 DIAGNOSIS — E55.9 VITAMIN D DEFICIENCY: ICD-10-CM

## 2023-05-04 DIAGNOSIS — Z17.0 MALIGNANT NEOPLASM OF LOWER-INNER QUADRANT OF LEFT BREAST IN FEMALE, ESTROGEN RECEPTOR POSITIVE: Primary | ICD-10-CM

## 2023-05-04 LAB
ALBUMIN SERPL BCP-MCNC: 3.6 G/DL (ref 3.5–5.2)
ALP SERPL-CCNC: 81 U/L (ref 55–135)
ALT SERPL W/O P-5'-P-CCNC: 28 U/L (ref 10–44)
ANION GAP SERPL CALC-SCNC: 8 MMOL/L (ref 8–16)
ANION GAP SERPL CALC-SCNC: 8 MMOL/L (ref 8–16)
AST SERPL-CCNC: 23 U/L (ref 10–40)
BASOPHILS # BLD AUTO: 0.05 K/UL (ref 0–0.2)
BASOPHILS NFR BLD: 0.7 % (ref 0–1.9)
BILIRUB SERPL-MCNC: 0.4 MG/DL (ref 0.1–1)
BUN SERPL-MCNC: 12 MG/DL (ref 8–23)
BUN SERPL-MCNC: 12 MG/DL (ref 8–23)
CALCIUM SERPL-MCNC: 9.3 MG/DL (ref 8.7–10.5)
CALCIUM SERPL-MCNC: 9.3 MG/DL (ref 8.7–10.5)
CHLORIDE SERPL-SCNC: 105 MMOL/L (ref 95–110)
CHLORIDE SERPL-SCNC: 105 MMOL/L (ref 95–110)
CO2 SERPL-SCNC: 30 MMOL/L (ref 23–29)
CO2 SERPL-SCNC: 30 MMOL/L (ref 23–29)
CREAT SERPL-MCNC: 1.1 MG/DL (ref 0.5–1.4)
CREAT SERPL-MCNC: 1.1 MG/DL (ref 0.5–1.4)
DIFFERENTIAL METHOD: ABNORMAL
EOSINOPHIL # BLD AUTO: 0.3 K/UL (ref 0–0.5)
EOSINOPHIL NFR BLD: 4.1 % (ref 0–8)
ERYTHROCYTE [DISTWIDTH] IN BLOOD BY AUTOMATED COUNT: 14.7 % (ref 11.5–14.5)
EST. GFR  (NO RACE VARIABLE): 57.2 ML/MIN/1.73 M^2
EST. GFR  (NO RACE VARIABLE): 57.2 ML/MIN/1.73 M^2
GLUCOSE SERPL-MCNC: 95 MG/DL (ref 70–110)
GLUCOSE SERPL-MCNC: 95 MG/DL (ref 70–110)
HCT VFR BLD AUTO: 40.3 % (ref 37–48.5)
HGB BLD-MCNC: 12.8 G/DL (ref 12–16)
IMM GRANULOCYTES # BLD AUTO: 0.01 K/UL (ref 0–0.04)
IMM GRANULOCYTES NFR BLD AUTO: 0.1 % (ref 0–0.5)
LYMPHOCYTES # BLD AUTO: 2.2 K/UL (ref 1–4.8)
LYMPHOCYTES NFR BLD: 32.7 % (ref 18–48)
MCH RBC QN AUTO: 27.5 PG (ref 27–31)
MCHC RBC AUTO-ENTMCNC: 31.8 G/DL (ref 32–36)
MCV RBC AUTO: 87 FL (ref 82–98)
MONOCYTES # BLD AUTO: 0.4 K/UL (ref 0.3–1)
MONOCYTES NFR BLD: 5.3 % (ref 4–15)
NEUTROPHILS # BLD AUTO: 3.9 K/UL (ref 1.8–7.7)
NEUTROPHILS NFR BLD: 57.1 % (ref 38–73)
NRBC BLD-RTO: 0 /100 WBC
PLATELET # BLD AUTO: 287 K/UL (ref 150–450)
PMV BLD AUTO: 10.5 FL (ref 9.2–12.9)
POTASSIUM SERPL-SCNC: 4.1 MMOL/L (ref 3.5–5.1)
POTASSIUM SERPL-SCNC: 4.1 MMOL/L (ref 3.5–5.1)
PROT SERPL-MCNC: 7.2 G/DL (ref 6–8.4)
RBC # BLD AUTO: 4.66 M/UL (ref 4–5.4)
SODIUM SERPL-SCNC: 143 MMOL/L (ref 136–145)
SODIUM SERPL-SCNC: 143 MMOL/L (ref 136–145)
WBC # BLD AUTO: 6.81 K/UL (ref 3.9–12.7)

## 2023-05-04 PROCEDURE — 3078F DIAST BP <80 MM HG: CPT | Mod: CPTII,S$GLB,, | Performed by: NURSE PRACTITIONER

## 2023-05-04 PROCEDURE — 4010F PR ACE/ARB THEARPY RXD/TAKEN: ICD-10-PCS | Mod: CPTII,S$GLB,, | Performed by: NURSE PRACTITIONER

## 2023-05-04 PROCEDURE — 1159F PR MEDICATION LIST DOCUMENTED IN MEDICAL RECORD: ICD-10-PCS | Mod: CPTII,S$GLB,, | Performed by: NURSE PRACTITIONER

## 2023-05-04 PROCEDURE — 85025 COMPLETE CBC W/AUTO DIFF WBC: CPT | Performed by: INTERNAL MEDICINE

## 2023-05-04 PROCEDURE — 36415 COLL VENOUS BLD VENIPUNCTURE: CPT | Performed by: INTERNAL MEDICINE

## 2023-05-04 PROCEDURE — 3008F BODY MASS INDEX DOCD: CPT | Mod: CPTII,S$GLB,, | Performed by: NURSE PRACTITIONER

## 2023-05-04 PROCEDURE — 99999 PR PBB SHADOW E&M-EST. PATIENT-LVL V: ICD-10-PCS | Mod: PBBFAC,,, | Performed by: NURSE PRACTITIONER

## 2023-05-04 PROCEDURE — 3077F PR MOST RECENT SYSTOLIC BLOOD PRESSURE >= 140 MM HG: ICD-10-PCS | Mod: CPTII,S$GLB,, | Performed by: NURSE PRACTITIONER

## 2023-05-04 PROCEDURE — 3008F PR BODY MASS INDEX (BMI) DOCUMENTED: ICD-10-PCS | Mod: CPTII,S$GLB,, | Performed by: NURSE PRACTITIONER

## 2023-05-04 PROCEDURE — 99215 OFFICE O/P EST HI 40 MIN: CPT | Mod: S$GLB,,, | Performed by: NURSE PRACTITIONER

## 2023-05-04 PROCEDURE — 1159F MED LIST DOCD IN RCRD: CPT | Mod: CPTII,S$GLB,, | Performed by: NURSE PRACTITIONER

## 2023-05-04 PROCEDURE — 3078F PR MOST RECENT DIASTOLIC BLOOD PRESSURE < 80 MM HG: ICD-10-PCS | Mod: CPTII,S$GLB,, | Performed by: NURSE PRACTITIONER

## 2023-05-04 PROCEDURE — 80053 COMPREHEN METABOLIC PANEL: CPT | Performed by: INTERNAL MEDICINE

## 2023-05-04 PROCEDURE — 3077F SYST BP >= 140 MM HG: CPT | Mod: CPTII,S$GLB,, | Performed by: NURSE PRACTITIONER

## 2023-05-04 PROCEDURE — 4010F ACE/ARB THERAPY RXD/TAKEN: CPT | Mod: CPTII,S$GLB,, | Performed by: NURSE PRACTITIONER

## 2023-05-04 PROCEDURE — 99999 PR PBB SHADOW E&M-EST. PATIENT-LVL V: CPT | Mod: PBBFAC,,, | Performed by: NURSE PRACTITIONER

## 2023-05-04 PROCEDURE — 99215 PR OFFICE/OUTPT VISIT, EST, LEVL V, 40-54 MIN: ICD-10-PCS | Mod: S$GLB,,, | Performed by: NURSE PRACTITIONER

## 2023-05-04 NOTE — PROGRESS NOTES
"Subjective:       Patient ID: Christal Posey is a 63 y.o. female.    Chief Complaint: Malignant neoplasm of lower-inner quadrant of left breast i    HPI    Returns for follow up on Tamoxifen  Feels pain in right chest wall- rib feels bruised.   Also notes twinge in right groin at times.   Right side low back pain- predated dx. No radicular pain.   No numbness/tingling  No other pain issues  No trips or falls  Seeing breast surgery in October but likely will not have any further surgeries.   Continue to have hot flashes- acupuncture helped but returned.   No other issues or complaints.     2/1/2023 BMD:   Impression:     *Normal bone mineral density  *Compared with previous DXA, BMD at the lumbar spine has declined by 5.2%, and BMD at the total hip has declined by 4.8%.     RECOMMENDATIONS:  *Daily calcium intake 7419-0797 mg, dietary sources preferred; Vitamin D 3897-8029 IU daily.  *Weight bearing exercise and fall precautions.  *No additional pharmacologic therapy recommended at this time.  *Repeat BMD in 2 years    Previously, required implants out due to infection and contracture  - 11/28/2022   Diagnosis:  Preoperative diagnosis cellulitis and capsular contracture of the right breast  Procedure performed  1. Bilateral removal of implants (tissue expanders)   2. Total bilateral capsulectomy  3. Wide excision soft tissue of right axillary region measuring 16 cm x 6 cm with layered closure final incision 16 cm  4. Wide excision soft tissue of the left axillary region measuring 16 cm x 6 cm with layered closure final incision 16 cm  Reports concerned as asked for nipples to be removed as she did not plan for additional reconstruction and this was not done    Last saw breast sx 3/20/2023  "She would like to spend more time recovering from her prior operation and consider her options.  She is leaning towards having the excess skin and nipple-areolar complex removed for anesthetic flat closure. "    Oncology " History:  - 8/12/2022 Breast MRI:  Findings:  Left  Numerous scattered foci and small masses are present, most of which appear similar to eachother.    There is an 8 mm x 7 mm x 7 mm irregularly shaped, heterogeneous mass seen in the left breast at 6 o'clock, 2.4 cm from the nipple and 8 cm from the chest wall. Delayed phase is washout.   There is a 9 mm x 7 mm x 7 mm irregularly shaped, homogeneous mass with irregular margins seen in the upper inner quadrant of the right breast, 2.8 cm from the nipple and 6.7 cm from the chest wall. Delayed phase is plateau. This is best seen on Series 18140: Image 50.  This is approximately 3 cm anterior and superior to the signal void related to the 9 o'clock biopsy marker.   There is no suspicious enhancement associated with either biopsy marker.   Right  Numerous scattered foci and small masses are present, most of which appear similar to eachother.    There is a 6 mm oval, homogeneous mass with circumscribed margins seen in the lower central region of the right breast, 2.3 cm from the nipple and 9.5 cm from the chest wall. Delayed phase is washout. This is best seen in Series 03855: Image 70.   There is no internal mammary or axillary adenopathy.  Impression:  Left  Mass: Left breast 9 mm x 7 mm x 7 mm mass at the upper inner position. Assessment: 4 - Suspicious finding. Biopsy is recommended.   Mass: Left breast 8 mm x 7 mm x 7 mm mass at the 6 o'clock position. Assessment: 4 - Suspicious finding. Biopsy is recommended.   There is no suspicious enhancement associated with either biopsy marker.   Right  Mass: Right breast  6 mm mass at the lower central position. Assessment: 4 - Suspicious finding. Biopsy is recommended.   BI-RADS Category:   Overall: 4 - Suspicious     - 9/28/2022 MRI guided breast biopsy:  BREAST, RIGHT, LOWER CENTRAL MASS, BIOPSY:   - Intraductal papilloma, benign.   - Benign breast tissue with predominantly fatty stroma and blood clot.   -  Microcalcifications: Focally seen in association with benign breast ducts.   - No atypia or malignancy.   - Additional deeper sections have been examined.      - 10/24/2022 Bilateral Mastectomy:  1. BREAST, RIGHT, MASTECTOMY:   - Negative for malignancy.   - Atypical ductal hyperplasia (ADH), focal.   - No residual intraductal papilloma present.   - Previous biopsy site changes and biopsy clip.   - Benign subareolar nipple ducts.   - One benign intramammary lymph node.   2. AXILLARY SENTINEL LYMPH NODE, LEFT, # 1, HOT BLUE 1451, EXCISION:   - One benign axillary sentinel lymph node, negative for metastatic carcinoma   (0/1).   - Immunohistochemical stains for CK WSK, Cam 5.2, and CK AE/1/AE3 are   negative, supporting the diagnosis.   3. BREAST, LEFT, NIPPLE SPARING MASTECTOMY:   - Invasive ductal carcinoma of breast with extensive intraductal component,   see Tumor Synoptic.   - Size of invasive carcinoma: 9 MM (measured histologically on slide 3L).   - [place] Histologic Score: Grade 1 of 3.                     Tubule Formation:  2                     Nuclear Pleomorphism:  2                     Mitotic Activity:  1   - Associated (DCIS), intermediate nuclear grade, cribriform type, with   central necrosis.   - Size of DCIS: at least 10 MM.   - No lymphovascular invasion.   - Margins:   ·tab Invasive carcinoma and DCIS are greater than 10 MM from all margins.   - Benign subareolar nipple ducts.   - Both biopsy clips are identified and corresponding areas sampled.   - Microcalcifications: Seen in association with benign breast ducts.   - Pathologic staging: pT1b (sn)N0   4. BREAST, LEFT, ADDITIONAL INFERIOR LATERAL MARGIN, NIPPLE SPARING   MASTECTOMY:   - Negative for atypia or malignancy.   - Benign breast tissue with predominantly fatty stroma.   5. BREAST, LEFT, NEW SUPERIOR MARGIN, NIPPLE SPARING MASTECTOMY:   - Negative for atypia or malignancy.   - Benign breast tissue with predominantly fatty stroma.  "  TUMOR SYNOPTIC: (INVASIVE CARCINOMA OF THE BREAST: Resection)   Standard(s) : AJCC-UICC 8   SPECIMEN   Procedure   Total mastectomy (including nipple-sparing and skin-sparing mastectomy)   Specimen Laterality   Left   TUMOR   +Tumor Site   Upper inner quadrant   Histologic Type   Invasive carcinoma of no special type (ductal)   Histologic Grade (Tama Histologic Score)   Tama Score   Glandular (Acinar) / Tubular Differentiation     Score 2 (10 to 75% of tumor area forming glandular / tubular structures)   Nuclear Pleomorphism     Score 2 (Cells larger than normal with open vesicular nuclei, visible   nucleoli, and moderate     variability in both size and shape)   Mitotic Rate     Score 1   Overall Grade     Grade 1 (scores of 3, 4 or 5)   Tumor Size   Greatest dimension of largest invasive focus greater than 1 mm (specify exact   measurement in Millimeters (mm)): 9 mm   +Tumor Focality   Single focus of invasive carcinoma   Ductal Carcinoma In Situ (DCIS)   Present   Positive for extensive intraductal component (EIC)   +Size (Extent) of DCIS     Estimated size (extent) of DCIS is at least in Millimeters (mm): at least   10 mm (multiple foci)   +Architectural Patterns     Cribriform   +Nuclear Grade     Grade II (intermediate)   +Necrosis     Present, central (expansive "comedo" necrosis)   +Lobular Carcinoma In Situ (LCIS)   Not identified   Tumor Extent  Tumor Extent (required only if skin, nipple, or skeletal muscle   are present and involved)     Not applicable (skin, nipple, and skeletal muscle are absent OR are   uninvolved)   Lymphovascular Invasion   Not identified   +Dermal Lymphovascular Invasion   No skin present   +Microcalcifications   Present in non-neoplastic tissue   Treatment Effect in the Breast   No known presurgical therapy   MARGINS   Margin Status for Invasive Carcinoma   All margins negative for invasive carcinoma   Distance from Invasive   Carcinoma to Closest Margin     Specify " in Millimeters (mm)     Greater than: 10 mm   +Closest Margin(s) to Invasive Carcinoma  (select all that apply)     Cannot be determined (explain): invasive carcinoma is in a random section   of the breast which did not get gross measurements to the nearest margin   Margin Status for DCIS   All margins negative for DCIS   Distance from DCIS to Closest Margin     Specify in Millimeters (mm)     Greater than: 10 mm   Closest Margin(s) to DCIS     Other (specify): nipple margin is closest gross margin   REGIONAL LYMPH NODES   Regional Lymph Node Status   Regional lymph nodes present   All regional lymph nodes negative for tumor   Total Number of Lymph Nodes Examined (sentinel and non-sentinel)     Exact number (specify): 1   Number of [place] Nodes Examined     Exact number (specify): 1   PATHOLOGIC STAGE CLASSIFICATION (pTNM, AJCC 8th Edition)   pT Category   pT1b: Tumor greater than 5 mm but less than or equal to 10 mm in greatest   dimension   Regional Lymph Nodes Modifier   (sn): [place] node(s) evaluated. If 6 or more nodes (sentinel or   nonsentinel) are removed, this modifier should not be used.   pN Category   pN0: No regional lymph node metastasis identified or ITCs only   SPECIAL STUDIES   BREAST BIOMARKERS (performed on the current specimen):   ER:  Positive (95% of tumor cells demonstrate moderate nuclear   immunoreactivity).   MO:  Negative.   [id number]:  Equivocal (2+, HER2 FISH is pending).   Ki-67 proliferative index:  15%.      Prior history of DCIS  Monitored by surveillance- opted out of adjuvant endocrine with low volume disease  DCIS History:  5/16/16 Screening mammography:  Calcifications in the left breast require additional evaluation.    ACR BI-RADS Category 0: Incomplete: Need Additional Imaging Evaluation  - 5/17/16 Diagnostic mammogram  Calcifications in the left breast are suspicious.  Histology using core biopsy  is recommended.  ACR BI-RADS Category 4: Suspicious Abnormality  - 5/20/16  biopsy  1, 2. LEFT BREAST, WITH CALCIFICATIONS AND WITHOUT CALCIFICATIONS, UPPER INNER QUADRANT  (NEEDLE BIOPSY):  -Low-grade ductal carcinoma in situ (DCIS)  -Nuclear grade 1 out of 3  -Cribriform pattern  -Associated with microcalcifications  %, % positive  - 6/10/2016 Lumpectomy  Pathology:  Procedure - Excision with wire guided localization  Lymph node sampling - Not sampled  Specimen laterality - Left  Tumor site - Not specified  Size of DCIS - 2mm, including findings from the biopsy report  Histologic type - Ductal carcinoma in situ  Architectural pattern - Solid and cribriforming  Nuclear grade - Low  Necrosis - Absent  Margins - Negative, inferior is 2mm  Pathologc staging - p Tis Nx Mx  Hormone receptors (See Breast Biopsy report; MS16 - 64789)  Estrogen receptor - Positive, strong, 100%  Progesterone receptors - Positive, strong, 100%     SUPPLEMENTAL #1:   HER2 AMPLIFICATION ASSOCIATED WITH BREAST CANCER, FISH, TISSUE:   Result Summary   Negative      Additional PMH:  Bilateral cataract surgery     FH:  No new cancers    Review of Systems   Constitutional:         See above   All other systems reviewed and are negative.      Objective:      Physical Exam  Vitals and nursing note reviewed.   Constitutional:       General: She is not in acute distress.     Appearance: Normal appearance. She is well-developed.      Comments: Presents alone  Very pleasant.    HENT:      Head: Normocephalic and atraumatic.   Eyes:      General: Lids are normal. No scleral icterus.     Conjunctiva/sclera: Conjunctivae normal.      Pupils: Pupils are equal, round, and reactive to light.   Neck:      Thyroid: No thyromegaly.      Vascular: No JVD.      Trachea: Trachea normal.   Cardiovascular:      Rate and Rhythm: Normal rate and regular rhythm.      Heart sounds: Normal heart sounds.   Pulmonary:      Effort: Pulmonary effort is normal.      Breath sounds: Normal breath sounds. No wheezing.   Chest:           Comments: Bilateral mastectomies. Nipples remain.   Tenderness at marked area above along incision.   Incisions healed. No LAD  Abdominal:      General: Bowel sounds are normal. There is no distension.      Palpations: Abdomen is soft. There is no mass.      Tenderness: There is no abdominal tenderness.      Comments: No organomegaly.    Musculoskeletal:         General: Normal range of motion.      Cervical back: Normal range of motion and neck supple.      Comments: No spinal or paraspinal tenderness.    Lymphadenopathy:      Head:      Right side of head: No submental or submandibular adenopathy.      Left side of head: No submental or submandibular adenopathy.      Cervical: No cervical adenopathy.      Upper Body:      Right upper body: No supraclavicular or axillary adenopathy.      Left upper body: No supraclavicular or axillary adenopathy.   Skin:     General: Skin is warm and dry.      Capillary Refill: Capillary refill takes less than 2 seconds.      Findings: No bruising or rash.      Nails: There is no clubbing.   Neurological:      Mental Status: She is alert and oriented to person, place, and time.   Psychiatric:         Mood and Affect: Mood normal.         Speech: Speech normal.         Behavior: Behavior normal.       Labs- reviewed    Assessment:       Problem List Items Addressed This Visit          Oncology    Malignant neoplasm of lower-inner quadrant of left breast in female, estrogen receptor positive - Primary    Relevant Orders    CBC Oncology    Comprehensive Metabolic Panel       Endocrine    Vitamin D deficiency    Relevant Orders    Vitamin D     Other Visit Diagnoses       Rib pain on right side        Relevant Orders    NM Bone Scan Whole Body    Acute right-sided low back pain without sciatica        Relevant Orders    NM Bone Scan Whole Body              Plan:       No breast imaging needed as bilateral mastectomies  Continue Tamoxifen  Continue f/u with Dr. Mcintyre  Bone scan as  with right rib pain, lower back and groin pain. If negative, may need MRI lumbar spine. Of note, back pain predated dx.     RTC 3 months-  Knows to call for any issues        Route Chart for Scheduling    Med Onc Chart Routing  Urgent    Follow up with physician 3 months. with cbc, cmp, vit d   Follow up with [facility]    Infusion scheduling note    Injection scheduling note    Labs    Imaging   Bone scan asap   Pharmacy appointment    Other referrals              Patient is in agreement with the proposed treatment plan. All questions were answered to the patient's satisfaction. Pt knows to call clinic for any new or worsening symptoms and if anything is needed before the next clinic visit.      Brijesh Zimmer, RAKESHP-C  Hematology & Oncology  [facility] [place] [address]  ph. [phone]  Fax. [phone]       40 minutes of total time spent on the encounter, which includes face to face time and non-face to face time preparing to see the patient (eg, review of tests), Obtaining and/or reviewing separately obtained history, Documenting clinical information in the electronic or other health record, Independently interpreting results (not separately reported) and communicating results to the patient/family/caregiver, or Care coordination (not separately reported).

## 2023-05-05 DIAGNOSIS — M25.50 POLYARTHRALGIA: Primary | ICD-10-CM

## 2023-05-05 RX ORDER — BACLOFEN 10 MG/1
10 TABLET ORAL 3 TIMES DAILY PRN
Qty: 90 TABLET | Refills: 4 | Status: SHIPPED | OUTPATIENT
Start: 2023-05-05 | End: 2023-06-04

## 2023-05-08 ENCOUNTER — TELEPHONE (OUTPATIENT)
Dept: HEMATOLOGY/ONCOLOGY | Facility: CLINIC | Age: 63
End: 2023-05-08
Payer: COMMERCIAL

## 2023-05-17 ENCOUNTER — HOSPITAL ENCOUNTER (OUTPATIENT)
Dept: RADIOLOGY | Facility: HOSPITAL | Age: 63
Discharge: HOME | End: 2023-05-17
Attending: NURSE PRACTITIONER
Payer: COMMERCIAL

## 2023-05-17 DIAGNOSIS — M54.50 ACUTE RIGHT-SIDED LOW BACK PAIN WITHOUT SCIATICA: ICD-10-CM

## 2023-05-17 DIAGNOSIS — R07.81 RIB PAIN ON RIGHT SIDE: ICD-10-CM

## 2023-05-17 PROCEDURE — 78306 NM BONE SCAN WHOLE BODY: ICD-10-PCS | Mod: 26,,, | Performed by: RADIOLOGY

## 2023-05-17 PROCEDURE — 78306 BONE IMAGING WHOLE BODY: CPT | Mod: TC

## 2023-05-17 PROCEDURE — 78306 BONE IMAGING WHOLE BODY: CPT | Mod: 26,,, | Performed by: RADIOLOGY

## 2023-05-17 PROCEDURE — A9503 TC99M MEDRONATE: HCPCS

## 2023-05-18 ENCOUNTER — TELEPHONE (OUTPATIENT)
Dept: HEMATOLOGY/ONCOLOGY | Facility: CLINIC | Age: 63
End: 2023-05-18
Payer: COMMERCIAL

## 2023-05-18 DIAGNOSIS — Z17.0 MALIGNANT NEOPLASM OF LOWER-INNER QUADRANT OF LEFT BREAST IN FEMALE, ESTROGEN RECEPTOR POSITIVE: ICD-10-CM

## 2023-05-18 DIAGNOSIS — R07.81 RIB PAIN ON RIGHT SIDE: Primary | ICD-10-CM

## 2023-05-18 DIAGNOSIS — C50.312 MALIGNANT NEOPLASM OF LOWER-INNER QUADRANT OF LEFT BREAST IN FEMALE, ESTROGEN RECEPTOR POSITIVE: ICD-10-CM

## 2023-05-18 NOTE — TELEPHONE ENCOUNTER
Continues to have right rib pain. Noticed this before surgery. Seh thought it was muscle related. Continues to have mild pain in the area. Will proceed with CT chest.   She was asked to let me know if this does not get scheduled.   PJ

## 2023-05-18 NOTE — TELEPHONE ENCOUNTER
----- Message from Lori Christopher sent at 5/18/2023  9:04 AM CDT -----  Regarding: Missed call / results        Caller: Christal Posey       Returning call to: Goran Miles Np       Caller can be reached at: [phone]      Nature of the call: Returning missed call to discuss results

## 2023-05-18 NOTE — TELEPHONE ENCOUNTER
----- Message from Brijesh Zimmer NP sent at 5/18/2023  8:53 AM CDT -----  Needs CT chest within the next week or 2.   PJ

## 2023-05-24 ENCOUNTER — PATIENT MESSAGE (OUTPATIENT)
Dept: OBSTETRICS AND GYNECOLOGY | Facility: CLINIC | Age: 63
End: 2023-05-24
Payer: COMMERCIAL

## 2023-05-26 ENCOUNTER — HOSPITAL ENCOUNTER (OUTPATIENT)
Dept: RADIOLOGY | Facility: HOSPITAL | Age: 63
Discharge: HOME | End: 2023-05-26
Attending: NURSE PRACTITIONER
Payer: COMMERCIAL

## 2023-05-26 ENCOUNTER — OFFICE VISIT (OUTPATIENT)
Dept: OBSTETRICS AND GYNECOLOGY | Facility: CLINIC | Age: 63
End: 2023-05-26
Payer: COMMERCIAL

## 2023-05-26 VITALS — BODY MASS INDEX: 34.05 KG/M2 | WEIGHT: 211 LBS | SYSTOLIC BLOOD PRESSURE: 144 MMHG | DIASTOLIC BLOOD PRESSURE: 86 MMHG

## 2023-05-26 DIAGNOSIS — R30.0 DYSURIA: ICD-10-CM

## 2023-05-26 DIAGNOSIS — R07.81 RIB PAIN ON RIGHT SIDE: ICD-10-CM

## 2023-05-26 DIAGNOSIS — Z17.0 MALIGNANT NEOPLASM OF LOWER-INNER QUADRANT OF LEFT BREAST IN FEMALE, ESTROGEN RECEPTOR POSITIVE: ICD-10-CM

## 2023-05-26 DIAGNOSIS — C50.312 MALIGNANT NEOPLASM OF LOWER-INNER QUADRANT OF LEFT BREAST IN FEMALE, ESTROGEN RECEPTOR POSITIVE: ICD-10-CM

## 2023-05-26 DIAGNOSIS — N89.8 VAGINAL DISCHARGE: Primary | ICD-10-CM

## 2023-05-26 LAB
BILIRUB UR QL STRIP: NEGATIVE
CANDIDA RRNA VAG QL PROBE: NEGATIVE
G VAGINALIS RRNA GENITAL QL PROBE: NEGATIVE
GLUCOSE UR QL STRIP: NEGATIVE
KETONES UR QL STRIP: NEGATIVE
LEUKOCYTE ESTERASE UR QL STRIP: NEGATIVE
PH, POC UA: 6
POC BLOOD, URINE: NEGATIVE
POC NITRATES, URINE: NEGATIVE
PROT UR QL STRIP: NEGATIVE
SP GR UR STRIP: 1 (ref 1–1.03)
T VAGINALIS RRNA GENITAL QL PROBE: NEGATIVE
UROBILINOGEN UR STRIP-ACNC: NORMAL (ref 0.1–1.1)

## 2023-05-26 PROCEDURE — 81003 POCT URINALYSIS, DIPSTICK, AUTOMATED, W/O SCOPE: ICD-10-PCS | Mod: QW,S$GLB,, | Performed by: FAMILY MEDICINE

## 2023-05-26 PROCEDURE — 99213 OFFICE O/P EST LOW 20 MIN: CPT | Mod: S$GLB,,, | Performed by: FAMILY MEDICINE

## 2023-05-26 PROCEDURE — 4010F ACE/ARB THERAPY RXD/TAKEN: CPT | Mod: CPTII,S$GLB,, | Performed by: FAMILY MEDICINE

## 2023-05-26 PROCEDURE — 3077F PR MOST RECENT SYSTOLIC BLOOD PRESSURE >= 140 MM HG: ICD-10-PCS | Mod: CPTII,S$GLB,, | Performed by: FAMILY MEDICINE

## 2023-05-26 PROCEDURE — 1159F MED LIST DOCD IN RCRD: CPT | Mod: CPTII,S$GLB,, | Performed by: FAMILY MEDICINE

## 2023-05-26 PROCEDURE — 99999 PR PBB SHADOW E&M-EST. PATIENT-LVL IV: CPT | Mod: PBBFAC,,, | Performed by: FAMILY MEDICINE

## 2023-05-26 PROCEDURE — 99213 PR OFFICE/OUTPT VISIT, EST, LEVL III, 20-29 MIN: ICD-10-PCS | Mod: S$GLB,,, | Performed by: FAMILY MEDICINE

## 2023-05-26 PROCEDURE — 71250 CT THORAX DX C-: CPT | Mod: 26,,, | Performed by: RADIOLOGY

## 2023-05-26 PROCEDURE — 1159F PR MEDICATION LIST DOCUMENTED IN MEDICAL RECORD: ICD-10-PCS | Mod: CPTII,S$GLB,, | Performed by: FAMILY MEDICINE

## 2023-05-26 PROCEDURE — 3008F BODY MASS INDEX DOCD: CPT | Mod: CPTII,S$GLB,, | Performed by: FAMILY MEDICINE

## 2023-05-26 PROCEDURE — 71250 CT CHEST WITHOUT CONTRAST: ICD-10-PCS | Mod: 26,,, | Performed by: RADIOLOGY

## 2023-05-26 PROCEDURE — 81003 URINALYSIS AUTO W/O SCOPE: CPT | Mod: QW,S$GLB,, | Performed by: FAMILY MEDICINE

## 2023-05-26 PROCEDURE — 4010F PR ACE/ARB THEARPY RXD/TAKEN: ICD-10-PCS | Mod: CPTII,S$GLB,, | Performed by: FAMILY MEDICINE

## 2023-05-26 PROCEDURE — 3008F PR BODY MASS INDEX (BMI) DOCUMENTED: ICD-10-PCS | Mod: CPTII,S$GLB,, | Performed by: FAMILY MEDICINE

## 2023-05-26 PROCEDURE — 3079F PR MOST RECENT DIASTOLIC BLOOD PRESSURE 80-89 MM HG: ICD-10-PCS | Mod: CPTII,S$GLB,, | Performed by: FAMILY MEDICINE

## 2023-05-26 PROCEDURE — 99999 PR PBB SHADOW E&M-EST. PATIENT-LVL IV: ICD-10-PCS | Mod: PBBFAC,,, | Performed by: FAMILY MEDICINE

## 2023-05-26 PROCEDURE — 3077F SYST BP >= 140 MM HG: CPT | Mod: CPTII,S$GLB,, | Performed by: FAMILY MEDICINE

## 2023-05-26 PROCEDURE — 3079F DIAST BP 80-89 MM HG: CPT | Mod: CPTII,S$GLB,, | Performed by: FAMILY MEDICINE

## 2023-05-26 PROCEDURE — 1160F RVW MEDS BY RX/DR IN RCRD: CPT | Mod: CPTII,S$GLB,, | Performed by: FAMILY MEDICINE

## 2023-05-26 PROCEDURE — 87086 URINE CULTURE/COLONY COUNT: CPT | Performed by: FAMILY MEDICINE

## 2023-05-26 PROCEDURE — 87480 CANDIDA DNA DIR PROBE: CPT | Performed by: FAMILY MEDICINE

## 2023-05-26 PROCEDURE — 71250 CT THORAX DX C-: CPT | Mod: TC

## 2023-05-26 PROCEDURE — 1160F PR REVIEW ALL MEDS BY PRESCRIBER/CLIN PHARMACIST DOCUMENTED: ICD-10-PCS | Mod: CPTII,S$GLB,, | Performed by: FAMILY MEDICINE

## 2023-05-26 RX ORDER — TERCONAZOLE 4 MG/G
1 CREAM VAGINAL NIGHTLY
Qty: 45 G | Refills: 0 | Status: SHIPPED | OUTPATIENT
Start: 2023-05-26 | End: 2023-06-02

## 2023-05-26 NOTE — PROGRESS NOTES
CC: Vaginitis  HPI: Patient presents for evaluation of an abnormal vaginal discharge. Symptoms have been present for 2 days- improved but still some mild sx. Vaginal symptoms: discharge described as white, local irritation, urinary symptoms of dysuria, and vulvar itching. Contraception: none. She denies odor and urinary symptoms of hematuria and fever . Sexually transmitted infection risk:none not SA. This is the extent of the patient's complaints at this time.     ROS:  GENERAL: No fever, chills, fatigability or weight loss.  VULVAR: No pain, no lesions and + itching+discharge around urethra.  VAGINAL: No relaxation, no itching, no discharge, no abnormal bleeding and no lesions.  URINARY: No incontinence, no nocturia, no frequency and + dysuria.     PHYSICAL EXAM:  Physical Exam:   Constitutional: She appears well-developed and well-nourished. She does not appear ill. No distress.               Genitourinary:    Uterus, right adnexa and left adnexa normal.            Pelvic exam was performed with patient supine.   The external female genitalia was normal.   Labial bartholins normal.There is no rash, tenderness or lesion on the right labia. There is no rash, tenderness or lesion on the left labia. Cervix is normal. Right adnexum displays no mass, no tenderness and no fullness. Left adnexum displays no mass, no tenderness and no fullness. There is vaginal discharge (very scant thin white) in the vagina. No tenderness, bleeding, rectocele, cystocele or unspecified prolapse of vaginal walls in the vagina.    No foreign body in the vagina.   Cervix exhibits no motion tenderness, no lesion, no discharge, no friability, no lesion and no polyp. Normal urethral meatus.Urethra findings: no urethral mass   Genitourinary Comments: No discharge noted just at the urethra                   Neurological: She is alert. GCS eye subscore is 4. GCS verbal subscore is 5. GCS motor subscore is 6.         Past Medical History:    Diagnosis Date    Allergy     Breast cancer 05/2016    Left breast low grade DCIS, ER/[place] positive    Colon polyp 10/31/2018    Ischemic colitis suspected as well ( area biopsied).    GERD (gastroesophageal reflux disease)     Hiatal hernia     Hyperlipidemia     Irritable bowel syndrome     Migraine headache     PONV (postoperative nausea and vomiting)     RA (rheumatoid arthritis)     Squamous cell carcinoma excised 11/12/14    in situ, R forearm       Past Surgical History:   Procedure Laterality Date    BREAST BIOPSY Left 05/2016    DCIS    BREAST CAPSULECTOMY Bilateral 11/28/2022    Procedure: CAPSULECTOMY, BREAST;  Surgeon: Garland Gray MD;  Location: [facility] OR [address] [place] [address];  Service: Plastics;  Laterality: Bilateral;    BREAST LUMPECTOMY      ECTOPIC PREGNANCY SURGERY Right     HYSTERECTOMY  1998    ovaries left intact    INJECTION FOR SENTINEL NODE IDENTIFICATION Left 10/24/2022    Procedure: INJECTION, FOR SENTINEL NODE IDENTIFICATION LEFT;  Surgeon: JOVANNI Jama MD;  Location: [facility] OR [address] [place] [address];  Service: General;  Laterality: Left;    INSERTION OF BREAST IMPLANT Bilateral 10/24/2022    Procedure: INSERTION, BREAST IMPLANT BILATERAL;  Surgeon: Garland Gray MD;  Location: [facility] OR [address] [place] [address];  Service: Plastics;  Laterality: Bilateral;    MASTECTOMY Bilateral 10/24/2022    Procedure: MASTECTOMY BILATERAL;  Surgeon: JOVANNI Jama MD;  Location: [facility] OR [address] [place] [address];  Service: General;  Laterality: Bilateral;    REMOVAL OF BREAST IMPLANT Bilateral 11/28/2022    Procedure: REMOVAL, IMPLANT, BREAST;  Surgeon: Garland Gray MD;  Location: [facility] OR [address] [place] [address];  Service: Plastics;  Laterality: Bilateral;    removal of ovarian cyst Right oct 1992    ovary left intact    SENTINEL LYMPH NODE BIOPSY Left 10/24/2022    Procedure: BIOPSY, LYMPH NODE, SENTINEL LEFT;  Surgeon: JOVANNI Jama MD;  Location: [facility] OR [address] [place] [address];  Service: General;  Laterality: Left;    TUBAL LIGATION      tubal reversal  july  1991       Family History   Problem Relation Age of Onset    Migraines Sister     Migraines Brother     Seizures Brother     Migraines Maternal Grandmother     Seizures Maternal Grandmother     Cancer Father 77        Lung cancer    Cancer Paternal Uncle         lung    Cancer Maternal Uncle         prostate ca    Melanoma Neg Hx     Breast cancer Neg Hx     Colon cancer Neg Hx     Ovarian cancer Neg Hx        Social History     Socioeconomic History    Marital status:      Spouse name: Parrish    Number of children: 2   Tobacco Use    Smoking status: Never     Passive exposure: Never    Smokeless tobacco: Never   Substance and Sexual Activity    Alcohol use: No     Alcohol/week: 0.0 standard drinks    Drug use: No    Sexual activity: Yes     Partners: Male     Birth control/protection: Surgical, None     Comment:  to Parrish    Other Topics Concern    Are you pregnant or think you may be? No    Breast-feeding No       Current Outpatient Medications   Medication Sig Dispense Refill    albuterol (VENTOLIN HFA) 90 mcg/actuation inhaler Inhale 2 puffs into the lungs every 6 (six) hours as needed for Wheezing. Rescue 18 g 1    ALPRAZolam (XANAX) 0.25 MG tablet Take 1 tablet (0.25 mg total) by mouth nightly as needed for Insomnia. 30 tablet 0    baclofen (LIORESAL) 10 MG tablet Take 1 tablet (10 mg total) by mouth 3 (three) times daily as needed. 90 tablet 4    budesonide 1 mg/2 mL NbSp       butalbital-acetaminophen-caffeine -40 mg (FIORICET, ESGIC) -40 mg per tablet Take 1 tablet by mouth every 6 to 8 hours as needed. 60 tablet 0    cholestyramine (QUESTRAN) 4 gram packet Take 1 packet by mouth once daily.      ciclopirox (LOPROX) 0.77 % Crea APPLY TOPICALLY TWICE A DAY 90 g 1    diclofenac sodium (VOLTAREN) 1 % Gel APPLY 2 GRAMS TOPICALLY 3 (THREE) TIMES DAILY. 100 g 3    gabapentin (NEURONTIN) 800 MG tablet Take 800 mg by mouth 3 (three) times daily.      hydrocortisone butyrate (LOCOID) 0.1 %  Crea cream AAA bid 45 g 3    lansoprazole (PREVACID) 30 MG capsule Take 30 mg by mouth every morning. 1 Capsule, Delayed Release(E.C.) Oral Every day      losartan (COZAAR) 25 MG tablet TAKE 1 TABLET BY MOUTH EVERY DAY 90 tablet 3    multivitamin (THERAGRAN) per tablet Take 1 tablet by mouth once daily.      ondansetron (ZOFRAN) 4 MG tablet Take 1 tablet (4 mg total) by mouth every 8 (eight) hours as needed for Nausea. 1 Tablet Oral Every 6 hours 12 tablet 12    prasterone, dhea, (INTRAROSA) 6.5 mg Inst Place 6.5 mg vaginally every evening. 30 each 11    sumatriptan (IMITREX) 20 mg/actuation nasal spray USE 1 SPRAY IN EACH NOSTRIL AT ONSET OF HEADACHE. MAY REPEAT ONCE IN 2 HOURS IF NO RELIEF. NO MORE THAN 2 SPRAYS PER 24 HOURS 6 each 12    tamoxifen (NOLVADEX) 20 MG Tab Take 1 tablet (20 mg total) by mouth once daily. 30 tablet 11    triamcinolone acetonide 0.025% (KENALOG) 0.025 % Oint Apply topically 2 (two) times daily. x 1-2 wks then prn flares only 80 g 2    vitamin D 1000 units Tab Take 185 mg by mouth every morning.       terconazole (TERAZOL 7) 0.4 % Crea Place 1 applicator vaginally every evening. Can apply extra externally for 7 days 45 g 0     Current Facility-Administered Medications   Medication Dose Route Frequency Provider Last Rate Last Admin    albuterol-ipratropium 2.5 mg-0.5 mg/3 mL nebulizer solution 3 mL  3 mL Nebulization 1 time in Clinic/HOD Tamri Mccormick II, MD           Review of patient's allergies indicates:   Allergen Reactions    Pravastatin Other (See Comments)     Severe muscle pain.  Muscle pain    Other reaction(s): Other (See Comments)  Severe muscle pain.    Shellfish containing products Anaphylaxis    Amitriptyline      Other reaction(s): dizzy    Codeine      Other reaction(s): Unknown    Doxycycline      Other reaction(s): Nausea    Erythromycin      Other reaction(s): Unknown    Iodine      Other reaction(s): Swelling  Other reaction(s): Unknown    Macrobid  [nitrofurantoin  monohyd/m-cryst]      Other reaction(s): Unknown    Verapamil      Other reaction(s): Headache          OB History    Para Term  AB Living   3 2 2   1 2   SAB IAB Ectopic Multiple Live Births       1   2      # Outcome Date GA Lbr Garrett/2nd Weight Sex Delivery Anes PTL Lv   3 Ectopic     U       2 Term     F Vag-Spont   NILTON   1 Term     F Vag-Spont   NILTON        Results for orders placed or performed in visit on 23   POCT Urinalysis, Dipstick, Automated, W/O Scope   Result Value Ref Range    POC Blood, Urine Negative Negative    POC Bilirubin, Urine Negative Negative    POC Urobilinogen, Urine Normal 0.1 - 1.1    POC Ketones, Urine Negative Negative    POC Protein, Urine Negative Negative    POC Nitrates, Urine Negative Negative    POC Glucose, Urine Negative Negative    pH, UA 6     POC Specific Gravity, Urine 1.000 (A) 1.003 - 1.029    POC Leukocytes, Urine Negative Negative         Assessment/Plan:    Vaginal discharge  -     terconazole (TERAZOL 7) 0.4 % Crea; Place 1 applicator vaginally every evening. Can apply extra externally for 7 days  Dispense: 45 g; Refill: 0  -     Vaginosis Screen by DNA Probe    Dysuria  -     POCT Urinalysis, Dipstick, Automated, W/O Scope  -     Urine culture          Patient was counseled today on vaginitis prevention including :  a. avoiding feminine products such as deoderant soaps, body wash, bubble bath, douches, scented toilet paper, deoderant tampons or pads, feminine wipes, chronic pad use, etc.  b. avoiding other vulvovaginal irritants such as long hot baths, humidity, tight, synthetic clothing, chlorine and sitting around in wet bathing suits  c. wearing cotton underwear, avoiding thong underwear and no underwear to bed  d. taking showers instead of baths and use a hair dryer on cool setting afterwards to dry  e. wearing cotton to exercise and shower immediately after exercise and change clothes  f. using polyurethane condoms without spermicide if sexually  active and symptoms are triggered by intercourse     FOLLOW UP: PRN/lack of improvement.

## 2023-05-28 LAB
BACTERIA UR CULT: NORMAL
BACTERIA UR CULT: NORMAL

## 2023-06-01 ENCOUNTER — OFFICE VISIT (OUTPATIENT)
Dept: INTERNAL MEDICINE | Facility: CLINIC | Age: 63
End: 2023-06-01
Payer: COMMERCIAL

## 2023-06-01 ENCOUNTER — PATIENT MESSAGE (OUTPATIENT)
Dept: HEMATOLOGY/ONCOLOGY | Facility: CLINIC | Age: 63
End: 2023-06-01
Payer: COMMERCIAL

## 2023-06-01 ENCOUNTER — HOSPITAL ENCOUNTER (OUTPATIENT)
Dept: RADIOLOGY | Facility: HOSPITAL | Age: 63
Discharge: HOME | End: 2023-06-01
Attending: NURSE PRACTITIONER
Payer: COMMERCIAL

## 2023-06-01 VITALS
HEART RATE: 77 BPM | DIASTOLIC BLOOD PRESSURE: 88 MMHG | OXYGEN SATURATION: 97 % | HEIGHT: 66 IN | SYSTOLIC BLOOD PRESSURE: 142 MMHG | BODY MASS INDEX: 33.84 KG/M2 | WEIGHT: 210.56 LBS

## 2023-06-01 DIAGNOSIS — S16.1XXA STRAIN OF NECK MUSCLE, INITIAL ENCOUNTER: ICD-10-CM

## 2023-06-01 DIAGNOSIS — S16.1XXA STRAIN OF NECK MUSCLE, INITIAL ENCOUNTER: Primary | ICD-10-CM

## 2023-06-01 DIAGNOSIS — M62.89 MUSCLE HYPERTROPHY: ICD-10-CM

## 2023-06-01 PROCEDURE — 1159F MED LIST DOCD IN RCRD: CPT | Mod: CPTII,S$GLB,, | Performed by: NURSE PRACTITIONER

## 2023-06-01 PROCEDURE — 3079F DIAST BP 80-89 MM HG: CPT | Mod: CPTII,S$GLB,, | Performed by: NURSE PRACTITIONER

## 2023-06-01 PROCEDURE — 72050 X-RAY EXAM NECK SPINE 4/5VWS: CPT | Mod: TC

## 2023-06-01 PROCEDURE — 99214 PR OFFICE/OUTPT VISIT, EST, LEVL IV, 30-39 MIN: ICD-10-PCS | Mod: S$GLB,,, | Performed by: NURSE PRACTITIONER

## 2023-06-01 PROCEDURE — 3079F PR MOST RECENT DIASTOLIC BLOOD PRESSURE 80-89 MM HG: ICD-10-PCS | Mod: CPTII,S$GLB,, | Performed by: NURSE PRACTITIONER

## 2023-06-01 PROCEDURE — 72050 X-RAY EXAM NECK SPINE 4/5VWS: CPT | Mod: 26,,, | Performed by: RADIOLOGY

## 2023-06-01 PROCEDURE — 99999 PR PBB SHADOW E&M-EST. PATIENT-LVL V: CPT | Mod: PBBFAC,,, | Performed by: NURSE PRACTITIONER

## 2023-06-01 PROCEDURE — 4010F PR ACE/ARB THEARPY RXD/TAKEN: ICD-10-PCS | Mod: CPTII,S$GLB,, | Performed by: NURSE PRACTITIONER

## 2023-06-01 PROCEDURE — 72050 XR CERVICAL SPINE COMPLETE 5 VIEW: ICD-10-PCS | Mod: 26,,, | Performed by: RADIOLOGY

## 2023-06-01 PROCEDURE — 99999 PR PBB SHADOW E&M-EST. PATIENT-LVL V: ICD-10-PCS | Mod: PBBFAC,,, | Performed by: NURSE PRACTITIONER

## 2023-06-01 PROCEDURE — 1159F PR MEDICATION LIST DOCUMENTED IN MEDICAL RECORD: ICD-10-PCS | Mod: CPTII,S$GLB,, | Performed by: NURSE PRACTITIONER

## 2023-06-01 PROCEDURE — 3077F SYST BP >= 140 MM HG: CPT | Mod: CPTII,S$GLB,, | Performed by: NURSE PRACTITIONER

## 2023-06-01 PROCEDURE — 99214 OFFICE O/P EST MOD 30 MIN: CPT | Mod: S$GLB,,, | Performed by: NURSE PRACTITIONER

## 2023-06-01 PROCEDURE — 4010F ACE/ARB THERAPY RXD/TAKEN: CPT | Mod: CPTII,S$GLB,, | Performed by: NURSE PRACTITIONER

## 2023-06-01 PROCEDURE — 3008F PR BODY MASS INDEX (BMI) DOCUMENTED: ICD-10-PCS | Mod: CPTII,S$GLB,, | Performed by: NURSE PRACTITIONER

## 2023-06-01 PROCEDURE — 3008F BODY MASS INDEX DOCD: CPT | Mod: CPTII,S$GLB,, | Performed by: NURSE PRACTITIONER

## 2023-06-01 PROCEDURE — 3077F PR MOST RECENT SYSTOLIC BLOOD PRESSURE >= 140 MM HG: ICD-10-PCS | Mod: CPTII,S$GLB,, | Performed by: NURSE PRACTITIONER

## 2023-06-01 RX ORDER — NAPROXEN SODIUM 550 MG/1
550 TABLET ORAL EVERY 12 HOURS PRN
Qty: 30 TABLET | Refills: 1 | Status: SHIPPED | OUTPATIENT
Start: 2023-06-01 | End: 2023-06-30

## 2023-06-01 NOTE — PROGRESS NOTES
INTERNAL MEDICINE CLINIC - SAME DAY APPOINTMENT  Progress Note    PRESENTING HISTORY     PCP: Blane Brian MD    Chief Complaint/Reason for Visit:   No chief complaint on file.    History of Present Illness & ROS : Ms. Christal Posey is a 63 y.o. female.    Same day apt   New to me   Est'd with Dr. Brian   Very pleasant lady.   Reports that has been experiencing pain to back of neck, awakened one morning about a week or so ago, now radiates down both shoulders, more on the right, worse at night and when lying flat. Taking Aleeve, helps a 'little'.  Does not endorse chest pain, cough or SOB. No history of recent fall or trauma, no MVAs or surgeries to neck. Neck and shoulders 'feel tight'.     Review of Systems:  Eyes: denies visual changes at this time denies floaters   ENT: no nasal congestion or sore throat  Respiratory: no cough or shorness of breath  Cardiovascular: no chest pain or palpitations  Gastrointestinal: no nausea or vomiting, no abdominal pain or change in bowel habits  Genitourinary: no hematuria or dysuria; denies frequency  Hematologic/Lymphatic: no easy bruising or lymphadenopathy  Neurological: no seizures or tremors  Endocrine: no heat or cold intolerance    PAST HISTORY:     Past Medical History:   Diagnosis Date    Allergy     Breast cancer 05/2016    Left breast low grade DCIS, ER/[place] positive    Colon polyp 10/31/2018    Ischemic colitis suspected as well ( area biopsied).    GERD (gastroesophageal reflux disease)     Hiatal hernia     Hyperlipidemia     Irritable bowel syndrome     Migraine headache     PONV (postoperative nausea and vomiting)     RA (rheumatoid arthritis)     Squamous cell carcinoma excised 11/12/14    in situ, R forearm       Past Surgical History:   Procedure Laterality Date    BREAST BIOPSY Left 05/2016    DCIS    BREAST CAPSULECTOMY Bilateral 11/28/2022    Procedure: CAPSULECTOMY, BREAST;  Surgeon: Garland Gray MD;  Location: [facility] OR [address] [place] [address];  Service:  Plastics;  Laterality: Bilateral;    BREAST LUMPECTOMY      ECTOPIC PREGNANCY SURGERY Right     HYSTERECTOMY  1998    ovaries left intact    INJECTION FOR SENTINEL NODE IDENTIFICATION Left 10/24/2022    Procedure: INJECTION, FOR SENTINEL NODE IDENTIFICATION LEFT;  Surgeon: JOVANNI Jama MD;  Location: [facility] OR [address] [place] [address];  Service: General;  Laterality: Left;    INSERTION OF BREAST IMPLANT Bilateral 10/24/2022    Procedure: INSERTION, BREAST IMPLANT BILATERAL;  Surgeon: Garland Gray MD;  Location: [facility] OR [address] [place] [address];  Service: Plastics;  Laterality: Bilateral;    MASTECTOMY Bilateral 10/24/2022    Procedure: MASTECTOMY BILATERAL;  Surgeon: JOVANNI Jama MD;  Location: [facility] OR [address] [place] [address];  Service: General;  Laterality: Bilateral;    REMOVAL OF BREAST IMPLANT Bilateral 11/28/2022    Procedure: REMOVAL, IMPLANT, BREAST;  Surgeon: Garland Gray MD;  Location: [facility] OR [address] [place] [address];  Service: Plastics;  Laterality: Bilateral;    removal of ovarian cyst Right oct 1992    ovary left intact    SENTINEL LYMPH NODE BIOPSY Left 10/24/2022    Procedure: BIOPSY, LYMPH NODE, SENTINEL LEFT;  Surgeon: JOVANNI Jama MD;  Location: [facility] OR [address] [place] [address];  Service: General;  Laterality: Left;    TUBAL LIGATION      tubal reversal  july 1991       Family History   Problem Relation Age of Onset    Migraines Sister     Migraines Brother     Seizures Brother     Migraines Maternal Grandmother     Seizures Maternal Grandmother     Cancer Father 77        Lung cancer    Cancer Paternal Uncle         lung    Cancer Maternal Uncle         prostate ca    Melanoma Neg Hx     Breast cancer Neg Hx     Colon cancer Neg Hx     Ovarian cancer Neg Hx        Social History     Socioeconomic History    Marital status:      Spouse name: Parrish    Number of children: 2   Tobacco Use    Smoking status: Never     Passive exposure: Never    Smokeless tobacco: Never   Substance and Sexual Activity    Alcohol use: No     Alcohol/week: 0.0 standard  drinks    Drug use: No    Sexual activity: Yes     Partners: Male     Birth control/protection: Surgical, None     Comment:  to Parrish    Other Topics Concern    Are you pregnant or think you may be? No    Breast-feeding No       MEDICATIONS & ALLERGIES:     Current Outpatient Medications on File Prior to Visit   Medication Sig Dispense Refill    albuterol (VENTOLIN HFA) 90 mcg/actuation inhaler Inhale 2 puffs into the lungs every 6 (six) hours as needed for Wheezing. Rescue 18 g 1    ALPRAZolam (XANAX) 0.25 MG tablet Take 1 tablet (0.25 mg total) by mouth nightly as needed for Insomnia. 30 tablet 0    baclofen (LIORESAL) 10 MG tablet Take 1 tablet (10 mg total) by mouth 3 (three) times daily as needed. 90 tablet 4    budesonide 1 mg/2 mL NbSp       butalbital-acetaminophen-caffeine -40 mg (FIORICET, ESGIC) -40 mg per tablet Take 1 tablet by mouth every 6 to 8 hours as needed. 60 tablet 0    cholestyramine (QUESTRAN) 4 gram packet Take 1 packet by mouth once daily.      ciclopirox (LOPROX) 0.77 % Crea APPLY TOPICALLY TWICE A DAY 90 g 1    diclofenac sodium (VOLTAREN) 1 % Gel APPLY 2 GRAMS TOPICALLY 3 (THREE) TIMES DAILY. 100 g 3    gabapentin (NEURONTIN) 800 MG tablet Take 800 mg by mouth 3 (three) times daily.      hydrocortisone butyrate (LOCOID) 0.1 % Crea cream AAA bid 45 g 3    lansoprazole (PREVACID) 30 MG capsule Take 30 mg by mouth every morning. 1 Capsule, Delayed Release(E.C.) Oral Every day      losartan (COZAAR) 25 MG tablet TAKE 1 TABLET BY MOUTH EVERY DAY 90 tablet 3    multivitamin (THERAGRAN) per tablet Take 1 tablet by mouth once daily.      ondansetron (ZOFRAN) 4 MG tablet Take 1 tablet (4 mg total) by mouth every 8 (eight) hours as needed for Nausea. 1 Tablet Oral Every 6 hours 12 tablet 12    prasterone, dhea, (INTRAROSA) 6.5 mg Inst Place 6.5 mg vaginally every evening. 30 each 11    sumatriptan (IMITREX) 20 mg/actuation nasal spray USE 1 SPRAY IN EACH NOSTRIL AT ONSET OF  HEADACHE. MAY REPEAT ONCE IN 2 HOURS IF NO RELIEF. NO MORE THAN 2 SPRAYS PER 24 HOURS 6 each 12    tamoxifen (NOLVADEX) 20 MG Tab Take 1 tablet (20 mg total) by mouth once daily. 30 tablet 11    terconazole (TERAZOL 7) 0.4 % Crea Place 1 applicator vaginally every evening. Can apply extra externally for 7 days 45 g 0    triamcinolone acetonide 0.025% (KENALOG) 0.025 % Oint Apply topically 2 (two) times daily. x 1-2 wks then prn flares only 80 g 2    vitamin D 1000 units Tab Take 185 mg by mouth every morning.        Current Facility-Administered Medications on File Prior to Visit   Medication Dose Route Frequency Provider Last Rate Last Admin    albuterol-ipratropium 2.5 mg-0.5 mg/3 mL nebulizer solution 3 mL  3 mL Nebulization 1 time in Clinic/HOD Tamir Mccormick II, MD            Review of patient's allergies indicates:   Allergen Reactions    Pravastatin Other (See Comments)     Severe muscle pain.  Muscle pain    Other reaction(s): Other (See Comments)  Severe muscle pain.    Shellfish containing products Anaphylaxis    Amitriptyline      Other reaction(s): dizzy    Codeine      Other reaction(s): Unknown    Doxycycline      Other reaction(s): Nausea    Erythromycin      Other reaction(s): Unknown    Iodine      Other reaction(s): Swelling  Other reaction(s): Unknown    Macrobid  [nitrofurantoin monohyd/m-cryst]      Other reaction(s): Unknown    Verapamil      Other reaction(s): Headache       Medications Reconciliation:   I have reconciled the patient's home medications with the patient/family. I have updated all changes.  Refer to After-Visit Medication List.    OBJECTIVE:     Vital Signs:  There were no vitals filed for this visit.  Wt Readings from Last 3 Encounters:   05/26/23 1741 95.7 kg (210 lb 15.7 oz)   05/04/23 0836 96.1 kg (211 lb 13.8 oz)   03/20/23 1455 100.2 kg (221 lb)     There is no height or weight on file to calculate BMI.   Wt Readings from Last 3 Encounters:   06/01/23 95.5 kg (210 lb 8.6  oz)   05/26/23 95.7 kg (210 lb 15.7 oz)   05/04/23 96.1 kg (211 lb 13.8 oz)     Temp Readings from Last 3 Encounters:   05/04/23 97.6 °F (36.4 °C) (Oral)   03/20/23 97.8 °F (36.6 °C) (Oral)   02/24/23 98.3 °F (36.8 °C) (Oral)     BP Readings from Last 3 Encounters:   06/01/23 (!) 142/88   05/26/23 (!) 144/86   05/04/23 (!) 159/78     Pulse Readings from Last 3 Encounters:   06/01/23 77   05/04/23 75   03/20/23 94       Physical Exam:  (General: Well developed, well nourished. No distress.  HEENT: Head is normocephalic, atraumatic  Eyes: Clear conjunctiva.  Neck: Supple, symmetrical neck; trachea midline.  Lungs: Clear to auscultation bilaterally and normal respiratory effort.  Cardiovascular: Heart with regular rate and rhythm. No murmurs, gallops or rubs  Extremities: No LE edema. Pulses 2+ and symmetric.   Skin: Skin color, texture, turgor normal. No rashes.  Musculoskeletal: Normal gait.   + muscle hypertrophy to both shoulders, R>L  Neurologic: Normal strength and tone. No focal numbness or weakness.   Psychiatric: Not depressed.      Laboratory  Lab Results   Component Value Date    WBC 6.81 05/04/2023    HGB 12.8 05/04/2023    HCT 40.3 05/04/2023     05/04/2023    CHOL 255 (H) 12/29/2022    TRIG 148 12/29/2022    HDL 74 12/29/2022    ALT 28 05/04/2023    AST 23 05/04/2023     05/04/2023     05/04/2023    K 4.1 05/04/2023    K 4.1 05/04/2023     05/04/2023     05/04/2023    CREATININE 1.1 05/04/2023    CREATININE 1.1 05/04/2023    BUN 12 05/04/2023    BUN 12 05/04/2023    CO2 30 (H) 05/04/2023    CO2 30 (H) 05/04/2023    TSH 3.790 10/06/2014    INR 1.0 05/16/2012       ASSESSMENT & PLAN:     Same day apt.     Meds: Baclofen, Neurontin , Vol Gel    Strain of neck muscle, initial encounter  -     X-Ray Cervical Spine Complete 5 view; Future; Expected date: 06/01/2023 (May need CT)  -     Ambulatory referral/consult to Orthopedics; Future; Expected date: 06/08/2023  -     naproxen  sodium (ANAPROX) 550 MG tablet; Take 1 tablet (550 mg total) by mouth every 12 (twelve) hours as needed (Neck Pain).  Dispense: 30 tablet; Refill: 1   (On Prevacid for GI protection)      Future Appointments   Date Time Provider Department Center   6/1/2023 11:15 AM [facility] [id number] LB LIMIT [facility] XRAY IM [place] PCW   6/19/2023  8:00 AM Latanya Julian MD [facility] AM[facility] [place]   8/14/2023  9:00 AM LAB, HEMONC CANCER BLDG [facility] LAB [facility]   8/14/2023 10:00 AM Latanya Julian MD [facility] [place]   10/11/2023  4:15 PM JOVANNI Jama MD [facility] [place]a        Medication List            Accurate as of June 1, 2023 11:08 AM. If you have any questions, ask your nurse or doctor.                START taking these medications      naproxen sodium 550 MG tablet  Commonly known as: ANAPROX  Take 1 tablet (550 mg total) by mouth every 12 (twelve) hours as needed (Neck Pain).  Started by: Lakeshia Godinez [facility]            CONTINUE taking these medications      albuterol 90 mcg/actuation inhaler  Commonly known as: VENTOLIN HFA  Inhale 2 puffs into the lungs every 6 (six) hours as needed for Wheezing. Rescue     ALPRAZolam 0.25 MG tablet  Commonly known as: XANAX  Take 1 tablet (0.25 mg total) by mouth nightly as needed for Insomnia.     baclofen 10 MG tablet  Commonly known as: LIORESAL  Take 1 tablet (10 mg total) by mouth 3 (three) times daily as needed.     budesonide 1 mg/2 mL [facility]     butalbital-acetaminophen-caffeine -40 mg -40 mg per tablet  Commonly known as: FIORICET, ESGIC  Take 1 tablet by mouth every 6 to 8 hours as needed.     cholestyramine 4 gram packet  Commonly known as: QUESTRAN     ciclopirox 0.77 % Crea  Commonly known as: LOPROX  APPLY TOPICALLY TWICE A DAY     diclofenac sodium 1 % Gel  Commonly known as: VOLTAREN  APPLY 2 GRAMS TOPICALLY 3 (THREE) TIMES DAILY.     gabapentin 800 MG tablet  Commonly known as: NEURONTIN     hydrocortisone butyrate 0.1 % Crea cream  Commonly  known as: LOCOID  AAA bid     lansoprazole 30 MG capsule  Commonly known as: PREVACID     losartan 25 MG tablet  Commonly known as: COZAAR  TAKE 1 TABLET BY MOUTH EVERY DAY     multivitamin per tablet  Commonly known as: THERAGRAN     ondansetron 4 MG tablet  Commonly known as: ZOFRAN  Take 1 tablet (4 mg total) by mouth every 8 (eight) hours as needed for Nausea. 1 Tablet Oral Every 6 hours     prasterone (dhea) 6.5 mg Inst  Commonly known as: INTRAROSA  Place 6.5 mg vaginally every evening.     SUMAtriptan 20 mg/actuation nasal spray  Commonly known as: IMITREX  USE 1 SPRAY IN EACH NOSTRIL AT ONSET OF HEADACHE. MAY REPEAT ONCE IN 2 HOURS IF NO RELIEF. NO MORE THAN 2 SPRAYS PER 24 HOURS     tamoxifen 20 MG Tab  Commonly known as: NOLVADEX  Take 1 tablet (20 mg total) by mouth once daily.     terconazole 0.4 % Crea  Commonly known as: TERAZOL 7  Place 1 applicator vaginally every evening. Can apply extra externally for 7 days     triamcinolone acetonide 0.025% 0.025 % Oint  Commonly known as: KENALOG  Apply topically 2 (two) times daily. x 1-2 wks then prn flares only     vitamin D 1000 units Tab  Commonly known as: VITAMIN D3               Where to Get Your Medications        These medications were sent to [facility]/pharmacy #[id number] - [place] - [address]  [address], [facility] [address]      Phone: [phone]   naproxen sodium 550 MG tablet         Signing Physician:  MATTHEW Cedeño

## 2023-06-01 NOTE — TELEPHONE ENCOUNTER
Bettie spoke to pt regarding results to be discussed with the provider in addition to appt schedule

## 2023-06-02 ENCOUNTER — TELEPHONE (OUTPATIENT)
Dept: HEMATOLOGY/ONCOLOGY | Facility: CLINIC | Age: 63
End: 2023-06-02
Payer: COMMERCIAL

## 2023-06-02 NOTE — TELEPHONE ENCOUNTER
Documentation- called patient this morning with results. Will follow up next week with plan.   FRITZ

## 2023-06-06 ENCOUNTER — PATIENT MESSAGE (OUTPATIENT)
Dept: HEMATOLOGY/ONCOLOGY | Facility: CLINIC | Age: 63
End: 2023-06-06
Payer: COMMERCIAL

## 2023-06-06 ENCOUNTER — TELEPHONE (OUTPATIENT)
Dept: HEMATOLOGY/ONCOLOGY | Facility: CLINIC | Age: 63
End: 2023-06-06
Payer: COMMERCIAL

## 2023-06-06 NOTE — TELEPHONE ENCOUNTER
----- Message from Latanya Julian MD sent at 6/6/2023  7:49 AM CDT -----  Could have breast surgery see her or repeat CT in 3 months    ----- Message -----  From: Brijesh Zimmer NP  Sent: 6/2/2023   8:11 AM CDT  To: Latanya Julian MD    Can you review and give me your thoughts when you reutrn? We did a bone scan initially bc of right chest/rib pain which led to this CT w/o. She does not have any evidence of bone disease but they note this soft tissue thickening in the mastectomy bed. Should I get the CT chest with contrast or send to breast surgery with an ultrasound?   Pj

## 2023-06-15 ENCOUNTER — TELEPHONE (OUTPATIENT)
Dept: ORTHOPEDICS | Facility: CLINIC | Age: 63
End: 2023-06-15
Payer: COMMERCIAL

## 2023-06-15 DIAGNOSIS — M50.30 DDD (DEGENERATIVE DISC DISEASE), CERVICAL: Primary | ICD-10-CM

## 2023-06-19 ENCOUNTER — OFFICE VISIT (OUTPATIENT)
Dept: SURGERY | Facility: CLINIC | Age: 63
End: 2023-06-19
Payer: COMMERCIAL

## 2023-06-19 ENCOUNTER — HOSPITAL ENCOUNTER (OUTPATIENT)
Dept: RADIOLOGY | Facility: HOSPITAL | Age: 63
Discharge: HOME | End: 2023-06-19
Attending: ORTHOPAEDIC SURGERY
Payer: COMMERCIAL

## 2023-06-19 ENCOUNTER — OFFICE VISIT (OUTPATIENT)
Dept: ORTHOPEDICS | Facility: CLINIC | Age: 63
End: 2023-06-19
Payer: COMMERCIAL

## 2023-06-19 ENCOUNTER — HOSPITAL ENCOUNTER (OUTPATIENT)
Dept: RADIOLOGY | Facility: HOSPITAL | Age: 63
Discharge: HOME | End: 2023-06-19
Attending: SURGERY
Payer: COMMERCIAL

## 2023-06-19 VITALS — WEIGHT: 209.38 LBS | BODY MASS INDEX: 33.65 KG/M2 | HEIGHT: 66 IN

## 2023-06-19 VITALS
BODY MASS INDEX: 33.75 KG/M2 | HEIGHT: 66 IN | HEART RATE: 91 BPM | WEIGHT: 210 LBS | DIASTOLIC BLOOD PRESSURE: 68 MMHG | OXYGEN SATURATION: 96 % | SYSTOLIC BLOOD PRESSURE: 144 MMHG

## 2023-06-19 DIAGNOSIS — Z90.13 STATUS POST BILATERAL MASTECTOMY: Primary | ICD-10-CM

## 2023-06-19 DIAGNOSIS — M47.812 CERVICAL SPONDYLOSIS: Primary | ICD-10-CM

## 2023-06-19 DIAGNOSIS — N63.10 BREAST MASS, RIGHT: ICD-10-CM

## 2023-06-19 DIAGNOSIS — S16.1XXA STRAIN OF NECK MUSCLE, INITIAL ENCOUNTER: ICD-10-CM

## 2023-06-19 DIAGNOSIS — M51.36 DDD (DEGENERATIVE DISC DISEASE), LUMBAR: ICD-10-CM

## 2023-06-19 DIAGNOSIS — M50.30 DDD (DEGENERATIVE DISC DISEASE), CERVICAL: ICD-10-CM

## 2023-06-19 DIAGNOSIS — M54.12 CERVICAL RADICULOPATHY: ICD-10-CM

## 2023-06-19 DIAGNOSIS — M47.816 LUMBAR SPONDYLOSIS: ICD-10-CM

## 2023-06-19 PROCEDURE — 1159F MED LIST DOCD IN RCRD: CPT | Mod: CPTII,S$GLB,, | Performed by: ORTHOPAEDIC SURGERY

## 2023-06-19 PROCEDURE — 3077F SYST BP >= 140 MM HG: CPT | Mod: CPTII,S$GLB,, | Performed by: SURGERY

## 2023-06-19 PROCEDURE — 4010F ACE/ARB THERAPY RXD/TAKEN: CPT | Mod: CPTII,S$GLB,, | Performed by: SURGERY

## 2023-06-19 PROCEDURE — 99999 PR PBB SHADOW E&M-EST. PATIENT-LVL V: CPT | Mod: PBBFAC,,, | Performed by: ORTHOPAEDIC SURGERY

## 2023-06-19 PROCEDURE — 99999 PR PBB SHADOW E&M-EST. PATIENT-LVL V: ICD-10-PCS | Mod: PBBFAC,,, | Performed by: ORTHOPAEDIC SURGERY

## 2023-06-19 PROCEDURE — 3008F BODY MASS INDEX DOCD: CPT | Mod: CPTII,S$GLB,, | Performed by: SURGERY

## 2023-06-19 PROCEDURE — 99999 PR PBB SHADOW E&M-EST. PATIENT-LVL III: CPT | Mod: PBBFAC,,, | Performed by: SURGERY

## 2023-06-19 PROCEDURE — 3078F DIAST BP <80 MM HG: CPT | Mod: CPTII,S$GLB,, | Performed by: SURGERY

## 2023-06-19 PROCEDURE — 3008F PR BODY MASS INDEX (BMI) DOCUMENTED: ICD-10-PCS | Mod: CPTII,S$GLB,, | Performed by: ORTHOPAEDIC SURGERY

## 2023-06-19 PROCEDURE — 3008F BODY MASS INDEX DOCD: CPT | Mod: CPTII,S$GLB,, | Performed by: ORTHOPAEDIC SURGERY

## 2023-06-19 PROCEDURE — 72040 X-RAY EXAM NECK SPINE 2-3 VW: CPT | Mod: TC

## 2023-06-19 PROCEDURE — 1160F PR REVIEW ALL MEDS BY PRESCRIBER/CLIN PHARMACIST DOCUMENTED: ICD-10-PCS | Mod: CPTII,S$GLB,, | Performed by: ORTHOPAEDIC SURGERY

## 2023-06-19 PROCEDURE — 1159F PR MEDICATION LIST DOCUMENTED IN MEDICAL RECORD: ICD-10-PCS | Mod: CPTII,S$GLB,, | Performed by: SURGERY

## 2023-06-19 PROCEDURE — 3008F PR BODY MASS INDEX (BMI) DOCUMENTED: ICD-10-PCS | Mod: CPTII,S$GLB,, | Performed by: SURGERY

## 2023-06-19 PROCEDURE — 4010F PR ACE/ARB THEARPY RXD/TAKEN: ICD-10-PCS | Mod: CPTII,S$GLB,, | Performed by: SURGERY

## 2023-06-19 PROCEDURE — 76642 ULTRASOUND BREAST LIMITED: CPT | Mod: TC,RT

## 2023-06-19 PROCEDURE — 3078F PR MOST RECENT DIASTOLIC BLOOD PRESSURE < 80 MM HG: ICD-10-PCS | Mod: CPTII,S$GLB,, | Performed by: SURGERY

## 2023-06-19 PROCEDURE — 1160F PR REVIEW ALL MEDS BY PRESCRIBER/CLIN PHARMACIST DOCUMENTED: ICD-10-PCS | Mod: CPTII,S$GLB,, | Performed by: SURGERY

## 2023-06-19 PROCEDURE — 99204 PR OFFICE/OUTPT VISIT, NEW, LEVL IV, 45-59 MIN: ICD-10-PCS | Mod: S$GLB,,, | Performed by: ORTHOPAEDIC SURGERY

## 2023-06-19 PROCEDURE — 99999 PR PBB SHADOW E&M-EST. PATIENT-LVL III: ICD-10-PCS | Mod: PBBFAC,,, | Performed by: SURGERY

## 2023-06-19 PROCEDURE — 99204 OFFICE O/P NEW MOD 45 MIN: CPT | Mod: S$GLB,,, | Performed by: ORTHOPAEDIC SURGERY

## 2023-06-19 PROCEDURE — 1159F PR MEDICATION LIST DOCUMENTED IN MEDICAL RECORD: ICD-10-PCS | Mod: CPTII,S$GLB,, | Performed by: ORTHOPAEDIC SURGERY

## 2023-06-19 PROCEDURE — 99212 OFFICE O/P EST SF 10 MIN: CPT | Mod: S$GLB,,, | Performed by: SURGERY

## 2023-06-19 PROCEDURE — 72040 XR CERVICAL SPINE FLEXION  AND EXTENSION ONLY: ICD-10-PCS | Mod: 26,,, | Performed by: RADIOLOGY

## 2023-06-19 PROCEDURE — 76642 US BREAST RIGHT LIMITED: ICD-10-PCS | Mod: 26,RT,, | Performed by: RADIOLOGY

## 2023-06-19 PROCEDURE — 3077F PR MOST RECENT SYSTOLIC BLOOD PRESSURE >= 140 MM HG: ICD-10-PCS | Mod: CPTII,S$GLB,, | Performed by: SURGERY

## 2023-06-19 PROCEDURE — 4010F ACE/ARB THERAPY RXD/TAKEN: CPT | Mod: CPTII,S$GLB,, | Performed by: ORTHOPAEDIC SURGERY

## 2023-06-19 PROCEDURE — 72040 X-RAY EXAM NECK SPINE 2-3 VW: CPT | Mod: 26,,, | Performed by: RADIOLOGY

## 2023-06-19 PROCEDURE — 1160F RVW MEDS BY RX/DR IN RCRD: CPT | Mod: CPTII,S$GLB,, | Performed by: ORTHOPAEDIC SURGERY

## 2023-06-19 PROCEDURE — 76642 ULTRASOUND BREAST LIMITED: CPT | Mod: 26,RT,, | Performed by: RADIOLOGY

## 2023-06-19 PROCEDURE — 99212 PR OFFICE/OUTPT VISIT, EST, LEVL II, 10-19 MIN: ICD-10-PCS | Mod: S$GLB,,, | Performed by: SURGERY

## 2023-06-19 PROCEDURE — 1159F MED LIST DOCD IN RCRD: CPT | Mod: CPTII,S$GLB,, | Performed by: SURGERY

## 2023-06-19 PROCEDURE — 4010F PR ACE/ARB THEARPY RXD/TAKEN: ICD-10-PCS | Mod: CPTII,S$GLB,, | Performed by: ORTHOPAEDIC SURGERY

## 2023-06-19 PROCEDURE — 1160F RVW MEDS BY RX/DR IN RCRD: CPT | Mod: CPTII,S$GLB,, | Performed by: SURGERY

## 2023-06-19 NOTE — PROGRESS NOTES
Date of Service:  06/19/2023    DIAGNOSIS:   This is a 63 y.o. 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    TREATMENT:   Bilateral mastectomy with left sentinel node biopsy on 10/24/2022. Diana Jama M.D. Surgical Oncology  After initial bilateral nipple sparing mastectomy with tissue expander reconstruction she had unfortunately infection requiring explant of the tissue expanders.   Adjuvant endocrine therapy, tamoxifen Latanya Julian M.D. Medical Oncology     HISTORY OF PRESENT ILLNESS:   Christal Posey is a 63 y.o. female comes in for oncological follow up and follow up on recent breast imaging.  When she was seen by medical oncology she just describing a new onset of rib pain.  Due to this pain a bone scan was performed with inconclusive results at the rib and thus a CT scan of the chest was recommended.  On CT scan of the chest a soft tissue protection was identified in the right mastectomy bed possibly representing scarring.  She had not noted any area of palpable concern at this location.  Since the imaging her rib pain has gone away.  She denies change in her chest wall self-exam specifically denying new masses, skin or nipple changes, or nipple discharge. Past medical and surgical history is updated with no new changes.     IMAGING:   Result:   US Breast Right Limited     History:  Patient is 63 y.o. and is seen for breast mass, right.     Films Compared:  Prior images (if available) were compared.     Findings:  There is no evidence of suspicious masses. Patient is status post bilateral mastectomy. Nipples remain. Mastectomy was complicated by infections and tissue expanders were removed. No sonographic finding is seen to account for the right breast finding on the Chest CT of 5-26-23.      Impression:  No sonographic evidence of malignancy.     BI-RADS Category 1: Negative Finding     Recommendation:  Follow up as clinically  "indicated.        PHYSICAL EXAM:   BP (!) 144/68 (BP Location: Right arm, Patient Position: Sitting, BP Method: Large (Automatic))   Pulse 91   Ht 5' 6" (1.676 m)   Wt 95.3 kg (210 lb)   LMP  (LMP Unknown)   SpO2 96%   BMI 33.89 kg/m²   General: The patient appears well and is in no acute distress.   Neuro: Alert & oriented x3.   Breasts: The exam was done with the patient seated and supine. Left breast -status post bilateral mastectomy with nipple preservation but without reconstruction at this time otherwise within normal limits. No palpable masses and no abnormal skin or nipple findings. No supraclavicular or axillary lymphadenopathy on the left side.   Right breast - status post bilateral mastectomy with nipple preservation but without reconstruction at this time otherwise within normal limits. No palpable masses and no abnormal skin or nipple findings. No supraclavicular or axillary lymphadenopathy on the right side.  Extremities:  No evidence of lymphedema    ASSESSMENT:   This is a 63 y.o. female without evidence of recurrence by exam, or history.     PLAN:   No evidence of underlying mass or of concern on ultrasound of the area of the soft tissue thickening on CT scan.  This area of soft tissue thickening is on the prophylactic mastectomy side.  Patient had mastectomy complicated by infections and tissue expander removal likely contributing to the level of scarring.  No concerns for breast cancer recurrence on imaging or physical exam.  We will continue breast cancer monitoring with physical exam alone.  Next due in 6 months.  We revisit the conversation about breast reconstruction.  She does not desire breast reconstruction.  We discussed potential for removal excess skin including the nipple-areolar complex.  She does not desire this at this time.  She will let us know if she desires any revision to her surgical sites.  We discussed potential for breast prosthesis.  We provided her with prescription " and information in case she would like to proceed.

## 2023-06-19 NOTE — PROGRESS NOTES
DATE: 6/19/2023  PATIENT: Christal Posey    Attending Physician: Holden Walden M.D.    CHIEF COMPLAINT: neck and RUE pain; LBP and RLE pain    HISTORY:  Christal Posey is a 63 y.o. female w/ a hx of breast cx (s/p double mastectomy; in remission) presents for initial evaluation of neck and right arm pain (Neck - 3, Arm - 3). The pain has been present for 3 weeks. The patient describes the pain as dull and it radiates down RUE to the elbow. The pain is worse with activity and improved by rest. There is RUE associated numbness and tingling. There is no subjective weakness. Prior treatments have included meds (naproxen, gabapentin and baclofen), but no PT, GUERO or surgery.     Patient also has 10+ years history of LBP that radiates posterolaterally down RLE to the mid-thigh. There is no n/t or weakness. No PT, GUERO or surgery.    The Patient denies myelopathic symptoms such as handwriting changes or difficulty with buttons/coins/keys. Denies perineal paresthesias, bowel/bladder dysfunction.    The patient does not smoke, have DM or endorse IVDU. The patient is not on any blood thinners and does not take chronic narcotics.     PAST MEDICAL/SURGICAL HISTORY:  Past Medical History:   Diagnosis Date    Allergy     Breast cancer 05/2016    Left breast low grade DCIS, ER/OR positive    Colon polyp 10/31/2018    Ischemic colitis suspected as well ( area biopsied).    GERD (gastroesophageal reflux disease)     Hiatal hernia     Hyperlipidemia     Irritable bowel syndrome     Migraine headache     PONV (postoperative nausea and vomiting)     RA (rheumatoid arthritis)     Squamous cell carcinoma excised 11/12/14    in situ, R forearm     Past Surgical History:   Procedure Laterality Date    BREAST BIOPSY Left 05/2016    DCIS    BREAST CAPSULECTOMY Bilateral 11/28/2022    Procedure: CAPSULECTOMY, BREAST;  Surgeon: Garland Gray MD;  Location: [facility] OR [address] [place] [address];  Service: Plastics;  Laterality: Bilateral;    BREAST  LUMPECTOMY      ECTOPIC PREGNANCY SURGERY Right     HYSTERECTOMY  1998    ovaries left intact    INJECTION FOR SENTINEL NODE IDENTIFICATION Left 10/24/2022    Procedure: INJECTION, FOR SENTINEL NODE IDENTIFICATION LEFT;  Surgeon: JOVANNI Jama MD;  [facility];  Service: General;  Laterality: Left;    INSERTION OF BREAST IMPLANT Bilateral 10/24/2022    Procedure: INSERTION, BREAST IMPLANT BILATERAL;  Surgeon: Garland Gray MD;  Location: [facility] OR [facility];  Service: Plastics;  Laterality: Bilateral;    MASTECTOMY Bilateral 10/24/2022    Procedure: MASTECTOMY BILATERAL;  Surgeon: JOVANNI Jama MD;  Location: [facility] OR [facility];  Service: General;  Laterality: Bilateral;    REMOVAL OF BREAST IMPLANT Bilateral 11/28/2022    Procedure: REMOVAL, IMPLANT, BREAST;  Surgeon: Garland Gray MD;  [facility];  Service: Plastics;  Laterality: Bilateral;    removal of ovarian cyst Right oct 1992    ovary left intact    SENTINEL LYMPH NODE BIOPSY Left 10/24/2022    Procedure: BIOPSY, LYMPH NODE, SENTINEL LEFT;  Surgeon: JOVANNI Jama MD;  [facility];  Service: General;  Laterality: Left;    TUBAL LIGATION      tubal reversal  july 1991       Current Medications:   Current Outpatient Medications:     albuterol (VENTOLIN HFA) 90 mcg/actuation inhaler, Inhale 2 puffs into the lungs every 6 (six) hours as needed for Wheezing. Rescue, Disp: 18 g, Rfl: 1    ALPRAZolam (XANAX) 0.25 MG tablet, Take 1 tablet (0.25 mg total) by mouth nightly as needed for Insomnia., Disp: 30 tablet, Rfl: 0    budesonide 1 mg/2 mL NbSp, , Disp: , Rfl:     butalbital-acetaminophen-caffeine -40 mg (FIORICET, ESGIC) -40 mg per tablet, Take 1 tablet by mouth every 6 to 8 hours as needed., Disp: 60 tablet, Rfl: 0    cholestyramine (QUESTRAN) 4 gram packet, Take 1 packet by mouth once daily., Disp: , Rfl:     ciclopirox (LOPROX) 0.77 % Crea, APPLY TOPICALLY TWICE A DAY, Disp: 90 g, Rfl:  1    diclofenac sodium (VOLTAREN) 1 % Gel, APPLY 2 GRAMS TOPICALLY 3 (THREE) TIMES DAILY., Disp: 100 g, Rfl: 3    gabapentin (NEURONTIN) 800 MG tablet, Take 800 mg by mouth 3 (three) times daily., Disp: , Rfl:     hydrocortisone butyrate (LOCOID) 0.1 % Crea cream, AAA bid, Disp: 45 g, Rfl: 3    lansoprazole (PREVACID) 30 MG capsule, Take 30 mg by mouth every morning. 1 Capsule, Delayed Release(E.C.) Oral Every day, Disp: , Rfl:     losartan (COZAAR) 25 MG tablet, TAKE 1 TABLET BY MOUTH EVERY DAY, Disp: 90 tablet, Rfl: 3    multivitamin (THERAGRAN) per tablet, Take 1 tablet by mouth once daily., Disp: , Rfl:     naproxen sodium (ANAPROX) 550 MG tablet, Take 1 tablet (550 mg total) by mouth every 12 (twelve) hours as needed (Neck Pain)., Disp: 30 tablet, Rfl: 1    ondansetron (ZOFRAN) 4 MG tablet, Take 1 tablet (4 mg total) by mouth every 8 (eight) hours as needed for Nausea. 1 Tablet Oral Every 6 hours, Disp: 12 tablet, Rfl: 12    prasterone, dhea, (INTRAROSA) 6.5 mg Inst, Place 6.5 mg vaginally every evening., Disp: 30 each, Rfl: 11    sumatriptan (IMITREX) 20 mg/actuation nasal spray, USE 1 SPRAY IN EACH NOSTRIL AT ONSET OF HEADACHE. MAY REPEAT ONCE IN 2 HOURS IF NO RELIEF. NO MORE THAN 2 SPRAYS PER 24 HOURS, Disp: 6 each, Rfl: 12    tamoxifen (NOLVADEX) 20 MG Tab, Take 1 tablet (20 mg total) by mouth once daily., Disp: 30 tablet, Rfl: 11    triamcinolone acetonide 0.025% (KENALOG) 0.025 % Oint, Apply topically 2 (two) times daily. x 1-2 wks then prn flares only, Disp: 80 g, Rfl: 2    vitamin D 1000 units Tab, Take 185 mg by mouth every morning. , Disp: , Rfl:     baclofen (LIORESAL) 10 MG tablet, Take 1 tablet (10 mg total) by mouth 3 (three) times daily as needed., Disp: 90 tablet, Rfl: 4    Current Facility-Administered Medications:     albuterol-ipratropium 2.5 mg-0.5 mg/3 mL nebulizer solution 3 mL, 3 mL, Nebulization, 1 time in Clinic/HOD, Tamir Mccormick II, MD    Social History:   Social History  "    Socioeconomic History    Marital status:      Spouse name: Parrish    Number of children: 2   Tobacco Use    Smoking status: Never     Passive exposure: Never    Smokeless tobacco: Never   Substance and Sexual Activity    Alcohol use: No     Alcohol/week: 0.0 standard drinks    Drug use: No    Sexual activity: Yes     Partners: Male     Birth control/protection: Surgical, None     Comment:  to Parrish    Other Topics Concern    Are you pregnant or think you may be? No    Breast-feeding No       REVIEW OF SYSTEMS:  Constitution: Negative. Negative for chills, fever and night sweats.   Cardiovascular: Negative for chest pain and syncope.   Respiratory: Negative for cough and shortness of breath.   Gastrointestinal: See HPI. Negative for nausea/vomiting. Negative for abdominal pain.  Genitourinary: See HPI. Negative for discoloration or dysuria.  Skin: Negative for dry skin, itching and rash.   Hematologic/Lymphatic: negative for bleeding/clotting disorders.   Musculoskeletal: Negative for falls and muscle weakness.   Neurological: See HPI. no history of seizures. no history of cranial surgery or shunts.  Endocrine: Negative for polydipsia, polyphagia and polyuria.   Allergic/Immunologic: Negative for hives and persistent infections.  Psychiatric/Behavioral: Negative for depression and insomnia.         EXAM:  Ht 5' 6" (1.676 m)   Wt 95 kg (209 lb 6.4 oz)   LMP  (LMP Unknown)   BMI 33.80 kg/m²     General: The patient is a 63 y.o. female in no apparent distress, the patient is orientatied to person, place and time.  Psych: Normal mood and affect  HEENT: Vision grossly intact, hearing intact to the spoken word.  Lungs: Respirations unlabored.  Gait: Normal station and gait, no difficulty with toe or heel walk.   Skin: Cervical skin negative for rashes, lesions, hairy patches and surgical scars.  Range of motion: Cervical range of motion is acceptable. There is posterior cervical and lower lumbar " tenderness to palpation.  Spinal Balance: Global saggital and coronal spinal balance acceptable, no significant for scoliosis and kyphosis.  Musculoskeletal: No pain with the range of motion of the bilateral shoulders and elbows. Normal bulk and contour of the bilateral hands.  Vascular: Bilateral hands warm and well perfused, Radial pulses 2+ bilaterally.  Neurological: Normal strength and tone in all major motor groups in the bilateral upper and lower extremities. Normal sensation to light touch in the C5-T1 and L2-S1 dermatomes bilaterally.  Deep tendon reflexes symmetric 2+ in the bilateral upper and lower extremities.  Negative Inverted Radial Reflex and Meng's bilaterally. Negative Babinski bilaterally.     IMAGING:   Today I independently reviewed the following images and my interpretations are as follows:    AP, Lat and Flex/Ex  upright C-spine films demonstrate spondylosis and DDD.    Lumbar Xrs showed spondylosis and DDD without listhesis.    DEXA in 2023 showed lumbar T-score of -0.9.     Body mass index is 33.8 kg/m².  No results found for: HGBA1C    ASSESSMENT/PLAN:  Cervical spondylosis  -     Ambulatory referral/consult to Physical/Occupational Therapy; Future; Expected date: 06/26/2023    Strain of neck muscle, initial encounter  -     Ambulatory referral/consult to Orthopedics    DDD (degenerative disc disease), cervical    Cervical radiculopathy    DDD (degenerative disc disease), lumbar    Lumbar spondylosis  -     Ambulatory referral/consult to Physical/Occupational Therapy; Future; Expected date: 06/26/2023      Follow up if symptoms worsen or fail to improve.    Patient has cervical spondylosis and RUE radiculopathy. She also has lumbar spondylosis and RLE radiculopathy. I discussed the natural history of their diagnoses as well as surgical and nonsurgical treatment options. I educated the patient on the importance of core/back strengthening, correct posture, bending/lifting ergonomics, and  low-impact aerobic exercises (walking, elliptical, and aquatherapy). Continue medications. I will refer the patient to PT for neck and lower back. Patient will follow up PRN. Next step is a cervical MRI.    Holden Walden MD  Orthopaedic Spine Surgeon  Department of Orthopaedic Surgery  [phone]

## 2023-06-20 ENCOUNTER — CLINICAL SUPPORT (OUTPATIENT)
Dept: REHABILITATION | Facility: HOSPITAL | Age: 63
End: 2023-06-20
Attending: ORTHOPAEDIC SURGERY
Payer: COMMERCIAL

## 2023-06-20 DIAGNOSIS — M53.82 DECREASED ROM OF INTERVERTEBRAL DISCS OF CERVICAL SPINE: ICD-10-CM

## 2023-06-20 DIAGNOSIS — M54.2 NECK PAIN: Primary | ICD-10-CM

## 2023-06-20 DIAGNOSIS — M47.812 CERVICAL SPONDYLOSIS: ICD-10-CM

## 2023-06-20 PROCEDURE — 97110 THERAPEUTIC EXERCISES: CPT

## 2023-06-20 PROCEDURE — 97161 PT EVAL LOW COMPLEX 20 MIN: CPT

## 2023-06-20 NOTE — PLAN OF CARE
[facility]   Physical Therapy Initial Evaluation      Date: 6/20/2023   Name: Christal Posey  Clinic Number: [id number]    Therapy Diagnosis:   Encounter Diagnoses   Name Primary?    Cervical spondylosis     Neck pain Yes    Decreased ROM of intervertebral discs of cervical spine      Physician: Holden Walden MD    Physician Orders: PT Eval and Treat   Medical Diagnosis from Referral: S16.1XXA (ICD-10-CM) - Strain of neck muscle, initial encounter  Evaluation Date: 6/20/2023  Authorization Period Expiration: TBD  Plan of Care Expiration: 9/15/2023  Progress Note Due: 7/19/2023  Visit # / Visits authorized: 1/ 1   FOTO: 1/5    FOTO Initial Evaluation: see foto  FOTO 2nd F/U: NA  FOTO Discharge: NA    Precautions: Standard     Time In: 900  Time Out: 945  Total Appointment Time (timed & untimed codes): 45 minutes      SUBJECTIVE     Date of onset: 4 weeks ago    History of current condition - Christal reports: she has been having R sided neck pain and tingling in the R arm that goes to the back of the hand. Patient reports she had xrays. Patient reports her doctor believes she has a nerve impinged. Patient reports she is still taking naproxen as needed. Patient reports her symptoms are worsened with looking down and reaching. Patient reports her pain has improved about 50%, but she continues to get the numbness and tingling. Patient reports her tingling symptoms happen everyday. Patient reports her tingling is occurring right now just sitting in place. Patients symptoms are relieved sometimes with head and shoulder movements or shaking her arm out     Trauma- No  Bowel/Bladder Disturbances- No  History of Cancer: Breast Cancer - double mastectomy   Constant Pain-  No  Night Pain- No  Headaches- No  Drop Attacks/Dizzy/Diplopia/Dysphagia/Dysarthria- No      Imaging, bone scan films: see imaging section     Prior Therapy: Yes after mastectomy   Occupation: Retired   Prior Level of Function: House  chores, grass cutting   Current Level of Function: no change in function, but now has discomfort and pain     Pain:  Current 2/10, worst 4/10, best 1/10   Location: right neck, shoulder, arm, hand    Description: Aching, Tight, Tingling, and Numb  Aggravating Factors: see subjective   Easing Factors: see subjective     Patients goals: To relieve symptoms of pain/tingling      Medical History:   Past Medical History:   Diagnosis Date    Allergy     BRCA1 negative 2022    BRCA2 negative 2022    Breast cancer 05/2016    Left breast low grade DCIS, ER/NH positive    Colon polyp 10/31/2018    Ischemic colitis suspected as well ( area biopsied).    GERD (gastroesophageal reflux disease)     Hiatal hernia     Hyperlipidemia     Irritable bowel syndrome     Migraine headache     PONV (postoperative nausea and vomiting)     RA (rheumatoid arthritis)     Squamous cell carcinoma excised 11/12/14    in situ, R forearm       Surgical History:   Christal Posey  has a past surgical history that includes Tubal ligation; Ectopic pregnancy surgery (Right); tubal reversal (07/1991); removal of ovarian cyst (Right, 10/1992); Hysterectomy (1998); Breast biopsy (Left, 05/2016); Breast lumpectomy; Mastectomy (Bilateral, 10/24/2022); Herndon lymph node biopsy (Left, 10/24/2022); Injection for sentinel node identification (Left, 10/24/2022); Insertion of breast implant (Bilateral, 10/24/2022); Removal of breast implant (Bilateral, 11/28/2022); Breast Capsulectomy (Bilateral, 11/28/2022); Breast reconstruction (10/24/22); and Augmentation of breast (10/24/22).    Medications:   Christal has a current medication list which includes the following prescription(s): albuterol, alprazolam, baclofen, budesonide, butalbital-acetaminophen-caffeine -40 mg, cholestyramine, ciclopirox, diclofenac sodium, gabapentin, hydrocortisone butyrate, lansoprazole, losartan, multivitamin, naproxen sodium, ondansetron, prasterone (dhea), sumatriptan,  tamoxifen, triamcinolone acetonide 0.025%, and vitamin d, and the following Facility-Administered Medications: albuterol-ipratropium.    Allergies:   Review of patient's allergies indicates:   Allergen Reactions    Pravastatin Other (See Comments)     Severe muscle pain.  Muscle pain    Other reaction(s): Other (See Comments)  Severe muscle pain.    Shellfish containing products Anaphylaxis    Amitriptyline      Other reaction(s): dizzy    Codeine      Other reaction(s): Unknown    Doxycycline      Other reaction(s): Nausea    Erythromycin      Other reaction(s): Unknown    Iodine      Other reaction(s): Swelling  Other reaction(s): Unknown    Macrobid  [nitrofurantoin monohyd/m-cryst]      Other reaction(s): Unknown    Verapamil      Other reaction(s): Headache          OBJECTIVE     Cervical AROM: Pain/Dysfunction with Movement:   Flexion: 70 degrees    Extension: 47 degrees    Right side bendin degrees Increased pins/needles and discomfort in R arm   Left side bendin degrees    Right rotation: 80 degrees Increased pins/needles and discomfort in R arm   Left rotation: 80 degrees      Myotome Right Left Pain/Dysfunction with Movement   C4- Shoulder Elevation 4+/5 4+/5    C5- Shoulder Abduction 4+/5 4+/5    C6- Wrist Extension 4+/5 4+/5    C7- Elbow Extension 4+/5 4+/5    C8- Phalangeal Abduction 4+/5 4+/5    T1- 5th Finger Abduction 4+/5 4+/5      Posture Assessment:    Spurling's Test: + on R with extension     Distraction Test: +     ULTT  - Median: + for tension on R but different than normal symptoms   - Radial: + for tension on R but different than normal symptoms  - Ulnar: -         Limitation/Restriction for FOTO Neck Survey    Therapist reviewed FOTO scores for Christal Posey on 2023.   FOTO documents entered into [facility] - see Media section.    Limitation Score: See foto %         TREATMENT     Total Treatment time (time-based codes) separate from Evaluation: 15 minutes      Christal received the  treatments listed below:      therapeutic exercises to develop strength, endurance, ROM, flexibility, and posture for 15 minutes including:    Upper trap and levator stretch   - 30s x 3   Radial Nerve Gliding   - 15x              PATIENT EDUCATION AND HOME EXERCISES     Education provided:   - Purpose of PT  - HEP    Written Home Exercises Provided: yes. Exercises were reviewed and Christal was able to demonstrate them prior to the end of the session.  Christal demonstrated good  understanding of the education provided. See EMR under Patient Instructions for exercises provided during therapy sessions.    ASSESSMENT     Christal is a 63 y.o. female referred to outpatient Physical Therapy with a medical diagnosis of strain of neck muscle. Patient presents with a 4 week history of R sided cervical pain and radiculopathy. Patient presents with R sided neck pain and tingling sensations with R SB and rotation range of motion. Patient also presents with nerve tension on the R. Patient symptoms present similarly in the radial nerve distribution pattern. Patient was progressed with stretching and nerve gliding today to address problem list above. Patient did well with no adverse effects. Patients signs and symptoms present similarly to R sided cervical radiculopathy     Patient prognosis is Good.   Patient will benefit from skilled outpatient Physical Therapy to address the deficits stated above and in the chart below, provide patient /family education, and to maximize patientt's level of independence.     Plan of care discussed with patient: Yes  Patient's spiritual, cultural and educational needs considered and patient is agreeable to the plan of care and goals as stated below:     Anticipated Barriers for therapy: None    Medical Necessity is demonstrated by the following  History  Co-morbidities and personal factors that may impact the plan of care Co-morbidities:   See medical history     Personal Factors:   no deficits     high    Examination  Body Structures and Functions, activity limitations and participation restrictions that may impact the plan of care Body Regions:   neck  upper extremities    Body Systems:    ROM  strength    Participation Restrictions:   None    Activity limitations:   Learning and applying knowledge  no deficits    General Tasks and Commands  no deficits    Communication  no deficits    Mobility  no deficits    Self care  no deficits    Domestic Life  no deficits    Interactions/Relationships  no deficits    Life Areas  no deficits    Community and Social Life  no deficits         high   Clinical Presentation stable and uncomplicated low   Decision Making/ Complexity Score: low     Goals:  Short Term Goals (2 Weeks):   1. Pt will be independent with HEP to supplement PT in improving pain free cervical mobility  2. Pt will demonstrate improved sitting posture to decrease pain experienced in head and neck.  Long Term Goals (6 Weeks):   1. Pt will improve FOTO to </=**% limitation to improve perceived limitation with changing and maintaining mobility.  2. Pt will improve cervical AROM to WNL in all planes to improve cervical mobility for driving   3. Pt will improve UE MMTs 1/2 grade in all planes to improve strength for lifting and carrying tasks.  4. Pt will report no pain with lifting 25 lbs to promote physical activity.   5. Pt will report no pain with cervical AROM in all planes to promote QOL.   6. Pt will not have complaints of nerve tension throughout the day with daily activities.     PLAN   Plan of care Certification: 6/20/2023 to 9/15/2023.    Outpatient Physical Therapy 2 times weekly for 8 weeks to include the following interventions: Cervical/Lumbar Traction, Manual Therapy, Moist Heat/ Ice, Neuromuscular Re-ed, Patient Education, Self Care, Therapeutic Activities, Therapeutic Exercise, and FDN.     Phill Ballard, PT      I CERTIFY THE NEED FOR THESE SERVICES FURNISHED UNDER THIS PLAN OF TREATMENT AND  WHILE UNDER MY CARE   Physician's comments:     Physician's Signature: ___________________________________________________

## 2023-06-28 ENCOUNTER — CLINICAL SUPPORT (OUTPATIENT)
Dept: REHABILITATION | Facility: HOSPITAL | Age: 63
End: 2023-06-28
Attending: ORTHOPAEDIC SURGERY
Payer: COMMERCIAL

## 2023-06-28 DIAGNOSIS — M54.2 NECK PAIN: ICD-10-CM

## 2023-06-28 DIAGNOSIS — M53.82 DECREASED ROM OF INTERVERTEBRAL DISCS OF CERVICAL SPINE: ICD-10-CM

## 2023-06-28 DIAGNOSIS — M47.812 CERVICAL SPONDYLOSIS: Primary | ICD-10-CM

## 2023-06-28 PROCEDURE — 97110 THERAPEUTIC EXERCISES: CPT

## 2023-06-28 PROCEDURE — 97112 NEUROMUSCULAR REEDUCATION: CPT

## 2023-06-28 NOTE — PROGRESS NOTES
[facility]   Physical Therapy Treatment Note      Name: Christal Posey  Clinic Number: [id number]    Therapy Diagnosis:   Encounter Diagnoses   Name Primary?    Cervical spondylosis Yes    Neck pain     Decreased ROM of intervertebral discs of cervical spine      Physician: Holden Walden MD    Visit Date: 2023  Physician Orders: PT Eval and Treat   Medical Diagnosis from Referral: S16.1XXA (ICD-10-CM) - Strain of neck muscle, initial encounter  Evaluation Date: 2023  Authorization Period Expiration: TBD  Plan of Care Expiration: 9/15/2023  Progress Note Due: 2023  Visit # / Visits authorized:    FOTO:      FOTO Initial Evaluation: see foto  FOTO 2nd F/U: NA  FOTO Discharge: NA     Precautions: Standard        PTA Visit #: 0/5     Time In: 1115  Time Out: 1200  Total Billable Time: 45 minutes    Subjective     Pt reports: she feels a little better compared to previous session. Patient has been compliant with her home program and stretching.  She was compliant with home exercise program.  Response to previous treatment: No adverse effects  Functional change: None    Pain: 0/10  Location: right neck      Objective      Cervical AROM: Pain/Dysfunction with Movement:   Flexion: 70 degrees     Extension: 47 degrees     Right side bendin degrees Increased pins/needles and discomfort in R arm   Left side bendin degrees     Right rotation: 80 degrees Increased pins/needles and discomfort in R arm   Left rotation: 80 degrees        Myotome Right Left Pain/Dysfunction with Movement   C4- Shoulder Elevation 4+/5 4+/5     C5- Shoulder Abduction 4+/5 4+/5     C6- Wrist Extension 4+/5 4+/5     C7- Elbow Extension 4+/5 4+/5     C8- Phalangeal Abduction 4+/5 4+/5     T1- 5th Finger Abduction 4+/5 4+/5        Posture Assessment:     Spurling's Test: + on R with extension      Distraction Test: +      ULTT  - Median: + for tension on R but different than normal symptoms   -  Radial: + for tension on R but different than normal symptoms  - Ulnar: -        Treatment     Christal received the treatments listed below:      therapeutic exercises to develop strength, endurance, ROM, and flexibility for 35 minutes including:    Upper trap and levator stretch   - 30s x 3   Radial Nerve Gliding   - 15x  Cervical retractions supine into towel  - 15x   Pec stretch   - 30s x 3   Ant scalene stretch    - 30s x 3    manual therapy techniques: Joint mobilizations, Manual traction, Soft tissue Mobilization, and FDN were applied to the: Cervical spine for 0 minutes, including:      neuromuscular re-education activities to improve: Proprioception, Posture, and Motor Control for 10 minutes. The following activities were included:    Scapular retractions  - 20x   Rows  - 7lbs   - 20x   Pulldowns GTB  - 20x     Patient Education and Home Exercises       Education provided:   - HEP Cont    Written Home Exercises Provided: Patient instructed to cont prior HEP. Exercises were reviewed and Christal was able to demonstrate them prior to the end of the session.  Christal demonstrated good  understanding of the education provided. See EMR under Patient Instructions for exercises provided during therapy sessions    Assessment     Patient presents to PT for initial visit and was progressed with therex and neuro to address cervical tightness, scapular strength, posture, and nerve tension. Patient did well with a few instances of increased tingling on R UE. Patient left without any worsening symptoms today. Cont to progress as tolerated    Christal Is progressing well towards her goals.   Pt prognosis is Good.     Pt will continue to benefit from skilled outpatient physical therapy to address the deficits listed in the problem list box on initial evaluation, provide pt/family education and to maximize pt's level of independence in the home and community environment.     Pt's spiritual, cultural and educational needs considered  and pt agreeable to plan of care and goals.     Anticipated barriers to physical therapy: None    Goals:   Short Term Goals (2 Weeks):   1. Pt will be independent with HEP to supplement PT in improving pain free cervical mobility  2. Pt will demonstrate improved sitting posture to decrease pain experienced in head and neck.  Long Term Goals (6 Weeks):   1. Pt will improve FOTO to </=**% limitation to improve perceived limitation with changing and maintaining mobility.  2. Pt will improve cervical AROM to WNL in all planes to improve cervical mobility for driving   3. Pt will improve UE MMTs 1/2 grade in all planes to improve strength for lifting and carrying tasks.  4. Pt will report no pain with lifting 25 lbs to promote physical activity.   5. Pt will report no pain with cervical AROM in all planes to promote QOL.   6. Pt will not have complaints of nerve tension throughout the day with daily activities.      PLAN   Plan of care Certification: 6/20/2023 to 9/15/2023.     Outpatient Physical Therapy 2 times weekly for 8 weeks to include the following interventions: Cervical/Lumbar Traction, Manual Therapy, Moist Heat/ Ice, Neuromuscular Re-ed, Patient Education, Self Care, Therapeutic Activities, Therapeutic Exercise, and FDN.     Phill Ballard, PT

## 2023-06-30 RX ORDER — NAPROXEN SODIUM 550 MG/1
550 TABLET ORAL EVERY 12 HOURS PRN
Qty: 30 TABLET | Refills: 1 | Status: SHIPPED | OUTPATIENT
Start: 2023-06-30 | End: 2024-01-03

## 2023-07-05 ENCOUNTER — CLINICAL SUPPORT (OUTPATIENT)
Dept: REHABILITATION | Facility: HOSPITAL | Age: 63
End: 2023-07-05
Attending: ORTHOPAEDIC SURGERY
Payer: COMMERCIAL

## 2023-07-05 DIAGNOSIS — M54.2 NECK PAIN: ICD-10-CM

## 2023-07-05 DIAGNOSIS — M53.82 DECREASED ROM OF INTERVERTEBRAL DISCS OF CERVICAL SPINE: ICD-10-CM

## 2023-07-05 DIAGNOSIS — M47.812 CERVICAL SPONDYLOSIS: Primary | ICD-10-CM

## 2023-07-05 PROCEDURE — 97112 NEUROMUSCULAR REEDUCATION: CPT

## 2023-07-05 PROCEDURE — 97012 MECHANICAL TRACTION THERAPY: CPT

## 2023-07-05 PROCEDURE — 97110 THERAPEUTIC EXERCISES: CPT

## 2023-07-05 NOTE — PROGRESS NOTES
[facility]   Physical Therapy Treatment Note      Name: Christal Posey  Clinic Number: [id number]    Therapy Diagnosis:   Encounter Diagnoses   Name Primary?    Cervical spondylosis Yes    Neck pain     Decreased ROM of intervertebral discs of cervical spine        Physician: Holden Walden MD    Visit Date: 2023  Physician Orders: PT Eval and Treat   Medical Diagnosis from Referral: S16.1XXA (ICD-10-CM) - Strain of neck muscle, initial encounter  Evaluation Date: 2023  Authorization Period Expiration: 2023  Plan of Care Expiration: 9/15/2023  Progress Note Due: 2023  Visit # / Visits authorized: ,    FOTO: 0/5     FOTO Initial Evaluation: see foto  FOTO 2nd F/U: NA  FOTO Discharge: NA     Precautions: Standard        PTA Visit #: 0/5     Time In: 1100  Time Out: 1145  Total Billable Time: 45 minutes    Subjective     Pt reports: she felt great after last session, but after a day or so her tingling symptoms returned. Patient continues to have symptoms in R arm down to the index and thumb fingers.       She was compliant with home exercise program.  Response to previous treatment: No adverse effects  Functional change: None    Pain: 0/10  Location: right neck      Objective      Cervical AROM: Pain/Dysfunction with Movement:   Flexion: 70 degrees     Extension: 47 degrees     Right side bendin degrees Increased pins/needles and discomfort in R arm   Left side bendin degrees     Right rotation: 80 degrees Increased pins/needles and discomfort in R arm   Left rotation: 80 degrees        Myotome Right Left Pain/Dysfunction with Movement   C4- Shoulder Elevation 4+/5 4+/5     C5- Shoulder Abduction 4+/5 4+/5     C6- Wrist Extension 4+/5 4+/5     C7- Elbow Extension 4+/5 4+/5     C8- Phalangeal Abduction 4+/5 4+/5     T1- 5th Finger Abduction 4+/5 4+/5        Posture Assessment:     Spurling's Test: + on R with extension      Distraction Test: +      ULTT  -  Median: + for tension on R but different than normal symptoms   - Radial: + for tension on R but different than normal symptoms  - Ulnar: -        Treatment     Christal received the treatments listed below:      therapeutic exercises to develop strength, endurance, ROM, and flexibility for 25 minutes including:    Upper trap and levator stretch   - 30s x 3   Radial Nerve Gliding   - 15x  Median Nerve Gliding   Cervical retractions supine into towel  - 15x   Pec stretch   - 30s x 3   Ant scalene stretch    - 30s x 3    manual therapy techniques: Joint mobilizations, Manual traction, Soft tissue Mobilization, and FDN were applied to the: Cervical spine for 0 minutes, including:      neuromuscular re-education activities to improve: Proprioception, Posture, and Motor Control for 10 minutes. The following activities were included:    Scapular retractions  - 20x   Rows  - 13lbs   - 20x   Pulldowns Cables  - 3lbs   - 20x     SUPERVISED MODALITIES after being cleared for contraindications:  Mechanical Traction:  Christal received static mechanical traction to the cervical spine at a force of 15 pounds for a total of 10 minutes. Hold time of 10 minutes and rest time for 0  Minutes. Patient tolerated treatment well without any adverse effects.      Patient Education and Home Exercises       Education provided:   - HEP Cont    Written Home Exercises Provided: Patient instructed to cont prior HEP. Exercises were reviewed and Christal was able to demonstrate them prior to the end of the session.  Christal demonstrated good  understanding of the education provided. See EMR under Patient Instructions for exercises provided during therapy sessions    Assessment     Patient presents to PT with minimal symptoms of R UE tingling in the radial nerve distribution pattern. Patient was progressed with mechanical traction today and she noted less symptoms afterwards. Patient did well with all exercises and progressions.     Christal Is progressing well  towards her goals.   Pt prognosis is Good.     Pt will continue to benefit from skilled outpatient physical therapy to address the deficits listed in the problem list box on initial evaluation, provide pt/family education and to maximize pt's level of independence in the home and community environment.     Pt's spiritual, cultural and educational needs considered and pt agreeable to plan of care and goals.     Anticipated barriers to physical therapy: None    Goals:   Short Term Goals (2 Weeks):   1. Pt will be independent with HEP to supplement PT in improving pain free cervical mobility  2. Pt will demonstrate improved sitting posture to decrease pain experienced in head and neck.  Long Term Goals (6 Weeks):   1. Pt will improve FOTO to </=**% limitation to improve perceived limitation with changing and maintaining mobility.  2. Pt will improve cervical AROM to WNL in all planes to improve cervical mobility for driving   3. Pt will improve UE MMTs 1/2 grade in all planes to improve strength for lifting and carrying tasks.  4. Pt will report no pain with lifting 25 lbs to promote physical activity.   5. Pt will report no pain with cervical AROM in all planes to promote QOL.   6. Pt will not have complaints of nerve tension throughout the day with daily activities.      PLAN   Plan of care Certification: 6/20/2023 to 9/15/2023.     Outpatient Physical Therapy 2 times weekly for 8 weeks to include the following interventions: Cervical/Lumbar Traction, Manual Therapy, Moist Heat/ Ice, Neuromuscular Re-ed, Patient Education, Self Care, Therapeutic Activities, Therapeutic Exercise, and FDN.     Phill Ballard, PT

## 2023-07-11 ENCOUNTER — CLINICAL SUPPORT (OUTPATIENT)
Dept: REHABILITATION | Facility: HOSPITAL | Age: 63
End: 2023-07-11
Attending: ORTHOPAEDIC SURGERY
Payer: COMMERCIAL

## 2023-07-11 DIAGNOSIS — M54.2 NECK PAIN: ICD-10-CM

## 2023-07-11 DIAGNOSIS — M53.82 DECREASED ROM OF INTERVERTEBRAL DISCS OF CERVICAL SPINE: ICD-10-CM

## 2023-07-11 DIAGNOSIS — M47.812 CERVICAL SPONDYLOSIS: ICD-10-CM

## 2023-07-11 PROCEDURE — 97012 MECHANICAL TRACTION THERAPY: CPT | Mod: CQ

## 2023-07-11 PROCEDURE — 97110 THERAPEUTIC EXERCISES: CPT | Mod: CQ

## 2023-07-11 PROCEDURE — 97112 NEUROMUSCULAR REEDUCATION: CPT | Mod: CQ

## 2023-07-11 NOTE — PROGRESS NOTES
[facility]   Physical Therapy Treatment Note      Name: Christal Posey  Clinic Number: [id number]    Therapy Diagnosis:   Encounter Diagnoses   Name Primary?    Neck pain     Cervical spondylosis     Decreased ROM of intervertebral discs of cervical spine          Physician: Holden Walden MD    Visit Date: 2023  Physician Orders: PT Eval and Treat   Medical Diagnosis from Referral: S16.1XXA (ICD-10-CM) - Strain of neck muscle, initial encounter  Evaluation Date: 2023  Authorization Period Expiration: 2023  Plan of Care Expiration: 9/15/2023  Progress Note Due: 2023  Visit # / Visits authorized: , 3/20   FOTO:      FOTO Initial Evaluation: see foto  FOTO 2nd F/U: NA  FOTO Discharge: NA     Precautions: Standard        PTA Visit #:      Time In: 11:17 am   Time Out: 12:03 pm   Total Billable Time: 46 minutes    Subjective     Pt reports: that she starts to improve each week and feels that she is able to stretch her neck and relieve her radicular symptoms when they arise. Pt states that she felt better following the traction.        She was compliant with home exercise program.  Response to previous treatment: No adverse effects  Functional change: None    Pain: 0/10  Location: right neck      Objective      Cervical AROM: Pain/Dysfunction with Movement:   Flexion: 70 degrees     Extension: 47 degrees     Right side bendin degrees Increased pins/needles and discomfort in R arm   Left side bendin degrees     Right rotation: 80 degrees Increased pins/needles and discomfort in R arm   Left rotation: 80 degrees        Myotome Right Left Pain/Dysfunction with Movement   C4- Shoulder Elevation 4+/5 4+/5     C5- Shoulder Abduction 4+/5 4+/5     C6- Wrist Extension 4+/5 4+/5     C7- Elbow Extension 4+/5 4+/5     C8- Phalangeal Abduction 4+/5 4+/5     T1- 5th Finger Abduction 4+/5 4+/5        Posture Assessment:     Spurling's Test: + on R with extension       Distraction Test: +      ULTT  - Median: + for tension on R but different than normal symptoms   - Radial: + for tension on R but different than normal symptoms  - Ulnar: -        Treatment     Christal received the treatments listed below:      therapeutic exercises to develop strength, endurance, ROM, and flexibility for 21 minutes including:    Upper trap and levator stretch   - 30s x 3   Radial Nerve Gliding (not performed)   - 15x  Median Nerve Gliding (not performed)   Cervical retractions supine into towel  - 15x   Pec stretch   - 30s x 3   Ant scalene stretch    - 30s x 3    manual therapy techniques: Joint mobilizations, Manual traction, Soft tissue Mobilization, and FDN were applied to the: Cervical spine for 0 minutes, including:      neuromuscular re-education activities to improve: Proprioception, Posture, and Motor Control for 15 minutes. The following activities were included:    Scapular retractions  - 20x   Rows  - 13lbs   - 20x   Pulldowns Cables  - 7lbs   - 20x   Standing cervical isometrics w/ ball at wall    - 10x ea     - side-bend     - extension     SUPERVISED MODALITIES after being cleared for contraindications:  Mechanical Traction:  Christal received static mechanical traction to the cervical spine at a force of 15 pounds for a total of 10 minutes. Hold time of 10 minutes  Patient tolerated treatment well without any adverse effects.      Patient Education and Home Exercises       Education provided:   - HEP Cont    Written Home Exercises Provided: Patient instructed to cont prior HEP. Exercises were reviewed and Christal was able to demonstrate them prior to the end of the session.  Christal demonstrated good  understanding of the education provided. See EMR under Patient Instructions for exercises provided during therapy sessions    Assessment     Incorporated cervical isometrics this session in order to increase cervical stability. Pt given verbal cues and demonstrations for proper form.  Continued with mechanical cervical traction in order continue to aid in decreased symptoms. Will continue to monitor pt's progress and modify sessions as tolerated.     Christal Is progressing well towards her goals.   Pt prognosis is Good.     Pt will continue to benefit from skilled outpatient physical therapy to address the deficits listed in the problem list box on initial evaluation, provide pt/family education and to maximize pt's level of independence in the home and community environment.     Pt's spiritual, cultural and educational needs considered and pt agreeable to plan of care and goals.     Anticipated barriers to physical therapy: None    Goals:   Short Term Goals (2 Weeks):   1. Pt will be independent with HEP to supplement PT in improving pain free cervical mobility  2. Pt will demonstrate improved sitting posture to decrease pain experienced in head and neck.  Long Term Goals (6 Weeks):   1. Pt will improve FOTO to </=**% limitation to improve perceived limitation with changing and maintaining mobility.  2. Pt will improve cervical AROM to WNL in all planes to improve cervical mobility for driving   3. Pt will improve UE MMTs 1/2 grade in all planes to improve strength for lifting and carrying tasks.  4. Pt will report no pain with lifting 25 lbs to promote physical activity.   5. Pt will report no pain with cervical AROM in all planes to promote QOL.   6. Pt will not have complaints of nerve tension throughout the day with daily activities.      PLAN   Plan of care Certification: 6/20/2023 to 9/15/2023.     Outpatient Physical Therapy 2 times weekly for 8 weeks to include the following interventions: Cervical/Lumbar Traction, Manual Therapy, Moist Heat/ Ice, Neuromuscular Re-ed, Patient Education, Self Care, Therapeutic Activities, Therapeutic Exercise, and FDN.     Rita Heaton, PTA

## 2023-07-18 ENCOUNTER — CLINICAL SUPPORT (OUTPATIENT)
Dept: REHABILITATION | Facility: HOSPITAL | Age: 63
End: 2023-07-18
Attending: ORTHOPAEDIC SURGERY
Payer: COMMERCIAL

## 2023-07-18 DIAGNOSIS — M53.82 DECREASED ROM OF INTERVERTEBRAL DISCS OF CERVICAL SPINE: ICD-10-CM

## 2023-07-18 DIAGNOSIS — M54.2 NECK PAIN: Primary | ICD-10-CM

## 2023-07-18 DIAGNOSIS — M47.812 CERVICAL SPONDYLOSIS: ICD-10-CM

## 2023-07-18 PROCEDURE — 97112 NEUROMUSCULAR REEDUCATION: CPT | Mod: CQ

## 2023-07-18 PROCEDURE — 97012 MECHANICAL TRACTION THERAPY: CPT | Mod: CQ

## 2023-07-18 PROCEDURE — 97110 THERAPEUTIC EXERCISES: CPT | Mod: CQ

## 2023-07-18 NOTE — PROGRESS NOTES
[facility]   Physical Therapy Treatment Note      Name: Christal Posey  Clinic Number: [id number]    Therapy Diagnosis:   Encounter Diagnoses   Name Primary?    Neck pain Yes    Cervical spondylosis     Decreased ROM of intervertebral discs of cervical spine            Physician: Holden Walden MD    Visit Date: 2023  Physician Orders: PT Eval and Treat   Medical Diagnosis from Referral: S16.1XXA (ICD-10-CM) - Strain of neck muscle, initial encounter  Evaluation Date: 2023  Authorization Period Expiration: 2023  Plan of Care Expiration: 9/15/2023  Progress Note Due: 2023  Visit # / Visits authorized: ,    FOTO: 3/5     FOTO Initial Evaluation: see foto  FOTO 2nd F/U: NA  FOTO Discharge: NA     Precautions: Standard        PTA Visit #:      Time In: 11:12 am   Time Out: 11:57 pm   Total Billable Time: 45 minutes    Subjective     Pt reports: that she continues to feel like mechanical traction is making a difference in her pain. Pt states that the pain is more in her shoulder and less frequent.       She was compliant with home exercise program.  Response to previous treatment: No adverse effects  Functional change: None    Pain: 0/10  Location: right neck      Objective      Cervical AROM: Pain/Dysfunction with Movement:   Flexion: 70 degrees     Extension: 47 degrees     Right side bendin degrees Increased pins/needles and discomfort in R arm   Left side bendin degrees     Right rotation: 80 degrees Increased pins/needles and discomfort in R arm   Left rotation: 80 degrees        Myotome Right Left Pain/Dysfunction with Movement   C4- Shoulder Elevation 4+/5 4+/5     C5- Shoulder Abduction 4+/5 4+/5     C6- Wrist Extension 4+/5 4+/5     C7- Elbow Extension 4+/5 4+/5     C8- Phalangeal Abduction 4+/5 4+/5     T1- 5th Finger Abduction 4+/5 4+/5        Posture Assessment:     Spurling's Test: + on R with extension      Distraction Test: +      ULTT  -  Median: + for tension on R but different than normal symptoms   - Radial: + for tension on R but different than normal symptoms  - Ulnar: -        Treatment     Christal received the treatments listed below:      therapeutic exercises to develop strength, endurance, ROM, and flexibility for 19 minutes including:    Upper trap and levator stretch   - 30s x 3   Radial Nerve Gliding (not performed)   - 15x  Median Nerve Gliding (not performed)   Cervical retractions supine into towel  - 15x   Pec stretch   - 30s x 3   Ant scalene stretch    - 30s x 3    manual therapy techniques: Joint mobilizations, Manual traction, Soft tissue Mobilization, and FDN were applied to the: Cervical spine for 0 minutes, including:      neuromuscular re-education activities to improve: Proprioception, Posture, and Motor Control for 16 minutes. The following activities were included:  UBE fwd/bkwd 2'/2'   Scapular retractions  - 20x   Rows  - 13lbs   - 20x   Pulldowns Cables  - 7lbs   - 20x   Standing cervical isometrics w/ ball at wall    - 10x ea     - side-bend     - extension     SUPERVISED MODALITIES after being cleared for contraindications:  Mechanical Traction:  Christal received static mechanical traction to the cervical spine at a force of 15 pounds for a total of 10 minutes. Hold time of 10 minutes  Patient tolerated treatment well without any adverse effects.      Patient Education and Home Exercises       Education provided:   - HEP Cont    Written Home Exercises Provided: Patient instructed to cont prior HEP. Exercises were reviewed and Christal was able to demonstrate them prior to the end of the session.  Christal demonstrated good  understanding of the education provided. See EMR under Patient Instructions for exercises provided during therapy sessions    Assessment     Pt able to tolerated addition of UBE with no adverse effects. Pt continued with the need for verbal cues during activities for proper execution. Mechanical traction  applied at the end of session to relieve radicular symptoms.     Christal Is progressing well towards her goals.   Pt prognosis is Good.     Pt will continue to benefit from skilled outpatient physical therapy to address the deficits listed in the problem list box on initial evaluation, provide pt/family education and to maximize pt's level of independence in the home and community environment.     Pt's spiritual, cultural and educational needs considered and pt agreeable to plan of care and goals.     Anticipated barriers to physical therapy: None    Goals:   Short Term Goals (2 Weeks):   1. Pt will be independent with HEP to supplement PT in improving pain free cervical mobility  2. Pt will demonstrate improved sitting posture to decrease pain experienced in head and neck.  Long Term Goals (6 Weeks):   1. Pt will improve FOTO to </=**% limitation to improve perceived limitation with changing and maintaining mobility.  2. Pt will improve cervical AROM to WNL in all planes to improve cervical mobility for driving   3. Pt will improve UE MMTs 1/2 grade in all planes to improve strength for lifting and carrying tasks.  4. Pt will report no pain with lifting 25 lbs to promote physical activity.   5. Pt will report no pain with cervical AROM in all planes to promote QOL.   6. Pt will not have complaints of nerve tension throughout the day with daily activities.      PLAN   Plan of care Certification: 6/20/2023 to 9/15/2023.     Outpatient Physical Therapy 2 times weekly for 8 weeks to include the following interventions: Cervical/Lumbar Traction, Manual Therapy, Moist Heat/ Ice, Neuromuscular Re-ed, Patient Education, Self Care, Therapeutic Activities, Therapeutic Exercise, and FDN.     Rita Heaton, PTA

## 2023-07-31 NOTE — PROGRESS NOTES
[facility]   Physical Therapy Treatment Note      Name: Christal Posey  Clinic Number: [id number]    Therapy Diagnosis:   Encounter Diagnoses   Name Primary?    Neck pain Yes    Cervical spondylosis     Decreased ROM of intervertebral discs of cervical spine          Physician: Holden Walden MD    Visit Date: 2023  Physician Orders: PT Eval and Treat   Medical Diagnosis from Referral: S16.1XXA (ICD-10-CM) - Strain of neck muscle, initial encounter  Evaluation Date: 2023  Authorization Period Expiration: 2023  Plan of Care Expiration: 9/15/2023  Progress Note Due: 2023  Visit # / Visits authorized: ,    FOTO:      FOTO Initial Evaluation: see foto  FOTO 2nd F/U: NA  FOTO Discharge: NA     Precautions: Standard        PTA Visit #: 3/5     Time In: 11:15 am   Time Out: 11:54 am   Total Billable Time: 39 minutes    Subjective     Pt reports: that she has been feeling great with no pain or symptoms into her UE's, even after keeping 5 grandchildren last week. Pt states that she feels good enough for today to be her last day.     She was compliant with home exercise program.  Response to previous treatment: No adverse effects  Functional change: None    Pain: 0/10  Location: right neck      Objective        Performed 2023    Cervical AROM: Pain/Dysfunction with Movement:   Flexion: 70 degrees     Extension: 65 degrees     Right side bendin degrees    Left side bendin  degrees     Right rotation: 87 degrees    Left rotation: 88 degrees        Myotome Right Left Pain/Dysfunction with Movement   C4- Shoulder Elevation 5/5 5/5     C5- Shoulder Abduction 5/5 5/5     C6- Wrist Extension 5/5 5/5     C7- Elbow Extension 5/5 5/5     C8- Phalangeal Abduction 5/5 5/5     T1- 5th Finger Abduction 5/5 45/5            Treatment     Christal received the treatments listed below:      therapeutic exercises to develop strength, endurance, ROM, and flexibility for 8  minutes including:  Updated objective measures *see above*     manual therapy techniques: Joint mobilizations, Manual traction, Soft tissue Mobilization, and FDN were applied to the: Cervical spine for 0 minutes, including:      neuromuscular re-education activities to improve: Proprioception, Posture, and Motor Control for 21 minutes. The following activities were included:  UBE fwd/bkwd 2'/2'   Scapular retractions  - 20x   Rows  - 13lbs   - 20x   Pulldowns Cables  - 7lbs   - 20x   Standing cervical isometrics w/ ball at wall    - 10x ea     - side-bend     - extension     SUPERVISED MODALITIES after being cleared for contraindications:  Mechanical Traction:  Christal received static mechanical traction to the cervical spine at a force of 15 pounds for a total of 10 minutes. Hold time of 10 minutes  Patient tolerated treatment well without any adverse effects.      Patient Education and Home Exercises       Education provided:   - HEP Cont    Written Home Exercises Provided: Patient instructed to cont prior HEP. Exercises were reviewed and Christal was able to demonstrate them prior to the end of the session.  Christal demonstrated good  understanding of the education provided. See EMR under Patient Instructions for exercises provided during therapy sessions    Assessment     Updated objective measures performed this session as today will be pt's last day. Pt exhibited improvements in all aspects of objective measures compared to evaluation, as well as improvement in FOTO score. Pt completed NMR and mechanical traction this session as well as reviewed updated HEP. Pt encouraged to reach out to clinic if further need arises for therapy.     Formal discharge summary to be performed by Phill Ballard DPT.     Christal Is progressing well towards her goals.   Pt prognosis is Good.     Pt will continue to benefit from skilled outpatient physical therapy to address the deficits listed in the problem list box on initial evaluation,  provide pt/family education and to maximize pt's level of independence in the home and community environment.     Pt's spiritual, cultural and educational needs considered and pt agreeable to plan of care and goals.     Anticipated barriers to physical therapy: None    Goals:   Short Term Goals (2 Weeks):   1. Pt will be independent with HEP to supplement PT in improving pain free cervical mobility  2. Pt will demonstrate improved sitting posture to decrease pain experienced in head and neck.  Long Term Goals (6 Weeks):   1. Pt will improve FOTO to </=**% limitation to improve perceived limitation with changing and maintaining mobility.  2. Pt will improve cervical AROM to WNL in all planes to improve cervical mobility for driving   3. Pt will improve UE MMTs 1/2 grade in all planes to improve strength for lifting and carrying tasks.  4. Pt will report no pain with lifting 25 lbs to promote physical activity.   5. Pt will report no pain with cervical AROM in all planes to promote QOL.   6. Pt will not have complaints of nerve tension throughout the day with daily activities.      PLAN   Plan of care Certification: 6/20/2023 to 9/15/2023.     Outpatient Physical Therapy 2 times weekly for 8 weeks to include the following interventions: Cervical/Lumbar Traction, Manual Therapy, Moist Heat/ Ice, Neuromuscular Re-ed, Patient Education, Self Care, Therapeutic Activities, Therapeutic Exercise, and FDN.     Rita Heaton, PTA

## 2023-08-01 ENCOUNTER — CLINICAL SUPPORT (OUTPATIENT)
Dept: REHABILITATION | Facility: HOSPITAL | Age: 63
End: 2023-08-01
Attending: ORTHOPAEDIC SURGERY
Payer: COMMERCIAL

## 2023-08-01 DIAGNOSIS — M47.812 CERVICAL SPONDYLOSIS: ICD-10-CM

## 2023-08-01 DIAGNOSIS — M53.82 DECREASED ROM OF INTERVERTEBRAL DISCS OF CERVICAL SPINE: ICD-10-CM

## 2023-08-01 DIAGNOSIS — M54.2 NECK PAIN: Primary | ICD-10-CM

## 2023-08-01 PROCEDURE — 97112 NEUROMUSCULAR REEDUCATION: CPT | Mod: CQ

## 2023-08-01 PROCEDURE — 97110 THERAPEUTIC EXERCISES: CPT | Mod: CQ

## 2023-08-01 PROCEDURE — 97012 MECHANICAL TRACTION THERAPY: CPT | Mod: CQ

## 2023-08-01 NOTE — PROGRESS NOTES
[facility]   Discharge Note    Name: Christal Posey  Clinic Number: [id number]    Therapy Diagnosis:   Encounter Diagnoses   Name Primary?    Neck pain Yes    Cervical spondylosis     Decreased ROM of intervertebral discs of cervical spine      Physician: Holden Walden MD    Physician Orders: Eval and Treat  Medical Diagnosis: Strain of neck muscle   Evaluation Date: 6/20/2023      Date of Last visit: 8/1/2023  Total Visits Received: 5    ASSESSMENT      Patient has progressed well with therapy and is now asymptomatic. Patient has no complaints as of today and wishes to be discharged.     Discharge reason: Patient is now asymptomatic      Goals: Short Term Goals (2 Weeks):   1. Pt will be independent with HEP to supplement PT in improving pain free cervical mobility MET  2. Pt will demonstrate improved sitting posture to decrease pain experienced in head and neck. MET  Long Term Goals (6 Weeks):   2. Pt will improve cervical AROM to WNL in all planes to improve cervical mobility for driving MET  3. Pt will improve UE MMTs 1/2 grade in all planes to improve strength for lifting and carrying tasks. MET  4. Pt will report no pain with lifting 25 lbs to promote physical activity. MET  5. Pt will report no pain with cervical AROM in all planes to promote QOL.  MET  6. Pt will not have complaints of nerve tension throughout the day with daily activities. MET    PLAN   This patient is discharged from Physical Therapy      Phill Ballard PT

## 2023-08-08 ENCOUNTER — DOCUMENTATION ONLY (OUTPATIENT)
Dept: REHABILITATION | Facility: HOSPITAL | Age: 63
End: 2023-08-08

## 2023-08-08 NOTE — PROGRESS NOTES
PT/PTA met face to face to discuss pt's treatment plan and progress towards established goals. Pt will be seen by a physical therapist minimally every 6th visit or every 30 days.    Rita Heaton PTA      Face to Face PTA Conference performed with Rita Heaton PTA and Case Kamara PTA regarding patient's current status, overall progress, and plan of care.    Phill Ballard, PT

## 2023-08-14 ENCOUNTER — OFFICE VISIT (OUTPATIENT)
Dept: HEMATOLOGY/ONCOLOGY | Facility: CLINIC | Age: 63
End: 2023-08-14
Payer: COMMERCIAL

## 2023-08-14 ENCOUNTER — PATIENT MESSAGE (OUTPATIENT)
Dept: HEMATOLOGY/ONCOLOGY | Facility: CLINIC | Age: 63
End: 2023-08-14

## 2023-08-14 ENCOUNTER — LAB VISIT (OUTPATIENT)
Dept: LAB | Facility: HOSPITAL | Age: 63
End: 2023-08-14
Attending: NURSE PRACTITIONER
Payer: COMMERCIAL

## 2023-08-14 VITALS
SYSTOLIC BLOOD PRESSURE: 158 MMHG | RESPIRATION RATE: 17 BRPM | HEART RATE: 85 BPM | WEIGHT: 213.63 LBS | HEIGHT: 66 IN | BODY MASS INDEX: 34.33 KG/M2 | TEMPERATURE: 97 F | DIASTOLIC BLOOD PRESSURE: 79 MMHG | OXYGEN SATURATION: 95 %

## 2023-08-14 DIAGNOSIS — E55.9 VITAMIN D DEFICIENCY: ICD-10-CM

## 2023-08-14 DIAGNOSIS — Z17.0 MALIGNANT NEOPLASM OF LOWER-INNER QUADRANT OF LEFT BREAST IN FEMALE, ESTROGEN RECEPTOR POSITIVE: ICD-10-CM

## 2023-08-14 DIAGNOSIS — L91.8 SKIN TAGS, MULTIPLE ACQUIRED: ICD-10-CM

## 2023-08-14 DIAGNOSIS — C50.312 MALIGNANT NEOPLASM OF LOWER-INNER QUADRANT OF LEFT BREAST IN FEMALE, ESTROGEN RECEPTOR POSITIVE: ICD-10-CM

## 2023-08-14 DIAGNOSIS — D05.12 DUCTAL CARCINOMA IN SITU (DCIS) OF LEFT BREAST: Primary | Chronic | ICD-10-CM

## 2023-08-14 DIAGNOSIS — B37.9 YEAST INFECTION: ICD-10-CM

## 2023-08-14 DIAGNOSIS — R93.89 ABNORMAL CT SCAN: ICD-10-CM

## 2023-08-14 DIAGNOSIS — Z88.8 ALLERGY TO IODINE: Primary | ICD-10-CM

## 2023-08-14 DIAGNOSIS — R22.2 LOCALIZED SWELLING, MASS AND LUMP, TRUNK: ICD-10-CM

## 2023-08-14 LAB
25(OH)D3+25(OH)D2 SERPL-MCNC: 26 NG/ML (ref 30–96)
ALBUMIN SERPL BCP-MCNC: 3.4 G/DL (ref 3.5–5.2)
ALP SERPL-CCNC: 78 U/L (ref 55–135)
ALT SERPL W/O P-5'-P-CCNC: 32 U/L (ref 10–44)
ANION GAP SERPL CALC-SCNC: 7 MMOL/L (ref 8–16)
AST SERPL-CCNC: 26 U/L (ref 10–40)
BILIRUB SERPL-MCNC: 0.3 MG/DL (ref 0.1–1)
BUN SERPL-MCNC: 12 MG/DL (ref 8–23)
CALCIUM SERPL-MCNC: 8.7 MG/DL (ref 8.7–10.5)
CHLORIDE SERPL-SCNC: 106 MMOL/L (ref 95–110)
CO2 SERPL-SCNC: 31 MMOL/L (ref 23–29)
CREAT SERPL-MCNC: 1.1 MG/DL (ref 0.5–1.4)
ERYTHROCYTE [DISTWIDTH] IN BLOOD BY AUTOMATED COUNT: 13.2 % (ref 11.5–14.5)
EST. GFR  (NO RACE VARIABLE): 57.2 ML/MIN/1.73 M^2
GLUCOSE SERPL-MCNC: 95 MG/DL (ref 70–110)
HCT VFR BLD AUTO: 40.2 % (ref 37–48.5)
HGB BLD-MCNC: 12.9 G/DL (ref 12–16)
IMM GRANULOCYTES # BLD AUTO: 0.02 K/UL (ref 0–0.04)
MCH RBC QN AUTO: 28.6 PG (ref 27–31)
MCHC RBC AUTO-ENTMCNC: 32.1 G/DL (ref 32–36)
MCV RBC AUTO: 89 FL (ref 82–98)
NEUTROPHILS # BLD AUTO: 3.8 K/UL (ref 1.8–7.7)
PLATELET # BLD AUTO: 255 K/UL (ref 150–450)
PMV BLD AUTO: 10.9 FL (ref 9.2–12.9)
POTASSIUM SERPL-SCNC: 4.3 MMOL/L (ref 3.5–5.1)
PROT SERPL-MCNC: 7 G/DL (ref 6–8.4)
RBC # BLD AUTO: 4.51 M/UL (ref 4–5.4)
SODIUM SERPL-SCNC: 144 MMOL/L (ref 136–145)
WBC # BLD AUTO: 6.74 K/UL (ref 3.9–12.7)

## 2023-08-14 PROCEDURE — 3077F PR MOST RECENT SYSTOLIC BLOOD PRESSURE >= 140 MM HG: ICD-10-PCS | Mod: CPTII,S$GLB,, | Performed by: INTERNAL MEDICINE

## 2023-08-14 PROCEDURE — 99214 PR OFFICE/OUTPT VISIT, EST, LEVL IV, 30-39 MIN: ICD-10-PCS | Mod: S$GLB,,, | Performed by: INTERNAL MEDICINE

## 2023-08-14 PROCEDURE — 3077F SYST BP >= 140 MM HG: CPT | Mod: CPTII,S$GLB,, | Performed by: INTERNAL MEDICINE

## 2023-08-14 PROCEDURE — 3008F BODY MASS INDEX DOCD: CPT | Mod: CPTII,S$GLB,, | Performed by: INTERNAL MEDICINE

## 2023-08-14 PROCEDURE — 82306 VITAMIN D 25 HYDROXY: CPT | Performed by: NURSE PRACTITIONER

## 2023-08-14 PROCEDURE — 4010F PR ACE/ARB THEARPY RXD/TAKEN: ICD-10-PCS | Mod: CPTII,S$GLB,, | Performed by: INTERNAL MEDICINE

## 2023-08-14 PROCEDURE — 1159F MED LIST DOCD IN RCRD: CPT | Mod: CPTII,S$GLB,, | Performed by: INTERNAL MEDICINE

## 2023-08-14 PROCEDURE — 4010F ACE/ARB THERAPY RXD/TAKEN: CPT | Mod: CPTII,S$GLB,, | Performed by: INTERNAL MEDICINE

## 2023-08-14 PROCEDURE — 85027 COMPLETE CBC AUTOMATED: CPT | Performed by: NURSE PRACTITIONER

## 2023-08-14 PROCEDURE — 3078F DIAST BP <80 MM HG: CPT | Mod: CPTII,S$GLB,, | Performed by: INTERNAL MEDICINE

## 2023-08-14 PROCEDURE — 99999 PR PBB SHADOW E&M-EST. PATIENT-LVL V: ICD-10-PCS | Mod: PBBFAC,,, | Performed by: INTERNAL MEDICINE

## 2023-08-14 PROCEDURE — 99999 PR PBB SHADOW E&M-EST. PATIENT-LVL V: CPT | Mod: PBBFAC,,, | Performed by: INTERNAL MEDICINE

## 2023-08-14 PROCEDURE — 99214 OFFICE O/P EST MOD 30 MIN: CPT | Mod: S$GLB,,, | Performed by: INTERNAL MEDICINE

## 2023-08-14 PROCEDURE — 3078F PR MOST RECENT DIASTOLIC BLOOD PRESSURE < 80 MM HG: ICD-10-PCS | Mod: CPTII,S$GLB,, | Performed by: INTERNAL MEDICINE

## 2023-08-14 PROCEDURE — 80053 COMPREHEN METABOLIC PANEL: CPT | Performed by: NURSE PRACTITIONER

## 2023-08-14 PROCEDURE — 36415 COLL VENOUS BLD VENIPUNCTURE: CPT | Performed by: NURSE PRACTITIONER

## 2023-08-14 PROCEDURE — 3008F PR BODY MASS INDEX (BMI) DOCUMENTED: ICD-10-PCS | Mod: CPTII,S$GLB,, | Performed by: INTERNAL MEDICINE

## 2023-08-14 PROCEDURE — 1159F PR MEDICATION LIST DOCUMENTED IN MEDICAL RECORD: ICD-10-PCS | Mod: CPTII,S$GLB,, | Performed by: INTERNAL MEDICINE

## 2023-08-14 PROCEDURE — 1160F PR REVIEW ALL MEDS BY PRESCRIBER/CLIN PHARMACIST DOCUMENTED: ICD-10-PCS | Mod: CPTII,S$GLB,, | Performed by: INTERNAL MEDICINE

## 2023-08-14 PROCEDURE — 1160F RVW MEDS BY RX/DR IN RCRD: CPT | Mod: CPTII,S$GLB,, | Performed by: INTERNAL MEDICINE

## 2023-08-14 RX ORDER — PREDNISONE 50 MG/1
TABLET ORAL
Qty: 3 TABLET | Refills: 0 | Status: SHIPPED | OUTPATIENT
Start: 2023-08-14 | End: 2024-01-03

## 2023-08-14 RX ORDER — PREDNISONE 50 MG/1
50 TABLET ORAL 2 TIMES DAILY
Qty: 3 TABLET | Refills: 0 | Status: CANCELLED | OUTPATIENT
Start: 2023-08-14 | End: 2024-08-13

## 2023-08-14 RX ORDER — NYSTATIN 100000 U/G
OINTMENT TOPICAL 2 TIMES DAILY
Qty: 30 G | Refills: 0 | Status: SHIPPED | OUTPATIENT
Start: 2023-08-14 | End: 2024-01-03

## 2023-08-14 NOTE — PROGRESS NOTES
Subjective     Patient ID: Christal Posey is a 63 y.o. female.    Chief Complaint: Malignant neoplasm of lower-inner quadrant of left breast i    HPI    Returns for follow up on Tamoxifen  States doing well in the interval  Her only complaint is hot flashes- states she sweats a lot  Reports acupuncture helped but recurred  Rare itching at breast scar site  Weight stable  No pain issues    2/1/2023 BMD:   Impression:  *Normal bone mineral density  *Compared with previous DXA, BMD at the lumbar spine has declined by 5.2%, and BMD at the total hip has declined by 4.8%.  RECOMMENDATIONS:  *Daily calcium intake 5842-4838 mg, dietary sources preferred; Vitamin D 2651-1788 IU daily.  *Weight bearing exercise and fall precautions.  *No additional pharmacologic therapy recommended at this time.  *Repeat BMD in 2 years    In the interval reported bone pain to NP  5/17/2023 Bone scan:  FINDINGS:  No focal pathologic uptake in the spine. Questionable mild focal uptake in a right lower lateral rib.  Uptake at the knees and left foot is likely degenerative.  There is physiologic distribution of the radiopharmaceutical throughout the skeleton.  There is normal uptake in the genitourinary system and soft tissues.  Impression:  Questionable mild focal uptake in a right lower lateral rib.  Consider CT of the chest to evaluate for fracture or metastatic lesion.    This result led to ordering of CT scan:    5/26/2023 CT Chest:  Impression:  1. There is been an interval bilateral mastectomy.  There is soft tissue thickening in the mastectomy bed.  On the right side, there is a finger-like projection of soft tissue up to 16 mm in length.  This may merely represent scarring.  However, comparison should be made with other CTs of the chest which may exist at other institutions.  If these do not exist, follow-up CT enhance with contrast recommended.  2. There are few stable left upper lobe pulmonary micro nodules (axial series 5, images 106,  175, and 212).  3. A rib fracture was not identified.  Additionally, there is no specific CT evidence of osteolytic or osteoblastic bone disease.  However, if there is a continued suspicion for metastatic bone disease, consider nuclear medicine scan.       Oncology History:  - 8/12/2022 Breast MRI:  Findings:  Left  Numerous scattered foci and small masses are present, most of which appear similar to eachother.    There is an 8 mm x 7 mm x 7 mm irregularly shaped, heterogeneous mass seen in the left breast at 6 o'clock, 2.4 cm from the nipple and 8 cm from the chest wall. Delayed phase is washout.   There is a 9 mm x 7 mm x 7 mm irregularly shaped, homogeneous mass with irregular margins seen in the upper inner quadrant of the right breast, 2.8 cm from the nipple and 6.7 cm from the chest wall. Delayed phase is plateau. This is best seen on Series 39834: Image 50.  This is approximately 3 cm anterior and superior to the signal void related to the 9 o'clock biopsy marker.   There is no suspicious enhancement associated with either biopsy marker.   Right  Numerous scattered foci and small masses are present, most of which appear similar to eachother.    There is a 6 mm oval, homogeneous mass with circumscribed margins seen in the lower central region of the right breast, 2.3 cm from the nipple and 9.5 cm from the chest wall. Delayed phase is washout. This is best seen in Series 08012: Image 70.   There is no internal mammary or axillary adenopathy.  Impression:  Left  Mass: Left breast 9 mm x 7 mm x 7 mm mass at the upper inner position. Assessment: 4 - Suspicious finding. Biopsy is recommended.   Mass: Left breast 8 mm x 7 mm x 7 mm mass at the 6 o'clock position. Assessment: 4 - Suspicious finding. Biopsy is recommended.   There is no suspicious enhancement associated with either biopsy marker.   Right  Mass: Right breast  6 mm mass at the lower central position. Assessment: 4 - Suspicious finding. Biopsy is  recommended.   BI-RADS Category:   Overall: 4 - Suspicious     - 9/28/2022 MRI guided breast biopsy:  BREAST, RIGHT, LOWER CENTRAL MASS, BIOPSY:   - Intraductal papilloma, benign.   - Benign breast tissue with predominantly fatty stroma and blood clot.   - Microcalcifications: Focally seen in association with benign breast ducts.   - No atypia or malignancy.   - Additional deeper sections have been examined.      - 10/24/2022 Bilateral Mastectomy:  1. BREAST, RIGHT, MASTECTOMY:   - Negative for malignancy.   - Atypical ductal hyperplasia (ADH), focal.   - No residual intraductal papilloma present.   - Previous biopsy site changes and biopsy clip.   - Benign subareolar nipple ducts.   - One benign intramammary lymph node.   2. AXILLARY SENTINEL LYMPH NODE, LEFT, # 1, HOT BLUE 1451, EXCISION:   - One benign axillary sentinel lymph node, negative for metastatic carcinoma   (0/1).   - Immunohistochemical stains for CK WSK, Cam 5.2, and CK AE/1/AE3 are   negative, supporting the diagnosis.   3. BREAST, LEFT, NIPPLE SPARING MASTECTOMY:   - Invasive ductal carcinoma of breast with extensive intraductal component,   see Tumor Synoptic.   - Size of invasive carcinoma: 9 MM (measured histologically on slide 3L).   - [place] Histologic Score: Grade 1 of 3.                     Tubule Formation:  2                     Nuclear Pleomorphism:  2                     Mitotic Activity:  1   - Associated (DCIS), intermediate nuclear grade, cribriform type, with   central necrosis.   - Size of DCIS: at least 10 MM.   - No lymphovascular invasion.   - Margins:   ·tab Invasive carcinoma and DCIS are greater than 10 MM from all margins.   - Benign subareolar nipple ducts.   - Both biopsy clips are identified and corresponding areas sampled.   - Microcalcifications: Seen in association with benign breast ducts.   - Pathologic staging: pT1b (sn)N0   4. BREAST, LEFT, ADDITIONAL INFERIOR LATERAL MARGIN, NIPPLE SPARING   MASTECTOMY:   -  Negative for atypia or malignancy.   - Benign breast tissue with predominantly fatty stroma.   5. BREAST, LEFT, NEW SUPERIOR MARGIN, NIPPLE SPARING MASTECTOMY:   - Negative for atypia or malignancy.   - Benign breast tissue with predominantly fatty stroma.       Previously, required implants out due to infection and contracture  - 11/28/2022   Diagnosis:  Preoperative diagnosis cellulitis and capsular contracture of the right breast  Procedure performed  1. Bilateral removal of implants (tissue expanders)   2. Total bilateral capsulectomy  3. Wide excision soft tissue of right axillary region measuring 16 cm x 6 cm with layered closure final incision 16 cm  4. Wide excision soft tissue of the left axillary region measuring 16 cm x 6 cm with layered closure final incision 16 cm  Reports concerned as asked for nipples to be removed as she did not plan for additional reconstruction and this was not done    Prior history of DCIS:  Monitored by surveillance- opted out of adjuvant endocrine with low volume disease  DCIS History:  5/16/16 Screening mammography:  Calcifications in the left breast require additional evaluation.    ACR BI-RADS Category 0: Incomplete: Need Additional Imaging Evaluation  - 5/17/16 Diagnostic mammogram  Calcifications in the left breast are suspicious.  Histology using core biopsy  is recommended.  ACR BI-RADS Category 4: Suspicious Abnormality  - 5/20/16 biopsy  1, 2. LEFT BREAST, WITH CALCIFICATIONS AND WITHOUT CALCIFICATIONS, UPPER INNER QUADRANT  (NEEDLE BIOPSY):  -Low-grade ductal carcinoma in situ (DCIS)  -Nuclear grade 1 out of 3  -Cribriform pattern  -Associated with microcalcifications  %, % positive  - 6/10/2016 Lumpectomy  Pathology:  Procedure - Excision with wire guided localization  Lymph node sampling - Not sampled  Specimen laterality - Left  Tumor site - Not specified  Size of DCIS - 2mm, including findings from the biopsy report  Histologic type - Ductal carcinoma  in situ  Architectural pattern - Solid and cribriforming  Nuclear grade - Low  Necrosis - Absent  Margins - Negative, inferior is 2mm  Pathologc staging - p Tis Nx Mx  Hormone receptors (See Breast Biopsy report; MS16 - 41262)  Estrogen receptor - Positive, strong, 100%  Progesterone receptors - Positive, strong, 100%     SUPPLEMENTAL #1:   HER2 AMPLIFICATION ASSOCIATED WITH BREAST CANCER, FISH, TISSUE:   Result Summary   Negative      Additional PMH:  Bilateral cataract surgery     FH:  No new cancers    Review of Systems   Constitutional:  Negative for appetite change and unexpected weight change.   HENT:  Negative for mouth sores.    Eyes:  Positive for visual disturbance.   Respiratory:  Negative for cough and shortness of breath.    Cardiovascular:  Negative for chest pain.   Gastrointestinal:  Negative for abdominal pain and diarrhea.   Genitourinary:  Negative for frequency.   Musculoskeletal:  Negative for back pain.   Integumentary:  Negative for rash.   Neurological:  Negative for headaches.   Hematological:  Negative for adenopathy.   Psychiatric/Behavioral:  The patient is not nervous/anxious.           Objective     Physical Exam  Vitals and nursing note reviewed.   Constitutional:       General: She is not in acute distress.     Appearance: Normal appearance. She is well-developed and normal weight. She is not ill-appearing.      Comments: Presents alone  Very pleasant   HENT:      Head: Normocephalic and atraumatic.   Eyes:      General: Lids are normal. No scleral icterus.     Extraocular Movements: Extraocular movements intact.      Conjunctiva/sclera: Conjunctivae normal.      Pupils: Pupils are equal, round, and reactive to light.   Neck:      Thyroid: No thyromegaly.      Vascular: No JVD.      Trachea: Trachea normal.   Cardiovascular:      Rate and Rhythm: Normal rate and regular rhythm.      Heart sounds: Normal heart sounds. No murmur heard.     No friction rub. No gallop.   Pulmonary:       Effort: Pulmonary effort is normal. No respiratory distress.      Breath sounds: Normal breath sounds. No wheezing, rhonchi or rales.   Chest:      Chest wall: No tenderness.          Comments: Bilateral mastectomies. Nipples remain.   Tenderness at marked area above along incision.   Incisions healed. No LAD  Abdominal:      General: Bowel sounds are normal. There is no distension.      Palpations: Abdomen is soft. There is no mass.      Tenderness: There is no abdominal tenderness. There is no guarding or rebound.      Comments: No organomegaly.    Musculoskeletal:         General: Normal range of motion.      Cervical back: Normal range of motion and neck supple.      Comments: No spinal or paraspinal tenderness.    Lymphadenopathy:      Head:      Right side of head: No submental or submandibular adenopathy.      Left side of head: No submental or submandibular adenopathy.      Cervical: No cervical adenopathy.      Upper Body:      Right upper body: No supraclavicular or axillary adenopathy.      Left upper body: No supraclavicular or axillary adenopathy.   Skin:     General: Skin is warm and dry.      Capillary Refill: Capillary refill takes less than 2 seconds.      Coloration: Skin is not jaundiced.      Findings: No bruising or rash.      Nails: There is no clubbing.   Neurological:      General: No focal deficit present.      Mental Status: She is alert and oriented to person, place, and time. Mental status is at baseline.      Sensory: No sensory deficit.      Motor: No weakness.      Gait: Gait normal.   Psychiatric:         Mood and Affect: Mood normal.         Speech: Speech normal.         Behavior: Behavior normal.         Thought Content: Thought content normal.         Judgment: Judgment normal.          Assessment and Plan     1. Ductal carcinoma in situ (DCIS) of left breast    2. Malignant neoplasm of lower-inner quadrant of left breast in female, estrogen receptor positive    3. Vitamin D  deficiency    4. Yeast infection  -     nystatin (MYCOSTATIN) ointment; Apply topically 2 (two) times daily.  Dispense: 30 g; Refill: 0    5. Skin tags, multiple acquired    6. Abnormal CT scan      DCIS  Invasive left breast cancer, hormone receptor +  Continue Tamoxifen    No breast imaging needed as bilateral mastectomies  Continue Tamoxifen through Fall of 2027    Yeast infection- axilla- Nystatin sent    Skin tags- dermatology referral post above    Abnormal CT- ? Scar area- repeat 11/2023    RTC 3 months-  Knows to call for any issues      25 minutes total time    Route Chart for Scheduling    Med Onc Chart Routing      Follow up with physician    Follow up with [facility] 3 months. cbc, cmp prior--CT scan prior   Infusion scheduling note    Injection scheduling note    Labs    Imaging    Pharmacy appointment    Other referrals

## 2023-09-19 ENCOUNTER — OFFICE VISIT (OUTPATIENT)
Dept: DERMATOLOGY | Facility: CLINIC | Age: 63
End: 2023-09-19
Payer: COMMERCIAL

## 2023-09-19 DIAGNOSIS — L73.9 FOLLICULITIS: ICD-10-CM

## 2023-09-19 DIAGNOSIS — L82.1 SEBORRHEIC KERATOSES: Primary | ICD-10-CM

## 2023-09-19 PROCEDURE — 99214 OFFICE O/P EST MOD 30 MIN: CPT | Mod: S$GLB,,, | Performed by: DERMATOLOGY

## 2023-09-19 PROCEDURE — 99214 PR OFFICE/OUTPT VISIT, EST, LEVL IV, 30-39 MIN: ICD-10-PCS | Mod: S$GLB,,, | Performed by: DERMATOLOGY

## 2023-09-19 PROCEDURE — 1159F MED LIST DOCD IN RCRD: CPT | Mod: CPTII,S$GLB,, | Performed by: DERMATOLOGY

## 2023-09-19 PROCEDURE — 99999 PR PBB SHADOW E&M-EST. PATIENT-LVL IV: CPT | Mod: PBBFAC,,, | Performed by: DERMATOLOGY

## 2023-09-19 PROCEDURE — 1159F PR MEDICATION LIST DOCUMENTED IN MEDICAL RECORD: ICD-10-PCS | Mod: CPTII,S$GLB,, | Performed by: DERMATOLOGY

## 2023-09-19 PROCEDURE — 99999 PR PBB SHADOW E&M-EST. PATIENT-LVL IV: ICD-10-PCS | Mod: PBBFAC,,, | Performed by: DERMATOLOGY

## 2023-09-19 PROCEDURE — 1160F PR REVIEW ALL MEDS BY PRESCRIBER/CLIN PHARMACIST DOCUMENTED: ICD-10-PCS | Mod: CPTII,S$GLB,, | Performed by: DERMATOLOGY

## 2023-09-19 PROCEDURE — 4010F PR ACE/ARB THEARPY RXD/TAKEN: ICD-10-PCS | Mod: CPTII,S$GLB,, | Performed by: DERMATOLOGY

## 2023-09-19 PROCEDURE — 1160F RVW MEDS BY RX/DR IN RCRD: CPT | Mod: CPTII,S$GLB,, | Performed by: DERMATOLOGY

## 2023-09-19 PROCEDURE — 4010F ACE/ARB THERAPY RXD/TAKEN: CPT | Mod: CPTII,S$GLB,, | Performed by: DERMATOLOGY

## 2023-09-19 RX ORDER — CLINDAMYCIN PHOSPHATE 11.9 MG/ML
SOLUTION TOPICAL
Qty: 60 ML | Refills: 3 | Status: SHIPPED | OUTPATIENT
Start: 2023-09-19 | End: 2023-12-22

## 2023-09-19 RX ORDER — MOMETASONE FUROATE 1 MG/ML
SOLUTION TOPICAL
Qty: 60 ML | Refills: 5 | Status: SHIPPED | OUTPATIENT
Start: 2023-09-19 | End: 2024-04-03

## 2023-09-19 RX ORDER — SULFAMETHOXAZOLE AND TRIMETHOPRIM 800; 160 MG/1; MG/1
1 TABLET ORAL 2 TIMES DAILY
Qty: 20 TABLET | Refills: 0 | Status: SHIPPED | OUTPATIENT
Start: 2023-09-19 | End: 2023-09-29

## 2023-09-19 NOTE — PROGRESS NOTES
Subjective:      Patient ID:  Christal Posey is a 63 y.o. female who presents for   Chief Complaint   Patient presents with    Itching     Scalp      Itching - Initial  Affected locations: scalp  Duration: 2 weeks  Signs / symptoms: itching and spreading (pin point flesh colored bumps)  Severity: moderate  Timing: Itchiness waxes and wanes.  Aggravated by: nothing  Relieving factors/Treatments tried: Rx topical steroids (selsun blue medicated)  Improvement on treatment: mild      Review of Systems   Constitutional:  Negative for fever, chills, fatigue and malaise.   Respiratory:  Negative for cough.    Skin:  Positive for itching. Negative for rash.   Hematologic/Lymphatic: Negative for adenopathy.   Allergic/Immunologic: Negative for environmental allergies.       Objective:   Physical Exam   Constitutional: She appears well-developed and well-nourished. No distress.   Neurological: She is alert and oriented to person, place, and time. She is not disoriented.   Psychiatric: She has a normal mood and affect.   Skin:   Areas Examined (abnormalities noted in diagram):   Scalp / Hair Palpated and Inspected  Head / Face Inspection Performed  RUE Inspected  LUE Inspection Performed                 Diagram Legend     Erythematous scaling macule/papule c/w actinic keratosis       Vascular papule c/w angioma      Pigmented verrucoid papule/plaque c/w seborrheic keratosis      Yellow umbilicated papule c/w sebaceous hyperplasia      Irregularly shaped tan macule c/w lentigo     1-2 mm smooth white papules consistent with Milia      Movable subcutaneous cyst with punctum c/w epidermal inclusion cyst      Subcutaneous movable cyst c/w pilar cyst      Firm pink to brown papule c/w dermatofibroma      Pedunculated fleshy papule(s) c/w skin tag(s)      Evenly pigmented macule c/w junctional nevus     Mildly variegated pigmented, slightly irregular-bordered macule c/w mildly atypical nevus      Flesh colored to evenly pigmented  papule c/w intradermal nevus       Pink pearly papule/plaque c/w basal cell carcinoma      Erythematous hyperkeratotic cursted plaque c/w SCC      Surgical scar with no sign of skin cancer recurrence      Open and closed comedones      Inflammatory papules and pustules      Verrucoid papule consistent consistent with wart     Erythematous eczematous patches and plaques     Dystrophic onycholytic nail with subungual debris c/w onychomycosis     Umbilicated papule    Erythematous-base heme-crusted tan verrucoid plaque consistent with inflamed seborrheic keratosis     Erythematous Silvery Scaling Plaque c/w Psoriasis     See annotation      Assessment / Plan:        Seborrheic keratoses  These are benign inherited growths without a malignant potential. Reassurance given to patient. No treatment is necessary.     Folliculitis  -     clindamycin (CLEOCIN T) 1 % external solution; AAA bid to bumps of the scalp  Dispense: 60 mL; Refill: 3  -     mometasone (ELOCON) 0.1 % solution; To scalp qd-bid prn itching  Dispense: 60 mL; Refill: 5  -     sulfamethoxazole-trimethoprim 800-160mg (BACTRIM DS) 800-160 mg Tab; Take 1 tablet by mouth 2 (two) times daily. for 10 days  Dispense: 20 tablet; Refill: 0    - wash with zinc or salicylic acid shampoo at least twice weekly for prevention  - apply clindamycin solution  to affected area daily to twice daily when flaring  Discussed benefits and risks of therapy including but not limited to rash, increased sun sensitivity, and severe allergic reaction. Patient instructed to go to the ER if difficulty breathing or swallowing develops.            No follow-ups on file.

## 2023-09-19 NOTE — PATIENT INSTRUCTIONS
Folliculitis  -     clindamycin (CLEOCIN T) 1 % external solution; AAA bid to bumps of the scalp  Dispense: 60 mL; Refill: 3  -     mometasone (ELOCON) 0.1 % solution; To scalp qd-bid prn itching  Dispense: 60 mL; Refill: 5  -     sulfamethoxazole-trimethoprim 800-160mg (BACTRIM DS) 800-160 mg Tab; Take 1 tablet by mouth 2 (two) times daily. for 10 days  Dispense: 20 tablet; Refill: 0    - wash with zinc or salicylic acid shampoo at least twice weekly for prevention  - apply clindamycin solution  to affected area daily to twice daily when flaring  Discussed benefits and risks of therapy including but not limited to rash, increased sun sensitivity, and severe allergic reaction. Patient instructed to go to the ER if difficulty breathing or swallowing develops.         Recommend OTC medicated shampoo containing active ingredients of either selenium sulfide, zinc pyrithione, tar, salicylic acid, or ketoconazole. Patient should rotate the active ingredients to avoid building tolerance. It is recommended that patient wash hair at least 2 times per week and allow shampoo to sit on scalp at least 5 minutes prior to rinsing.

## 2023-09-28 NOTE — PROGRESS NOTES
Distress Screening Results: Psychosocial Distress screening score of Distress Score: 0 noted and reviewed. No intervention indicated.   66

## 2023-10-18 ENCOUNTER — OFFICE VISIT (OUTPATIENT)
Dept: OPHTHALMOLOGY | Facility: CLINIC | Age: 63
End: 2023-10-18
Payer: COMMERCIAL

## 2023-10-18 DIAGNOSIS — H18.513 FUCHS' CORNEAL DYSTROPHY OF BOTH EYES: Primary | ICD-10-CM

## 2023-10-18 PROCEDURE — 92004 COMPRE OPH EXAM NEW PT 1/>: CPT | Mod: S$GLB,,, | Performed by: OPHTHALMOLOGY

## 2023-10-18 PROCEDURE — 4010F PR ACE/ARB THEARPY RXD/TAKEN: ICD-10-PCS | Mod: CPTII,S$GLB,, | Performed by: OPHTHALMOLOGY

## 2023-10-18 PROCEDURE — 4010F ACE/ARB THERAPY RXD/TAKEN: CPT | Mod: CPTII,S$GLB,, | Performed by: OPHTHALMOLOGY

## 2023-10-18 PROCEDURE — 1160F RVW MEDS BY RX/DR IN RCRD: CPT | Mod: CPTII,S$GLB,, | Performed by: OPHTHALMOLOGY

## 2023-10-18 PROCEDURE — 99999 PR PBB SHADOW E&M-EST. PATIENT-LVL IV: CPT | Mod: PBBFAC,,, | Performed by: OPHTHALMOLOGY

## 2023-10-18 PROCEDURE — 99999 PR PBB SHADOW E&M-EST. PATIENT-LVL IV: ICD-10-PCS | Mod: PBBFAC,,, | Performed by: OPHTHALMOLOGY

## 2023-10-18 PROCEDURE — 92004 PR EYE EXAM, NEW PATIENT,COMPREHESV: ICD-10-PCS | Mod: S$GLB,,, | Performed by: OPHTHALMOLOGY

## 2023-10-18 PROCEDURE — 1160F PR REVIEW ALL MEDS BY PRESCRIBER/CLIN PHARMACIST DOCUMENTED: ICD-10-PCS | Mod: CPTII,S$GLB,, | Performed by: OPHTHALMOLOGY

## 2023-10-18 PROCEDURE — 1159F MED LIST DOCD IN RCRD: CPT | Mod: CPTII,S$GLB,, | Performed by: OPHTHALMOLOGY

## 2023-10-18 PROCEDURE — 1159F PR MEDICATION LIST DOCUMENTED IN MEDICAL RECORD: ICD-10-PCS | Mod: CPTII,S$GLB,, | Performed by: OPHTHALMOLOGY

## 2023-10-18 NOTE — PROGRESS NOTES
HPI    Patient presents today for a Cornea Evaluation OS. Patient notes vision as   blurry OS > OD. Pt is here because her optom thinks that she may have   fuch's. Patient notes no eye pain.    Last edited by Landry Bautista on 10/18/2023 11:08 AM.            Assessment /Plan     For exam results, see Encounter Report.    Fuchs' corneal dystrophy of both eyes      IOLs OU    Visual loss from corneal edema and its slowly progressive clinical course were discussed. We will monitor the clinical condition for now, repeat measurements as needed, and possibly consider surgery at a later date  [phone]

## 2023-10-28 DIAGNOSIS — Z17.0 MALIGNANT NEOPLASM OF LOWER-INNER QUADRANT OF LEFT BREAST IN FEMALE, ESTROGEN RECEPTOR POSITIVE: ICD-10-CM

## 2023-10-28 DIAGNOSIS — C50.312 MALIGNANT NEOPLASM OF LOWER-INNER QUADRANT OF LEFT BREAST IN FEMALE, ESTROGEN RECEPTOR POSITIVE: ICD-10-CM

## 2023-10-30 RX ORDER — TAMOXIFEN CITRATE 20 MG/1
20 TABLET ORAL
Qty: 90 TABLET | Refills: 3 | Status: SHIPPED | OUTPATIENT
Start: 2023-10-30 | End: 2023-12-22

## 2023-11-17 ENCOUNTER — TELEPHONE (OUTPATIENT)
Dept: HEMATOLOGY/ONCOLOGY | Facility: CLINIC | Age: 63
End: 2023-11-17
Payer: COMMERCIAL

## 2023-11-17 NOTE — TELEPHONE ENCOUNTER
"----- Message from Kulwant Deutsch RN sent at 11/16/2023  5:28 PM CST -----  Regarding: FW: appt access  Contact: [phone]    ----- Message -----  From: Macho Aly LPN  Sent: 11/16/2023  12:05 PM CST  To: [facility] Staff  Subject: FW: appt access                                    ----- Message -----  From: Ebonie Ellis  Sent: 11/16/2023   8:24 AM CST  To: [facility]  Subject: appt access                                      Name Of Caller: self     Contact Preference?:  [phone]     What is the nature of the call?: pt is feeling ill and would like to reschedule CT that was scheduled for tomorrow     Additional Notes:    "Thank you for all that you do for our patients"            "

## 2023-11-24 ENCOUNTER — HOSPITAL ENCOUNTER (OUTPATIENT)
Dept: RADIOLOGY | Facility: HOSPITAL | Age: 63
Discharge: HOME | End: 2023-11-24
Attending: INTERNAL MEDICINE
Payer: COMMERCIAL

## 2023-11-24 ENCOUNTER — TELEPHONE (OUTPATIENT)
Dept: DERMATOLOGY | Facility: CLINIC | Age: 63
End: 2023-11-24
Payer: COMMERCIAL

## 2023-11-24 DIAGNOSIS — R22.2 LOCALIZED SWELLING, MASS AND LUMP, TRUNK: ICD-10-CM

## 2023-11-24 LAB
CREAT SERPL-MCNC: 1.2 MG/DL (ref 0.5–1.4)
SAMPLE: NORMAL

## 2023-11-24 PROCEDURE — 71260 CT THORAX DX C+: CPT | Mod: TC

## 2023-11-24 PROCEDURE — 71260 CT THORAX DX C+: CPT | Mod: 26,,, | Performed by: RADIOLOGY

## 2023-11-24 PROCEDURE — 25500020 PHARM REV CODE 255: Performed by: INTERNAL MEDICINE

## 2023-11-24 PROCEDURE — 71260 CT CHEST WITH CONTRAST: ICD-10-PCS | Mod: 26,,, | Performed by: RADIOLOGY

## 2023-11-24 RX ADMIN — IOHEXOL 75 ML: 350 INJECTION, SOLUTION INTRAVENOUS at 10:11

## 2023-11-24 NOTE — TELEPHONE ENCOUNTER
I spoke with Mrs. Posey and informed her Dr. Quispe will not be in the clinic on 12/21/2023.  I offered her a sooner appointment on 11/30/2023. She accepted that date, and I booked her for 9:30 AM. She thanked me for the call.

## 2023-11-28 ENCOUNTER — LAB VISIT (OUTPATIENT)
Dept: LAB | Facility: HOSPITAL | Age: 63
End: 2023-11-28
Attending: INTERNAL MEDICINE
Payer: COMMERCIAL

## 2023-11-28 DIAGNOSIS — C50.312 MALIGNANT NEOPLASM OF LOWER-INNER QUADRANT OF LEFT BREAST IN FEMALE, ESTROGEN RECEPTOR POSITIVE: ICD-10-CM

## 2023-11-28 DIAGNOSIS — Z17.0 MALIGNANT NEOPLASM OF LOWER-INNER QUADRANT OF LEFT BREAST IN FEMALE, ESTROGEN RECEPTOR POSITIVE: ICD-10-CM

## 2023-11-28 LAB
ALBUMIN SERPL BCP-MCNC: 3.4 G/DL (ref 3.5–5.2)
ALP SERPL-CCNC: 75 U/L (ref 55–135)
ALT SERPL W/O P-5'-P-CCNC: 45 U/L (ref 10–44)
ANION GAP SERPL CALC-SCNC: 6 MMOL/L (ref 8–16)
AST SERPL-CCNC: 28 U/L (ref 10–40)
BASOPHILS # BLD AUTO: 0.05 K/UL (ref 0–0.2)
BASOPHILS NFR BLD: 0.5 % (ref 0–1.9)
BILIRUB SERPL-MCNC: 0.4 MG/DL (ref 0.1–1)
BUN SERPL-MCNC: 13 MG/DL (ref 8–23)
CALCIUM SERPL-MCNC: 9 MG/DL (ref 8.7–10.5)
CHLORIDE SERPL-SCNC: 106 MMOL/L (ref 95–110)
CO2 SERPL-SCNC: 29 MMOL/L (ref 23–29)
CREAT SERPL-MCNC: 1 MG/DL (ref 0.5–1.4)
DIFFERENTIAL METHOD: NORMAL
EOSINOPHIL # BLD AUTO: 0.5 K/UL (ref 0–0.5)
EOSINOPHIL NFR BLD: 4.6 % (ref 0–8)
ERYTHROCYTE [DISTWIDTH] IN BLOOD BY AUTOMATED COUNT: 13.2 % (ref 11.5–14.5)
EST. GFR  (NO RACE VARIABLE): >60 ML/MIN/1.73 M^2
GLUCOSE SERPL-MCNC: 94 MG/DL (ref 70–110)
HCT VFR BLD AUTO: 40.1 % (ref 37–48.5)
HGB BLD-MCNC: 13.1 G/DL (ref 12–16)
IMM GRANULOCYTES # BLD AUTO: 0.04 K/UL (ref 0–0.04)
IMM GRANULOCYTES NFR BLD AUTO: 0.4 % (ref 0–0.5)
LYMPHOCYTES # BLD AUTO: 2.7 K/UL (ref 1–4.8)
LYMPHOCYTES NFR BLD: 26.7 % (ref 18–48)
MCH RBC QN AUTO: 29.4 PG (ref 27–31)
MCHC RBC AUTO-ENTMCNC: 32.7 G/DL (ref 32–36)
MCV RBC AUTO: 90 FL (ref 82–98)
MONOCYTES # BLD AUTO: 0.5 K/UL (ref 0.3–1)
MONOCYTES NFR BLD: 5.2 % (ref 4–15)
NEUTROPHILS # BLD AUTO: 6.3 K/UL (ref 1.8–7.7)
NEUTROPHILS NFR BLD: 62.6 % (ref 38–73)
NRBC BLD-RTO: 0 /100 WBC
PLATELET # BLD AUTO: 334 K/UL (ref 150–450)
PMV BLD AUTO: 10.7 FL (ref 9.2–12.9)
POTASSIUM SERPL-SCNC: 4.1 MMOL/L (ref 3.5–5.1)
PROT SERPL-MCNC: 6.8 G/DL (ref 6–8.4)
RBC # BLD AUTO: 4.46 M/UL (ref 4–5.4)
SODIUM SERPL-SCNC: 141 MMOL/L (ref 136–145)
WBC # BLD AUTO: 9.99 K/UL (ref 3.9–12.7)

## 2023-11-28 PROCEDURE — 85025 COMPLETE CBC W/AUTO DIFF WBC: CPT | Performed by: INTERNAL MEDICINE

## 2023-11-28 PROCEDURE — 80053 COMPREHEN METABOLIC PANEL: CPT | Performed by: INTERNAL MEDICINE

## 2023-11-28 PROCEDURE — 36415 COLL VENOUS BLD VENIPUNCTURE: CPT | Performed by: INTERNAL MEDICINE

## 2023-11-30 ENCOUNTER — OFFICE VISIT (OUTPATIENT)
Dept: DERMATOLOGY | Facility: CLINIC | Age: 63
End: 2023-11-30
Payer: COMMERCIAL

## 2023-11-30 DIAGNOSIS — L98.9 DISEASE OF SKIN AND SUBCUTANEOUS TISSUE: Primary | ICD-10-CM

## 2023-11-30 DIAGNOSIS — T50.905A ADVERSE EFFECT OF DRUG, INITIAL ENCOUNTER: ICD-10-CM

## 2023-11-30 DIAGNOSIS — R21 MORBILLIFORM RASH: ICD-10-CM

## 2023-11-30 PROCEDURE — 99214 PR OFFICE/OUTPT VISIT, EST, LEVL IV, 30-39 MIN: ICD-10-PCS | Mod: S$GLB,,, | Performed by: DERMATOLOGY

## 2023-11-30 PROCEDURE — 99999 PR PBB SHADOW E&M-EST. PATIENT-LVL V: CPT | Mod: PBBFAC,,, | Performed by: DERMATOLOGY

## 2023-11-30 PROCEDURE — 1159F PR MEDICATION LIST DOCUMENTED IN MEDICAL RECORD: ICD-10-PCS | Mod: CPTII,S$GLB,, | Performed by: DERMATOLOGY

## 2023-11-30 PROCEDURE — 1160F PR REVIEW ALL MEDS BY PRESCRIBER/CLIN PHARMACIST DOCUMENTED: ICD-10-PCS | Mod: CPTII,S$GLB,, | Performed by: DERMATOLOGY

## 2023-11-30 PROCEDURE — 99999 PR PBB SHADOW E&M-EST. PATIENT-LVL V: ICD-10-PCS | Mod: PBBFAC,,, | Performed by: DERMATOLOGY

## 2023-11-30 PROCEDURE — 4010F PR ACE/ARB THEARPY RXD/TAKEN: ICD-10-PCS | Mod: CPTII,S$GLB,, | Performed by: DERMATOLOGY

## 2023-11-30 PROCEDURE — 4010F ACE/ARB THERAPY RXD/TAKEN: CPT | Mod: CPTII,S$GLB,, | Performed by: DERMATOLOGY

## 2023-11-30 PROCEDURE — 1159F MED LIST DOCD IN RCRD: CPT | Mod: CPTII,S$GLB,, | Performed by: DERMATOLOGY

## 2023-11-30 PROCEDURE — 99214 OFFICE O/P EST MOD 30 MIN: CPT | Mod: S$GLB,,, | Performed by: DERMATOLOGY

## 2023-11-30 PROCEDURE — 1160F RVW MEDS BY RX/DR IN RCRD: CPT | Mod: CPTII,S$GLB,, | Performed by: DERMATOLOGY

## 2023-11-30 RX ORDER — TRIAMCINOLONE ACETONIDE 1 MG/G
OINTMENT TOPICAL
Qty: 454 G | Refills: 1 | Status: SHIPPED | OUTPATIENT
Start: 2023-11-30 | End: 2024-01-03

## 2023-11-30 NOTE — PROGRESS NOTES
Subjective:      Patient ID:  Christal Posey is a 63 y.o. female who presents for   Chief Complaint   Patient presents with    Rash     Diffuse      Rash - Initial  Affected locations: diffuse  Duration: 2 months  Signs / symptoms: itching and inflamed  Severity: moderate  Timing: constant  Aggravated by: nothing  Treatments tried: Pt notes taking predinsone two weeks ago for bronchitis and notided releif of her symtopms.  However, the itchiness returend after stopping the medication.  Improvement on treatment: moderate      Review of Systems   Constitutional:  Negative for fever, chills and fatigue.   Skin:  Positive for itching and rash.       Objective:   Physical Exam   Constitutional: She appears well-developed and well-nourished. No distress.   Neurological: She is alert and oriented to person, place, and time. She is not disoriented.   Psychiatric: She has a normal mood and affect.   Skin:   Areas Examined (abnormalities noted in diagram):   Head / Face Inspection Performed  Neck Inspection Performed  Chest / Axilla Inspection Performed  Abdomen Inspection Performed  Back Inspection Performed  RUE Inspected  LUE Inspection Performed            Diagram Legend     Erythematous scaling macule/papule c/w actinic keratosis       Vascular papule c/w angioma      Pigmented verrucoid papule/plaque c/w seborrheic keratosis      Yellow umbilicated papule c/w sebaceous hyperplasia      Irregularly shaped tan macule c/w lentigo     1-2 mm smooth white papules consistent with Milia      Movable subcutaneous cyst with punctum c/w epidermal inclusion cyst      Subcutaneous movable cyst c/w pilar cyst      Firm pink to brown papule c/w dermatofibroma      Pedunculated fleshy papule(s) c/w skin tag(s)      Evenly pigmented macule c/w junctional nevus     Mildly variegated pigmented, slightly irregular-bordered macule c/w mildly atypical nevus      Flesh colored to evenly pigmented papule c/w intradermal nevus       Pink  pearly papule/plaque c/w basal cell carcinoma      Erythematous hyperkeratotic cursted plaque c/w SCC      Surgical scar with no sign of skin cancer recurrence      Open and closed comedones      Inflammatory papules and pustules      Verrucoid papule consistent consistent with wart     Erythematous eczematous patches and plaques     Dystrophic onycholytic nail with subungual debris c/w onychomycosis     Umbilicated papule    Erythematous-base heme-crusted tan verrucoid plaque consistent with inflamed seborrheic keratosis     Erythematous Silvery Scaling Plaque c/w Psoriasis     See annotation            Assessment / Plan:      Christal was seen today for rash.    Diagnoses and all orders for this visit:    Disease of skin and subcutaneous tissue    Adverse effect of drug, initial encounter    Morbilliform rash  Delayed appearance of maculopapular eruptions induced by tamoxifen], PMID: [id number]  Reviewed CBC and CMP. No elevated Liver enzyme or change in blood cell count.  Do not suspect a drug reaction with systemic symptoms. Will give topical triamcinolone and discuss with Dr. Julian about alternatives therapy as highly suspect tamoxifen     - Use cerave anti- itch cream as often a needed put in fridge. Ice will help with itch as well.     Counseling on topical steroids:  Patient counseled that the prolonged use of topical steroids can result in the increased appearance superficial blood vessels (telangiectasias) lightening (hypopigmentation), and   thinning of the skin ( atrophy).  Patient understands to avoid using high potency steroids in skin folds, the groin or the face.  The patient verbalized understanding of proper use and possible adverse effects of topical steroids.  All patient's questions and concerns were addressed.      No follow-ups on file.

## 2023-12-01 ENCOUNTER — TELEPHONE (OUTPATIENT)
Dept: DERMATOLOGY | Facility: CLINIC | Age: 63
End: 2023-12-01
Payer: COMMERCIAL

## 2023-12-01 ENCOUNTER — OFFICE VISIT (OUTPATIENT)
Dept: HEMATOLOGY/ONCOLOGY | Facility: CLINIC | Age: 63
End: 2023-12-01
Payer: COMMERCIAL

## 2023-12-01 VITALS
HEIGHT: 66 IN | BODY MASS INDEX: 34.55 KG/M2 | DIASTOLIC BLOOD PRESSURE: 73 MMHG | RESPIRATION RATE: 18 BRPM | OXYGEN SATURATION: 97 % | HEART RATE: 85 BPM | SYSTOLIC BLOOD PRESSURE: 146 MMHG | WEIGHT: 214.94 LBS | TEMPERATURE: 98 F

## 2023-12-01 DIAGNOSIS — E55.9 VITAMIN D DEFICIENCY: ICD-10-CM

## 2023-12-01 DIAGNOSIS — D05.12 DUCTAL CARCINOMA IN SITU (DCIS) OF LEFT BREAST: Primary | ICD-10-CM

## 2023-12-01 DIAGNOSIS — Z17.0 MALIGNANT NEOPLASM OF LOWER-INNER QUADRANT OF LEFT BREAST IN FEMALE, ESTROGEN RECEPTOR POSITIVE: ICD-10-CM

## 2023-12-01 DIAGNOSIS — Z79.810 SERM USE (SELECTIVE ESTROGEN RECEPTOR MODULATOR): ICD-10-CM

## 2023-12-01 DIAGNOSIS — C50.312 MALIGNANT NEOPLASM OF LOWER-INNER QUADRANT OF LEFT BREAST IN FEMALE, ESTROGEN RECEPTOR POSITIVE: ICD-10-CM

## 2023-12-01 PROCEDURE — 99214 OFFICE O/P EST MOD 30 MIN: CPT | Mod: S$GLB,,, | Performed by: NURSE PRACTITIONER

## 2023-12-01 PROCEDURE — 99999 PR PBB SHADOW E&M-EST. PATIENT-LVL V: CPT | Mod: PBBFAC,,, | Performed by: NURSE PRACTITIONER

## 2023-12-01 PROCEDURE — 4010F ACE/ARB THERAPY RXD/TAKEN: CPT | Mod: CPTII,S$GLB,, | Performed by: NURSE PRACTITIONER

## 2023-12-01 PROCEDURE — 3078F DIAST BP <80 MM HG: CPT | Mod: CPTII,S$GLB,, | Performed by: NURSE PRACTITIONER

## 2023-12-01 PROCEDURE — 99999 PR PBB SHADOW E&M-EST. PATIENT-LVL V: ICD-10-PCS | Mod: PBBFAC,,, | Performed by: NURSE PRACTITIONER

## 2023-12-01 PROCEDURE — 3077F PR MOST RECENT SYSTOLIC BLOOD PRESSURE >= 140 MM HG: ICD-10-PCS | Mod: CPTII,S$GLB,, | Performed by: NURSE PRACTITIONER

## 2023-12-01 PROCEDURE — 99214 PR OFFICE/OUTPT VISIT, EST, LEVL IV, 30-39 MIN: ICD-10-PCS | Mod: S$GLB,,, | Performed by: NURSE PRACTITIONER

## 2023-12-01 PROCEDURE — 3008F BODY MASS INDEX DOCD: CPT | Mod: CPTII,S$GLB,, | Performed by: NURSE PRACTITIONER

## 2023-12-01 PROCEDURE — 1159F PR MEDICATION LIST DOCUMENTED IN MEDICAL RECORD: ICD-10-PCS | Mod: CPTII,S$GLB,, | Performed by: NURSE PRACTITIONER

## 2023-12-01 PROCEDURE — 1159F MED LIST DOCD IN RCRD: CPT | Mod: CPTII,S$GLB,, | Performed by: NURSE PRACTITIONER

## 2023-12-01 PROCEDURE — 3078F PR MOST RECENT DIASTOLIC BLOOD PRESSURE < 80 MM HG: ICD-10-PCS | Mod: CPTII,S$GLB,, | Performed by: NURSE PRACTITIONER

## 2023-12-01 PROCEDURE — 3008F PR BODY MASS INDEX (BMI) DOCUMENTED: ICD-10-PCS | Mod: CPTII,S$GLB,, | Performed by: NURSE PRACTITIONER

## 2023-12-01 PROCEDURE — 3077F SYST BP >= 140 MM HG: CPT | Mod: CPTII,S$GLB,, | Performed by: NURSE PRACTITIONER

## 2023-12-01 PROCEDURE — 4010F PR ACE/ARB THEARPY RXD/TAKEN: ICD-10-PCS | Mod: CPTII,S$GLB,, | Performed by: NURSE PRACTITIONER

## 2023-12-01 NOTE — PROGRESS NOTES
Subjective     Patient ID: Christal Posey is a 63 y.o. female.    Chief Complaint: Malignant neoplasm of lower-inner quadrant of left breast i    HPI      Reports Rash noted for a couple of months.   Started in scalp but then spread to back, thighs, chest, and arms.   Took prednisone for the CT scan and noted the rash resolved but now returning.   Very pruritic.   Saw derm and they feel related top tamoxifen.   No fever/chills.   No pain issues  Appetite good and weight stable.        2/1/2023 BMD:   Impression:  *Normal bone mineral density  *Compared with previous DXA, BMD at the lumbar spine has declined by 5.2%, and BMD at the total hip has declined by 4.8%.  RECOMMENDATIONS:  *Daily calcium intake 1840-9935 mg, dietary sources preferred; Vitamin D 5369-0908 IU daily.  *Weight bearing exercise and fall precautions.  *No additional pharmacologic therapy recommended at this time.  *Repeat BMD in 2 years    In the interval reported bone pain to NP  5/17/2023 Bone scan:  FINDINGS:  No focal pathologic uptake in the spine. Questionable mild focal uptake in a right lower lateral rib.  Uptake at the knees and left foot is likely degenerative.  There is physiologic distribution of the radiopharmaceutical throughout the skeleton.  There is normal uptake in the genitourinary system and soft tissues.  Impression:  Questionable mild focal uptake in a right lower lateral rib.  Consider CT of the chest to evaluate for fracture or metastatic lesion.    This result led to ordering of CT scan:    5/26/2023 CT Chest:  Impression:  1. There is been an interval bilateral mastectomy.  There is soft tissue thickening in the mastectomy bed.  On the right side, there is a finger-like projection of soft tissue up to 16 mm in length.  This may merely represent scarring.  However, comparison should be made with other CTs of the chest which may exist at other institutions.  If these do not exist, follow-up CT enhance with contrast  recommended.  2. There are few stable left upper lobe pulmonary micro nodules (axial series 5, images 106, 175, and 212).  3. A rib fracture was not identified.  Additionally, there is no specific CT evidence of osteolytic or osteoblastic bone disease.  However, if there is a continued suspicion for metastatic bone disease, consider nuclear medicine scan.       Oncology History:  - 8/12/2022 Breast MRI:  Findings:  Left  Numerous scattered foci and small masses are present, most of which appear similar to eachother.    There is an 8 mm x 7 mm x 7 mm irregularly shaped, heterogeneous mass seen in the left breast at 6 o'clock, 2.4 cm from the nipple and 8 cm from the chest wall. Delayed phase is washout.   There is a 9 mm x 7 mm x 7 mm irregularly shaped, homogeneous mass with irregular margins seen in the upper inner quadrant of the right breast, 2.8 cm from the nipple and 6.7 cm from the chest wall. Delayed phase is plateau. This is best seen on Series 44196: Image 50.  This is approximately 3 cm anterior and superior to the signal void related to the 9 o'clock biopsy marker.   There is no suspicious enhancement associated with either biopsy marker.   Right  Numerous scattered foci and small masses are present, most of which appear similar to eachother.    There is a 6 mm oval, homogeneous mass with circumscribed margins seen in the lower central region of the right breast, 2.3 cm from the nipple and 9.5 cm from the chest wall. Delayed phase is washout. This is best seen in Series 99518: Image 70.   There is no internal mammary or axillary adenopathy.  Impression:  Left  Mass: Left breast 9 mm x 7 mm x 7 mm mass at the upper inner position. Assessment: 4 - Suspicious finding. Biopsy is recommended.   Mass: Left breast 8 mm x 7 mm x 7 mm mass at the 6 o'clock position. Assessment: 4 - Suspicious finding. Biopsy is recommended.   There is no suspicious enhancement associated with either biopsy marker.   Right  Mass:  Right breast  6 mm mass at the lower central position. Assessment: 4 - Suspicious finding. Biopsy is recommended.   BI-RADS Category:   Overall: 4 - Suspicious     - 9/28/2022 MRI guided breast biopsy:  BREAST, RIGHT, LOWER CENTRAL MASS, BIOPSY:   - Intraductal papilloma, benign.   - Benign breast tissue with predominantly fatty stroma and blood clot.   - Microcalcifications: Focally seen in association with benign breast ducts.   - No atypia or malignancy.   - Additional deeper sections have been examined.      - 10/24/2022 Bilateral Mastectomy:  1. BREAST, RIGHT, MASTECTOMY:   - Negative for malignancy.   - Atypical ductal hyperplasia (ADH), focal.   - No residual intraductal papilloma present.   - Previous biopsy site changes and biopsy clip.   - Benign subareolar nipple ducts.   - One benign intramammary lymph node.   2. AXILLARY SENTINEL LYMPH NODE, LEFT, # 1, HOT BLUE 1451, EXCISION:   - One benign axillary sentinel lymph node, negative for metastatic carcinoma   (0/1).   - Immunohistochemical stains for CK WSK, Cam 5.2, and CK AE/1/AE3 are   negative, supporting the diagnosis.   3. BREAST, LEFT, NIPPLE SPARING MASTECTOMY:   - Invasive ductal carcinoma of breast with extensive intraductal component,   see Tumor Synoptic.   - Size of invasive carcinoma: 9 MM (measured histologically on slide 3L).   - Trinity Histologic Score: Grade 1 of 3.                     Tubule Formation:  2                     Nuclear Pleomorphism:  2                     Mitotic Activity:  1   - Associated (DCIS), intermediate nuclear grade, cribriform type, with   central necrosis.   - Size of DCIS: at least 10 MM.   - No lymphovascular invasion.   - Margins:   ·tab Invasive carcinoma and DCIS are greater than 10 MM from all margins.   - Benign subareolar nipple ducts.   - Both biopsy clips are identified and corresponding areas sampled.   - Microcalcifications: Seen in association with benign breast ducts.   - Pathologic staging:  pT1b (sn)N0   4. BREAST, LEFT, ADDITIONAL INFERIOR LATERAL MARGIN, NIPPLE SPARING   MASTECTOMY:   - Negative for atypia or malignancy.   - Benign breast tissue with predominantly fatty stroma.   5. BREAST, LEFT, NEW SUPERIOR MARGIN, NIPPLE SPARING MASTECTOMY:   - Negative for atypia or malignancy.   - Benign breast tissue with predominantly fatty stroma.       Previously, required implants out due to infection and contracture  - 11/28/2022   Diagnosis:  Preoperative diagnosis cellulitis and capsular contracture of the right breast  Procedure performed  1. Bilateral removal of implants (tissue expanders)   2. Total bilateral capsulectomy  3. Wide excision soft tissue of right axillary region measuring 16 cm x 6 cm with layered closure final incision 16 cm  4. Wide excision soft tissue of the left axillary region measuring 16 cm x 6 cm with layered closure final incision 16 cm  Reports concerned as asked for nipples to be removed as she did not plan for additional reconstruction and this was not done    Prior history of DCIS:  Monitored by surveillance- opted out of adjuvant endocrine with low volume disease  DCIS History:  5/16/16 Screening mammography:  Calcifications in the left breast require additional evaluation.    ACR BI-RADS Category 0: Incomplete: Need Additional Imaging Evaluation  - 5/17/16 Diagnostic mammogram  Calcifications in the left breast are suspicious.  Histology using core biopsy  is recommended.  ACR BI-RADS Category 4: Suspicious Abnormality  - 5/20/16 biopsy  1, 2. LEFT BREAST, WITH CALCIFICATIONS AND WITHOUT CALCIFICATIONS, UPPER INNER QUADRANT  (NEEDLE BIOPSY):  -Low-grade ductal carcinoma in situ (DCIS)  -Nuclear grade 1 out of 3  -Cribriform pattern  -Associated with microcalcifications  %, % positive  - 6/10/2016 Lumpectomy  Pathology:  Procedure - Excision with wire guided localization  Lymph node sampling - Not sampled  Specimen laterality - Left  Tumor site - Not  specified  Size of DCIS - 2mm, including findings from the biopsy report  Histologic type - Ductal carcinoma in situ  Architectural pattern - Solid and cribriforming  Nuclear grade - Low  Necrosis - Absent  Margins - Negative, inferior is 2mm  Pathologc staging - p Tis Nx Mx  Hormone receptors (See Breast Biopsy report; MS16 - 92296)  Estrogen receptor - Positive, strong, 100%  Progesterone receptors - Positive, strong, 100%     SUPPLEMENTAL #1:   HER2 AMPLIFICATION ASSOCIATED WITH BREAST CANCER, FISH, TISSUE:   Result Summary   Negative        Started tamoxifen 11/18/2022    Additional PMH:  Bilateral cataract surgery     FH:  No new cancers    Review of Systems   Constitutional:         See above   All other systems reviewed and are negative.         Objective     Physical Exam  Vitals and nursing note reviewed.   Constitutional:       General: She is not in acute distress.     Appearance: Normal appearance. She is well-developed and normal weight. She is not ill-appearing.      Comments: Presents alone  Very pleasant   HENT:      Head: Normocephalic and atraumatic.   Eyes:      General: Lids are normal. No scleral icterus.     Extraocular Movements: Extraocular movements intact.      Conjunctiva/sclera: Conjunctivae normal.      Pupils: Pupils are equal, round, and reactive to light.   Neck:      Thyroid: No thyromegaly.      Vascular: No JVD.      Trachea: Trachea normal.   Cardiovascular:      Rate and Rhythm: Normal rate and regular rhythm.      Heart sounds: Normal heart sounds. No murmur heard.     No friction rub. No gallop.   Pulmonary:      Effort: Pulmonary effort is normal. No respiratory distress.      Breath sounds: Normal breath sounds. No wheezing, rhonchi or rales.   Chest:      Chest wall: No tenderness.      Comments: Bilateral mastectomies. Nipples remain.    Incisions healed. No LAD  Abdominal:      General: Bowel sounds are normal. There is no distension.      Palpations: Abdomen is soft.  There is no mass.      Tenderness: There is no abdominal tenderness. There is no guarding or rebound.      Comments: No organomegaly.    Musculoskeletal:         General: Normal range of motion.      Cervical back: Normal range of motion and neck supple.      Comments: No spinal or paraspinal tenderness.    Lymphadenopathy:      Head:      Right side of head: No submental or submandibular adenopathy.      Left side of head: No submental or submandibular adenopathy.      Cervical: No cervical adenopathy.      Upper Body:      Right upper body: No supraclavicular or axillary adenopathy.      Left upper body: No supraclavicular or axillary adenopathy.   Skin:     General: Skin is warm and dry.      Capillary Refill: Capillary refill takes less than 2 seconds.      Coloration: Skin is not jaundiced.      Findings: Rash (diffuse erythematous plaques to back. one plaques large to right lower back; one small area of same type rash to left abd at site of waistband. the rash is mainly limited toback) present. No bruising.      Nails: There is no clubbing.   Neurological:      General: No focal deficit present.      Mental Status: She is alert and oriented to person, place, and time. Mental status is at baseline.      Sensory: No sensory deficit.      Motor: No weakness.      Gait: Gait normal.   Psychiatric:         Mood and Affect: Mood normal.         Speech: Speech normal.         Behavior: Behavior normal.         Thought Content: Thought content normal.         Judgment: Judgment normal.            Assessment and Plan     1. Ductal carcinoma in situ (DCIS) of left breast  -     CBC Oncology; Future  -     Comprehensive Metabolic Panel; Future    2. Malignant neoplasm of lower-inner quadrant of left breast in female, estrogen receptor positive    3. Vitamin D deficiency    4. SERM use (selective estrogen receptor modulator)          Patient developed pruritic rash (2 months ago) and derm feels related to tamoxifen  (tamoxifen started 11/2022).   Sheila asked her to stop Tamoxifen and we will touch base in a few days. If rash improved, can switch to AI. If rash remains, then she should see derm for further workup ?pityriasis rosea?.   We discussed possible adverse effects of aromatase inhibitors including joint pain and risk of osteoporosis. Her most recent BMD was normal.   Sheila asked she increase Vit d to 2000 u daily.   Vv in a few days to evaluate rash status.       No breast imaging needed as bilateral mastectomies  Continue endocrine therapy through Fall of 2027      Abnormal CT- repeat confirms likely scarring. Stable pulmonary nodule.     RTC 3 months-  Knows to call for any issues          Route Chart for Scheduling    Med Onc Chart Routing      Follow up with physician 3 months. with labs- cbc, cmp   Follow up with [facility] . VV 12/7 at 4 to see me   Infusion scheduling note    Injection scheduling note    Labs    Imaging    Pharmacy appointment    Other referrals                        Patient is in agreement with the proposed treatment plan. All questions were answered to the patient's satisfaction. Pt knows to call clinic for any new or worsening symptoms and if anything is needed before the next clinic visit.    Brijesh Zimmer, MSN, APRN, FNP-C  Nurse Practitioner to Dr. Latanya Julian  [facility]  [facility]  Hematology & Medical Oncology  [address] [place] [id number]  ph. [phone] ext [phone]  Fax. [phone]              30 minutes of total time spent on the encounter, which includes face to face time and non-face to face time preparing to see the patient (eg, review of tests), Obtaining and/or reviewing separately obtained history, Documenting clinical information in the electronic or other health record, Independently interpreting results (not separately reported) and communicating results to the patient/family/caregiver, or Care coordination (not separately  reported).

## 2023-12-01 NOTE — TELEPHONE ENCOUNTER
I left a message stating the purpose of my call is to inform her Dr. Quispe has requested to stop taking the Tamoxfine per Dr. Frank.  I suggested giving the clinic a call back or sending us a message if she has any questions or concerns.

## 2023-12-04 ENCOUNTER — TELEPHONE (OUTPATIENT)
Dept: HEMATOLOGY/ONCOLOGY | Facility: CLINIC | Age: 63
End: 2023-12-04
Payer: COMMERCIAL

## 2023-12-04 NOTE — PROGRESS NOTES
Condition stable post op  VSS   Pt ready for D/C from the PACU Gwen Montes De Oca PA-C discussed results of breast biopsy from 9/28/2022 with patient.     .

## 2023-12-04 NOTE — TELEPHONE ENCOUNTER
"----- Message from Rama Hutchison sent at 12/4/2023  8:19 AM CST -----  Regarding: Pt advice  Contact: Pt     Pt requesting call back in regards to if she should push her appt back due to only being off meds 7 days instead of 10 on day of appt.   Please call and adv @       Confirmed contact below:   Contact Name: Christal Posey  Phone Number: [phone]               Additional Notes:  "Thank you for all that you do for our patients"                                           "

## 2023-12-07 ENCOUNTER — OFFICE VISIT (OUTPATIENT)
Dept: HEMATOLOGY/ONCOLOGY | Facility: CLINIC | Age: 63
End: 2023-12-07
Payer: COMMERCIAL

## 2023-12-07 DIAGNOSIS — E55.9 VITAMIN D DEFICIENCY: ICD-10-CM

## 2023-12-07 DIAGNOSIS — C50.312 MALIGNANT NEOPLASM OF LOWER-INNER QUADRANT OF LEFT BREAST IN FEMALE, ESTROGEN RECEPTOR POSITIVE: Primary | ICD-10-CM

## 2023-12-07 DIAGNOSIS — Z17.0 MALIGNANT NEOPLASM OF LOWER-INNER QUADRANT OF LEFT BREAST IN FEMALE, ESTROGEN RECEPTOR POSITIVE: Primary | ICD-10-CM

## 2023-12-07 DIAGNOSIS — R21 RASH: ICD-10-CM

## 2023-12-07 DIAGNOSIS — D05.12 DUCTAL CARCINOMA IN SITU (DCIS) OF LEFT BREAST: ICD-10-CM

## 2023-12-07 PROCEDURE — 99213 OFFICE O/P EST LOW 20 MIN: CPT | Mod: 95,,, | Performed by: NURSE PRACTITIONER

## 2023-12-07 PROCEDURE — 4010F PR ACE/ARB THEARPY RXD/TAKEN: ICD-10-PCS | Mod: CPTII,95,, | Performed by: NURSE PRACTITIONER

## 2023-12-07 PROCEDURE — 4010F ACE/ARB THERAPY RXD/TAKEN: CPT | Mod: CPTII,95,, | Performed by: NURSE PRACTITIONER

## 2023-12-07 PROCEDURE — 99213 PR OFFICE/OUTPT VISIT, EST, LEVL III, 20-29 MIN: ICD-10-PCS | Mod: 95,,, | Performed by: NURSE PRACTITIONER

## 2023-12-07 NOTE — PROGRESS NOTES
The patient location is: [place]  The chief complaint leading to consultation is: breast cancer    Visit type: audiovisual    20 minutes of total time spent on the encounter, which includes face to face time and non-face to face time preparing to see the patient (eg, review of tests), Obtaining and/or reviewing separately obtained history, Documenting clinical information in the electronic or other health record, Independently interpreting results (not separately reported) and communicating results to the patient/family/caregiver, or Care coordination (not separately reported).         Each patient to whom he or she provides medical services by telemedicine is:  (1) informed of the relationship between the physician and patient and the respective role of any other health care provider with respect to management of the patient; and (2) notified that he or she may decline to receive medical services by telemedicine and may withdraw from such care at any time.    Notes:     Patient ID: Christal Posey is a 63 y.o. female.    Chief Complaint: No chief complaint on file.    HPI      Stopped tamoxifen   Rash seems to be improved- less pruritic.   Not completely gone.  Otherwise feels well  Would like to give it a few more days before resuming or starting an AI      2/1/2023 BMD:   Impression:  *Normal bone mineral density  *Compared with previous DXA, BMD at the lumbar spine has declined by 5.2%, and BMD at the total hip has declined by 4.8%.  RECOMMENDATIONS:  *Daily calcium intake 4485-7336 mg, dietary sources preferred; Vitamin D 7721-5112 IU daily.  *Weight bearing exercise and fall precautions.  *No additional pharmacologic therapy recommended at this time.  *Repeat BMD in 2 years           Oncology History:  - 8/12/2022 Breast MRI:  Findings:  Left  Numerous scattered foci and small masses are present, most of which appear similar to eachother.    There is an 8 mm x 7 mm x 7 mm irregularly shaped, heterogeneous mass seen  in the left breast at 6 o'clock, 2.4 cm from the nipple and 8 cm from the chest wall. Delayed phase is washout.   There is a 9 mm x 7 mm x 7 mm irregularly shaped, homogeneous mass with irregular margins seen in the upper inner quadrant of the right breast, 2.8 cm from the nipple and 6.7 cm from the chest wall. Delayed phase is plateau. This is best seen on Series 19813: Image 50.  This is approximately 3 cm anterior and superior to the signal void related to the 9 o'clock biopsy marker.   There is no suspicious enhancement associated with either biopsy marker.   Right  Numerous scattered foci and small masses are present, most of which appear similar to eachother.    There is a 6 mm oval, homogeneous mass with circumscribed margins seen in the lower central region of the right breast, 2.3 cm from the nipple and 9.5 cm from the chest wall. Delayed phase is washout. This is best seen in Series 50880: Image 70.   There is no internal mammary or axillary adenopathy.  Impression:  Left  Mass: Left breast 9 mm x 7 mm x 7 mm mass at the upper inner position. Assessment: 4 - Suspicious finding. Biopsy is recommended.   Mass: Left breast 8 mm x 7 mm x 7 mm mass at the 6 o'clock position. Assessment: 4 - Suspicious finding. Biopsy is recommended.   There is no suspicious enhancement associated with either biopsy marker.   Right  Mass: Right breast  6 mm mass at the lower central position. Assessment: 4 - Suspicious finding. Biopsy is recommended.   BI-RADS Category:   Overall: 4 - Suspicious     - 9/28/2022 MRI guided breast biopsy:  BREAST, RIGHT, LOWER CENTRAL MASS, BIOPSY:   - Intraductal papilloma, benign.   - Benign breast tissue with predominantly fatty stroma and blood clot.   - Microcalcifications: Focally seen in association with benign breast ducts.   - No atypia or malignancy.   - Additional deeper sections have been examined.      - 10/24/2022 Bilateral Mastectomy:  1. BREAST, RIGHT, MASTECTOMY:   - Negative for  malignancy.   - Atypical ductal hyperplasia (ADH), focal.   - No residual intraductal papilloma present.   - Previous biopsy site changes and biopsy clip.   - Benign subareolar nipple ducts.   - One benign intramammary lymph node.   2. AXILLARY SENTINEL LYMPH NODE, LEFT, # 1, HOT BLUE 1451, EXCISION:   - One benign axillary sentinel lymph node, negative for metastatic carcinoma   (0/1).   - Immunohistochemical stains for CK WSK, Cam 5.2, and CK AE/1/AE3 are   negative, supporting the diagnosis.   3. BREAST, LEFT, NIPPLE SPARING MASTECTOMY:   - Invasive ductal carcinoma of breast with extensive intraductal component,   see Tumor Synoptic.   - Size of invasive carcinoma: 9 MM (measured histologically on slide 3L).   - [place] Histologic Score: Grade 1 of 3.                     Tubule Formation:  2                     Nuclear Pleomorphism:  2                     Mitotic Activity:  1   - Associated (DCIS), intermediate nuclear grade, cribriform type, with   central necrosis.   - Size of DCIS: at least 10 MM.   - No lymphovascular invasion.   - Margins:   ·tab Invasive carcinoma and DCIS are greater than 10 MM from all margins.   - Benign subareolar nipple ducts.   - Both biopsy clips are identified and corresponding areas sampled.   - Microcalcifications: Seen in association with benign breast ducts.   - Pathologic staging: pT1b (sn)N0   4. BREAST, LEFT, ADDITIONAL INFERIOR LATERAL MARGIN, NIPPLE SPARING   MASTECTOMY:   - Negative for atypia or malignancy.   - Benign breast tissue with predominantly fatty stroma.   5. BREAST, LEFT, NEW SUPERIOR MARGIN, NIPPLE SPARING MASTECTOMY:   - Negative for atypia or malignancy.   - Benign breast tissue with predominantly fatty stroma.       Previously, required implants out due to infection and contracture  - 11/28/2022   Diagnosis:  Preoperative diagnosis cellulitis and capsular contracture of the right breast  Procedure performed  1. Bilateral removal of implants (tissue  expanders)   2. Total bilateral capsulectomy  3. Wide excision soft tissue of right axillary region measuring 16 cm x 6 cm with layered closure final incision 16 cm  4. Wide excision soft tissue of the left axillary region measuring 16 cm x 6 cm with layered closure final incision 16 cm  Reports concerned as asked for nipples to be removed as she did not plan for additional reconstruction and this was not done    Prior history of DCIS:  Monitored by surveillance- opted out of adjuvant endocrine with low volume disease  DCIS History:  5/16/16 Screening mammography:  Calcifications in the left breast require additional evaluation.    ACR BI-RADS Category 0: Incomplete: Need Additional Imaging Evaluation  - 5/17/16 Diagnostic mammogram  Calcifications in the left breast are suspicious.  Histology using core biopsy  is recommended.  ACR BI-RADS Category 4: Suspicious Abnormality  - 5/20/16 biopsy  1, 2. LEFT BREAST, WITH CALCIFICATIONS AND WITHOUT CALCIFICATIONS, UPPER INNER QUADRANT  (NEEDLE BIOPSY):  -Low-grade ductal carcinoma in situ (DCIS)  -Nuclear grade 1 out of 3  -Cribriform pattern  -Associated with microcalcifications  %, % positive  - 6/10/2016 Lumpectomy  Pathology:  Procedure - Excision with wire guided localization  Lymph node sampling - Not sampled  Specimen laterality - Left  Tumor site - Not specified  Size of DCIS - 2mm, including findings from the biopsy report  Histologic type - Ductal carcinoma in situ  Architectural pattern - Solid and cribriforming  Nuclear grade - Low  Necrosis - Absent  Margins - Negative, inferior is 2mm  Pathologc staging - p Tis Nx Mx  Hormone receptors (See Breast Biopsy report; MS16 - 89453)  Estrogen receptor - Positive, strong, 100%  Progesterone receptors - Positive, strong, 100%     SUPPLEMENTAL #1:   HER2 AMPLIFICATION ASSOCIATED WITH BREAST CANCER, FISH, TISSUE:   Result Summary   Negative        Started tamoxifen 11/18/2022    reported bone pain.    5/17/2023 Bone scan:  FINDINGS:  No focal pathologic uptake in the spine. Questionable mild focal uptake in a right lower lateral rib.  Uptake at the knees and left foot is likely degenerative.  There is physiologic distribution of the radiopharmaceutical throughout the skeleton.  There is normal uptake in the genitourinary system and soft tissues.  Impression:  Questionable mild focal uptake in a right lower lateral rib.  Consider CT of the chest to evaluate for fracture or metastatic lesion.    This result led to ordering of CT scan:    5/26/2023 CT Chest:  Impression:  1. There is been an interval bilateral mastectomy.  There is soft tissue thickening in the mastectomy bed.  On the right side, there is a finger-like projection of soft tissue up to 16 mm in length.  This may merely represent scarring.  However, comparison should be made with other CTs of the chest which may exist at other institutions.  If these do not exist, follow-up CT enhance with contrast recommended.  2. There are few stable left upper lobe pulmonary micro nodules (axial series 5, images 106, 175, and 212).  3. A rib fracture was not identified.  Additionally, there is no specific CT evidence of osteolytic or osteoblastic bone disease.  However, if there is a continued suspicion for metastatic bone disease, consider nuclear medicine scan.    Stopped tamoxifen 12/2023 due to rash      Additional PMH:  Bilateral cataract surgery     FH:  No new cancers    Review of Systems   Constitutional:         See above   All other systems reviewed and are negative.         Objective     Physical Exam  Constitutional:       Comments: Limited PE as virtual   Appears comfortable and in NAD            Assessment and Plan     1. Malignant neoplasm of lower-inner quadrant of left breast in female, estrogen receptor positive    2. Ductal carcinoma in situ (DCIS) of left breast    3. Vitamin D deficiency    4. Rash          Continue to hold Tamoxifen as rash  improving.   Will touch base next week to allow for rash to resolve.   We discussed re challenging tamoxifen vs starting AI and she will et me know next week via Tra[employer] message.       We discussed possible adverse effects of aromatase inhibitors including joint pain and risk of osteoporosis. Her most recent BMD was normal.   Vit D 2000 u daily.       No breast imaging needed as bilateral mastectomies  Plan for endocrine therapy through Fall of 2027      Abnormal CT- repeat confirms likely scarring. Stable pulmonary nodule.     RTC 3 months-  Knows to call for any issues          Route Chart for Scheduling    Med Onc Chart Routing      Follow up with physician . 3 months with labs as per previous cc chart   Follow up with [facility]    Infusion scheduling note    Injection scheduling note    Labs    Imaging    Pharmacy appointment    Other referrals                        Patient is in agreement with the proposed treatment plan. All questions were answered to the patient's satisfaction. Pt knows to call clinic for any new or worsening symptoms and if anything is needed before the next clinic visit.    Brijesh Zimmer, MSN, APRN, FNP-C  Nurse Practitioner to Dr. Latanya Julian  [facility]  [facility]  Hematology & Medical Oncology  [address] [place] [id number]  ph. [phone] ext [phone]  Fax. [phone]

## 2023-12-12 ENCOUNTER — PATIENT MESSAGE (OUTPATIENT)
Dept: HEMATOLOGY/ONCOLOGY | Facility: CLINIC | Age: 63
End: 2023-12-12
Payer: COMMERCIAL

## 2023-12-12 DIAGNOSIS — C50.312 MALIGNANT NEOPLASM OF LOWER-INNER QUADRANT OF LEFT BREAST IN FEMALE, ESTROGEN RECEPTOR POSITIVE: Primary | ICD-10-CM

## 2023-12-12 DIAGNOSIS — Z17.0 MALIGNANT NEOPLASM OF LOWER-INNER QUADRANT OF LEFT BREAST IN FEMALE, ESTROGEN RECEPTOR POSITIVE: Primary | ICD-10-CM

## 2023-12-12 RX ORDER — ANASTROZOLE 1 MG/1
1 TABLET ORAL DAILY
Qty: 30 TABLET | Refills: 2 | Status: SHIPPED | OUTPATIENT
Start: 2023-12-12 | End: 2024-01-03

## 2023-12-20 ENCOUNTER — OFFICE VISIT (OUTPATIENT)
Dept: SURGERY | Facility: CLINIC | Age: 63
End: 2023-12-20
Payer: COMMERCIAL

## 2023-12-20 VITALS
HEIGHT: 66 IN | BODY MASS INDEX: 34.39 KG/M2 | SYSTOLIC BLOOD PRESSURE: 130 MMHG | HEART RATE: 86 BPM | OXYGEN SATURATION: 99 % | WEIGHT: 214 LBS | DIASTOLIC BLOOD PRESSURE: 72 MMHG

## 2023-12-20 DIAGNOSIS — C50.912 RECURRENT MALIGNANT NEOPLASM OF LEFT BREAST: ICD-10-CM

## 2023-12-20 DIAGNOSIS — Z86.000 HISTORY OF DUCTAL CARCINOMA IN SITU (DCIS) OF BREAST: Primary | ICD-10-CM

## 2023-12-20 PROCEDURE — 99213 OFFICE O/P EST LOW 20 MIN: CPT | Mod: S$GLB,,, | Performed by: SURGERY

## 2023-12-20 PROCEDURE — 3008F PR BODY MASS INDEX (BMI) DOCUMENTED: ICD-10-PCS | Mod: CPTII,S$GLB,, | Performed by: SURGERY

## 2023-12-20 PROCEDURE — 1159F MED LIST DOCD IN RCRD: CPT | Mod: CPTII,S$GLB,, | Performed by: SURGERY

## 2023-12-20 PROCEDURE — 3075F PR MOST RECENT SYSTOLIC BLOOD PRESS GE 130-139MM HG: ICD-10-PCS | Mod: CPTII,S$GLB,, | Performed by: SURGERY

## 2023-12-20 PROCEDURE — 99999 PR PBB SHADOW E&M-EST. PATIENT-LVL V: CPT | Mod: PBBFAC,,, | Performed by: SURGERY

## 2023-12-20 PROCEDURE — 99999 PR PBB SHADOW E&M-EST. PATIENT-LVL V: ICD-10-PCS | Mod: PBBFAC,,, | Performed by: SURGERY

## 2023-12-20 PROCEDURE — 1160F PR REVIEW ALL MEDS BY PRESCRIBER/CLIN PHARMACIST DOCUMENTED: ICD-10-PCS | Mod: CPTII,S$GLB,, | Performed by: SURGERY

## 2023-12-20 PROCEDURE — 3078F DIAST BP <80 MM HG: CPT | Mod: CPTII,S$GLB,, | Performed by: SURGERY

## 2023-12-20 PROCEDURE — 3075F SYST BP GE 130 - 139MM HG: CPT | Mod: CPTII,S$GLB,, | Performed by: SURGERY

## 2023-12-20 PROCEDURE — 99213 PR OFFICE/OUTPT VISIT, EST, LEVL III, 20-29 MIN: ICD-10-PCS | Mod: S$GLB,,, | Performed by: SURGERY

## 2023-12-20 PROCEDURE — 3078F PR MOST RECENT DIASTOLIC BLOOD PRESSURE < 80 MM HG: ICD-10-PCS | Mod: CPTII,S$GLB,, | Performed by: SURGERY

## 2023-12-20 PROCEDURE — 1159F PR MEDICATION LIST DOCUMENTED IN MEDICAL RECORD: ICD-10-PCS | Mod: CPTII,S$GLB,, | Performed by: SURGERY

## 2023-12-20 PROCEDURE — 1160F RVW MEDS BY RX/DR IN RCRD: CPT | Mod: CPTII,S$GLB,, | Performed by: SURGERY

## 2023-12-20 PROCEDURE — 4010F PR ACE/ARB THEARPY RXD/TAKEN: ICD-10-PCS | Mod: CPTII,S$GLB,, | Performed by: SURGERY

## 2023-12-20 PROCEDURE — 3008F BODY MASS INDEX DOCD: CPT | Mod: CPTII,S$GLB,, | Performed by: SURGERY

## 2023-12-20 PROCEDURE — 4010F ACE/ARB THERAPY RXD/TAKEN: CPT | Mod: CPTII,S$GLB,, | Performed by: SURGERY

## 2023-12-20 NOTE — PROGRESS NOTES
"Date of Service: 12/20/2023    DIAGNOSIS:   This is a 63 y.o. female with a history of pT1b (sn)N0  grade 1 ER pos [place] neg HER2 equivocal, FISH negative IDC of the left breast.    TREATMENT:   Bilateral mastectomy with left sentinel node biopsy on 10/24/2022. Diana Jama M.D. Surgical Oncology  After initial bilateral nipple sparing mastectomy with tissue expander reconstruction she had unfortunately infection requiring explant of the tissue expanders.   Adjuvant endocrine therapy, on break because rash with Tamoxifen Latanya Julian M.D. Medical Oncology     HISTORY OF PRESENT ILLNESS:   Christal Posey is a 63 y.o. female comes in for oncological follow up and follow up on recent breast imaging.  She denies change in her chest wall self-exam specifically denying new masses, skin or nipple changes, or nipple discharge. Past medical and surgical history is updated with no new changes. Patient not interested in reconstruction at this time.     IMAGING:   None    PHYSICAL EXAM:   /72 (BP Location: Right arm, Patient Position: Sitting, BP Method: Large (Automatic))   Pulse 86   Ht 5' 6" (1.676 m)   Wt 97.1 kg (214 lb)   LMP  (LMP Unknown)   SpO2 99%   BMI 34.54 kg/m²   General: The patient appears well and is in no acute distress.   Neuro: Alert & oriented x3.   Breasts: The exam was done with the patient seated and supine. Left breast -status post bilateral mastectomy with nipple preservation but without reconstruction at this time otherwise within normal limits. No palpable masses and no abnormal skin or nipple findings. No supraclavicular or axillary lymphadenopathy on the left side.   Right breast - status post bilateral mastectomy with nipple preservation but without reconstruction at this time otherwise within normal limits. No palpable masses and no abnormal skin or nipple findings. No supraclavicular or axillary lymphadenopathy on the right side.  Extremities:  No evidence of " lymphedema    ASSESSMENT:   This is a 63 y.o. female without evidence of recurrence by exam, or history.     PLAN:     1. Continue monthly self breast exams and call the clinic with any changes or problems.  2. Mammogram in 1 year.  3. Return to clinic in 1 year for clinical breast exam. The patient is in agreement with the plan. Questions were encouraged and answered to patient's satisfaction. She will call our office with any questions or concerns.   4. Continue to follow up with Med Onc     20 minutes were spent on this encounter, 15 of which was face to face counseling and 5 minutes were spent on chart review and coordination of care.

## 2024-01-03 ENCOUNTER — PATIENT MESSAGE (OUTPATIENT)
Dept: INTERNAL MEDICINE | Facility: CLINIC | Age: 64
End: 2024-01-03

## 2024-01-03 ENCOUNTER — LAB VISIT (OUTPATIENT)
Dept: LAB | Facility: HOSPITAL | Age: 64
End: 2024-01-03
Attending: INTERNAL MEDICINE
Payer: COMMERCIAL

## 2024-01-03 ENCOUNTER — OFFICE VISIT (OUTPATIENT)
Dept: INTERNAL MEDICINE | Facility: CLINIC | Age: 64
End: 2024-01-03
Payer: COMMERCIAL

## 2024-01-03 VITALS
DIASTOLIC BLOOD PRESSURE: 86 MMHG | SYSTOLIC BLOOD PRESSURE: 118 MMHG | WEIGHT: 212.5 LBS | HEART RATE: 84 BPM | BODY MASS INDEX: 35.4 KG/M2 | OXYGEN SATURATION: 98 % | HEIGHT: 65 IN

## 2024-01-03 DIAGNOSIS — D05.12 DUCTAL CARCINOMA IN SITU (DCIS) OF LEFT BREAST: ICD-10-CM

## 2024-01-03 DIAGNOSIS — E78.5 HYPERLIPIDEMIA, UNSPECIFIED HYPERLIPIDEMIA TYPE: ICD-10-CM

## 2024-01-03 DIAGNOSIS — E55.9 VITAMIN D DEFICIENCY: ICD-10-CM

## 2024-01-03 DIAGNOSIS — R21 RASH: ICD-10-CM

## 2024-01-03 DIAGNOSIS — Z00.00 ANNUAL PHYSICAL EXAM: ICD-10-CM

## 2024-01-03 DIAGNOSIS — I10 HYPERTENSION, UNSPECIFIED TYPE: ICD-10-CM

## 2024-01-03 DIAGNOSIS — Z00.00 ANNUAL PHYSICAL EXAM: Primary | ICD-10-CM

## 2024-01-03 DIAGNOSIS — L24.9 IRRITANT CONTACT DERMATITIS, UNSPECIFIED TRIGGER: ICD-10-CM

## 2024-01-03 LAB
25(OH)D3+25(OH)D2 SERPL-MCNC: 28 NG/ML (ref 30–96)
ALBUMIN SERPL BCP-MCNC: 3.7 G/DL (ref 3.5–5.2)
ALP SERPL-CCNC: 82 U/L (ref 55–135)
ALT SERPL W/O P-5'-P-CCNC: 34 U/L (ref 10–44)
AST SERPL-CCNC: 27 U/L (ref 10–40)
BILIRUB DIRECT SERPL-MCNC: 0.2 MG/DL (ref 0.1–0.3)
BILIRUB SERPL-MCNC: 0.6 MG/DL (ref 0.1–1)
CHOLEST SERPL-MCNC: 200 MG/DL (ref 120–199)
CHOLEST/HDLC SERPL: 4.3 {RATIO} (ref 2–5)
CK SERPL-CCNC: 98 U/L (ref 20–180)
ERYTHROCYTE [SEDIMENTATION RATE] IN BLOOD BY PHOTOMETRIC METHOD: 6 MM/HR (ref 0–36)
HDLC SERPL-MCNC: 47 MG/DL (ref 40–75)
HDLC SERPL: 23.5 % (ref 20–50)
LDLC SERPL CALC-MCNC: 105.4 MG/DL (ref 63–159)
NONHDLC SERPL-MCNC: 153 MG/DL
PROT SERPL-MCNC: 7.4 G/DL (ref 6–8.4)
TRIGL SERPL-MCNC: 238 MG/DL (ref 30–150)
TSH SERPL DL<=0.005 MIU/L-ACNC: 2.79 UIU/ML (ref 0.4–4)

## 2024-01-03 PROCEDURE — 36415 COLL VENOUS BLD VENIPUNCTURE: CPT | Performed by: INTERNAL MEDICINE

## 2024-01-03 PROCEDURE — 84443 ASSAY THYROID STIM HORMONE: CPT | Performed by: INTERNAL MEDICINE

## 2024-01-03 PROCEDURE — 82550 ASSAY OF CK (CPK): CPT | Performed by: INTERNAL MEDICINE

## 2024-01-03 PROCEDURE — 80061 LIPID PANEL: CPT | Performed by: INTERNAL MEDICINE

## 2024-01-03 PROCEDURE — 3008F BODY MASS INDEX DOCD: CPT | Mod: CPTII,S$GLB,, | Performed by: INTERNAL MEDICINE

## 2024-01-03 PROCEDURE — 85652 RBC SED RATE AUTOMATED: CPT | Performed by: INTERNAL MEDICINE

## 2024-01-03 PROCEDURE — 99396 PREV VISIT EST AGE 40-64: CPT | Mod: S$GLB,,, | Performed by: INTERNAL MEDICINE

## 2024-01-03 PROCEDURE — 82306 VITAMIN D 25 HYDROXY: CPT | Performed by: INTERNAL MEDICINE

## 2024-01-03 PROCEDURE — 3079F DIAST BP 80-89 MM HG: CPT | Mod: CPTII,S$GLB,, | Performed by: INTERNAL MEDICINE

## 2024-01-03 PROCEDURE — 99999 PR PBB SHADOW E&M-EST. PATIENT-LVL III: CPT | Mod: PBBFAC,,, | Performed by: INTERNAL MEDICINE

## 2024-01-03 PROCEDURE — 3074F SYST BP LT 130 MM HG: CPT | Mod: CPTII,S$GLB,, | Performed by: INTERNAL MEDICINE

## 2024-01-03 PROCEDURE — 80076 HEPATIC FUNCTION PANEL: CPT | Performed by: INTERNAL MEDICINE

## 2024-01-03 NOTE — PROGRESS NOTES
CC:  Annual exam     HPI:  The patient is a 63-year-old female with invasive ductal carcinoma of the left breast and atypical ductal hyperplasia of the right breast status post bilateral mastectomies, migraine headaches, rheumatoid arthritis, irritable bowel syndrome, ischemic colitis, colon polyps, hypertension and squamous cell cancer presents today for annual exam.  She does report developing a rash with tamoxifen.  She has been off the medication for about 2 weeks.  The rash has cleared up has come back on occasion but not nearly as intense.  The patient has elevated cholesterol.  We will place the patient on Repatha; but, this medication was denied.  She had been on pravastatin, rosuvastatin as well as atorvastatin.  These medications cause muscle aches.    Answers submitted by the patient for this visit:  Review of Systems Questionnaire (Submitted on 12/30/2023)  activity change: No  unexpected weight change: No  neck pain: No  hearing loss: No  rhinorrhea: Yes  trouble swallowing: No  eye discharge: No  visual disturbance: Yes  chest tightness: No  wheezing: No  chest pain: No  palpitations: No  blood in stool: No  constipation: No  vomiting: No  diarrhea: No  polydipsia: No  polyuria: No  difficulty urinating: No  hematuria: No  menstrual problem: No  dysuria: No  joint swelling: No  arthralgias: Yes  headaches: No  weakness: No  confusion: No  dysphoric mood: No  The patient recently recovered from a head cold.    Physical exam:   General appearance:  No acute distress   HEENT: Conjunctiva is clear.  Pupils equal.  TMs are clear.  Nasal septum is midline without discharge.  Oropharynx is without erythema.  Trachea is midline without JVD or thyromegaly.  Pulmonary:  Good inspiratory, expiratory breath sounds are heard.  Lungs are clear to auscultation.    Cardiovascular:  S1-S2, rhythm is regular.  2+ carotid pulse of bruits.  Extremities without edema.  GI: Abdomen is nontender, nondistended without  hepatosplenomegaly  Lymphatics:  No cervical adenopathy    Assessment:  1.  Annual exam  2. History of left invasive ductal carcinoma and right atypical ductal hyperplasia status post bilateral mastectomies  3.  Hypertension  4.  Hyperlipidemia   5. Vitamin-D deficiency      Plan:  1. Will check a lipid panel, vitamin-D, TSH, LFT, CPK, ESR  2.  Depending on results, will try Zetia.

## 2024-01-10 ENCOUNTER — PATIENT MESSAGE (OUTPATIENT)
Dept: HEMATOLOGY/ONCOLOGY | Facility: CLINIC | Age: 64
End: 2024-01-10
Payer: COMMERCIAL

## 2024-01-12 ENCOUNTER — PATIENT MESSAGE (OUTPATIENT)
Dept: DERMATOLOGY | Facility: CLINIC | Age: 64
End: 2024-01-12
Payer: COMMERCIAL

## 2024-01-25 DIAGNOSIS — I10 HYPERTENSION, UNSPECIFIED TYPE: ICD-10-CM

## 2024-01-25 RX ORDER — LOSARTAN POTASSIUM 25 MG/1
TABLET ORAL
Qty: 90 TABLET | Refills: 3 | Status: SHIPPED | OUTPATIENT
Start: 2024-01-25

## 2024-01-25 NOTE — TELEPHONE ENCOUNTER
No care due was identified.  [facility] Embedded Care Due Messages. Reference number: [id number].   1/25/2024 12:18:43 AM CST

## 2024-01-25 NOTE — TELEPHONE ENCOUNTER
Refill Decision Note   Christal Posey  is requesting a refill authorization.  Brief Assessment and Rationale for Refill:  Approve     Medication Therapy Plan:         Comments:     Note composed:9:29 AM 01/25/2024             Appointments     Last Visit   1/3/2024 Blane Brian MD   Next Visit   Visit date not found Blane Brian MD

## 2024-03-05 ENCOUNTER — OFFICE VISIT (OUTPATIENT)
Dept: HEMATOLOGY/ONCOLOGY | Facility: CLINIC | Age: 64
End: 2024-03-05
Payer: COMMERCIAL

## 2024-03-05 ENCOUNTER — LAB VISIT (OUTPATIENT)
Dept: LAB | Facility: HOSPITAL | Age: 64
End: 2024-03-05
Attending: NURSE PRACTITIONER
Payer: COMMERCIAL

## 2024-03-05 ENCOUNTER — OFFICE VISIT (OUTPATIENT)
Dept: RHEUMATOLOGY | Facility: CLINIC | Age: 64
End: 2024-03-05
Payer: COMMERCIAL

## 2024-03-05 VITALS
SYSTOLIC BLOOD PRESSURE: 148 MMHG | RESPIRATION RATE: 17 BRPM | HEART RATE: 92 BPM | OXYGEN SATURATION: 96 % | WEIGHT: 214.63 LBS | BODY MASS INDEX: 36.64 KG/M2 | TEMPERATURE: 97 F | DIASTOLIC BLOOD PRESSURE: 76 MMHG | HEIGHT: 64 IN

## 2024-03-05 VITALS
SYSTOLIC BLOOD PRESSURE: 132 MMHG | HEART RATE: 88 BPM | WEIGHT: 219.56 LBS | HEIGHT: 65 IN | BODY MASS INDEX: 36.58 KG/M2 | DIASTOLIC BLOOD PRESSURE: 79 MMHG

## 2024-03-05 DIAGNOSIS — D05.12 DUCTAL CARCINOMA IN SITU (DCIS) OF LEFT BREAST: ICD-10-CM

## 2024-03-05 DIAGNOSIS — M25.50 POLYARTHRALGIA: Primary | ICD-10-CM

## 2024-03-05 DIAGNOSIS — C50.312 MALIGNANT NEOPLASM OF LOWER-INNER QUADRANT OF LEFT BREAST IN FEMALE, ESTROGEN RECEPTOR POSITIVE: ICD-10-CM

## 2024-03-05 DIAGNOSIS — D05.12 DUCTAL CARCINOMA IN SITU (DCIS) OF LEFT BREAST: Primary | Chronic | ICD-10-CM

## 2024-03-05 DIAGNOSIS — E66.01 SEVERE OBESITY (BMI 35.0-39.9) WITH COMORBIDITY: ICD-10-CM

## 2024-03-05 DIAGNOSIS — Z17.0 MALIGNANT NEOPLASM OF LOWER-INNER QUADRANT OF LEFT BREAST IN FEMALE, ESTROGEN RECEPTOR POSITIVE: ICD-10-CM

## 2024-03-05 DIAGNOSIS — M25.50 POLYARTHRALGIA: ICD-10-CM

## 2024-03-05 LAB
ALBUMIN SERPL BCP-MCNC: 3.6 G/DL (ref 3.5–5.2)
ALP SERPL-CCNC: 88 U/L (ref 55–135)
ALT SERPL W/O P-5'-P-CCNC: 34 U/L (ref 10–44)
ANION GAP SERPL CALC-SCNC: 11 MMOL/L (ref 8–16)
AST SERPL-CCNC: 30 U/L (ref 10–40)
BILIRUB SERPL-MCNC: 0.3 MG/DL (ref 0.1–1)
BUN SERPL-MCNC: 11 MG/DL (ref 8–23)
CALCIUM SERPL-MCNC: 9.3 MG/DL (ref 8.7–10.5)
CCP AB SER IA-ACNC: <0.5 U/ML
CHLORIDE SERPL-SCNC: 105 MMOL/L (ref 95–110)
CO2 SERPL-SCNC: 26 MMOL/L (ref 23–29)
CREAT SERPL-MCNC: 1.1 MG/DL (ref 0.5–1.4)
CRP SERPL-MCNC: 4.2 MG/L (ref 0–8.2)
ERYTHROCYTE [DISTWIDTH] IN BLOOD BY AUTOMATED COUNT: 12.8 % (ref 11.5–14.5)
ERYTHROCYTE [SEDIMENTATION RATE] IN BLOOD BY PHOTOMETRIC METHOD: 25 MM/HR (ref 0–36)
EST. GFR  (NO RACE VARIABLE): 56.8 ML/MIN/1.73 M^2
GLUCOSE SERPL-MCNC: 144 MG/DL (ref 70–110)
HCT VFR BLD AUTO: 40.9 % (ref 37–48.5)
HGB BLD-MCNC: 13 G/DL (ref 12–16)
IMM GRANULOCYTES # BLD AUTO: 0.02 K/UL (ref 0–0.04)
MCH RBC QN AUTO: 28.7 PG (ref 27–31)
MCHC RBC AUTO-ENTMCNC: 31.8 G/DL (ref 32–36)
MCV RBC AUTO: 90 FL (ref 82–98)
NEUTROPHILS # BLD AUTO: 5.3 K/UL (ref 1.8–7.7)
PLATELET # BLD AUTO: 288 K/UL (ref 150–450)
PMV BLD AUTO: 10.7 FL (ref 9.2–12.9)
POTASSIUM SERPL-SCNC: 3.6 MMOL/L (ref 3.5–5.1)
PROT SERPL-MCNC: 7.3 G/DL (ref 6–8.4)
RBC # BLD AUTO: 4.53 M/UL (ref 4–5.4)
RHEUMATOID FACT SERPL-ACNC: 16 IU/ML (ref 0–15)
SODIUM SERPL-SCNC: 142 MMOL/L (ref 136–145)
URATE SERPL-MCNC: 7.8 MG/DL (ref 2.4–5.7)
WBC # BLD AUTO: 7.78 K/UL (ref 3.9–12.7)

## 2024-03-05 PROCEDURE — 86431 RHEUMATOID FACTOR QUANT: CPT | Performed by: INTERNAL MEDICINE

## 2024-03-05 PROCEDURE — 84550 ASSAY OF BLOOD/URIC ACID: CPT | Performed by: INTERNAL MEDICINE

## 2024-03-05 PROCEDURE — 1160F RVW MEDS BY RX/DR IN RCRD: CPT | Mod: CPTII,S$GLB,, | Performed by: INTERNAL MEDICINE

## 2024-03-05 PROCEDURE — 86200 CCP ANTIBODY: CPT | Performed by: INTERNAL MEDICINE

## 2024-03-05 PROCEDURE — 99999 PR PBB SHADOW E&M-EST. PATIENT-LVL III: CPT | Mod: PBBFAC,,, | Performed by: INTERNAL MEDICINE

## 2024-03-05 PROCEDURE — 99214 OFFICE O/P EST MOD 30 MIN: CPT | Mod: S$GLB,,, | Performed by: INTERNAL MEDICINE

## 2024-03-05 PROCEDURE — 3075F SYST BP GE 130 - 139MM HG: CPT | Mod: CPTII,S$GLB,, | Performed by: INTERNAL MEDICINE

## 2024-03-05 PROCEDURE — 3078F DIAST BP <80 MM HG: CPT | Mod: CPTII,S$GLB,, | Performed by: INTERNAL MEDICINE

## 2024-03-05 PROCEDURE — 4010F ACE/ARB THERAPY RXD/TAKEN: CPT | Mod: CPTII,S$GLB,, | Performed by: INTERNAL MEDICINE

## 2024-03-05 PROCEDURE — 99999 PR PBB SHADOW E&M-EST. PATIENT-LVL V: CPT | Mod: PBBFAC,,, | Performed by: INTERNAL MEDICINE

## 2024-03-05 PROCEDURE — 1159F MED LIST DOCD IN RCRD: CPT | Mod: CPTII,S$GLB,, | Performed by: INTERNAL MEDICINE

## 2024-03-05 PROCEDURE — 3008F BODY MASS INDEX DOCD: CPT | Mod: CPTII,S$GLB,, | Performed by: INTERNAL MEDICINE

## 2024-03-05 PROCEDURE — 85652 RBC SED RATE AUTOMATED: CPT | Performed by: INTERNAL MEDICINE

## 2024-03-05 PROCEDURE — 99215 OFFICE O/P EST HI 40 MIN: CPT | Mod: S$GLB,,, | Performed by: INTERNAL MEDICINE

## 2024-03-05 PROCEDURE — 36415 COLL VENOUS BLD VENIPUNCTURE: CPT | Performed by: INTERNAL MEDICINE

## 2024-03-05 PROCEDURE — 3077F SYST BP >= 140 MM HG: CPT | Mod: CPTII,S$GLB,, | Performed by: INTERNAL MEDICINE

## 2024-03-05 PROCEDURE — 80053 COMPREHEN METABOLIC PANEL: CPT | Performed by: NURSE PRACTITIONER

## 2024-03-05 PROCEDURE — 86140 C-REACTIVE PROTEIN: CPT | Performed by: INTERNAL MEDICINE

## 2024-03-05 PROCEDURE — 85027 COMPLETE CBC AUTOMATED: CPT | Performed by: NURSE PRACTITIONER

## 2024-03-05 RX ORDER — METHYLPREDNISOLONE 4 MG/1
TABLET ORAL
Qty: 21 EACH | Refills: 0 | Status: SHIPPED | OUTPATIENT
Start: 2024-03-05 | End: 2024-03-26

## 2024-03-05 NOTE — PROGRESS NOTES
3/4/2024    11:54 AM   Rapid3 Question Responses and Scores   MDHAQ Score 0.4   Psychologic Score 1.1   Pain Score 4   When you awakened in the morning OVER THE LAST WEEK, did you feel stiff? Yes   If Yes, please indicate the number of hours until you are as limber as you will be for the day 1   Fatigue Score 2   Global Health Score 3   RAPID3 Score 2.78     Answers submitted by the patient for this visit:  Rheumatology Questionnaire (Submitted on 3/4/2024)  fever: No  eye redness: No  mouth sores: No  headaches: No  shortness of breath: No  chest pain: No  trouble swallowing: No  diarrhea: No  constipation: No  unexpected weight change: No  genital sore: No  dysuria: No  During the last 3 days, have you had a skin rash?: No  Bruises or bleeds easily: No  cough: No

## 2024-03-05 NOTE — PROGRESS NOTES
" Patient ID: Christal Posey is a 57 y.o. female.     Chief Complaint: Follow-up     HPI::   Initial history: 55 yo F with PMH Migraines, IBS, GERD, breast CA s/p lumpectomy 6/2016 seen in consult for joint pain and swelling with positive RF. She states that her hand pain began about a years ago. It is mostly in the PIPs and she does sometimes report some swelling. She feels stiff for about 1 hour in the AM. She also has pain in the joints in her feet for about the last year as well. She has some knee pain and "cracking" without swelling that is more chronic. She does not sleep well and is fatigued. She has had a dry cough for about a year as well, but no other extra-articular manifestations. She was previously on Premarin that was stopped when the breast CA was found and thinks her symptoms are worse since stopping it. She uses Aleve which helps her pain as does ibuprofen though not quite as much, tylenol does not help. She has never smoked. Her paternal uncle has "arthritis." She took prednisone once is the past for sciatica and it made her "sick" where she felt very hot and just "not good."         Interval history:   She was diagnosed with recurrence of left sided breast cancer now s/p bilateral mastectomy in October 2022.  She stopped meloxicam since aleve works better for her. She takes 2 aleve a day a few times a week,  She has pain in neck and lower back. She has pain in both sides, worse on the left.  Reports pain in elbows on occasion. Sometimes, she notices mild swelling in ankles.  She has pain in left mtp.   Pain level is 4/10, non-radiating, and aching.     Review of Systems   Constitutional: Negative for activity change, chills, fatigue and fever.   HENT: Negative for mouth sores and trouble swallowing.    Eyes: Negative for pain and redness.   Respiratory: Negative for cough, chest tightness and shortness of breath.    Cardiovascular: Negative for chest pain.   Gastrointestinal: Negative for abdominal " pain, diarrhea and nausea.   Genitourinary: Negative for dysuria.   Musculoskeletal: Positive for arthralgias and neck stiffness. Negative for joint swelling, myalgias and neck pain.   Skin: Negative for color change and rash.   Neurological: Negative for weakness and numbness.   Hematological: Negative for adenopathy.   Psychiatric/Behavioral: Negative for sleep disturbance.          Objective:           Physical Exam   Constitutional: She is oriented to person, place, and time and well-developed, well-nourished, and in no distress. No distress.   HENT:   Head: Normocephalic and atraumatic.   Mouth/Throat: No oropharyngeal exudate.   Eyes: Conjunctivae are normal. No scleral icterus.   Neck: Normal range of motion. Neck supple. No thyromegaly present.   Cardiovascular: Normal rate, regular rhythm and normal heart sounds.    Pulmonary/Chest: Effort normal and breath sounds normal.   Abdominal: Soft. There is no tenderness.         Right Side Rheumatological Exam     Examination finds the shoulder, elbow, wrist, knee, 1st PIP, 1st MCP, 2nd PIP, 2nd MCP, 3rd PIP, 3rd MCP, 4th PIP, 4th MCP, 5th PIP and 5th MCP normal.     Left Side Rheumatological Exam     Examination finds the shoulder, elbow, wrist, knee, 1st PIP, 1st MCP, 2nd PIP, 2nd MCP, 3rd PIP, 3rd MCP, 4th PIP, 4th MCP, 5th PIP and 5th MCP normal.       Lymphadenopathy:     She has no cervical adenopathy.   Neurological: She is alert and oriented to person, place, and time.   Skin: Skin is warm and dry. No rash noted.   Musculoskeletal: Normal range of motion. She exhibits no edema or tenderness.        labs:  Reviewed;  Tammy-negative  Ccp-negative  RF-49    Arthritis survey (2017): I personally reviewed;  No significant detrimental interval change since the examination of 9/26/16 is appreciated    Assessment:    63 yo old female with PMH of hyperlipidemia, Migraines, left breast cancer status post lumpectomy (june 2016), HTN,    here for evaluation of joint  pain and +RF. She was diagnosed with recurrence of left sided breast cancer now s/p bilateral mastectomy in October 2022.      Plan:     #+RF:- I told her that +RF does not indicate RA but we will continue to monitor her.  Xrays and exam not suggestive of RA but more of DJD.    #Myalgias:    -continue baclofen 10mg qhs prn for neck stiffness  Continue voltaren gel QID PRN      #obesity: encourage weight loss     rtc in 8   months    30 * minutes of total time spent on the encounter, which includes face to face time and non-face to face time preparing to see the patient (eg, review of tests), Obtaining and/or reviewing separately obtained history, Documenting clinical information in the electronic or other health record, Independently interpreting results (not separately reported) and communicating results to the patient/family/caregiver, or Care coordination (not separately reported).

## 2024-03-05 NOTE — PROGRESS NOTES
" Patient ID: Christal Posey is a 57 y.o. female.     Chief Complaint: Follow-up     HPI::   Initial history: 57 yo F with PMH Migraines, IBS, GERD, breast CA s/p lumpectomy 6/2016 seen in consult for joint pain and swelling with positive RF. She states that her hand pain began about a years ago. It is mostly in the PIPs and she does sometimes report some swelling. She feels stiff for about 1 hour in the AM. She also has pain in the joints in her feet for about the last year as well. She has some knee pain and "cracking" without swelling that is more chronic. She does not sleep well and is fatigued. She has had a dry cough for about a year as well, but no other extra-articular manifestations. She was previously on Premarin that was stopped when the breast CA was found and thinks her symptoms are worse since stopping it. She uses Aleve which helps her pain as does ibuprofen though not quite as much, tylenol does not help. She has never smoked. Her paternal uncle has "arthritis." She took prednisone once is the past for sciatica and it made her "sick" where she felt very hot and just "not good."         Interval history:  she developed rash on tamoxifen.   She stopped meloxicam since aleve works better for her. She takes 2 aleve a day a few times a week,  She has pain in neck and lower back. She has pain in both sides, worse on the left.  Reports pain in right shoulder on occasion. Right now, she has pain in left thumb.She has pain in left mtp.   Pain level is 4/10, non-radiating, and aching.     Review of Systems   Constitutional: Negative for activity change, chills, fatigue and fever.   HENT: Negative for mouth sores and trouble swallowing.    Eyes: Negative for pain and redness.   Respiratory: Negative for cough, chest tightness and shortness of breath.    Cardiovascular: Negative for chest pain.   Gastrointestinal: Negative for abdominal pain, diarrhea and nausea.   Genitourinary: Negative for dysuria. "   Musculoskeletal: Positive for arthralgias and neck stiffness. Negative for joint swelling, myalgias and neck pain.   Skin: Negative for color change and rash.   Neurological: Negative for weakness and numbness.   Hematological: Negative for adenopathy.   Psychiatric/Behavioral: Negative for sleep disturbance.          Objective:           Physical Exam   Constitutional: She is oriented to person, place, and time and well-developed, well-nourished, and in no distress. No distress.   HENT:   Head: Normocephalic and atraumatic.   Mouth/Throat: No oropharyngeal exudate.   Eyes: Conjunctivae are normal. No scleral icterus.   Neck: Normal range of motion. Neck supple. No thyromegaly present.   Cardiovascular: Normal rate, regular rhythm and normal heart sounds.    Pulmonary/Chest: Effort normal and breath sounds normal.   Abdominal: Soft. There is no tenderness.         Right Side Rheumatological Exam     Examination finds the shoulder, elbow, wrist, knee, 1st PIP, 1st MCP, 2nd PIP, 2nd MCP, 3rd PIP, 3rd MCP, 4th PIP, 4th MCP, 5th PIP and 5th MCP normal.     Left Side Rheumatological Exam     Examination finds the shoulder, elbow, wrist, knee, 1st PIP, 1st MCP, 2nd PIP, 2nd MCP, 3rd PIP, 3rd MCP, 4th PIP, 4th MCP, 5th PIP and 5th MCP normal.       Lymphadenopathy:     She has no cervical adenopathy.   Neurological: She is alert and oriented to person, place, and time.   Skin: Skin is warm and dry. No rash noted.   Musculoskeletal: Normal range of motion. She exhibits no edema or tenderness.        labs:  Reviewed;  Tammy-negative  Ccp-negative  RF-49    Arthritis survey (2017): I personally reviewed;  No significant detrimental interval change since the examination of 9/26/16 is appreciated    Assessment:    62 yo old female with PMH of hyperlipidemia, Migraines, left breast cancer status post lumpectomy (june 2016), HTN,    here for evaluation of joint pain and +RF. She was diagnosed with recurrence of left sided breast  cancer now s/p bilateral mastectomy in October 2022.  I suspect patient has early RA as she is having more episodes of hand stiffness.I told her that +RF does not indicate RA but we will continue to monitor her.      Plan:     -continue baclofen 10mg qhs prn for neck stiffness  Continue voltaren gel QID PRN  Labs  Consider MRI of left foot and right hand  Encouraged weight loss  -discussed diet that is low in fat and carbohydrates.      #obesity: encourage weight loss     rtc in 8   months    30 * minutes of total time spent on the encounter, which includes face to face time and non-face to face time preparing to see the patient (eg, review of tests), Obtaining and/or reviewing separately obtained history, Documenting clinical information in the electronic or other health record, Independently interpreting results (not separately reported) and communicating results to the patient/family/caregiver, or Care coordination (not separately reported).

## 2024-03-05 NOTE — PROGRESS NOTES
Subjective     Patient ID: Christal Posey is a 64 y.o. female.    Chief Complaint: Malignant neoplasm of lower-inner quadrant of left breast i    HPI    Unable to tolerate adjuvant endocrine therapy  7-8 months post Tamoxifen  Started in scalp and then later back and extremities  Resolved with cessation of Tamoxifen    Carpal tunnel after starting aromatase inhibitor  Stopped post 2 weeks  All symptosm resolved within days    Reports feels fine now  Joints back to normal  No hot flashes  No new pain-- chronic arthritis    Completed PT with improvement after breast surgery but not with above     Oncology History:  - 8/12/2022 Breast MRI:  Findings:  Left  Numerous scattered foci and small masses are present, most of which appear similar to eachother.    There is an 8 mm x 7 mm x 7 mm irregularly shaped, heterogeneous mass seen in the left breast at 6 o'clock, 2.4 cm from the nipple and 8 cm from the chest wall. Delayed phase is washout.   There is a 9 mm x 7 mm x 7 mm irregularly shaped, homogeneous mass with irregular margins seen in the upper inner quadrant of the right breast, 2.8 cm from the nipple and 6.7 cm from the chest wall. Delayed phase is plateau. This is best seen on Series 94123: Image 50.  This is approximately 3 cm anterior and superior to the signal void related to the 9 o'clock biopsy marker.   There is no suspicious enhancement associated with either biopsy marker.   Right  Numerous scattered foci and small masses are present, most of which appear similar to eachother.    There is a 6 mm oval, homogeneous mass with circumscribed margins seen in the lower central region of the right breast, 2.3 cm from the nipple and 9.5 cm from the chest wall. Delayed phase is washout. This is best seen in Series 66435: Image 70.   There is no internal mammary or axillary adenopathy.  Impression:  Left  Mass: Left breast 9 mm x 7 mm x 7 mm mass at the upper inner position. Assessment: 4 - Suspicious finding. Biopsy  is recommended.   Mass: Left breast 8 mm x 7 mm x 7 mm mass at the 6 o'clock position. Assessment: 4 - Suspicious finding. Biopsy is recommended.   There is no suspicious enhancement associated with either biopsy marker.   Right  Mass: Right breast  6 mm mass at the lower central position. Assessment: 4 - Suspicious finding. Biopsy is recommended.   BI-RADS Category:   Overall: 4 - Suspicious     - 9/28/2022 MRI guided breast biopsy:  BREAST, RIGHT, LOWER CENTRAL MASS, BIOPSY:   - Intraductal papilloma, benign.   - Benign breast tissue with predominantly fatty stroma and blood clot.   - Microcalcifications: Focally seen in association with benign breast ducts.   - No atypia or malignancy.   - Additional deeper sections have been examined.      - 10/24/2022 Bilateral Mastectomy:  1. BREAST, RIGHT, MASTECTOMY:   - Negative for malignancy.   - Atypical ductal hyperplasia (ADH), focal.   - No residual intraductal papilloma present.   - Previous biopsy site changes and biopsy clip.   - Benign subareolar nipple ducts.   - One benign intramammary lymph node.   2. AXILLARY SENTINEL LYMPH NODE, LEFT, # 1, HOT BLUE 1451, EXCISION:   - One benign axillary sentinel lymph node, negative for metastatic carcinoma   (0/1).   - Immunohistochemical stains for CK WSK, Cam 5.2, and CK AE/1/AE3 are   negative, supporting the diagnosis.   3. BREAST, LEFT, NIPPLE SPARING MASTECTOMY:   - Invasive ductal carcinoma of breast with extensive intraductal component,   see Tumor Synoptic.   - Size of invasive carcinoma: 9 MM (measured histologically on slide 3L).   - Trinity Histologic Score: Grade 1 of 3.                     Tubule Formation:  2                     Nuclear Pleomorphism:  2                     Mitotic Activity:  1   - Associated (DCIS), intermediate nuclear grade, cribriform type, with   central necrosis.   - Size of DCIS: at least 10 MM.   - No lymphovascular invasion.   - Margins:   ·tab Invasive carcinoma and DCIS are  greater than 10 MM from all margins.   - Benign subareolar nipple ducts.   - Both biopsy clips are identified and corresponding areas sampled.   - Microcalcifications: Seen in association with benign breast ducts.   - Pathologic staging: pT1b (sn)N0   4. BREAST, LEFT, ADDITIONAL INFERIOR LATERAL MARGIN, NIPPLE SPARING   MASTECTOMY:   - Negative for atypia or malignancy.   - Benign breast tissue with predominantly fatty stroma.   5. BREAST, LEFT, NEW SUPERIOR MARGIN, NIPPLE SPARING MASTECTOMY:   - Negative for atypia or malignancy.   - Benign breast tissue with predominantly fatty stroma.       Previously, required implants out due to infection and contracture  - 11/28/2022   Diagnosis:  Preoperative diagnosis cellulitis and capsular contracture of the right breast  Procedure performed  1. Bilateral removal of implants (tissue expanders)   2. Total bilateral capsulectomy  3. Wide excision soft tissue of right axillary region measuring 16 cm x 6 cm with layered closure final incision 16 cm  4. Wide excision soft tissue of the left axillary region measuring 16 cm x 6 cm with layered closure final incision 16 cm  Reports concerned as asked for nipples to be removed as she did not plan for additional reconstruction and this was not done     Prior history of DCIS:  Monitored by surveillance- opted out of adjuvant endocrine with low volume disease  DCIS History:  5/16/16 Screening mammography:  Calcifications in the left breast require additional evaluation.    ACR BI-RADS Category 0: Incomplete: Need Additional Imaging Evaluation  - 5/17/16 Diagnostic mammogram  Calcifications in the left breast are suspicious.  Histology using core biopsy  is recommended.  ACR BI-RADS Category 4: Suspicious Abnormality  - 5/20/16 biopsy  1, 2. LEFT BREAST, WITH CALCIFICATIONS AND WITHOUT CALCIFICATIONS, UPPER INNER QUADRANT  (NEEDLE BIOPSY):  -Low-grade ductal carcinoma in situ (DCIS)  -Nuclear grade 1 out of 3  -Cribriform  pattern  -Associated with microcalcifications  %, % positive  - 6/10/2016 Lumpectomy  Pathology:  Procedure - Excision with wire guided localization  Lymph node sampling - Not sampled  Specimen laterality - Left  Tumor site - Not specified  Size of DCIS - 2mm, including findings from the biopsy report  Histologic type - Ductal carcinoma in situ  Architectural pattern - Solid and cribriforming  Nuclear grade - Low  Necrosis - Absent  Margins - Negative, inferior is 2mm  Pathologc staging - p Tis Nx Mx  Hormone receptors (See Breast Biopsy report; MS16 - 69971)  Estrogen receptor - Positive, strong, 100%  Progesterone receptors - Positive, strong, 100%     SUPPLEMENTAL #1:   HER2 AMPLIFICATION ASSOCIATED WITH BREAST CANCER, FISH, TISSUE:   Result Summary   Negative         Started tamoxifen 11/18/2022     reported bone pain.   5/17/2023 Bone scan:  FINDINGS:  No focal pathologic uptake in the spine. Questionable mild focal uptake in a right lower lateral rib.  Uptake at the knees and left foot is likely degenerative.  There is physiologic distribution of the radiopharmaceutical throughout the skeleton.  There is normal uptake in the genitourinary system and soft tissues.  Impression:  Questionable mild focal uptake in a right lower lateral rib.  Consider CT of the chest to evaluate for fracture or metastatic lesion.     This result led to ordering of CT scan:     5/26/2023 CT Chest:  Impression:  1. There is been an interval bilateral mastectomy.  There is soft tissue thickening in the mastectomy bed.  On the right side, there is a finger-like projection of soft tissue up to 16 mm in length.  This may merely represent scarring.  However, comparison should be made with other CTs of the chest which may exist at other institutions.  If these do not exist, follow-up CT enhance with contrast recommended.  2. There are few stable left upper lobe pulmonary micro nodules (axial series 5, images 106, 175, and  212).  3. A rib fracture was not identified.  Additionally, there is no specific CT evidence of osteolytic or osteoblastic bone disease.  However, if there is a continued suspicion for metastatic bone disease, consider nuclear medicine scan.     Stopped tamoxifen 12/2023 due to rash        Additional PMH:  Bilateral cataract surgery     FH:  No new cancers  Review of Systems   Constitutional:  Positive for fatigue (remains busy). Negative for activity change, appetite change and unexpected weight change.   HENT:  Negative for mouth sores.    Eyes:  Positive for visual disturbance.   Respiratory:  Negative for cough and shortness of breath.    Cardiovascular:  Negative for chest pain.   Gastrointestinal:  Negative for abdominal pain and diarrhea.   Genitourinary:  Negative for frequency.   Musculoskeletal:  Negative for back pain.   Integumentary:  Negative for rash.   Neurological:  Negative for headaches.   Hematological:  Negative for adenopathy.   Psychiatric/Behavioral:  The patient is not nervous/anxious.           Objective     Physical Exam  Vitals and nursing note reviewed.   Constitutional:       General: She is not in acute distress.     Appearance: Normal appearance. She is well-developed and normal weight. She is not ill-appearing.      Comments: Presents alone  Very pleasant   HENT:      Head: Normocephalic and atraumatic.   Eyes:      General: Lids are normal. No scleral icterus.     Extraocular Movements: Extraocular movements intact.      Conjunctiva/sclera: Conjunctivae normal.      Pupils: Pupils are equal, round, and reactive to light.   Neck:      Thyroid: No thyromegaly.      Vascular: No JVD.      Trachea: Trachea normal.   Cardiovascular:      Rate and Rhythm: Normal rate and regular rhythm.      Heart sounds: Normal heart sounds. No murmur heard.     No friction rub. No gallop.   Pulmonary:      Effort: Pulmonary effort is normal. No respiratory distress.      Breath sounds: Normal breath  sounds. No wheezing, rhonchi or rales.   Chest:      Chest wall: No tenderness.          Comments: Bilateral mastectomies. Nipples remain.   Tenderness at marked area above along incision.   Incisions healed. No LAD  Abdominal:      General: Bowel sounds are normal. There is no distension.      Palpations: Abdomen is soft. There is no mass.      Tenderness: There is no abdominal tenderness. There is no guarding or rebound.      Comments: No organomegaly.    Musculoskeletal:         General: Normal range of motion.      Cervical back: Normal range of motion and neck supple.      Comments: No spinal or paraspinal tenderness.    Lymphadenopathy:      Head:      Right side of head: No submental or submandibular adenopathy.      Left side of head: No submental or submandibular adenopathy.      Cervical: No cervical adenopathy.      Upper Body:      Right upper body: No supraclavicular or axillary adenopathy.      Left upper body: No supraclavicular or axillary adenopathy.   Skin:     General: Skin is warm and dry.      Capillary Refill: Capillary refill takes less than 2 seconds.      Coloration: Skin is not jaundiced.      Findings: No bruising or rash.      Nails: There is no clubbing.   Neurological:      General: No focal deficit present.      Mental Status: She is alert and oriented to person, place, and time. Mental status is at baseline.      Sensory: No sensory deficit.      Motor: No weakness.      Gait: Gait normal.   Psychiatric:         Mood and Affect: Mood normal.         Speech: Speech normal.         Behavior: Behavior normal.         Thought Content: Thought content normal.         Judgment: Judgment normal.     Labs- reviewed     Assessment and Plan     1. Ductal carcinoma in situ (DCIS) of left breast    2. Malignant neoplasm of lower-inner quadrant of left breast in female, estrogen receptor positive      Opts our of endocrine therapy  RTC 3 months  Knows to call for any issues     Route Chart for  Scheduling    Med Onc Chart Routing      Follow up with physician    Follow up with [facility] 3 months.   Infusion scheduling note    Injection scheduling note    Labs    Imaging    Pharmacy appointment    Other referrals

## 2024-03-14 ENCOUNTER — PATIENT MESSAGE (OUTPATIENT)
Dept: INTERNAL MEDICINE | Facility: CLINIC | Age: 64
End: 2024-03-14
Payer: COMMERCIAL

## 2024-04-03 ENCOUNTER — OFFICE VISIT (OUTPATIENT)
Dept: OBSTETRICS AND GYNECOLOGY | Facility: CLINIC | Age: 64
End: 2024-04-03
Attending: OBSTETRICS & GYNECOLOGY
Payer: COMMERCIAL

## 2024-04-03 VITALS
HEART RATE: 80 BPM | WEIGHT: 217 LBS | HEIGHT: 66 IN | SYSTOLIC BLOOD PRESSURE: 160 MMHG | DIASTOLIC BLOOD PRESSURE: 86 MMHG | BODY MASS INDEX: 34.87 KG/M2

## 2024-04-03 DIAGNOSIS — R10.31 RIGHT LOWER QUADRANT PAIN: Primary | ICD-10-CM

## 2024-04-03 DIAGNOSIS — E66.01 SEVERE OBESITY (BMI 35.0-39.9) WITH COMORBIDITY: ICD-10-CM

## 2024-04-03 DIAGNOSIS — D05.12 DUCTAL CARCINOMA IN SITU (DCIS) OF LEFT BREAST: Chronic | ICD-10-CM

## 2024-04-03 DIAGNOSIS — Z01.419 ENCOUNTER FOR GYNECOLOGICAL EXAMINATION WITHOUT ABNORMAL FINDING: ICD-10-CM

## 2024-04-03 PROCEDURE — 99999 PR PBB SHADOW E&M-EST. PATIENT-LVL IV: CPT | Mod: PBBFAC,,, | Performed by: OBSTETRICS & GYNECOLOGY

## 2024-04-03 PROCEDURE — 4010F ACE/ARB THERAPY RXD/TAKEN: CPT | Mod: CPTII,S$GLB,, | Performed by: OBSTETRICS & GYNECOLOGY

## 2024-04-03 PROCEDURE — 3008F BODY MASS INDEX DOCD: CPT | Mod: CPTII,S$GLB,, | Performed by: OBSTETRICS & GYNECOLOGY

## 2024-04-03 PROCEDURE — 3077F SYST BP >= 140 MM HG: CPT | Mod: CPTII,S$GLB,, | Performed by: OBSTETRICS & GYNECOLOGY

## 2024-04-03 PROCEDURE — 1159F MED LIST DOCD IN RCRD: CPT | Mod: CPTII,S$GLB,, | Performed by: OBSTETRICS & GYNECOLOGY

## 2024-04-03 PROCEDURE — 99396 PREV VISIT EST AGE 40-64: CPT | Mod: S$GLB,,, | Performed by: OBSTETRICS & GYNECOLOGY

## 2024-04-03 PROCEDURE — 3079F DIAST BP 80-89 MM HG: CPT | Mod: CPTII,S$GLB,, | Performed by: OBSTETRICS & GYNECOLOGY

## 2024-04-03 NOTE — PROGRESS NOTES
SUBJECTIVE:   64 y.o. female   for annual routine checkup. No LMP recorded (lmp unknown). Patient has had a hysterectomy..  She recently stopped tamoxifen due to rash.  She tried aromatase inhibitor but could not tolerate due to carpal tunnel.  She desires weight loss.  She admits that her diet could be better.  Eats a lot of junk food.  She does have a stationary bike but does not use it regularly.  She is lost weight before on weight watchers.    She reports right lower quadrant pain that comes and goes.  She reports a is not very consistent.  Might be gone for 3 weeks and return.  No alleviating and no aggravating factors    Past Medical History:   Diagnosis Date    Allergy     BRCA1 negative     BRCA2 negative     Breast cancer 2016    Left breast low grade DCIS, ER/ID positive    Colon polyp 10/31/2018    Ischemic colitis suspected as well ( area biopsied).    GERD (gastroesophageal reflux disease)     Hiatal hernia     Hyperlipidemia     Irritable bowel syndrome     Migraine headache     PONV (postoperative nausea and vomiting)     RA (rheumatoid arthritis)     Squamous cell carcinoma excised 14    in situ, R forearm     Past Surgical History:   Procedure Laterality Date    AUGMENTATION OF BREAST  10/24/22    Implants failed and removed     BREAST BIOPSY Left 2016    DCIS    BREAST CAPSULECTOMY Bilateral 2022    Procedure: CAPSULECTOMY, BREAST;  Surgeon: Garland Gray MD;  Location: [facility] OR [address] [place] [address];  Service: Plastics;  Laterality: Bilateral;    BREAST LUMPECTOMY      BREAST RECONSTRUCTION  10/24/22    Breast implants failed and removed    ECTOPIC PREGNANCY SURGERY Right     HYSTERECTOMY      ovaries left intact    INJECTION FOR SENTINEL NODE IDENTIFICATION Left 10/24/2022    Procedure: INJECTION, FOR SENTINEL NODE IDENTIFICATION LEFT;  Surgeon: JOVANNI Jama MD;  Location: [facility] OR [address] [place] [address];  Service: General;  Laterality: Left;    INSERTION OF BREAST  IMPLANT Bilateral 10/24/2022    Procedure: INSERTION, BREAST IMPLANT BILATERAL;  Surgeon: Garland Gray MD;  [facility];  Service: Plastics;  Laterality: Bilateral;    MASTECTOMY Bilateral 10/24/2022    Procedure: MASTECTOMY BILATERAL;  Surgeon: JOVANNI Jama MD;  [facility];  Service: General;  Laterality: Bilateral;    REMOVAL OF BREAST IMPLANT Bilateral 11/28/2022    Procedure: REMOVAL, IMPLANT, BREAST;  Surgeon: Garland Gray MD;  [facility];  Service: Plastics;  Laterality: Bilateral;    removal of ovarian cyst Right 10/1992    ovary left intact    SENTINEL LYMPH NODE BIOPSY Left 10/24/2022    Procedure: BIOPSY, LYMPH NODE, SENTINEL LEFT;  Surgeon: JOVANNI Jama MD;  [facility];  Service: General;  Laterality: Left;    TUBAL LIGATION      tubal reversal  07/1991     Social History     Socioeconomic History    Marital status:      Spouse name: Parrish    Number of children: 2   Tobacco Use    Smoking status: Never     Passive exposure: Never    Smokeless tobacco: Never   Substance and Sexual Activity    Alcohol use: No     Alcohol/week: 0.0 standard drinks of alcohol    Drug use: No    Sexual activity: Yes     Partners: Male     Birth control/protection: Surgical, None     Comment:  to Parrish    Other Topics Concern    Are you pregnant or think you may be? No    Breast-feeding No     Social Determinants of Health     Financial Resource Strain: Patient Declined (11/27/2023)    Overall Financial Resource Strain (CARDIA)     Difficulty of Paying Living Expenses: Patient declined   Food Insecurity: Patient Declined (11/27/2023)    Hunger Vital Sign     Worried About Running Out of Food in the Last Year: Patient declined     Ran Out of Food in the Last Year: Patient declined   Transportation Needs: Patient Declined (11/27/2023)    PRAPARE - Transportation     Lack of Transportation (Medical): Patient declined     Lack of  Transportation (Non-Medical): Patient declined   Physical Activity: Unknown (2023)    Exercise Vital Sign     Days of Exercise per Week: Patient declined   Stress: Patient Declined (2023)    [employer] - Occupational Stress Questionnaire     Feeling of Stress : Patient declined   Social Connections: Unknown (2023)    Social Connection and Isolation Panel [NHANES]     Frequency of Communication with Friends and Family: Patient declined     Frequency of Social Gatherings with Friends and Family: Patient declined     Active Member of Clubs or Organizations: Patient declined     Attends Club or Organization Meetings: Patient declined     Marital Status:    Housing Stability: Unknown (2023)    Housing Stability Vital Sign     Unable to Pay for Housing in the Last Year: Patient refused     Unstable Housing in the Last Year: Patient refused     Family History   Problem Relation Age of Onset    Migraines Maternal Grandmother     Seizures Maternal Grandmother     Cancer Father 77        Lung cancer    Migraines Brother     Seizures Brother     Migraines Sister     Breast cancer Sister 65    Cancer Maternal Uncle         prostate ca    Cancer Paternal Uncle         lung    Melanoma Neg Hx     Colon cancer Neg Hx     Ovarian cancer Neg Hx     Uterine cancer Neg Hx     Cervical cancer Neg Hx      OB History    Para Term  AB Living   3 2 2   1 2   SAB IAB Ectopic Multiple Live Births       1   2      # Outcome Date GA Lbr Garrett/2nd Weight Sex Delivery Anes PTL Lv   3 Ectopic     U       2 Term     F Vag-Spont   NILTON   1 Term     F Vag-Spont   NILTON           Current Outpatient Medications   Medication Sig Dispense Refill    albuterol (VENTOLIN HFA) 90 mcg/actuation inhaler Inhale 2 puffs into the lungs every 6 (six) hours as needed for Wheezing. Rescue 18 g 1    ALPRAZolam (XANAX) 0.25 MG tablet Take 1 tablet (0.25 mg total) by mouth nightly as needed for  Insomnia. 30 tablet 0    baclofen (LIORESAL) 10 MG tablet Take 1 tablet (10 mg total) by mouth 3 (three) times daily as needed. 90 tablet 4    butalbital-acetaminophen-caffeine -40 mg (FIORICET, ESGIC) -40 mg per tablet Take 1 tablet by mouth every 6 to 8 hours as needed. 60 tablet 0    cholestyramine (QUESTRAN) 4 gram packet Take 1 packet by mouth once daily.      diclofenac sodium (VOLTAREN) 1 % Gel APPLY 2 GRAMS TOPICALLY 3 (THREE) TIMES DAILY. 100 g 3    gabapentin (NEURONTIN) 800 MG tablet Take 800 mg by mouth 3 (three) times daily.      lansoprazole (PREVACID) 30 MG capsule Take 30 mg by mouth every morning. 1 Capsule, Delayed Release(E.C.) Oral Every day      losartan (COZAAR) 25 MG tablet TAKE 1 TABLET BY MOUTH EVERY DAY 90 tablet 3    multivitamin (THERAGRAN) per tablet Take 1 tablet by mouth once daily.      ondansetron (ZOFRAN) 4 MG tablet Take 1 tablet (4 mg total) by mouth every 8 (eight) hours as needed for Nausea. 1 Tablet Oral Every 6 hours 12 tablet 12    sumatriptan (IMITREX) 20 mg/actuation nasal spray USE 1 SPRAY IN EACH NOSTRIL AT ONSET OF HEADACHE. MAY REPEAT ONCE IN 2 HOURS IF NO RELIEF. NO MORE THAN 2 SPRAYS PER 24 HOURS 6 each 12    triamcinolone acetonide 0.025% (KENALOG) 0.025 % Oint Apply topically 2 (two) times daily. x 1-2 wks then prn flares only 80 g 2    vitamin D 1000 units Tab Take 185 mg by mouth every morning.        No current facility-administered medications for this visit.     Allergies: Pravastatin, Shellfish containing products, Amitriptyline, Codeine, Doxycycline, Erythromycin, Iodine, Macrobid  [nitrofurantoin monohyd/m-cryst], and Verapamil     The 10-year ASCVD risk score (Christal COTTO, et al., 2019) is: 9.2%    Values used to calculate the score:      Age: 64 years      Sex: Female      Is Non- : No      Diabetic: No      Tobacco smoker: No      Systolic Blood Pressure: 160 mmHg      Is BP treated: Yes      HDL Cholesterol: 47 mg/dL       Total Cholesterol: 200 mg/dL      ROS:  Constitutional: no weight loss, +weight gain, fever, fatigue  Eyes:  No vision changes, glasses/contacts  ENT/Mouth: No ulcers, sinus problems, ears ringing, headache  Cardiovascular: No inability to lie flat, chest pain, exercise intolerance, swelling, heart palpitations  Respiratory: No wheezing, coughing blood, shortness of breath, or cough  Gastrointestinal: No diarrhea, bloody stool, nausea/vomiting, constipation, gas, hemorrhoids  Genitourinary: No blood in urine, painful urination, urgency of urination, frequency of urination, incomplete emptying, incontinence, abnormal bleeding, painful periods, heavy periods, vaginal discharge, vaginal odor, painful intercourse, sexual problems, bleeding after intercourse, +pelvic pain.  Musculoskeletal: No muscle weakness  Skin/Breast: No painful breasts, nipple discharge, masses, rash, ulcers  Neurological: No passing out, seizures, numbness, headache  Endocrine: No diabetes, hypothyroid, hyperthyroid, hot flashes, hair loss, abnormal hair growth, acne  Psychiatric: No depression, crying  Hematologic: No bruises, bleeding, swollen lymph nodes, anemia.      Physical Exam:   Constitutional: She is oriented to person, place, and time. She appears well-developed and well-nourished.      Neck: No tracheal deviation present. No thyromegaly present.    Cardiovascular:       Exam reveals no edema.        Pulmonary/Chest: Effort normal. She exhibits no mass, no tenderness, no deformity and no retraction. Right breast exhibits mastectomy. Left breast exhibits mastectomy. Breasts are symmetrical.        Abdominal: Soft. She exhibits no distension and no mass. There is no abdominal tenderness. There is no rebound and no guarding. No hernia. Hernia confirmed negative in the left inguinal area.     Genitourinary: Rectum:      No external hemorrhoid.   There is no rash, tenderness or lesion on the right labia. There is no rash, tenderness or  lesion on the left labia. No no adexnal prolapse. Right adnexum displays no mass, no tenderness and no fullness. Left adnexum displays no mass, no tenderness and no fullness. No vaginal discharge, tenderness, bleeding, rectocele, cystocele or prolapse of vaginal walls in the vagina. Cervix is absent.Uterus is absent.           Musculoskeletal: Normal range of motion and moves all extremeties. No edema.       Neurological: She is alert and oriented to person, place, and time.    Skin: No rash noted. No erythema. No pallor.    Psychiatric: She has a normal mood and affect. Her behavior is normal. Judgment and thought content normal.     Bilateral mastectomy - nipples remain    ASSESSMENT:   well woman  Right lower quadrant pain  Breast cancer  Weight gain  PLAN:   Pelvic ultrasound  Counseled her on weight loss and how this can impact her overall health and decrease her risk for recurrence of breast cancer.  Counseled her to eliminate processed food and to increase her exercise.  Offered nutrition appointment she declines at this time

## 2024-04-30 ENCOUNTER — OFFICE VISIT (OUTPATIENT)
Dept: INTERNAL MEDICINE | Facility: CLINIC | Age: 64
End: 2024-04-30
Payer: COMMERCIAL

## 2024-04-30 ENCOUNTER — HOSPITAL ENCOUNTER (OUTPATIENT)
Dept: RADIOLOGY | Facility: OTHER | Age: 64
Discharge: HOME | End: 2024-04-30
Attending: INTERNAL MEDICINE
Payer: COMMERCIAL

## 2024-04-30 ENCOUNTER — PATIENT MESSAGE (OUTPATIENT)
Dept: INTERNAL MEDICINE | Facility: CLINIC | Age: 64
End: 2024-04-30
Payer: COMMERCIAL

## 2024-04-30 VITALS
WEIGHT: 214.5 LBS | DIASTOLIC BLOOD PRESSURE: 80 MMHG | BODY MASS INDEX: 34.47 KG/M2 | TEMPERATURE: 99 F | HEIGHT: 66 IN | OXYGEN SATURATION: 98 % | HEART RATE: 101 BPM | SYSTOLIC BLOOD PRESSURE: 130 MMHG

## 2024-04-30 DIAGNOSIS — R10.11 CHRONIC RUQ PAIN: ICD-10-CM

## 2024-04-30 DIAGNOSIS — M54.50 ACUTE RIGHT-SIDED LOW BACK PAIN WITHOUT SCIATICA: ICD-10-CM

## 2024-04-30 DIAGNOSIS — G56.01 CARPAL TUNNEL SYNDROME OF RIGHT WRIST: ICD-10-CM

## 2024-04-30 DIAGNOSIS — G89.29 CHRONIC RUQ PAIN: ICD-10-CM

## 2024-04-30 DIAGNOSIS — R10.31 ACUTE RIGHT LOWER QUADRANT PAIN: Primary | ICD-10-CM

## 2024-04-30 DIAGNOSIS — R10.31 ACUTE RIGHT LOWER QUADRANT PAIN: ICD-10-CM

## 2024-04-30 PROCEDURE — 76856 US EXAM PELVIC COMPLETE: CPT | Mod: TC

## 2024-04-30 PROCEDURE — 4010F ACE/ARB THERAPY RXD/TAKEN: CPT | Mod: CPTII,S$GLB,, | Performed by: INTERNAL MEDICINE

## 2024-04-30 PROCEDURE — 99999 PR PBB SHADOW E&M-EST. PATIENT-LVL V: CPT | Mod: PBBFAC,,, | Performed by: INTERNAL MEDICINE

## 2024-04-30 PROCEDURE — 76856 US EXAM PELVIC COMPLETE: CPT | Mod: 26,,, | Performed by: RADIOLOGY

## 2024-04-30 PROCEDURE — 3079F DIAST BP 80-89 MM HG: CPT | Mod: CPTII,S$GLB,, | Performed by: INTERNAL MEDICINE

## 2024-04-30 PROCEDURE — 76830 TRANSVAGINAL US NON-OB: CPT | Mod: TC

## 2024-04-30 PROCEDURE — 3075F SYST BP GE 130 - 139MM HG: CPT | Mod: CPTII,S$GLB,, | Performed by: INTERNAL MEDICINE

## 2024-04-30 PROCEDURE — 3008F BODY MASS INDEX DOCD: CPT | Mod: CPTII,S$GLB,, | Performed by: INTERNAL MEDICINE

## 2024-04-30 PROCEDURE — 99214 OFFICE O/P EST MOD 30 MIN: CPT | Mod: S$GLB,,, | Performed by: INTERNAL MEDICINE

## 2024-04-30 PROCEDURE — 76830 TRANSVAGINAL US NON-OB: CPT | Mod: 26,,, | Performed by: RADIOLOGY

## 2024-04-30 PROCEDURE — 1159F MED LIST DOCD IN RCRD: CPT | Mod: CPTII,S$GLB,, | Performed by: INTERNAL MEDICINE

## 2024-04-30 PROCEDURE — 76705 ECHO EXAM OF ABDOMEN: CPT | Mod: TC

## 2024-04-30 PROCEDURE — 76705 ECHO EXAM OF ABDOMEN: CPT | Mod: 26,,, | Performed by: RADIOLOGY

## 2024-04-30 NOTE — PROGRESS NOTES
CC:  Right upper quadrant and right lower quadrant pain     HPI:  The patient is a 64-year-old female with invasive ductal carcinoma of the left breast and atypical ductal hyperplasia of the right breast status post bilateral mastectomies, migraine headaches, rheumatoid arthritis, irritable bowel syndrome, ischemic colitis, colon polyps, hypertension and history of squamous cell cancer who presents today with complaints of right-sided abdominal discomfort.  The patient reports having pain involving the right lower rib going back over a year.  Areas sensitive to touch.  She did CT scan as well as nuclear medicine scan performed.  She was now getting some discomfort involving the right flank.  He has been a little bit more achy over the past week.  She also reports having some discomfort in the right lower quadrant with a past few weeks.  She also reports some right lower back pain status post fall.  The patient was cutting grass and tripped while walking backwards.  She landed on her rear end.  Symptoms are better but not resolved.    Answers submitted by the patient for this visit:  Abdominal Pain Questionnaire (Submitted on 4/29/2024)  Chief Complaint: Abdominal pain  Chronicity: recurrent  Onset: more than 1 month ago  Onset quality: gradual  Frequency: intermittently  Episode duration: 3 Weeks  Progression since onset: waxing and waning  Pain location: RLQ, RUQ  Pain - numeric: 2/10  Pain quality: dull  anorexia: No  arthralgias: Yes  belching: No  constipation: No  diarrhea: No  dysuria: No  fever: No  flatus: No  frequency: No  headaches: No  hematochezia: No  hematuria: No  melena: No  myalgias: Yes  nausea: No  weight loss: No  vomiting: No  Aggravated by: certain positions, palpation  Relieved by: certain positions  Diagnostic workup: CT scan, lower endoscopy  Pain severity: mild  Treatments tried: acetaminophen, antacids, proton pump inhibitors  Improvement on treatment: no relief  abdominal surgery:  Yes  colon cancer: No  Crohn's disease: No  gallstones: No  GERD: Yes  irritable bowel syndrome: Yes  kidney stones: No  pancreatitis: No  PUD: No  ulcerative colitis: No  UTI: Yes  She reports no fever chills.  Weight is stable.  She was little queasy last week.  Her last blood test showed a glucose of 144.  This was nonfasting.      Physical exam:   General appearance:  No acute distress  Pulmonary good inspiratory, expiratory breath sounds lungs are clear to auscultation.    Cardiovascular:  S1-S2 rhythm is regular.  Extremities with trace edema.  GI: Abdomen showed some right lower quadrant tenderness no guarding.  Did not appreciate any hepatosplenomegaly.  The patient did have some point tenderness over the right lower rib.    Ortho:  And L-spine exam was performed.  The patient has fairly good range of motion.  The patient is right arm was evaluated.  The patient appears to have positive Tinel sign negative Phalen.    Assessment:   1.  Right upper quadrant pain which is chronic   2. Acute right lower quadrant pain  3.  Carpal tunnel syndrome   4. Low back pain  5.  Hypertension currently stable  6.  History breast cancer status post bilateral mastectomies    Plan:  1. Will schedule an ultrasound of the abdomen as well as an ultrasound appendix  2. Will schedule a CBC, CMP, amylase lipase

## 2024-05-03 ENCOUNTER — PATIENT MESSAGE (OUTPATIENT)
Dept: INTERNAL MEDICINE | Facility: CLINIC | Age: 64
End: 2024-05-03
Payer: COMMERCIAL

## 2024-06-10 ENCOUNTER — PATIENT MESSAGE (OUTPATIENT)
Dept: INTERNAL MEDICINE | Facility: CLINIC | Age: 64
End: 2024-06-10
Payer: COMMERCIAL

## 2024-06-14 ENCOUNTER — ON-DEMAND VIRTUAL (OUTPATIENT)
Dept: URGENT CARE | Facility: CLINIC | Age: 64
End: 2024-06-14
Payer: COMMERCIAL

## 2024-06-14 DIAGNOSIS — H10.9 CONJUNCTIVITIS OF RIGHT EYE, UNSPECIFIED CONJUNCTIVITIS TYPE: Primary | ICD-10-CM

## 2024-06-14 PROCEDURE — 99213 OFFICE O/P EST LOW 20 MIN: CPT | Mod: 95,,, | Performed by: PHYSICIAN ASSISTANT

## 2024-06-14 RX ORDER — POLYMYXIN B SULFATE AND TRIMETHOPRIM 1; 10000 MG/ML; [USP'U]/ML
1 SOLUTION OPHTHALMIC 4 TIMES DAILY
Qty: 10 ML | Refills: 0 | Status: SHIPPED | OUTPATIENT
Start: 2024-06-14 | End: 2024-06-21

## 2024-06-14 NOTE — PROGRESS NOTES
Subjective:      Patient ID: Christal Posey is a 64 y.o. female.    Vitals:  vitals were not taken for this visit.     Chief Complaint: Eye Problem      Visit Type: TELE AUDIOVISUAL    Present with the patient at the time of consultation: TELEMED PRESENT WITH PATIENT: None at home in [place]    Past Medical History:   Diagnosis Date    Allergy     BRCA1 negative 2022    BRCA2 negative 2022    Breast cancer 05/2016    Left breast low grade DCIS, ER/CT positive    Colon polyp 10/31/2018    Ischemic colitis suspected as well ( area biopsied).    GERD (gastroesophageal reflux disease)     Hiatal hernia     Hyperlipidemia     Irritable bowel syndrome     Migraine headache     PONV (postoperative nausea and vomiting)     RA (rheumatoid arthritis)     Squamous cell carcinoma excised 11/12/14    in situ, R forearm     Past Surgical History:   Procedure Laterality Date    AUGMENTATION OF BREAST  10/24/22    Implants failed and removed 11/22    BREAST BIOPSY Left 05/2016    DCIS    BREAST CAPSULECTOMY Bilateral 11/28/2022    Procedure: CAPSULECTOMY, BREAST;  Surgeon: Garland Gray MD;  Location: [address];  Service: Plastics;  Laterality: Bilateral;    BREAST LUMPECTOMY      BREAST RECONSTRUCTION  10/24/22    Breast implants failed and removed    ECTOPIC PREGNANCY SURGERY Right     HYSTERECTOMY  1998    ovaries left intact    INJECTION FOR SENTINEL NODE IDENTIFICATION Left 10/24/2022    Procedure: INJECTION, FOR SENTINEL NODE IDENTIFICATION LEFT;  Surgeon: JOVANNI Jama MD;  Location: [address];  Service: General;  Laterality: Left;    INSERTION OF BREAST IMPLANT Bilateral 10/24/2022    Procedure: INSERTION, BREAST IMPLANT BILATERAL;  Surgeon: Garland Gray MD;  Location: [address];  Service: Plastics;  Laterality: Bilateral;    MASTECTOMY Bilateral 10/24/2022    Procedure: MASTECTOMY BILATERAL;  Surgeon: JOVANNI Jama MD;  Location: [address];  Service: General;  Laterality:  Bilateral;    REMOVAL OF BREAST IMPLANT Bilateral 11/28/2022    Procedure: REMOVAL, IMPLANT, BREAST;  Surgeon: Garland Gray MD;  Location: [facility] OR [address] [place] [address];  Service: Plastics;  Laterality: Bilateral;    removal of ovarian cyst Right 10/1992    ovary left intact    SENTINEL LYMPH NODE BIOPSY Left 10/24/2022    Procedure: BIOPSY, LYMPH NODE, SENTINEL LEFT;  Surgeon: JOVANNI Jama MD;  Location: [facility] OR [address] [place] [address];  Service: General;  Laterality: Left;    TUBAL LIGATION      tubal reversal  07/1991     Review of patient's allergies indicates:   Allergen Reactions    Pravastatin Other (See Comments)     Severe muscle pain.  Muscle pain    Other reaction(s): Other (See Comments)  Severe muscle pain.    Shellfish containing products Anaphylaxis    Amitriptyline      Other reaction(s): dizzy    Codeine      Other reaction(s): Unknown    Doxycycline      Other reaction(s): Nausea    Erythromycin      Other reaction(s): Unknown    Iodine      Other reaction(s): Swelling  Other reaction(s): Unknown    Macrobid  [nitrofurantoin monohyd/m-cryst]      Other reaction(s): Unknown    Verapamil      Other reaction(s): Headache     Current Outpatient Medications on File Prior to Visit   Medication Sig Dispense Refill    albuterol (VENTOLIN HFA) 90 mcg/actuation inhaler Inhale 2 puffs into the lungs every 6 (six) hours as needed for Wheezing. Rescue 18 g 1    ALPRAZolam (XANAX) 0.25 MG tablet Take 1 tablet (0.25 mg total) by mouth nightly as needed for Insomnia. 30 tablet 0    baclofen (LIORESAL) 10 MG tablet Take 1 tablet (10 mg total) by mouth 3 (three) times daily as needed. 90 tablet 4    butalbital-acetaminophen-caffeine -40 mg (FIORICET, ESGIC) -40 mg per tablet Take 1 tablet by mouth every 6 to 8 hours as needed. 60 tablet 0    cholestyramine (QUESTRAN) 4 gram packet Take 1 packet by mouth once daily.      diclofenac sodium (VOLTAREN) 1 % Gel APPLY 2 GRAMS TOPICALLY 3 (THREE) TIMES DAILY. 100 g 3     gabapentin (NEURONTIN) 800 MG tablet Take 800 mg by mouth 3 (three) times daily.      lansoprazole (PREVACID) 30 MG capsule Take 30 mg by mouth every morning. 1 Capsule, Delayed Release(E.C.) Oral Every day      losartan (COZAAR) 25 MG tablet TAKE 1 TABLET BY MOUTH EVERY DAY 90 tablet 3    multivitamin (THERAGRAN) per tablet Take 1 tablet by mouth once daily.      ondansetron (ZOFRAN) 4 MG tablet Take 1 tablet (4 mg total) by mouth every 8 (eight) hours as needed for Nausea. 1 Tablet Oral Every 6 hours 12 tablet 12    sumatriptan (IMITREX) 20 mg/actuation nasal spray USE 1 SPRAY IN EACH NOSTRIL AT ONSET OF HEADACHE. MAY REPEAT ONCE IN 2 HOURS IF NO RELIEF. NO MORE THAN 2 SPRAYS PER 24 HOURS 6 each 12    triamcinolone acetonide 0.025% (KENALOG) 0.025 % Oint Apply topically 2 (two) times daily. x 1-2 wks then prn flares only 80 g 2    vitamin D 1000 units Tab Take 185 mg by mouth every morning.        No current facility-administered medications on file prior to visit.     Family History   Problem Relation Name Age of Onset    Migraines Maternal Grandmother      Seizures Maternal Grandmother      Cancer Father  77        Lung cancer    Migraines Brother      Seizures Brother      Migraines Sister      Breast cancer Sister Renu 65    Cancer Maternal Uncle          prostate ca    Cancer Paternal Uncle          lung    Melanoma Neg Hx      Colon cancer Neg Hx      Ovarian cancer Neg Hx      Uterine cancer Neg Hx      Cervical cancer Neg Hx         Medications Ordered                [id number] [place]  [facility] AT [facility] [address]   [facility] [place] [address]    Telephone: [phone]   Fax: [phone]   Hours: Not open 24 hours                         E-Prescribed (1 of 1)              polymyxin B sulf-trimethoprim (POLYTRIM) 10,000 unit- 1 mg/mL Drop    Sig: Place 1 drop into the right eye 4 (four) times daily. for 7 days       Start: 6/14/24      Quantity: 10 mL Refills: 0                           Ohs Peq Odvv Intake    6/14/2024  4:29 PM CDT - Filed by Patient   What is your current physical address in the event of a medical emergency? [address] [place]   Are you able to take your vital signs? No   Please attach any relevant images or files          HPI  65yo female presents with c/o right eye sticky drainage, redness x this morning. Feels irritated. No stye. Using artificial tears without improvement. Denies visual disturbances, eye pain, rash, fevers, ear pain, runny nose, cough.     No known sick contacts but was recently on a flight.         Constitution: Negative for chills and fever.   HENT:  Negative for ear pain, congestion and sore throat.    Eyes:  Positive for eye discharge and eye redness. Negative for eye itching, eye pain, photophobia, vision loss, double vision, blurred vision and eyelid swelling.   Skin:  Negative for rash.        Objective:   The physical exam was conducted virtually.  Physical Exam   Constitutional: She is oriented to person, place, and time.  Non-toxic appearance. She does not appear ill. No distress.   HENT:   Head: Normocephalic and atraumatic.   Eyes: Lids are normal. Right eye exhibits no chemosis and no exudate. Left eye exhibits no chemosis and no exudate. Right conjunctiva is injected. Left conjunctiva is not injected. Extraocular movement intact      Comments: No periorbital pain, redness, swelling.    Neck: Neck supple.   Pulmonary/Chest: Effort normal. No respiratory distress.   Abdominal: Normal appearance.   Neurological: She is alert and oriented to person, place, and time. Coordination normal.   Skin: Skin is dry, not diaphoretic, not pale and no rash.   Psychiatric: Her behavior is normal. Judgment and thought content normal.       Assessment:     1. Conjunctivitis of right eye, unspecified conjunctivitis type        Plan:       Conjunctivitis of right eye, unspecified  conjunctivitis type    Other orders  -     polymyxin B sulf-trimethoprim (POLYTRIM) 10,000 unit- 1 mg/mL Drop; Place 1 drop into the right eye 4 (four) times daily. for 7 days  Dispense: 10 mL; Refill: 0      1. I have sent antibiotic eye drops to the pharmacy, please take as directed. Also recommend warm compresses gently over eyes every few hours during the day for 10 minutes. Avoid touching/itching eyes and frequent hand washing encouraged.   2. Follow up either with at local urgent care or with your PCP if no improvement in 2 days or sooner as needed. If sudden vision changes, eye pain, fevers recommend to be seen immediately.   3. You must understand that you've received a Telehealth Urgent Care treatment only and that you may be released before all your medical problems are known or treated. You, the patient, will arrange for follow up care as instructed.??Patient voiced understanding and agrees to plan.

## 2024-06-14 NOTE — PATIENT INSTRUCTIONS
1. I have sent antibiotic eye drops to the pharmacy, please take as directed. Also recommend warm compresses gently over eyes every few hours during the day for 10 minutes. Avoid touching/itching eyes and frequent hand washing encouraged.   2. Follow up either with at local urgent care or with your PCP if no improvement in 2 days or sooner as needed. If sudden vision changes, eye pain, fevers recommend to be seen immediately.   3. You must understand that you've received a Telehealth Urgent Care treatment only and that you may be released before all your medical problems are known or treated. You, the patient, will arrange for follow up care as instructed.??

## 2024-07-01 ENCOUNTER — OFFICE VISIT (OUTPATIENT)
Dept: HEMATOLOGY/ONCOLOGY | Facility: CLINIC | Age: 64
End: 2024-07-01
Payer: COMMERCIAL

## 2024-07-01 ENCOUNTER — TELEPHONE (OUTPATIENT)
Dept: SURGERY | Facility: CLINIC | Age: 64
End: 2024-07-01
Payer: COMMERCIAL

## 2024-07-01 VITALS
HEART RATE: 88 BPM | HEIGHT: 66 IN | RESPIRATION RATE: 20 BRPM | OXYGEN SATURATION: 98 % | WEIGHT: 216.19 LBS | SYSTOLIC BLOOD PRESSURE: 135 MMHG | BODY MASS INDEX: 34.74 KG/M2 | DIASTOLIC BLOOD PRESSURE: 70 MMHG

## 2024-07-01 DIAGNOSIS — Z17.0 MALIGNANT NEOPLASM OF LOWER-INNER QUADRANT OF LEFT BREAST IN FEMALE, ESTROGEN RECEPTOR POSITIVE: Primary | ICD-10-CM

## 2024-07-01 DIAGNOSIS — C50.312 MALIGNANT NEOPLASM OF LOWER-INNER QUADRANT OF LEFT BREAST IN FEMALE, ESTROGEN RECEPTOR POSITIVE: Primary | ICD-10-CM

## 2024-07-01 DIAGNOSIS — D05.12 DUCTAL CARCINOMA IN SITU (DCIS) OF LEFT BREAST: ICD-10-CM

## 2024-07-01 DIAGNOSIS — Z90.13 H/O BILATERAL MASTECTOMY: ICD-10-CM

## 2024-07-01 DIAGNOSIS — E55.9 VITAMIN D DEFICIENCY: ICD-10-CM

## 2024-07-01 PROCEDURE — 3075F SYST BP GE 130 - 139MM HG: CPT | Mod: CPTII,S$GLB,, | Performed by: NURSE PRACTITIONER

## 2024-07-01 PROCEDURE — 99999 PR PBB SHADOW E&M-EST. PATIENT-LVL IV: CPT | Mod: PBBFAC,,, | Performed by: NURSE PRACTITIONER

## 2024-07-01 PROCEDURE — 1159F MED LIST DOCD IN RCRD: CPT | Mod: CPTII,S$GLB,, | Performed by: NURSE PRACTITIONER

## 2024-07-01 PROCEDURE — 3008F BODY MASS INDEX DOCD: CPT | Mod: CPTII,S$GLB,, | Performed by: NURSE PRACTITIONER

## 2024-07-01 PROCEDURE — 3078F DIAST BP <80 MM HG: CPT | Mod: CPTII,S$GLB,, | Performed by: NURSE PRACTITIONER

## 2024-07-01 PROCEDURE — 4010F ACE/ARB THERAPY RXD/TAKEN: CPT | Mod: CPTII,S$GLB,, | Performed by: NURSE PRACTITIONER

## 2024-07-01 PROCEDURE — 99214 OFFICE O/P EST MOD 30 MIN: CPT | Mod: S$GLB,,, | Performed by: NURSE PRACTITIONER

## 2024-07-01 NOTE — PROGRESS NOTES
Subjective     Patient ID: Christal Posey is a 64 y.o. female.    Chief Complaint: Ductal carcinoma in situ (DCIS) of left breast    HPI    Overall feels well  Occasional tightness in right axilla - especially after lifting.   No pain issues except usual arthritis.   Appetite good and weight stable.   No n/v  Bowels normal.      Oncology History:  - 8/12/2022 Breast MRI:  Findings:  Left  Numerous scattered foci and small masses are present, most of which appear similar to eachother.    There is an 8 mm x 7 mm x 7 mm irregularly shaped, heterogeneous mass seen in the left breast at 6 o'clock, 2.4 cm from the nipple and 8 cm from the chest wall. Delayed phase is washout.   There is a 9 mm x 7 mm x 7 mm irregularly shaped, homogeneous mass with irregular margins seen in the upper inner quadrant of the right breast, 2.8 cm from the nipple and 6.7 cm from the chest wall. Delayed phase is plateau. This is best seen on Series 85776: Image 50.  This is approximately 3 cm anterior and superior to the signal void related to the 9 o'clock biopsy marker.   There is no suspicious enhancement associated with either biopsy marker.   Right  Numerous scattered foci and small masses are present, most of which appear similar to eachother.    There is a 6 mm oval, homogeneous mass with circumscribed margins seen in the lower central region of the right breast, 2.3 cm from the nipple and 9.5 cm from the chest wall. Delayed phase is washout. This is best seen in Series 65167: Image 70.   There is no internal mammary or axillary adenopathy.  Impression:  Left  Mass: Left breast 9 mm x 7 mm x 7 mm mass at the upper inner position. Assessment: 4 - Suspicious finding. Biopsy is recommended.   Mass: Left breast 8 mm x 7 mm x 7 mm mass at the 6 o'clock position. Assessment: 4 - Suspicious finding. Biopsy is recommended.   There is no suspicious enhancement associated with either biopsy marker.   Right  Mass: Right breast  6 mm mass at the  lower central position. Assessment: 4 - Suspicious finding. Biopsy is recommended.   BI-RADS Category:   Overall: 4 - Suspicious     - 9/28/2022 MRI guided breast biopsy:  BREAST, RIGHT, LOWER CENTRAL MASS, BIOPSY:   - Intraductal papilloma, benign.   - Benign breast tissue with predominantly fatty stroma and blood clot.   - Microcalcifications: Focally seen in association with benign breast ducts.   - No atypia or malignancy.   - Additional deeper sections have been examined.      - 10/24/2022 Bilateral Mastectomy:  1. BREAST, RIGHT, MASTECTOMY:   - Negative for malignancy.   - Atypical ductal hyperplasia (ADH), focal.   - No residual intraductal papilloma present.   - Previous biopsy site changes and biopsy clip.   - Benign subareolar nipple ducts.   - One benign intramammary lymph node.   2. AXILLARY SENTINEL LYMPH NODE, LEFT, # 1, HOT BLUE 1451, EXCISION:   - One benign axillary sentinel lymph node, negative for metastatic carcinoma   (0/1).   - Immunohistochemical stains for CK WSK, Cam 5.2, and CK AE/1/AE3 are   negative, supporting the diagnosis.   3. BREAST, LEFT, NIPPLE SPARING MASTECTOMY:   - Invasive ductal carcinoma of breast with extensive intraductal component,   see Tumor Synoptic.   - Size of invasive carcinoma: 9 MM (measured histologically on slide 3L).   - Sumas Histologic Score: Grade 1 of 3.                     Tubule Formation:  2                     Nuclear Pleomorphism:  2                     Mitotic Activity:  1   - Associated (DCIS), intermediate nuclear grade, cribriform type, with   central necrosis.   - Size of DCIS: at least 10 MM.   - No lymphovascular invasion.   - Margins:   ·tab Invasive carcinoma and DCIS are greater than 10 MM from all margins.   - Benign subareolar nipple ducts.   - Both biopsy clips are identified and corresponding areas sampled.   - Microcalcifications: Seen in association with benign breast ducts.   - Pathologic staging: pT1b (sn)N0   4. BREAST, LEFT,  ADDITIONAL INFERIOR LATERAL MARGIN, NIPPLE SPARING   MASTECTOMY:   - Negative for atypia or malignancy.   - Benign breast tissue with predominantly fatty stroma.   5. BREAST, LEFT, NEW SUPERIOR MARGIN, NIPPLE SPARING MASTECTOMY:   - Negative for atypia or malignancy.   - Benign breast tissue with predominantly fatty stroma.       Previously, required implants out due to infection and contracture  - 11/28/2022   Diagnosis:  Preoperative diagnosis cellulitis and capsular contracture of the right breast  Procedure performed  1. Bilateral removal of implants (tissue expanders)   2. Total bilateral capsulectomy  3. Wide excision soft tissue of right axillary region measuring 16 cm x 6 cm with layered closure final incision 16 cm  4. Wide excision soft tissue of the left axillary region measuring 16 cm x 6 cm with layered closure final incision 16 cm  Reports concerned as asked for nipples to be removed as she did not plan for additional reconstruction and this was not done     Prior history of DCIS:  Monitored by surveillance- opted out of adjuvant endocrine with low volume disease  DCIS History:  5/16/16 Screening mammography:  Calcifications in the left breast require additional evaluation.    ACR BI-RADS Category 0: Incomplete: Need Additional Imaging Evaluation  - 5/17/16 Diagnostic mammogram  Calcifications in the left breast are suspicious.  Histology using core biopsy  is recommended.  ACR BI-RADS Category 4: Suspicious Abnormality  - 5/20/16 biopsy  1, 2. LEFT BREAST, WITH CALCIFICATIONS AND WITHOUT CALCIFICATIONS, UPPER INNER QUADRANT  (NEEDLE BIOPSY):  -Low-grade ductal carcinoma in situ (DCIS)  -Nuclear grade 1 out of 3  -Cribriform pattern  -Associated with microcalcifications  %, % positive  - 6/10/2016 Lumpectomy  Pathology:  Procedure - Excision with wire guided localization  Lymph node sampling - Not sampled  Specimen laterality - Left  Tumor site - Not specified  Size of DCIS - 2mm, including  findings from the biopsy report  Histologic type - Ductal carcinoma in situ  Architectural pattern - Solid and cribriforming  Nuclear grade - Low  Necrosis - Absent  Margins - Negative, inferior is 2mm  Pathologc staging - p Tis Nx Mx  Hormone receptors (See Breast Biopsy report; MS16 - 68436)  Estrogen receptor - Positive, strong, 100%  Progesterone receptors - Positive, strong, 100%     SUPPLEMENTAL #1:   HER2 AMPLIFICATION ASSOCIATED WITH BREAST CANCER, FISH, TISSUE:   Result Summary   Negative         Started tamoxifen 11/18/2022  Unable to tolerate tamoxifen   Opts out      reported bone pain.   5/17/2023 Bone scan:  FINDINGS:  No focal pathologic uptake in the spine. Questionable mild focal uptake in a right lower lateral rib.  Uptake at the knees and left foot is likely degenerative.  There is physiologic distribution of the radiopharmaceutical throughout the skeleton.  There is normal uptake in the genitourinary system and soft tissues.  Impression:  Questionable mild focal uptake in a right lower lateral rib.  Consider CT of the chest to evaluate for fracture or metastatic lesion.     This result led to ordering of CT scan:     5/26/2023 CT Chest:  Impression:  1. There is been an interval bilateral mastectomy.  There is soft tissue thickening in the mastectomy bed.  On the right side, there is a finger-like projection of soft tissue up to 16 mm in length.  This may merely represent scarring.  However, comparison should be made with other CTs of the chest which may exist at other institutions.  If these do not exist, follow-up CT enhance with contrast recommended.  2. There are few stable left upper lobe pulmonary micro nodules (axial series 5, images 106, 175, and 212).  3. A rib fracture was not identified.  Additionally, there is no specific CT evidence of osteolytic or osteoblastic bone disease.  However, if there is a continued suspicion for metastatic bone disease, consider nuclear medicine scan.      Stopped tamoxifen 12/2023 due to rash        Additional PMH:  Bilateral cataract surgery     FH:  No new cancers  Review of Systems   Constitutional:         See above   All other systems reviewed and are negative.         Objective     Physical Exam  Vitals and nursing note reviewed.   Constitutional:       General: She is not in acute distress.     Appearance: Normal appearance. She is well-developed and normal weight. She is not ill-appearing.      Comments: Presents alone  Very pleasant   HENT:      Head: Normocephalic and atraumatic.   Eyes:      General: Lids are normal. No scleral icterus.     Extraocular Movements: Extraocular movements intact.      Conjunctiva/sclera: Conjunctivae normal.      Pupils: Pupils are equal, round, and reactive to light.   Neck:      Thyroid: No thyromegaly.      Vascular: No JVD.      Trachea: Trachea normal.   Cardiovascular:      Rate and Rhythm: Normal rate and regular rhythm.      Heart sounds: Normal heart sounds. No murmur heard.     No friction rub. No gallop.   Pulmonary:      Effort: Pulmonary effort is normal. No respiratory distress.      Breath sounds: Normal breath sounds. No wheezing, rhonchi or rales.   Chest:      Chest wall: No tenderness.          Comments: Bilateral mastectomies. Nipples remain.   Incisions healed. No LAD  Abdominal:      General: Bowel sounds are normal. There is no distension.      Palpations: Abdomen is soft. There is no mass.      Tenderness: There is no abdominal tenderness. There is no guarding or rebound.      Comments: No organomegaly.    Musculoskeletal:         General: Normal range of motion.      Cervical back: Normal range of motion and neck supple.      Comments: No spinal or paraspinal tenderness.    Lymphadenopathy:      Head:      Right side of head: No submental or submandibular adenopathy.      Left side of head: No submental or submandibular adenopathy.      Cervical: No cervical adenopathy.      Upper Body:      Right  upper body: No supraclavicular or axillary adenopathy.      Left upper body: No supraclavicular or axillary adenopathy.   Skin:     General: Skin is warm and dry.      Capillary Refill: Capillary refill takes less than 2 seconds.      Coloration: Skin is not jaundiced.      Findings: No bruising or rash.      Nails: There is no clubbing.   Neurological:      Mental Status: She is alert and oriented to person, place, and time. Mental status is at baseline.      Motor: No weakness.      Gait: Gait normal.   Psychiatric:         Mood and Affect: Mood normal.         Speech: Speech normal.         Behavior: Behavior normal.         Thought Content: Thought content normal.         Judgment: Judgment normal.       Labs- reviewed     Assessment and Plan     1. Malignant neoplasm of lower-inner quadrant of left breast in female, estrogen receptor positive  -     POST-MASTECTOMY BRA FOR HOME USE  -     BREAST PROSTHESIS FOR HOME USE    2. H/O bilateral mastectomy  -     POST-MASTECTOMY BRA FOR HOME USE  -     BREAST PROSTHESIS FOR HOME USE  -     CBC Oncology; Future  -     Comprehensive Metabolic Panel; Future    3. Ductal carcinoma in situ (DCIS) of left breast  -     CBC Oncology; Future  -     Comprehensive Metabolic Panel; Future    4. Vitamin D deficiency      Clinically negative  Opts out of endocrine therapy  Continue Vit D 3000u daily.   RTC 3 months  Knows to call for any issues  Colonoscopy UTD     Route Chart for Scheduling    Med Onc Chart Routing      Follow up with physician    Follow up with [facility] 3 months.   Infusion scheduling note    Injection scheduling note    Labs CBC and CMP   Scheduling:  Preferred lab:  Lab interval:     Imaging    Pharmacy appointment    Other referrals                   Patient is in agreement with the proposed treatment plan. All questions were answered to the patient's satisfaction. Pt knows to call clinic for any new or worsening symptoms and if anything is needed before the next  clinic visit.    Brijesh Zimmer, MSN, APRN, FNP-C  Nurse Practitioner to Dr. Latanya Julian  [facility]  [facility]  Hematology & Medical Oncology  [address] [place] [id number]  ph. [phone] ext [phone]  Fax. [phone]              30 minutes of total time spent on the encounter, which includes face to face time and non-face to face time preparing to see the patient (eg, review of tests), Obtaining and/or reviewing separately obtained history, Documenting clinical information in the electronic or other health record, Independently interpreting results (not separately reported) and communicating results to the patient/family/caregiver, or Care coordination (not separately reported).

## 2024-07-01 NOTE — TELEPHONE ENCOUNTER
Patient was seen by Brijesh Zimmer NP . Faxed orders for her Breast Prosthesis & Bra's order to [facility] [employer] . Patient was informed that her order was faxed.   Patient appreciated my call .

## 2024-08-19 ENCOUNTER — PATIENT MESSAGE (OUTPATIENT)
Dept: ADMINISTRATIVE | Facility: HOSPITAL | Age: 64
End: 2024-08-19
Payer: COMMERCIAL

## 2024-09-26 ENCOUNTER — LAB VISIT (OUTPATIENT)
Dept: LAB | Facility: HOSPITAL | Age: 64
End: 2024-09-26
Payer: COMMERCIAL

## 2024-09-26 ENCOUNTER — OFFICE VISIT (OUTPATIENT)
Dept: HEMATOLOGY/ONCOLOGY | Facility: CLINIC | Age: 64
End: 2024-09-26
Payer: COMMERCIAL

## 2024-09-26 VITALS
BODY MASS INDEX: 34.47 KG/M2 | HEIGHT: 66 IN | RESPIRATION RATE: 17 BRPM | WEIGHT: 214.5 LBS | HEART RATE: 80 BPM | DIASTOLIC BLOOD PRESSURE: 77 MMHG | TEMPERATURE: 97 F | OXYGEN SATURATION: 97 % | SYSTOLIC BLOOD PRESSURE: 152 MMHG

## 2024-09-26 DIAGNOSIS — C50.312 MALIGNANT NEOPLASM OF LOWER-INNER QUADRANT OF LEFT BREAST IN FEMALE, ESTROGEN RECEPTOR POSITIVE: Primary | ICD-10-CM

## 2024-09-26 DIAGNOSIS — Z90.13 H/O BILATERAL MASTECTOMY: ICD-10-CM

## 2024-09-26 DIAGNOSIS — D05.12 DUCTAL CARCINOMA IN SITU (DCIS) OF LEFT BREAST: ICD-10-CM

## 2024-09-26 DIAGNOSIS — Z17.0 MALIGNANT NEOPLASM OF LOWER-INNER QUADRANT OF LEFT BREAST IN FEMALE, ESTROGEN RECEPTOR POSITIVE: Primary | ICD-10-CM

## 2024-09-26 DIAGNOSIS — E55.9 VITAMIN D DEFICIENCY: ICD-10-CM

## 2024-09-26 LAB
ALBUMIN SERPL BCP-MCNC: 3.6 G/DL (ref 3.5–5.2)
ALP SERPL-CCNC: 95 U/L (ref 55–135)
ALT SERPL W/O P-5'-P-CCNC: 23 U/L (ref 10–44)
ANION GAP SERPL CALC-SCNC: 10 MMOL/L (ref 8–16)
AST SERPL-CCNC: 20 U/L (ref 10–40)
BILIRUB SERPL-MCNC: 0.5 MG/DL (ref 0.1–1)
BUN SERPL-MCNC: 12 MG/DL (ref 8–23)
CALCIUM SERPL-MCNC: 9.6 MG/DL (ref 8.7–10.5)
CHLORIDE SERPL-SCNC: 107 MMOL/L (ref 95–110)
CO2 SERPL-SCNC: 28 MMOL/L (ref 23–29)
CREAT SERPL-MCNC: 1.1 MG/DL (ref 0.5–1.4)
ERYTHROCYTE [DISTWIDTH] IN BLOOD BY AUTOMATED COUNT: 13.7 % (ref 11.5–14.5)
EST. GFR  (NO RACE VARIABLE): 56.8 ML/MIN/1.73 M^2
GLUCOSE SERPL-MCNC: 93 MG/DL (ref 70–110)
HCT VFR BLD AUTO: 39.7 % (ref 37–48.5)
HGB BLD-MCNC: 12.4 G/DL (ref 12–16)
IMM GRANULOCYTES # BLD AUTO: 0.02 K/UL (ref 0–0.04)
MCH RBC QN AUTO: 27.7 PG (ref 27–31)
MCHC RBC AUTO-ENTMCNC: 31.2 G/DL (ref 32–36)
MCV RBC AUTO: 89 FL (ref 82–98)
NEUTROPHILS # BLD AUTO: 4.2 K/UL (ref 1.8–7.7)
PLATELET # BLD AUTO: 301 K/UL (ref 150–450)
PMV BLD AUTO: 10.2 FL (ref 9.2–12.9)
POTASSIUM SERPL-SCNC: 4.1 MMOL/L (ref 3.5–5.1)
PROT SERPL-MCNC: 7.3 G/DL (ref 6–8.4)
RBC # BLD AUTO: 4.48 M/UL (ref 4–5.4)
SODIUM SERPL-SCNC: 145 MMOL/L (ref 136–145)
WBC # BLD AUTO: 7.69 K/UL (ref 3.9–12.7)

## 2024-09-26 PROCEDURE — 80053 COMPREHEN METABOLIC PANEL: CPT | Performed by: NURSE PRACTITIONER

## 2024-09-26 PROCEDURE — 3078F DIAST BP <80 MM HG: CPT | Mod: CPTII,S$GLB,, | Performed by: NURSE PRACTITIONER

## 2024-09-26 PROCEDURE — 3077F SYST BP >= 140 MM HG: CPT | Mod: CPTII,S$GLB,, | Performed by: NURSE PRACTITIONER

## 2024-09-26 PROCEDURE — 3008F BODY MASS INDEX DOCD: CPT | Mod: CPTII,S$GLB,, | Performed by: NURSE PRACTITIONER

## 2024-09-26 PROCEDURE — G2211 COMPLEX E/M VISIT ADD ON: HCPCS | Mod: S$GLB,,, | Performed by: NURSE PRACTITIONER

## 2024-09-26 PROCEDURE — 85027 COMPLETE CBC AUTOMATED: CPT | Performed by: NURSE PRACTITIONER

## 2024-09-26 PROCEDURE — 99999 PR PBB SHADOW E&M-EST. PATIENT-LVL IV: CPT | Mod: PBBFAC,,, | Performed by: NURSE PRACTITIONER

## 2024-09-26 PROCEDURE — 4010F ACE/ARB THERAPY RXD/TAKEN: CPT | Mod: CPTII,S$GLB,, | Performed by: NURSE PRACTITIONER

## 2024-09-26 PROCEDURE — 1159F MED LIST DOCD IN RCRD: CPT | Mod: CPTII,S$GLB,, | Performed by: NURSE PRACTITIONER

## 2024-09-26 PROCEDURE — 36415 COLL VENOUS BLD VENIPUNCTURE: CPT | Performed by: NURSE PRACTITIONER

## 2024-09-26 PROCEDURE — 99214 OFFICE O/P EST MOD 30 MIN: CPT | Mod: S$GLB,,, | Performed by: NURSE PRACTITIONER

## 2024-09-26 NOTE — PROGRESS NOTES
Subjective     Patient ID: Christal Posey is a 64 y.o. female.    Chief Complaint: Malignant neoplasm of lower-inner quadrant of left breast i    HPI    Overall feels well  No new issues  No complaints today  Occasional tightness in right axilla - especially after lifting.   No pain issues except usual arthritis.   Appetite good and weight stable.   No n/v  Bowels normal.  Stays active- cuts her own grass.       Oncology History:  - 8/12/2022 Breast MRI:  Findings:  Left  Numerous scattered foci and small masses are present, most of which appear similar to eachother.    There is an 8 mm x 7 mm x 7 mm irregularly shaped, heterogeneous mass seen in the left breast at 6 o'clock, 2.4 cm from the nipple and 8 cm from the chest wall. Delayed phase is washout.   There is a 9 mm x 7 mm x 7 mm irregularly shaped, homogeneous mass with irregular margins seen in the upper inner quadrant of the right breast, 2.8 cm from the nipple and 6.7 cm from the chest wall. Delayed phase is plateau. This is best seen on Series 11348: Image 50.  This is approximately 3 cm anterior and superior to the signal void related to the 9 o'clock biopsy marker.   There is no suspicious enhancement associated with either biopsy marker.   Right  Numerous scattered foci and small masses are present, most of which appear similar to eachother.    There is a 6 mm oval, homogeneous mass with circumscribed margins seen in the lower central region of the right breast, 2.3 cm from the nipple and 9.5 cm from the chest wall. Delayed phase is washout. This is best seen in Series 52713: Image 70.   There is no internal mammary or axillary adenopathy.  Impression:  Left  Mass: Left breast 9 mm x 7 mm x 7 mm mass at the upper inner position. Assessment: 4 - Suspicious finding. Biopsy is recommended.   Mass: Left breast 8 mm x 7 mm x 7 mm mass at the 6 o'clock position. Assessment: 4 - Suspicious finding. Biopsy is recommended.   There is no suspicious enhancement  associated with either biopsy marker.   Right  Mass: Right breast  6 mm mass at the lower central position. Assessment: 4 - Suspicious finding. Biopsy is recommended.   BI-RADS Category:   Overall: 4 - Suspicious     - 9/28/2022 MRI guided breast biopsy:  BREAST, RIGHT, LOWER CENTRAL MASS, BIOPSY:   - Intraductal papilloma, benign.   - Benign breast tissue with predominantly fatty stroma and blood clot.   - Microcalcifications: Focally seen in association with benign breast ducts.   - No atypia or malignancy.   - Additional deeper sections have been examined.      - 10/24/2022 Bilateral Mastectomy:  1. BREAST, RIGHT, MASTECTOMY:   - Negative for malignancy.   - Atypical ductal hyperplasia (ADH), focal.   - No residual intraductal papilloma present.   - Previous biopsy site changes and biopsy clip.   - Benign subareolar nipple ducts.   - One benign intramammary lymph node.   2. AXILLARY SENTINEL LYMPH NODE, LEFT, # 1, HOT BLUE 1451, EXCISION:   - One benign axillary sentinel lymph node, negative for metastatic carcinoma   (0/1).   - Immunohistochemical stains for CK WSK, Cam 5.2, and CK AE/1/AE3 are   negative, supporting the diagnosis.   3. BREAST, LEFT, NIPPLE SPARING MASTECTOMY:   - Invasive ductal carcinoma of breast with extensive intraductal component,   see Tumor Synoptic.   - Size of invasive carcinoma: 9 MM (measured histologically on slide 3L).   - Trinity Histologic Score: Grade 1 of 3.                     Tubule Formation:  2                     Nuclear Pleomorphism:  2                     Mitotic Activity:  1   - Associated (DCIS), intermediate nuclear grade, cribriform type, with   central necrosis.   - Size of DCIS: at least 10 MM.   - No lymphovascular invasion.   - Margins:   ·tab Invasive carcinoma and DCIS are greater than 10 MM from all margins.   - Benign subareolar nipple ducts.   - Both biopsy clips are identified and corresponding areas sampled.   - Microcalcifications: Seen in association  with benign breast ducts.   - Pathologic staging: pT1b (sn)N0   4. BREAST, LEFT, ADDITIONAL INFERIOR LATERAL MARGIN, NIPPLE SPARING   MASTECTOMY:   - Negative for atypia or malignancy.   - Benign breast tissue with predominantly fatty stroma.   5. BREAST, LEFT, NEW SUPERIOR MARGIN, NIPPLE SPARING MASTECTOMY:   - Negative for atypia or malignancy.   - Benign breast tissue with predominantly fatty stroma.       Previously, required implants out due to infection and contracture  - 11/28/2022   Diagnosis:  Preoperative diagnosis cellulitis and capsular contracture of the right breast  Procedure performed  1. Bilateral removal of implants (tissue expanders)   2. Total bilateral capsulectomy  3. Wide excision soft tissue of right axillary region measuring 16 cm x 6 cm with layered closure final incision 16 cm  4. Wide excision soft tissue of the left axillary region measuring 16 cm x 6 cm with layered closure final incision 16 cm  Reports concerned as asked for nipples to be removed as she did not plan for additional reconstruction and this was not done     Prior history of DCIS:  Monitored by surveillance- opted out of adjuvant endocrine with low volume disease  DCIS History:  5/16/16 Screening mammography:  Calcifications in the left breast require additional evaluation.    ACR BI-RADS Category 0: Incomplete: Need Additional Imaging Evaluation  - 5/17/16 Diagnostic mammogram  Calcifications in the left breast are suspicious.  Histology using core biopsy  is recommended.  ACR BI-RADS Category 4: Suspicious Abnormality  - 5/20/16 biopsy  1, 2. LEFT BREAST, WITH CALCIFICATIONS AND WITHOUT CALCIFICATIONS, UPPER INNER QUADRANT  (NEEDLE BIOPSY):  -Low-grade ductal carcinoma in situ (DCIS)  -Nuclear grade 1 out of 3  -Cribriform pattern  -Associated with microcalcifications  %, % positive  - 6/10/2016 Lumpectomy  Pathology:  Procedure - Excision with wire guided localization  Lymph node sampling - Not  sampled  Specimen laterality - Left  Tumor site - Not specified  Size of DCIS - 2mm, including findings from the biopsy report  Histologic type - Ductal carcinoma in situ  Architectural pattern - Solid and cribriforming  Nuclear grade - Low  Necrosis - Absent  Margins - Negative, inferior is 2mm  Pathologc staging - p Tis Nx Mx  Hormone receptors (See Breast Biopsy report; MS16 - 14079)  Estrogen receptor - Positive, strong, 100%  Progesterone receptors - Positive, strong, 100%     SUPPLEMENTAL #1:   HER2 AMPLIFICATION ASSOCIATED WITH BREAST CANCER, FISH, TISSUE:   Result Summary   Negative         Started tamoxifen 11/18/2022  Unable to tolerate tamoxifen   Opts out      reported bone pain.   5/17/2023 Bone scan:  FINDINGS:  No focal pathologic uptake in the spine. Questionable mild focal uptake in a right lower lateral rib.  Uptake at the knees and left foot is likely degenerative.  There is physiologic distribution of the radiopharmaceutical throughout the skeleton.  There is normal uptake in the genitourinary system and soft tissues.  Impression:  Questionable mild focal uptake in a right lower lateral rib.  Consider CT of the chest to evaluate for fracture or metastatic lesion.     This result led to ordering of CT scan:     5/26/2023 CT Chest:  Impression:  1. There is been an interval bilateral mastectomy.  There is soft tissue thickening in the mastectomy bed.  On the right side, there is a finger-like projection of soft tissue up to 16 mm in length.  This may merely represent scarring.  However, comparison should be made with other CTs of the chest which may exist at other institutions.  If these do not exist, follow-up CT enhance with contrast recommended.  2. There are few stable left upper lobe pulmonary micro nodules (axial series 5, images 106, 175, and 212).  3. A rib fracture was not identified.  Additionally, there is no specific CT evidence of osteolytic or osteoblastic bone disease.  However, if  there is a continued suspicion for metastatic bone disease, consider nuclear medicine scan.     Stopped tamoxifen 12/2023 due to rash        Additional PMH:  Bilateral cataract surgery     FH:  No new cancers    Review of Systems   Constitutional:         See above   All other systems reviewed and are negative.         Objective     Physical Exam  Vitals and nursing note reviewed.   Constitutional:       General: She is not in acute distress.     Appearance: Normal appearance. She is well-developed and normal weight. She is not ill-appearing.      Comments: Presents alone  Very pleasant   HENT:      Head: Normocephalic and atraumatic.   Eyes:      General: Lids are normal. No scleral icterus.     Extraocular Movements: Extraocular movements intact.      Conjunctiva/sclera: Conjunctivae normal.      Pupils: Pupils are equal, round, and reactive to light.   Neck:      Thyroid: No thyromegaly.      Vascular: No JVD.      Trachea: Trachea normal.   Cardiovascular:      Rate and Rhythm: Normal rate and regular rhythm.      Heart sounds: Normal heart sounds. No murmur heard.     No friction rub. No gallop.   Pulmonary:      Effort: Pulmonary effort is normal. No respiratory distress.      Breath sounds: Normal breath sounds. No wheezing, rhonchi or rales.   Chest:      Chest wall: No tenderness.          Comments: Bilateral mastectomies. Nipples remain.   Incisions healed. No LAD  Abdominal:      General: Bowel sounds are normal. There is no distension.      Palpations: Abdomen is soft. There is no mass.      Tenderness: There is no abdominal tenderness. There is no guarding or rebound.      Comments: No organomegaly.    Musculoskeletal:         General: Normal range of motion.      Cervical back: Normal range of motion and neck supple.      Comments: No spinal or paraspinal tenderness.    Lymphadenopathy:      Head:      Right side of head: No submental or submandibular adenopathy.      Left side of head: No submental or  submandibular adenopathy.      Cervical: No cervical adenopathy.      Upper Body:      Right upper body: No supraclavicular or axillary adenopathy.      Left upper body: No supraclavicular or axillary adenopathy.   Skin:     General: Skin is warm and dry.      Capillary Refill: Capillary refill takes less than 2 seconds.      Coloration: Skin is not jaundiced.      Findings: No bruising or rash.      Nails: There is no clubbing.   Neurological:      Mental Status: She is alert and oriented to person, place, and time. Mental status is at baseline.      Motor: No weakness.      Gait: Gait normal.   Psychiatric:         Mood and Affect: Mood normal.         Speech: Speech normal.         Behavior: Behavior normal.         Thought Content: Thought content normal.         Judgment: Judgment normal.       Labs- pending     Assessment and Plan     1. Malignant neoplasm of lower-inner quadrant of left breast in female, estrogen receptor positive  -     CBC Oncology; Future  -     Comprehensive Metabolic Panel; Future    2. Ductal carcinoma in situ (DCIS) of left breast    3. H/O bilateral mastectomy    4. Vitamin D deficiency    5. BMI 34.0-34.9,adult        Clinically negative  Opts out of endocrine therapy  Continue Vit D 3000u daily.   RTC 3 months  Knows to call for any issues  Colonoscopy UTD  Encouraged to increase exercise.      Route Chart for Scheduling    Med Onc Chart Routing      Follow up with physician 3 months.   Follow up with [facility]    Infusion scheduling note    Injection scheduling note    Labs CBC and CMP   Scheduling:  Preferred lab:  Lab interval:     Imaging    Pharmacy appointment    Other referrals                     Patient is in agreement with the proposed treatment plan. All questions were answered to the patient's satisfaction. Pt knows to call clinic for any new or worsening symptoms and if anything is needed before the next clinic visit.    Brijesh Zimmer, MSN, APRN, FNP-C  Nurse Practitioner to  Dr. Latanya Julian  [facility]  [facility]  Hematology & Medical Oncology  [address] [place] [id number]  ph. [phone] ext [phone]  Fax. [phone]              30 minutes of total time spent on the encounter, which includes face to face time and non-face to face time preparing to see the patient (eg, review of tests), Obtaining and/or reviewing separately obtained history, Documenting clinical information in the electronic or other health record, Independently interpreting results (not separately reported) and communicating results to the patient/family/caregiver, or Care coordination (not separately reported).

## 2024-10-16 ENCOUNTER — PATIENT MESSAGE (OUTPATIENT)
Dept: ADMINISTRATIVE | Facility: HOSPITAL | Age: 64
End: 2024-10-16
Payer: COMMERCIAL

## 2024-10-16 ENCOUNTER — PATIENT OUTREACH (OUTPATIENT)
Dept: INTERNAL MEDICINE | Facility: CLINIC | Age: 64
End: 2024-10-16

## 2024-10-24 ENCOUNTER — OFFICE VISIT (OUTPATIENT)
Dept: DERMATOLOGY | Facility: CLINIC | Age: 64
End: 2024-10-24
Payer: COMMERCIAL

## 2024-10-24 DIAGNOSIS — L82.1 SK (SEBORRHEIC KERATOSIS): Primary | ICD-10-CM

## 2024-10-24 DIAGNOSIS — L81.4 LENTIGO: ICD-10-CM

## 2024-10-24 DIAGNOSIS — L73.8 SEBACEOUS GLAND HYPERPLASIA: ICD-10-CM

## 2024-10-24 DIAGNOSIS — Z85.828 HISTORY OF NONMELANOMA SKIN CANCER: ICD-10-CM

## 2024-10-24 PROCEDURE — 1159F MED LIST DOCD IN RCRD: CPT | Mod: CPTII,S$GLB,, | Performed by: DERMATOLOGY

## 2024-10-24 PROCEDURE — 1160F RVW MEDS BY RX/DR IN RCRD: CPT | Mod: CPTII,S$GLB,, | Performed by: DERMATOLOGY

## 2024-10-24 PROCEDURE — 99213 OFFICE O/P EST LOW 20 MIN: CPT | Mod: S$GLB,,, | Performed by: DERMATOLOGY

## 2024-10-24 PROCEDURE — 99999 PR PBB SHADOW E&M-EST. PATIENT-LVL III: CPT | Mod: PBBFAC,,, | Performed by: DERMATOLOGY

## 2024-10-24 PROCEDURE — 4010F ACE/ARB THERAPY RXD/TAKEN: CPT | Mod: CPTII,S$GLB,, | Performed by: DERMATOLOGY

## 2024-10-24 NOTE — PROGRESS NOTES
Subjective:      Patient ID:  Christal Posey is a 64 y.o. female who presents for   Chief Complaint   Patient presents with    Skin Check     Ubse      Patient here for Upper Body Skin Exam    Last seen by dermatologist: 11/30/2023 by Nataly for rash- pt states she tx with tamoxifen (resolved)    No - personal history of atypical moles removed  No - personal history of MM   No - family history of MM  Yes - childhood blistering sunburns  No - tanning bed use  Yes SCC on right forearm - personal history of NMSC  This is a high risk patient here to check for the development of new lesions.      Patient with specific complaint of lesion(s)  Location: forehead and nose   Duration: about a year  Symptoms: none   Relieving factors/Previous treatments: none    Patient with new area of concern:   Location: right shoulder +raised in the past few months   Previous treatments: none               Review of Systems   Skin:  Positive for activity-related sunscreen use and wears hat (yard work). Negative for itching, rash, daily sunscreen use and recent sunburn.   Hematologic/Lymphatic: Does not bruise/bleed easily.       Objective:   Physical Exam   Constitutional: She appears well-developed and well-nourished. No distress.   Neurological: She is alert and oriented to person, place, and time. She is not disoriented.   Psychiatric: She has a normal mood and affect.   Skin:   Areas Examined (abnormalities noted in diagram):   Scalp / Hair Palpated and Inspected  Head / Face Inspection Performed  Neck Inspection Performed  Chest / Axilla Inspection Performed  Back Inspection Performed  RUE Inspected  LUE Inspection Performed                 Diagram Legend     Erythematous scaling macule/papule c/w actinic keratosis       Vascular papule c/w angioma      Pigmented verrucoid papule/plaque c/w seborrheic keratosis      Yellow umbilicated papule c/w sebaceous hyperplasia      Irregularly shaped tan macule c/w lentigo     1-2 mm  smooth white papules consistent with Milia      Movable subcutaneous cyst with punctum c/w epidermal inclusion cyst      Subcutaneous movable cyst c/w pilar cyst      Firm pink to brown papule c/w dermatofibroma      Pedunculated fleshy papule(s) c/w skin tag(s)      Evenly pigmented macule c/w junctional nevus     Mildly variegated pigmented, slightly irregular-bordered macule c/w mildly atypical nevus      Flesh colored to evenly pigmented papule c/w intradermal nevus       Pink pearly papule/plaque c/w basal cell carcinoma      Erythematous hyperkeratotic cursted plaque c/w SCC      Surgical scar with no sign of skin cancer recurrence      Open and closed comedones      Inflammatory papules and pustules      Verrucoid papule consistent consistent with wart     Erythematous eczematous patches and plaques     Dystrophic onycholytic nail with subungual debris c/w onychomycosis     Umbilicated papule    Erythematous-base heme-crusted tan verrucoid plaque consistent with inflamed seborrheic keratosis     Erythematous Silvery Scaling Plaque c/w Psoriasis     See annotation      Assessment / Plan:        SK (seborrheic keratosis)  These are benign inherited growths without a malignant potential. Reassurance given to patient. No treatment is necessary.     Lentigo  This is a benign hyperpigmented sun induced lesion. Recommend daily sun protection/avoidance and use of at least SPF 30, broad spectrum sunscreen (OTC drug) will reduce the number of new lesions. Treatment of these benign lesions are considered cosmetic.    Sebaceous gland hyperplasia  This is a common condition representing benign enlargement of the sebaceous lobule. It typically occurs in adulthood. Reassurance given to patient.     History of nonmelanoma skin cancer  Area(s) of previous NMSC evaluated with no signs of recurrence.    Upper body skin examination performed today including at least 6 points as noted in physical examination. No lesions  suspicious for malignancy noted.    Recommend daily sun protection/avoidance and use of at least SPF 30, broad spectrum sunscreen (OTC drug).              Follow up in about 1 year (around 10/24/2025) for UBSE.

## 2024-10-25 ENCOUNTER — PATIENT MESSAGE (OUTPATIENT)
Dept: INTERNAL MEDICINE | Facility: CLINIC | Age: 64
End: 2024-10-25
Payer: COMMERCIAL

## 2024-11-21 ENCOUNTER — OFFICE VISIT (OUTPATIENT)
Dept: INTERNAL MEDICINE | Facility: CLINIC | Age: 64
End: 2024-11-21
Payer: COMMERCIAL

## 2024-11-21 VITALS
HEART RATE: 103 BPM | WEIGHT: 209.88 LBS | OXYGEN SATURATION: 99 % | SYSTOLIC BLOOD PRESSURE: 142 MMHG | BODY MASS INDEX: 33.88 KG/M2 | DIASTOLIC BLOOD PRESSURE: 88 MMHG

## 2024-11-21 DIAGNOSIS — H65.191 OTHER NON-RECURRENT ACUTE NONSUPPURATIVE OTITIS MEDIA OF RIGHT EAR: Primary | ICD-10-CM

## 2024-11-21 DIAGNOSIS — R05.1 ACUTE COUGH: ICD-10-CM

## 2024-11-21 DIAGNOSIS — J06.9 VIRAL UPPER RESPIRATORY ILLNESS: ICD-10-CM

## 2024-11-21 LAB
CTP QC/QA: YES
CTP QC/QA: YES
POC MOLECULAR INFLUENZA A AGN: NEGATIVE
POC MOLECULAR INFLUENZA B AGN: NEGATIVE
SARS-COV-2 RDRP RESP QL NAA+PROBE: NEGATIVE

## 2024-11-21 PROCEDURE — 99999 PR PBB SHADOW E&M-EST. PATIENT-LVL IV: CPT | Mod: PBBFAC,,,

## 2024-11-21 RX ORDER — AMOXICILLIN AND CLAVULANATE POTASSIUM 875; 125 MG/1; MG/1
1 TABLET, FILM COATED ORAL
Status: CANCELLED | OUTPATIENT
Start: 2024-11-21

## 2024-11-21 RX ORDER — PROMETHAZINE HYDROCHLORIDE AND DEXTROMETHORPHAN HYDROBROMIDE 6.25; 15 MG/5ML; MG/5ML
5 SYRUP ORAL EVERY 4 HOURS PRN
Qty: 118 ML | Refills: 0 | Status: SHIPPED | OUTPATIENT
Start: 2024-11-21 | End: 2024-12-01

## 2024-11-21 RX ORDER — AZITHROMYCIN 250 MG/1
TABLET, FILM COATED ORAL
Qty: 6 TABLET | Refills: 0 | Status: SHIPPED | OUTPATIENT
Start: 2024-11-21 | End: 2024-11-26

## 2024-11-21 RX ORDER — FLUTICASONE PROPIONATE 50 MCG
1 SPRAY, SUSPENSION (ML) NASAL DAILY
Qty: 15.8 ML | Refills: 0 | Status: CANCELLED | OUTPATIENT
Start: 2024-11-21

## 2024-11-21 NOTE — PATIENT INSTRUCTIONS
Promethazine DM as needed for cough.  Continue Flonase  Recommend over the counter oral antihistamine (zyrtec, allegra, claritin) or nasal decongestant (Coricidin HB cough and cold) to help with congestion and sinus pressure.  If not allergic, take Tylenol (Acetaminophen) 650 mg to  1 g every 6 hours as needed for fever and/or Motrin (Ibuprofen) 600 to 800 mg every 6 hours as needed for fever as directed for control of pain and/or fever  Please drink plenty of fluids.  Please get plenty of rest.  Nasal irrigation with a saline spray or Netti Pot like device per their directions is also recommended.  If you smoke, please stop smoking.

## 2024-11-21 NOTE — PROGRESS NOTES
INTERNAL MEDICINE URGENT CARE NOTE    CHIEF COMPLAINT     No chief complaint on file.      JOSHUA Posey is a 64 y.o. female with invasive ductal carcinoma of the left breast and atypical ductal hyperplasia of the right breast status post bilateral mastectomies, migraine headaches, rheumatoid arthritis, irritable bowel syndrome, ischemic colitis, colon polyps, hypertension and history of squamous cell cancer who presents for an urgent care visit today.     Last Saturday Ms. Siegel developed a postnasal drip, by Tuesday she developed pain in her right ear and a mild sore throat (resolved). She presents today with right ear pain (4/10, increased pressure in ear when bending forward, Mild alleviation with heat packs), rhinorrhea, congestion, sinus pressure, ocular burning, and productive cough (onset yesterday, quarter sized yellow phlegm every 1/2 hour).  Symptoms have progressively worsened. Mild alleviation with Benadryl, Ibuprofen, Flonase, Afrin, and  Sudafed.     Denies fever, chills, fatigue, SOB, sinus pain, purulent nasal discharge, facial pain, headache, CP, neck stiffness, or rash.    Past Medical History:  Past Medical History:   Diagnosis Date    Allergy     BRCA1 negative 2022    BRCA2 negative 2022    Breast cancer 05/2016    Left breast low grade DCIS, ER/MA positive    Colon polyp 10/31/2018    Ischemic colitis suspected as well ( area biopsied).    GERD (gastroesophageal reflux disease)     Hiatal hernia     Hyperlipidemia     Irritable bowel syndrome     Migraine headache     PONV (postoperative nausea and vomiting)     RA (rheumatoid arthritis)     Squamous cell carcinoma excised 11/12/14    in situ, R forearm       Home Medications:  Prior to Admission medications    Medication Sig Start Date End Date Taking? Authorizing Provider   albuterol (VENTOLIN HFA) 90 mcg/actuation inhaler Inhale 2 puffs into the lungs every 6 (six) hours as needed for Wheezing. Rescue 10/28/22   Blane Brian MD    ALPRAZolam (XANAX) 0.25 MG tablet Take 1 tablet (0.25 mg total) by mouth nightly as needed for Insomnia. 11/16/18   Blane Brian MD   baclofen (LIORESAL) 10 MG tablet Take 1 tablet (10 mg total) by mouth 3 (three) times daily as needed. 5/5/23 6/4/23  Marilyn Lua MD   butalbital-acetaminophen-caffeine -40 mg (FIORICET, ESGIC) -40 mg per tablet Take 1 tablet by mouth every 6 to 8 hours as needed. 6/18/15   Blane Brian MD   cholestyramine (QUESTRAN) 4 gram packet Take 1 packet by mouth once daily. 5/21/22   Provider, Historical   diclofenac sodium (VOLTAREN) 1 % Gel APPLY 2 GRAMS TOPICALLY 3 (THREE) TIMES DAILY. 9/10/20   Blane Brian MD   gabapentin (NEURONTIN) 800 MG tablet Take 800 mg by mouth 3 (three) times daily. 1/28/20   Provider, Historical   lansoprazole (PREVACID) 30 MG capsule Take 30 mg by mouth every morning. 1 Capsule, Delayed Release(E.C.) Oral Every day    Provider, Historical   losartan (COZAAR) 25 MG tablet TAKE 1 TABLET BY MOUTH EVERY DAY 1/25/24   Blane Brian MD   multivitamin (THERAGRAN) per tablet Take 1 tablet by mouth once daily.    Provider, Historical   ondansetron (ZOFRAN) 4 MG tablet Take 1 tablet (4 mg total) by mouth every 8 (eight) hours as needed for Nausea. 1 Tablet Oral Every 6 hours 4/27/15   Seth Thorpe MD   sumatriptan (IMITREX) 20 mg/actuation nasal spray USE 1 SPRAY IN EACH NOSTRIL AT ONSET OF HEADACHE. MAY REPEAT ONCE IN 2 HOURS IF NO RELIEF. NO MORE THAN 2 SPRAYS PER 24 HOURS 5/29/15   Seth Thorpe MD   triamcinolone acetonide 0.025% (KENALOG) 0.025 % Oint Apply topically 2 (two) times daily. x 1-2 wks then prn flares only 5/11/22   Leidy Marrufo MD   vitamin D 1000 units Tab Take 185 mg by mouth every morning.     Provider, Historical       Review of Systems:  Review of Systems   Constitutional:  Negative for chills and fever.   HENT:  Positive for congestion, ear pain, hearing loss (blocked in right ear), postnasal drip,  rhinorrhea and sinus pressure. Negative for ear discharge, sinus pain, sore throat and trouble swallowing.    Eyes:  Negative for visual disturbance.   Respiratory:  Positive for cough. Negative for shortness of breath and wheezing.    Cardiovascular:  Negative for chest pain, palpitations and leg swelling.   Gastrointestinal:  Negative for abdominal pain, constipation, diarrhea, nausea and vomiting.   Genitourinary:  Negative for dysuria, frequency and urgency.   Musculoskeletal:  Negative for joint swelling, myalgias and neck pain.   Skin:  Negative for rash.   Neurological:  Negative for dizziness, light-headedness and headaches.       Health Maintainence:   Immunizations:  Health Maintenance         Date Due Completion Date    Pneumococcal Vaccines (Age 0-64) (1 of 2 - PCV) Never done ---    Shingles Vaccine (1 of 2) Never done ---    Hemoglobin A1c (Diabetic Prevention Screening) Never done ---    RSV Vaccine (Age 60+ and Pregnant patients) (1 - Risk 60-74 years 1-dose series) Never done ---    COVID-19 Vaccine (3 - Moderna risk series) 04/04/2022 3/7/2022    Mammogram 09/28/2023 9/28/2022    Influenza Vaccine (1) 09/01/2024 12/29/2022 (Declined)    Override on 12/29/2022: Declined (Declined for season)    Override on 11/26/2019: Declined    TETANUS VACCINE 10/06/2024 10/6/2014    DEXA Scan 02/01/2025 2/1/2023    Lipid Panel 01/03/2025 1/3/2024    Colorectal Cancer Screening 01/25/2029 1/25/2024             PHYSICAL EXAM     Physical Exam  Constitutional:       Appearance: She is well-developed.   HENT:      Head: Normocephalic and atraumatic.      Right Ear: Decreased hearing noted. No mastoid tenderness. Tympanic membrane is injected, erythematous and bulging.      Left Ear: Tympanic membrane, ear canal and external ear normal.      Nose: Nose normal.      Right Sinus: No maxillary sinus tenderness or frontal sinus tenderness.      Left Sinus: No maxillary sinus tenderness or frontal sinus tenderness.       "Comments: Erythematous nasal turbinates     Mouth/Throat:      Comments: posterior oropharyngeal erythema  Eyes:      Pupils: Pupils are equal, round, and reactive to light.   Cardiovascular:      Rate and Rhythm: Normal rate and regular rhythm.      Heart sounds: Normal heart sounds. No murmur heard.  Pulmonary:      Effort: Pulmonary effort is normal.   Neurological:      Mental Status: She is alert and oriented to person, place, and time.         LABS     No results found for: "LABA1C", "HGBA1C"  CMP  Sodium   Date Value Ref Range Status   09/26/2024 145 136 - 145 mmol/L Final     Potassium   Date Value Ref Range Status   09/26/2024 4.1 3.5 - 5.1 mmol/L Final     Chloride   Date Value Ref Range Status   09/26/2024 107 95 - 110 mmol/L Final     CO2   Date Value Ref Range Status   09/26/2024 28 23 - 29 mmol/L Final     Glucose   Date Value Ref Range Status   09/26/2024 93 70 - 110 mg/dL Final     BUN   Date Value Ref Range Status   09/26/2024 12 8 - 23 mg/dL Final     Creatinine   Date Value Ref Range Status   09/26/2024 1.1 0.5 - 1.4 mg/dL Final     Calcium   Date Value Ref Range Status   09/26/2024 9.6 8.7 - 10.5 mg/dL Final     Total Protein   Date Value Ref Range Status   09/26/2024 7.3 6.0 - 8.4 g/dL Final     Albumin   Date Value Ref Range Status   09/26/2024 3.6 3.5 - 5.2 g/dL Final     Total Bilirubin   Date Value Ref Range Status   09/26/2024 0.5 0.1 - 1.0 mg/dL Final     Comment:     For infants and newborns, interpretation of results should be based  on gestational age, weight and in agreement with clinical  observations.    Premature Infant recommended reference ranges:  Up to 24 hours.............<8.0 mg/dL  Up to 48 hours............<12.0 mg/dL  3-5 days..................<15.0 mg/dL  6-29 days.................<15.0 mg/dL       Alkaline Phosphatase   Date Value Ref Range Status   09/26/2024 95 55 - 135 U/L Final     AST   Date Value Ref Range Status   09/26/2024 20 10 - 40 U/L Final     ALT   Date " Value Ref Range Status   09/26/2024 23 10 - 44 U/L Final     Anion Gap   Date Value Ref Range Status   09/26/2024 10 8 - 16 mmol/L Final     eGFR if    Date Value Ref Range Status   06/23/2022 >60.0 >60 mL/min/1.73 m^2 Final     eGFR if non    Date Value Ref Range Status   06/23/2022 54.7 (A) >60 mL/min/1.73 m^2 Final     Comment:     Calculation used to obtain the estimated glomerular filtration  rate (eGFR) is the CKD-EPI equation.        Lab Results   Component Value Date    WBC 7.69 09/26/2024    HGB 12.4 09/26/2024    HCT 39.7 09/26/2024    MCV 89 09/26/2024     09/26/2024     Lab Results   Component Value Date    CHOL 200 (H) 01/03/2024    CHOL 255 (H) 12/29/2022    CHOL 253 (H) 11/15/2021     Lab Results   Component Value Date    HDL 47 01/03/2024    HDL 74 12/29/2022    HDL 62 11/15/2021     Lab Results   Component Value Date    LDLCALC 105.4 01/03/2024    LDLCALC 151.4 12/29/2022    LDLCALC 162.4 (H) 11/15/2021     Lab Results   Component Value Date    TRIG 238 (H) 01/03/2024    TRIG 148 12/29/2022    TRIG 143 11/15/2021     Lab Results   Component Value Date    CHOLHDL 23.5 01/03/2024    CHOLHDL 29.0 12/29/2022    CHOLHDL 24.5 11/15/2021     Lab Results   Component Value Date    TSH 2.785 01/03/2024    A6UUQMI 142 04/27/2015    T0ZTJSN 7.8 04/27/2015       ASSESSMENT/PLAN     Christal Posey is a 64 y.o. female     Patient reports she is able take a z-pack despite stated Erythromycin allergy. She has completed the prescription several times in the past with no problems. Most recent 10/2021 prescribed by Dr. Brian. Advised patient not to take Zofran when taking Promethazine. Pt acknowledged understanding.     Other non-recurrent acute nonsuppurative otitis media of right ear  -     azithromycin (Z-SAMINA) 250 MG tablet; Take 2 tablets by mouth on day 1; Take 1 tablet by mouth on days 2-5    Viral upper respiratory illness  -     promethazine-dextromethorphan  (PROMETHAZINE-DM) 6.25-15 mg/5 mL Syrp; Take 5 mLs by mouth every 4 (four) hours as needed (as needed for cough).  -     POCT Influenza A/B Molecular  -     POCT COVID-19 Rapid Screening  Recommend over the counter oral antihistamine (zyrtec, allegra, claritin) + nasal decongestant (Coricidin HBP cough and cold) to help with congestion and sinus pressure.  Continue Flonase  If not allergic, take Tylenol (Acetaminophen) 650 mg to  1 g every 6 hours as needed for fever and/or Motrin (Ibuprofen) 600 to 800 mg every 6 hours as needed for fever as directed for control of pain and/or fever  Please drink plenty of fluids.  Please get plenty of rest.  Nasal irrigation with a saline spray or Netti Pot like device per their directions is also recommended.  If you smoke, please stop smoking.    Acute cough  -     POCT Influenza A/B Molecular  -     POCT COVID-19 Rapid Screening    Patient education provided from [facility]. Patient was counseled on when and how to seek emergent care.       Zenaida CRUZ, APRN, FNP-c   Department of Internal Medicine - [facility]  1:46 PM    Answers submitted by the patient for this visit:  Ear Pain Questionnaire (Submitted on 11/21/2024)  Chief Complaint: Ear pain  Affected ear: right  Chronicity: new  Onset: in the past 7 days  Progression since onset: unchanged  Frequency: constantly  Fever: no fever  Pain - numeric: 2/10  drainage: No  Treatments tried: NSAIDs, heat packs  Improvement on treatment: no relief  Pain severity: mild  chronic ear infection: No  hearing loss: No  tympanostomy tube: No

## 2024-12-04 VITALS — SYSTOLIC BLOOD PRESSURE: 122 MMHG | DIASTOLIC BLOOD PRESSURE: 69 MMHG

## 2024-12-13 ENCOUNTER — OFFICE VISIT (OUTPATIENT)
Dept: HEMATOLOGY/ONCOLOGY | Facility: CLINIC | Age: 64
End: 2024-12-13
Payer: COMMERCIAL

## 2024-12-13 ENCOUNTER — LAB VISIT (OUTPATIENT)
Dept: LAB | Facility: HOSPITAL | Age: 64
End: 2024-12-13
Payer: COMMERCIAL

## 2024-12-13 VITALS
HEART RATE: 78 BPM | DIASTOLIC BLOOD PRESSURE: 73 MMHG | HEIGHT: 66 IN | TEMPERATURE: 97 F | SYSTOLIC BLOOD PRESSURE: 157 MMHG | WEIGHT: 209 LBS | BODY MASS INDEX: 33.59 KG/M2 | RESPIRATION RATE: 17 BRPM | OXYGEN SATURATION: 98 %

## 2024-12-13 DIAGNOSIS — C50.312 MALIGNANT NEOPLASM OF LOWER-INNER QUADRANT OF LEFT BREAST IN FEMALE, ESTROGEN RECEPTOR POSITIVE: ICD-10-CM

## 2024-12-13 DIAGNOSIS — C50.312 MALIGNANT NEOPLASM OF LOWER-INNER QUADRANT OF LEFT BREAST IN FEMALE, ESTROGEN RECEPTOR POSITIVE: Primary | ICD-10-CM

## 2024-12-13 DIAGNOSIS — Z17.0 MALIGNANT NEOPLASM OF LOWER-INNER QUADRANT OF LEFT BREAST IN FEMALE, ESTROGEN RECEPTOR POSITIVE: ICD-10-CM

## 2024-12-13 DIAGNOSIS — E55.9 VITAMIN D DEFICIENCY: ICD-10-CM

## 2024-12-13 DIAGNOSIS — Z17.0 MALIGNANT NEOPLASM OF LOWER-INNER QUADRANT OF LEFT BREAST IN FEMALE, ESTROGEN RECEPTOR POSITIVE: Primary | ICD-10-CM

## 2024-12-13 DIAGNOSIS — Z90.13 H/O BILATERAL MASTECTOMY: ICD-10-CM

## 2024-12-13 LAB
ALBUMIN SERPL BCP-MCNC: 3.6 G/DL (ref 3.5–5.2)
ALP SERPL-CCNC: 96 U/L (ref 40–150)
ALT SERPL W/O P-5'-P-CCNC: 19 U/L (ref 10–44)
ANION GAP SERPL CALC-SCNC: 8 MMOL/L (ref 8–16)
AST SERPL-CCNC: 16 U/L (ref 10–40)
BILIRUB SERPL-MCNC: 0.5 MG/DL (ref 0.1–1)
BUN SERPL-MCNC: 10 MG/DL (ref 8–23)
CALCIUM SERPL-MCNC: 9.3 MG/DL (ref 8.7–10.5)
CHLORIDE SERPL-SCNC: 105 MMOL/L (ref 95–110)
CO2 SERPL-SCNC: 28 MMOL/L (ref 23–29)
CREAT SERPL-MCNC: 1.1 MG/DL (ref 0.5–1.4)
ERYTHROCYTE [DISTWIDTH] IN BLOOD BY AUTOMATED COUNT: 13.7 % (ref 11.5–14.5)
EST. GFR  (NO RACE VARIABLE): 56.8 ML/MIN/1.73 M^2
GLUCOSE SERPL-MCNC: 92 MG/DL (ref 70–110)
HCT VFR BLD AUTO: 40.6 % (ref 37–48.5)
HGB BLD-MCNC: 12.6 G/DL (ref 12–16)
IMM GRANULOCYTES # BLD AUTO: 0.03 K/UL (ref 0–0.04)
MCH RBC QN AUTO: 28.2 PG (ref 27–31)
MCHC RBC AUTO-ENTMCNC: 31 G/DL (ref 32–36)
MCV RBC AUTO: 91 FL (ref 82–98)
NEUTROPHILS # BLD AUTO: 4.1 K/UL (ref 1.8–7.7)
PLATELET # BLD AUTO: 321 K/UL (ref 150–450)
PMV BLD AUTO: 10.4 FL (ref 9.2–12.9)
POTASSIUM SERPL-SCNC: 4 MMOL/L (ref 3.5–5.1)
PROT SERPL-MCNC: 7.2 G/DL (ref 6–8.4)
RBC # BLD AUTO: 4.47 M/UL (ref 4–5.4)
SODIUM SERPL-SCNC: 141 MMOL/L (ref 136–145)
WBC # BLD AUTO: 7.04 K/UL (ref 3.9–12.7)

## 2024-12-13 PROCEDURE — 85027 COMPLETE CBC AUTOMATED: CPT | Performed by: NURSE PRACTITIONER

## 2024-12-13 PROCEDURE — 80053 COMPREHEN METABOLIC PANEL: CPT | Performed by: NURSE PRACTITIONER

## 2024-12-13 PROCEDURE — 99999 PR PBB SHADOW E&M-EST. PATIENT-LVL IV: CPT | Mod: PBBFAC,,, | Performed by: NURSE PRACTITIONER

## 2024-12-13 PROCEDURE — 36415 COLL VENOUS BLD VENIPUNCTURE: CPT | Performed by: NURSE PRACTITIONER

## 2024-12-13 NOTE — PROGRESS NOTES
Subjective     Patient ID: Christal Posey is a 64 y.o. female.    Chief Complaint: Malignant neoplasm of lower-inner quadrant of left breast i    HPI    Overall feels well  No complaints today  No pain issues except occasional sting and itch in the breast  Appetite good and weight stable.   No n/v  Bowels normal.  Stays active- cuts her own grass.     Colonoscopy done last year. [facility]    Oncology History:  - 8/12/2022 Breast MRI:  Findings:  Left  Numerous scattered foci and small masses are present, most of which appear similar to eachother.    There is an 8 mm x 7 mm x 7 mm irregularly shaped, heterogeneous mass seen in the left breast at 6 o'clock, 2.4 cm from the nipple and 8 cm from the chest wall. Delayed phase is washout.   There is a 9 mm x 7 mm x 7 mm irregularly shaped, homogeneous mass with irregular margins seen in the upper inner quadrant of the right breast, 2.8 cm from the nipple and 6.7 cm from the chest wall. Delayed phase is plateau. This is best seen on Series 90603: Image 50.  This is approximately 3 cm anterior and superior to the signal void related to the 9 o'clock biopsy marker.   There is no suspicious enhancement associated with either biopsy marker.   Right  Numerous scattered foci and small masses are present, most of which appear similar to eachother.    There is a 6 mm oval, homogeneous mass with circumscribed margins seen in the lower central region of the right breast, 2.3 cm from the nipple and 9.5 cm from the chest wall. Delayed phase is washout. This is best seen in Series 20847: Image 70.   There is no internal mammary or axillary adenopathy.  Impression:  Left  Mass: Left breast 9 mm x 7 mm x 7 mm mass at the upper inner position. Assessment: 4 - Suspicious finding. Biopsy is recommended.   Mass: Left breast 8 mm x 7 mm x 7 mm mass at the 6 o'clock position. Assessment: 4 - Suspicious finding. Biopsy is recommended.   There is no suspicious enhancement associated with either  biopsy marker.   Right  Mass: Right breast  6 mm mass at the lower central position. Assessment: 4 - Suspicious finding. Biopsy is recommended.   BI-RADS Category:   Overall: 4 - Suspicious     - 9/28/2022 MRI guided breast biopsy:  BREAST, RIGHT, LOWER CENTRAL MASS, BIOPSY:   - Intraductal papilloma, benign.   - Benign breast tissue with predominantly fatty stroma and blood clot.   - Microcalcifications: Focally seen in association with benign breast ducts.   - No atypia or malignancy.   - Additional deeper sections have been examined.      - 10/24/2022 Bilateral Mastectomy:  1. BREAST, RIGHT, MASTECTOMY:   - Negative for malignancy.   - Atypical ductal hyperplasia (ADH), focal.   - No residual intraductal papilloma present.   - Previous biopsy site changes and biopsy clip.   - Benign subareolar nipple ducts.   - One benign intramammary lymph node.   2. AXILLARY SENTINEL LYMPH NODE, LEFT, # 1, HOT BLUE 1451, EXCISION:   - One benign axillary sentinel lymph node, negative for metastatic carcinoma   (0/1).   - Immunohistochemical stains for CK WSK, Cam 5.2, and CK AE/1/AE3 are   negative, supporting the diagnosis.   3. BREAST, LEFT, NIPPLE SPARING MASTECTOMY:   - Invasive ductal carcinoma of breast with extensive intraductal component,   see Tumor Synoptic.   - Size of invasive carcinoma: 9 MM (measured histologically on slide 3L).   - [place] Histologic Score: Grade 1 of 3.                     Tubule Formation:  2                     Nuclear Pleomorphism:  2                     Mitotic Activity:  1   - Associated (DCIS), intermediate nuclear grade, cribriform type, with   central necrosis.   - Size of DCIS: at least 10 MM.   - No lymphovascular invasion.   - Margins:   ·tab Invasive carcinoma and DCIS are greater than 10 MM from all margins.   - Benign subareolar nipple ducts.   - Both biopsy clips are identified and corresponding areas sampled.   - Microcalcifications: Seen in association with benign breast  ducts.   - Pathologic staging: pT1b (sn)N0   4. BREAST, LEFT, ADDITIONAL INFERIOR LATERAL MARGIN, NIPPLE SPARING   MASTECTOMY:   - Negative for atypia or malignancy.   - Benign breast tissue with predominantly fatty stroma.   5. BREAST, LEFT, NEW SUPERIOR MARGIN, NIPPLE SPARING MASTECTOMY:   - Negative for atypia or malignancy.   - Benign breast tissue with predominantly fatty stroma.       Previously, required implants out due to infection and contracture  - 11/28/2022   Diagnosis:  Preoperative diagnosis cellulitis and capsular contracture of the right breast  Procedure performed  1. Bilateral removal of implants (tissue expanders)   2. Total bilateral capsulectomy  3. Wide excision soft tissue of right axillary region measuring 16 cm x 6 cm with layered closure final incision 16 cm  4. Wide excision soft tissue of the left axillary region measuring 16 cm x 6 cm with layered closure final incision 16 cm  Reports concerned as asked for nipples to be removed as she did not plan for additional reconstruction and this was not done     Prior history of DCIS:  Monitored by surveillance- opted out of adjuvant endocrine with low volume disease  DCIS History:  5/16/16 Screening mammography:  Calcifications in the left breast require additional evaluation.    ACR BI-RADS Category 0: Incomplete: Need Additional Imaging Evaluation  - 5/17/16 Diagnostic mammogram  Calcifications in the left breast are suspicious.  Histology using core biopsy  is recommended.  ACR BI-RADS Category 4: Suspicious Abnormality  - 5/20/16 biopsy  1, 2. LEFT BREAST, WITH CALCIFICATIONS AND WITHOUT CALCIFICATIONS, UPPER INNER QUADRANT  (NEEDLE BIOPSY):  -Low-grade ductal carcinoma in situ (DCIS)  -Nuclear grade 1 out of 3  -Cribriform pattern  -Associated with microcalcifications  %, % positive  - 6/10/2016 Lumpectomy  Pathology:  Procedure - Excision with wire guided localization  Lymph node sampling - Not sampled  Specimen laterality -  Left  Tumor site - Not specified  Size of DCIS - 2mm, including findings from the biopsy report  Histologic type - Ductal carcinoma in situ  Architectural pattern - Solid and cribriforming  Nuclear grade - Low  Necrosis - Absent  Margins - Negative, inferior is 2mm  Pathologc staging - p Tis Nx Mx  Hormone receptors (See Breast Biopsy report; MS16 - 49266)  Estrogen receptor - Positive, strong, 100%  Progesterone receptors - Positive, strong, 100%     SUPPLEMENTAL #1:   HER2 AMPLIFICATION ASSOCIATED WITH BREAST CANCER, FISH, TISSUE:   Result Summary   Negative         Started tamoxifen 11/18/2022  Unable to tolerate tamoxifen   Opts out      reported bone pain.   5/17/2023 Bone scan:  FINDINGS:  No focal pathologic uptake in the spine. Questionable mild focal uptake in a right lower lateral rib.  Uptake at the knees and left foot is likely degenerative.  There is physiologic distribution of the radiopharmaceutical throughout the skeleton.  There is normal uptake in the genitourinary system and soft tissues.  Impression:  Questionable mild focal uptake in a right lower lateral rib.  Consider CT of the chest to evaluate for fracture or metastatic lesion.     This result led to ordering of CT scan:     5/26/2023 CT Chest:  Impression:  1. There is been an interval bilateral mastectomy.  There is soft tissue thickening in the mastectomy bed.  On the right side, there is a finger-like projection of soft tissue up to 16 mm in length.  This may merely represent scarring.  However, comparison should be made with other CTs of the chest which may exist at other institutions.  If these do not exist, follow-up CT enhance with contrast recommended.  2. There are few stable left upper lobe pulmonary micro nodules (axial series 5, images 106, 175, and 212).  3. A rib fracture was not identified.  Additionally, there is no specific CT evidence of osteolytic or osteoblastic bone disease.  However, if there is a continued suspicion  for metastatic bone disease, consider nuclear medicine scan.     Stopped tamoxifen 12/2023 due to rash        Additional PMH:  Bilateral cataract surgery     FH:  No new cancers    Review of Systems   Constitutional:         See above   All other systems reviewed and are negative.         Objective     Physical Exam  Vitals and nursing note reviewed.   Constitutional:       General: She is not in acute distress.     Appearance: Normal appearance. She is well-developed and normal weight. She is not ill-appearing.      Comments: Presents alone  Very pleasant   HENT:      Head: Normocephalic and atraumatic.   Eyes:      General: Lids are normal. No scleral icterus.     Extraocular Movements: Extraocular movements intact.      Conjunctiva/sclera: Conjunctivae normal.      Pupils: Pupils are equal, round, and reactive to light.   Neck:      Thyroid: No thyromegaly.      Vascular: No JVD.      Trachea: Trachea normal.   Cardiovascular:      Rate and Rhythm: Normal rate and regular rhythm.      Heart sounds: Normal heart sounds. No murmur heard.     No friction rub. No gallop.   Pulmonary:      Effort: Pulmonary effort is normal. No respiratory distress.      Breath sounds: Normal breath sounds. No wheezing, rhonchi or rales.   Chest:      Chest wall: No tenderness.      Comments: Bilateral mastectomies. Chest wall negative. Nipples remain.   Incisions healed. No LAD  Abdominal:      General: Bowel sounds are normal. There is no distension.      Palpations: Abdomen is soft. There is no mass.      Tenderness: There is no abdominal tenderness. There is no guarding or rebound.      Comments: No organomegaly.    Musculoskeletal:         General: Normal range of motion.      Cervical back: Normal range of motion and neck supple.      Comments: No spinal or paraspinal tenderness.    Lymphadenopathy:      Head:      Right side of head: No submental or submandibular adenopathy.      Left side of head: No submental or submandibular  adenopathy.      Cervical: No cervical adenopathy.      Upper Body:      Right upper body: No supraclavicular or axillary adenopathy.      Left upper body: No supraclavicular or axillary adenopathy.   Skin:     General: Skin is warm and dry.      Capillary Refill: Capillary refill takes less than 2 seconds.      Coloration: Skin is not jaundiced.      Findings: No bruising or rash.      Nails: There is no clubbing.   Neurological:      Mental Status: She is alert and oriented to person, place, and time. Mental status is at baseline.      Motor: No weakness.      Gait: Gait normal.   Psychiatric:         Mood and Affect: Mood normal.         Speech: Speech normal.         Behavior: Behavior normal.         Thought Content: Thought content normal.         Judgment: Judgment normal.       Labs- reviewed     Assessment and Plan     1. Malignant neoplasm of lower-inner quadrant of left breast in female, estrogen receptor positive    2. H/O bilateral mastectomy    3. Vitamin D deficiency          Clinically negative  Opts out of endocrine therapy  Continue Vit D 3000u daily.   RTC 3 months  Knows to call for any issues  Colonoscopy UTD- she had in 2023 at [facility]  Labs pending. No labs with next visit      Route Chart for Scheduling    Med Onc Chart Routing      Follow up with physician    Follow up with [facility] . See me 3-4 months- no labs   Infusion scheduling note    Injection scheduling note    Labs    Imaging    Pharmacy appointment    Other referrals                       Patient is in agreement with the proposed treatment plan. All questions were answered to the patient's satisfaction. Pt knows to call clinic for any new or worsening symptoms and if anything is needed before the next clinic visit.    Brijesh Zimmer, MSN, APRN, FNP-C  Nurse Practitioner to Dr. Latanya Julian  [facility]  [facility]  Hematology & Medical Oncology  [address] [place] [id number]  ph.  [phone] ext [phone]  Fax. [phone]              30 minutes of total time spent on the encounter, which includes face to face time and non-face to face time preparing to see the patient (eg, review of tests), Obtaining and/or reviewing separately obtained history, Documenting clinical information in the electronic or other health record, Independently interpreting results (not separately reported) and communicating results to the patient/family/caregiver, or Care coordination (not separately reported).

## 2024-12-18 ENCOUNTER — OFFICE VISIT (OUTPATIENT)
Dept: SURGERY | Facility: CLINIC | Age: 64
End: 2024-12-18
Payer: COMMERCIAL

## 2024-12-18 VITALS
HEIGHT: 66 IN | SYSTOLIC BLOOD PRESSURE: 151 MMHG | DIASTOLIC BLOOD PRESSURE: 90 MMHG | HEART RATE: 102 BPM | BODY MASS INDEX: 33.47 KG/M2 | WEIGHT: 208.25 LBS | OXYGEN SATURATION: 97 %

## 2024-12-18 DIAGNOSIS — N63.20 MASS OF LEFT BREAST, UNSPECIFIED QUADRANT: ICD-10-CM

## 2024-12-18 DIAGNOSIS — Z08 ENCOUNTER FOR FOLLOW-UP SURVEILLANCE OF BREAST CANCER: Primary | ICD-10-CM

## 2024-12-18 DIAGNOSIS — Z85.3 ENCOUNTER FOR FOLLOW-UP SURVEILLANCE OF BREAST CANCER: Primary | ICD-10-CM

## 2024-12-18 PROCEDURE — 1159F MED LIST DOCD IN RCRD: CPT | Mod: CPTII,S$GLB,, | Performed by: SURGERY

## 2024-12-18 PROCEDURE — 99212 OFFICE O/P EST SF 10 MIN: CPT | Mod: S$GLB,,, | Performed by: SURGERY

## 2024-12-18 PROCEDURE — 3008F BODY MASS INDEX DOCD: CPT | Mod: CPTII,S$GLB,, | Performed by: SURGERY

## 2024-12-18 PROCEDURE — 99999 PR PBB SHADOW E&M-EST. PATIENT-LVL III: CPT | Mod: PBBFAC,,, | Performed by: SURGERY

## 2024-12-18 PROCEDURE — 1160F RVW MEDS BY RX/DR IN RCRD: CPT | Mod: CPTII,S$GLB,, | Performed by: SURGERY

## 2024-12-18 PROCEDURE — 4010F ACE/ARB THERAPY RXD/TAKEN: CPT | Mod: CPTII,S$GLB,, | Performed by: SURGERY

## 2024-12-18 PROCEDURE — 3077F SYST BP >= 140 MM HG: CPT | Mod: CPTII,S$GLB,, | Performed by: SURGERY

## 2024-12-18 PROCEDURE — 3080F DIAST BP >= 90 MM HG: CPT | Mod: CPTII,S$GLB,, | Performed by: SURGERY

## 2024-12-18 RX ORDER — METHYLPREDNISOLONE 4 MG/1
4 TABLET ORAL DAILY
COMMUNITY

## 2024-12-19 ENCOUNTER — TELEPHONE (OUTPATIENT)
Dept: RADIOLOGY | Facility: HOSPITAL | Age: 64
End: 2024-12-19
Payer: COMMERCIAL

## 2024-12-19 NOTE — TELEPHONE ENCOUNTER
----- Message from JUSTEN Jackson sent at 12/18/2024  4:24 PM CST -----  Regarding: US  Hey   Can you get this patient scheduled for US ASAP?  Thanks   Olivia

## 2024-12-20 NOTE — PROGRESS NOTES
"Date of Service: 12/18/2024    DIAGNOSIS:   This is a 64 y.o. female with a history of pT1b (sn)N0  grade 1 ER pos [place] neg HER2 equivocal, FISH negative IDC of the left breast.    TREATMENT:   Bilateral mastectomy with left sentinel node biopsy on 10/24/2022. Diana Jama M.D. Surgical Oncology  After initial bilateral nipple sparing mastectomy with tissue expander reconstruction she had unfortunately infection requiring explant of the tissue expanders.   Adjuvant endocrine therapy, on break because rash with Tamoxifen Latanya Julian M.D. Medical Oncology     HISTORY OF PRESENT ILLNESS:   Christal Posey is a 64 y.o. female comes in for oncological follow up and follow up on recent breast imaging.  She denies change in her chest wall self-exam specifically denying new masses, skin or nipple changes, or nipple discharge. Past medical and surgical history is updated with no new changes.     IMAGING:   None    PHYSICAL EXAM:   BP (!) 151/90 (BP Location: Right arm, Patient Position: Sitting)   Pulse 102   Ht 5' 6" (1.676 m)   Wt 94.4 kg (208 lb 3.6 oz)   LMP  (LMP Unknown)   SpO2 97%   BMI 33.61 kg/m²   General: The patient appears well and is in no acute distress.   Neuro: Alert & oriented x3.   Breasts: The exam was done with the patient seated and supine. Left breast -status post bilateral mastectomy with nipple preservation but without reconstruction at this time otherwise within normal limits.  Palpable firm area just lateral to areola. No abnormal skin or nipple findings. No supraclavicular or axillary lymphadenopathy on the left side.   Right breast - status post bilateral mastectomy with nipple preservation but without reconstruction at this time otherwise within normal limits. No palpable masses and no abnormal skin or nipple findings. No supraclavicular or axillary lymphadenopathy on the right side.  Extremities:  No evidence of lymphedema    ASSESSMENT:   This is a 64 y.o. female without evidence of " recurrence by exam, or history. Firm palpable gee lateral to left nipple likely scar tissue but recommend diagnostic workup.      PLAN:   Limited US of the left chest wall.   If no concerning findings then return in 1 year for clinical breast exam.   Return to clinic in 1 year for clinical breast exam. The patient is in agreement with the plan. Questions were encouraged and answered to patient's satisfaction. She will call our office with any questions or concerns.       20 minutes were spent on this encounter, 15 of which was face to face counseling and 5 minutes were spent on chart review and coordination of care.

## 2024-12-31 ENCOUNTER — HOSPITAL ENCOUNTER (OUTPATIENT)
Dept: RADIOLOGY | Facility: HOSPITAL | Age: 64
Discharge: HOME | End: 2024-12-31
Attending: SURGERY
Payer: COMMERCIAL

## 2024-12-31 DIAGNOSIS — N63.20 MASS OF LEFT BREAST, UNSPECIFIED QUADRANT: ICD-10-CM

## 2024-12-31 PROCEDURE — 76642 ULTRASOUND BREAST LIMITED: CPT | Mod: TC,LT

## 2024-12-31 PROCEDURE — 76642 ULTRASOUND BREAST LIMITED: CPT | Mod: 26,LT,, | Performed by: RADIOLOGY

## 2025-02-11 ENCOUNTER — PATIENT MESSAGE (OUTPATIENT)
Dept: INTERNAL MEDICINE | Facility: CLINIC | Age: 65
End: 2025-02-11

## 2025-02-11 ENCOUNTER — HOSPITAL ENCOUNTER (OUTPATIENT)
Dept: RADIOLOGY | Facility: HOSPITAL | Age: 65
Discharge: HOME | End: 2025-02-11
Attending: INTERNAL MEDICINE
Payer: COMMERCIAL

## 2025-02-11 ENCOUNTER — OFFICE VISIT (OUTPATIENT)
Dept: INTERNAL MEDICINE | Facility: CLINIC | Age: 65
End: 2025-02-11
Payer: COMMERCIAL

## 2025-02-11 VITALS
OXYGEN SATURATION: 96 % | HEIGHT: 66 IN | HEART RATE: 82 BPM | WEIGHT: 215.94 LBS | BODY MASS INDEX: 34.7 KG/M2 | SYSTOLIC BLOOD PRESSURE: 140 MMHG | DIASTOLIC BLOOD PRESSURE: 82 MMHG

## 2025-02-11 DIAGNOSIS — K63.5 POLYP OF COLON, UNSPECIFIED PART OF COLON, UNSPECIFIED TYPE: ICD-10-CM

## 2025-02-11 DIAGNOSIS — G43.109 MIGRAINE WITH AURA AND WITHOUT STATUS MIGRAINOSUS, NOT INTRACTABLE: ICD-10-CM

## 2025-02-11 DIAGNOSIS — M25.551 CHRONIC HIP PAIN, RIGHT: Primary | ICD-10-CM

## 2025-02-11 DIAGNOSIS — M25.551 CHRONIC HIP PAIN, RIGHT: ICD-10-CM

## 2025-02-11 DIAGNOSIS — R05.3 CHRONIC COUGH: ICD-10-CM

## 2025-02-11 DIAGNOSIS — G89.29 CHRONIC HIP PAIN, RIGHT: ICD-10-CM

## 2025-02-11 DIAGNOSIS — G89.29 CHRONIC HIP PAIN, RIGHT: Primary | ICD-10-CM

## 2025-02-11 DIAGNOSIS — I10 HYPERTENSION, UNSPECIFIED TYPE: ICD-10-CM

## 2025-02-11 DIAGNOSIS — K58.0 IRRITABLE BOWEL SYNDROME WITH DIARRHEA: ICD-10-CM

## 2025-02-11 DIAGNOSIS — R76.8 RHEUMATOID FACTOR POSITIVE: ICD-10-CM

## 2025-02-11 DIAGNOSIS — D05.12 DUCTAL CARCINOMA IN SITU (DCIS) OF LEFT BREAST: ICD-10-CM

## 2025-02-11 DIAGNOSIS — Z00.00 ANNUAL PHYSICAL EXAM: Primary | ICD-10-CM

## 2025-02-11 PROCEDURE — 73521 X-RAY EXAM HIPS BI 2 VIEWS: CPT | Mod: 26,,, | Performed by: RADIOLOGY

## 2025-02-11 PROCEDURE — 3008F BODY MASS INDEX DOCD: CPT | Mod: CPTII,S$GLB,, | Performed by: INTERNAL MEDICINE

## 2025-02-11 PROCEDURE — 73521 X-RAY EXAM HIPS BI 2 VIEWS: CPT | Mod: TC

## 2025-02-11 PROCEDURE — 99999 PR PBB SHADOW E&M-EST. PATIENT-LVL V: CPT | Mod: PBBFAC,,, | Performed by: INTERNAL MEDICINE

## 2025-02-11 PROCEDURE — 71046 X-RAY EXAM CHEST 2 VIEWS: CPT | Mod: 26,,, | Performed by: RADIOLOGY

## 2025-02-11 PROCEDURE — 1159F MED LIST DOCD IN RCRD: CPT | Mod: CPTII,S$GLB,, | Performed by: INTERNAL MEDICINE

## 2025-02-11 PROCEDURE — 3077F SYST BP >= 140 MM HG: CPT | Mod: CPTII,S$GLB,, | Performed by: INTERNAL MEDICINE

## 2025-02-11 PROCEDURE — 99396 PREV VISIT EST AGE 40-64: CPT | Mod: S$GLB,,, | Performed by: INTERNAL MEDICINE

## 2025-02-11 PROCEDURE — 3079F DIAST BP 80-89 MM HG: CPT | Mod: CPTII,S$GLB,, | Performed by: INTERNAL MEDICINE

## 2025-02-11 PROCEDURE — 71046 X-RAY EXAM CHEST 2 VIEWS: CPT | Mod: TC

## 2025-02-11 RX ORDER — HYDROCHLOROTHIAZIDE 12.5 MG/1
12.5 TABLET ORAL DAILY
Qty: 30 TABLET | Refills: 11 | Status: SHIPPED | OUTPATIENT
Start: 2025-02-11 | End: 2026-02-11

## 2025-02-11 RX ORDER — MELOXICAM 7.5 MG/1
7.5 TABLET ORAL DAILY
Qty: 20 TABLET | Refills: 0 | Status: SHIPPED | OUTPATIENT
Start: 2025-02-11

## 2025-02-11 NOTE — PROGRESS NOTES
CC: Annual exam     HPI: The patient is a 65-year-old female with invasive ductal carcinoma of the left breast and atypical ductal hyperplasia of the right breast status post bilateral mastectomies, migraine headaches, rheumatoid arthritis, irritable bowel syndrome, ischemic colitis, colon polyps, hypertension and history of squamous cell cancer presents today for annual exam.  The patient does complain of a chronic cough.  She was changed from lisinopril to losartan.  The cough is more at night in the early in the morning.  She is on a PPI daily.  The patient also complains of right hip pain ever since the recent snow event.  Symptoms started after she was shoveling her driveway.  She has pain with walking or lying on her right side.    ROS: Patient reports weight is stable.  She had her eyes checked a few months ago with Dr. Anders. No auditory changes.  No chest pain.  No shortness a breath.  She is not exercising currently.  She was riding on a stationary bike but stopped when she hurt her hip.  No nausea vomiting.  No abdominal pain.  No bowel changes.  She has a bowel movement every day or every other day.  No bladder changes.  The patient does report having a colonoscopy with [facility] within the last 2 years.  She reports no polyps were seen.  She saw gyn in April of 2024.  She saw breast surgery clinic in December of 2024.    Physical exam:   General appearance: No acute distress  HEENT: Conjunctiva is clear.  Pupils equal reactive.  TMs are clear.  The patient did have bilateral lens replacements.  Nasal septum is midline without discharge.  Oropharynx without erythema.  Trachea is midline without JVD or thyromegaly.  Pulmonary: Good inspiratory, expiratory breath sounds are heard.  Lungs are clear auscultation.    Cardiovascular:  S1-S2, rhythm is regular.  Extremities with trace edema.  2+ carotid pulse of bruits.    GI: Abdomen was nontender, nondistended without hepatosplenomegaly  Ortho:  The  patient had pain with adduction against resistance as well as passive internal external rotation of the right hip    Assessment:    Annual exam   2.  History of left breast cancer and atypical ductal hyperplasia of the right breast status post bilateral mastectomies   3.  Hypertension patient with chronic cough  4.  Irritable bowel syndrome  5.  Migraine headaches   6.  Colon polyps   7.  Rheumatoid factor positive  8.  Right hip pain  Plan:   1.  The patient has chart was reviewed   2.  Will order a right hip film   3.  Will get a UA   4.  Will discontinue losartan and start the patient on HCTZ 12.5 mg once a day  5.  The patient has follow up in 1 month for re-evaluate

## 2025-02-13 ENCOUNTER — PATIENT MESSAGE (OUTPATIENT)
Dept: INTERNAL MEDICINE | Facility: CLINIC | Age: 65
End: 2025-02-13
Payer: COMMERCIAL

## 2025-02-19 NOTE — Clinical Note
A BMP and a CMP were linked to lab. She should not be charged for BMP as this was not ordered. A BMD was ordered. thanks Hpi Title: Evaluation of Skin Lesions Have Your Spot(S) Been Treated In The Past?: has not been treated Additional History: The patient requests that all of their moles, brown spots, and bumps be evaluated across their entire body to make sure they are not dangerous.

## 2025-02-25 ENCOUNTER — PATIENT MESSAGE (OUTPATIENT)
Dept: INTERNAL MEDICINE | Facility: CLINIC | Age: 65
End: 2025-02-25
Payer: COMMERCIAL

## 2025-02-26 VITALS — DIASTOLIC BLOOD PRESSURE: 79 MMHG | SYSTOLIC BLOOD PRESSURE: 133 MMHG

## 2025-02-26 NOTE — TELEPHONE ENCOUNTER
" LOV with Blane Brian MD , 2/11/2025  Nx appt 3/18/25  On last visit you d/c losartan and start the patient on HCTZ 12.5 mg once a day. Chart note shows"She was changed from lisinopril to losartan. The cough is more at night in the early in the morning "    Per pt, she did not like the fluid pills and stopped them due to daily headaches, feeling like she was going to faint and her bp was higher than normale. She resumed Losartan and states she feels better. Also reports that her BP has come down. I added her remote BP reading into chart.  "

## 2025-03-09 ENCOUNTER — DOCUMENTATION ONLY (OUTPATIENT)
Dept: INTERNAL MEDICINE | Facility: CLINIC | Age: 65
End: 2025-03-09
Payer: COMMERCIAL

## 2025-03-09 DIAGNOSIS — I10 HYPERTENSION, UNSPECIFIED TYPE: Primary | ICD-10-CM

## 2025-03-09 RX ORDER — LISINOPRIL 5 MG/1
5 TABLET ORAL DAILY
Qty: 90 TABLET | Refills: 3 | Status: SHIPPED | OUTPATIENT
Start: 2025-03-09 | End: 2026-03-09

## 2025-03-18 ENCOUNTER — OFFICE VISIT (OUTPATIENT)
Dept: INTERNAL MEDICINE | Facility: CLINIC | Age: 65
End: 2025-03-18
Payer: COMMERCIAL

## 2025-03-18 VITALS
DIASTOLIC BLOOD PRESSURE: 76 MMHG | OXYGEN SATURATION: 96 % | HEIGHT: 66 IN | SYSTOLIC BLOOD PRESSURE: 122 MMHG | WEIGHT: 215.81 LBS | BODY MASS INDEX: 34.68 KG/M2 | HEART RATE: 78 BPM

## 2025-03-18 DIAGNOSIS — I10 HYPERTENSION, UNSPECIFIED TYPE: Primary | ICD-10-CM

## 2025-03-18 PROCEDURE — 1160F RVW MEDS BY RX/DR IN RCRD: CPT | Mod: CPTII,S$GLB,, | Performed by: INTERNAL MEDICINE

## 2025-03-18 PROCEDURE — 99999 PR PBB SHADOW E&M-EST. PATIENT-LVL V: CPT | Mod: PBBFAC,,, | Performed by: INTERNAL MEDICINE

## 2025-03-18 PROCEDURE — 4010F ACE/ARB THERAPY RXD/TAKEN: CPT | Mod: CPTII,S$GLB,, | Performed by: INTERNAL MEDICINE

## 2025-03-18 PROCEDURE — 99213 OFFICE O/P EST LOW 20 MIN: CPT | Mod: S$GLB,,, | Performed by: INTERNAL MEDICINE

## 2025-03-18 PROCEDURE — 3078F DIAST BP <80 MM HG: CPT | Mod: CPTII,S$GLB,, | Performed by: INTERNAL MEDICINE

## 2025-03-18 PROCEDURE — 3074F SYST BP LT 130 MM HG: CPT | Mod: CPTII,S$GLB,, | Performed by: INTERNAL MEDICINE

## 2025-03-18 PROCEDURE — 1159F MED LIST DOCD IN RCRD: CPT | Mod: CPTII,S$GLB,, | Performed by: INTERNAL MEDICINE

## 2025-03-18 PROCEDURE — 3008F BODY MASS INDEX DOCD: CPT | Mod: CPTII,S$GLB,, | Performed by: INTERNAL MEDICINE

## 2025-03-18 NOTE — PROGRESS NOTES
CC:  Follow-up of blood pressure    HPI:  The patient is a 65-year-old female with invasive ductal carcinoma of the left breast and atypical ductal hyperplasia of the right breast status post bilateral mastectomies, migraine headaches, rheumatoid arthritis, irritable bowel syndrome, ischemic colitis, colon polyps, hypertension, and history of squamous cell cancer presents today for follow-up of blood pressure.  The patient is on lisinopril currently.  She has been on losartan.  This has changed HCTZ.  This has medication gave headaches.  She was placed back on lisinopril.  She reports the chronic cough she had did improve off of losartan.  It seems to be better on the lisinopril as well.  The cough does tend to occur more at night.  Especially when she is lying flat on her back.    ROS:  The patient is on Questran were irritable bowel syndrome.  She does take this medication at night.  If she takes it too late, it will give her reflux.    Physical exam:   General appearance: No acute distress   HEENT: Trachea is midline without JVD   Pulmonary: Good inspiratory, expiratory breath sounds are heard.  Lungs are clear auscultation.  Cardiovascular: S1-S2, rhythm is regular.  Extremities without edema.    GI: Abdomen was nontender, nondistended without hepatosplenomegaly   Comments: The patient brings in a list of blood pressure readings.  Her systolic has ranged anywhere from a low of 118 to a high of 135 with most of the readings in the 120s.  The diastolic has been in the 70s.    Assessment:   Hypertension  2.  Chronic cough   Plan:    Will continue lisinopril at current dose   2.  No change in medication at this time

## 2025-04-09 ENCOUNTER — OFFICE VISIT (OUTPATIENT)
Dept: RHEUMATOLOGY | Facility: CLINIC | Age: 65
End: 2025-04-09
Payer: COMMERCIAL

## 2025-04-09 ENCOUNTER — HOSPITAL ENCOUNTER (OUTPATIENT)
Dept: RADIOLOGY | Facility: HOSPITAL | Age: 65
Discharge: HOME | End: 2025-04-09
Attending: INTERNAL MEDICINE
Payer: COMMERCIAL

## 2025-04-09 VITALS
BODY MASS INDEX: 35.3 KG/M2 | HEART RATE: 91 BPM | SYSTOLIC BLOOD PRESSURE: 162 MMHG | WEIGHT: 218.69 LBS | DIASTOLIC BLOOD PRESSURE: 93 MMHG

## 2025-04-09 DIAGNOSIS — M79.10 MYALGIA: Primary | ICD-10-CM

## 2025-04-09 DIAGNOSIS — M25.50 POLYARTHRALGIA: Primary | ICD-10-CM

## 2025-04-09 DIAGNOSIS — M25.50 POLYARTHRALGIA: ICD-10-CM

## 2025-04-09 DIAGNOSIS — E66.01 SEVERE OBESITY (BMI 35.0-39.9) WITH COMORBIDITY: ICD-10-CM

## 2025-04-09 PROCEDURE — 1159F MED LIST DOCD IN RCRD: CPT | Mod: CPTII,S$GLB,, | Performed by: INTERNAL MEDICINE

## 2025-04-09 PROCEDURE — 99999 PR PBB SHADOW E&M-EST. PATIENT-LVL IV: CPT | Mod: PBBFAC,,, | Performed by: INTERNAL MEDICINE

## 2025-04-09 PROCEDURE — 99215 OFFICE O/P EST HI 40 MIN: CPT | Mod: S$GLB,,, | Performed by: INTERNAL MEDICINE

## 2025-04-09 PROCEDURE — 3008F BODY MASS INDEX DOCD: CPT | Mod: CPTII,S$GLB,, | Performed by: INTERNAL MEDICINE

## 2025-04-09 PROCEDURE — 72100 X-RAY EXAM L-S SPINE 2/3 VWS: CPT | Mod: 26,,, | Performed by: RADIOLOGY

## 2025-04-09 PROCEDURE — 4010F ACE/ARB THERAPY RXD/TAKEN: CPT | Mod: CPTII,S$GLB,, | Performed by: INTERNAL MEDICINE

## 2025-04-09 PROCEDURE — 72100 X-RAY EXAM L-S SPINE 2/3 VWS: CPT | Mod: TC

## 2025-04-09 PROCEDURE — 77077 JOINT SURVEY SINGLE VIEW: CPT | Mod: 26,,, | Performed by: RADIOLOGY

## 2025-04-09 PROCEDURE — 3080F DIAST BP >= 90 MM HG: CPT | Mod: CPTII,S$GLB,, | Performed by: INTERNAL MEDICINE

## 2025-04-09 PROCEDURE — 3077F SYST BP >= 140 MM HG: CPT | Mod: CPTII,S$GLB,, | Performed by: INTERNAL MEDICINE

## 2025-04-09 PROCEDURE — 77077 JOINT SURVEY SINGLE VIEW: CPT | Mod: TC

## 2025-04-09 RX ORDER — BACLOFEN 10 MG/1
TABLET ORAL
Qty: 60 TABLET | Refills: 11 | Status: SHIPPED | OUTPATIENT
Start: 2025-04-09

## 2025-04-09 RX ORDER — FLUTICASONE PROPIONATE 50 MCG
1 SPRAY, SUSPENSION (ML) NASAL 2 TIMES DAILY
COMMUNITY
Start: 2025-02-18

## 2025-04-09 RX ORDER — PREDNISONE 20 MG/1
20 TABLET ORAL DAILY
Qty: 10 TABLET | Refills: 3 | Status: SHIPPED | OUTPATIENT
Start: 2025-04-09 | End: 2025-04-19

## 2025-04-09 NOTE — PROGRESS NOTES
" Patient ID: Christal Posey is a 57 y.o. female.     Chief Complaint: Follow-up     HPI::   Initial history: 57 yo F with PMH Migraines, IBS, GERD, breast CA s/p lumpectomy 6/2016 seen in consult for joint pain and swelling with positive RF. She states that her hand pain began about a years ago. It is mostly in the PIPs and she does sometimes report some swelling. She feels stiff for about 1 hour in the AM. She also has pain in the joints in her feet for about the last year as well. She has some knee pain and "cracking" without swelling that is more chronic. She does not sleep well and is fatigued. She has had a dry cough for about a year as well, but no other extra-articular manifestations. She was previously on Premarin that was stopped when the breast CA was found and thinks her symptoms are worse since stopping it. She uses Aleve which helps her pain as does ibuprofen though not quite as much, tylenol does not help. She has never smoked. Her paternal uncle has "arthritis." She took prednisone once is the past for sciatica and it made her "sick" where she felt very hot and just "not good."         Interval history:    She has had pain in hands, elbows, knees, left big toe, right shoulder, right hip  and lower back.  She gets swelling in hands and knees.   Pain level is 4/10, non-radiating, and aching.     Review of Systems   Constitutional: Negative for activity change, chills, fatigue and fever.   HENT: Negative for mouth sores and trouble swallowing.    Eyes: Negative for pain and redness.   Respiratory: Negative for cough, chest tightness and shortness of breath.    Cardiovascular: Negative for chest pain.   Gastrointestinal: Negative for abdominal pain, diarrhea and nausea.   Genitourinary: Negative for dysuria.   Musculoskeletal: Positive for arthralgias and neck stiffness. Negative for joint swelling, myalgias and neck pain.   Skin: Negative for color change and rash.   Neurological: Negative for weakness " and numbness.   Hematological: Negative for adenopathy.   Psychiatric/Behavioral: Negative for sleep disturbance.          Objective:           Physical Exam   Constitutional: She is oriented to person, place, and time and well-developed, well-nourished, and in no distress. No distress.   HENT:   Head: Normocephalic and atraumatic.   Mouth/Throat: No oropharyngeal exudate.   Eyes: Conjunctivae are normal. No scleral icterus.   Neck: Normal range of motion. Neck supple. No thyromegaly present.   Cardiovascular: Normal rate, regular rhythm and normal heart sounds.    Pulmonary/Chest: Effort normal and breath sounds normal.   Abdominal: Soft. There is no tenderness.         Right Side Rheumatological Exam     Examination finds the shoulder, elbow, wrist, knee, 1st PIP, 1st MCP, 2nd PIP, 2nd MCP, 3rd PIP, 3rd MCP, 4th PIP, 4th MCP, 5th PIP and 5th MCP normal.     Left Side Rheumatological Exam     Examination finds the shoulder, elbow, wrist, knee, 1st PIP, 1st MCP, 2nd PIP, 2nd MCP, 3rd PIP, 3rd MCP, 4th PIP, 4th MCP, 5th PIP and 5th MCP normal.       Lymphadenopathy:     She has no cervical adenopathy.   Neurological: She is alert and oriented to person, place, and time.   Skin: Skin is warm and dry. No rash noted.   Musculoskeletal: Normal range of motion. She exhibits no edema or tenderness.        labs:  Reviewed;  Tammy-negative  Ccp-negative  RF-49    Arthritis survey (2017): I personally reviewed;  No significant detrimental interval change since the examination of 9/26/16 is appreciated    Assessment:    63 yo old female with PMH of hyperlipidemia, Migraines, left breast cancer status post lumpectomy (june 2016), HTN,    here for evaluation of joint pain and +RF. She was diagnosed with recurrence of left sided breast cancer now s/p bilateral mastectomy in October 2022.      Plan:     #Seropositive RA-she has worsening arthralgias and episodes of joint swelling with worsening morning stiffness.  Discussed MTX and  plaquenil but patient declines.  Start prednisone 20mg a day for 10 days  Labs  Xrays   Xrays and exam not suggestive of RA but more of DJD.    #Myalgias:worsening.    -increase baclofen from 10mg qhs prn  to 20mg qhs  Continue voltaren gel QID PRN    #Obesity: risks of obesity discussed including increased inflammation.  -discussed diet including anti-inflammatory diet and handout was given  -encouraged daily exercise 30 minutes a day     #HTN: asymptomatic.  Follow up with PCP    rtc in 4   months    40 * minutes of total time spent on the encounter, which includes face to face time and non-face to face time preparing to see the patient (eg, review of tests), Obtaining and/or reviewing separately obtained history, Documenting clinical information in the electronic or other health record, Independently interpreting results (not separately reported) and communicating results to the patient/family/caregiver, or Care coordination (not separately reported).

## 2025-04-10 ENCOUNTER — RESULTS FOLLOW-UP (OUTPATIENT)
Dept: RHEUMATOLOGY | Facility: CLINIC | Age: 65
End: 2025-04-10

## 2025-04-30 ENCOUNTER — OFFICE VISIT (OUTPATIENT)
Dept: OBSTETRICS AND GYNECOLOGY | Facility: CLINIC | Age: 65
End: 2025-04-30
Attending: OBSTETRICS & GYNECOLOGY
Payer: COMMERCIAL

## 2025-04-30 ENCOUNTER — HOSPITAL ENCOUNTER (OUTPATIENT)
Dept: RADIOLOGY | Facility: CLINIC | Age: 65
Discharge: HOME | End: 2025-04-30
Attending: OBSTETRICS & GYNECOLOGY
Payer: COMMERCIAL

## 2025-04-30 VITALS
HEIGHT: 66 IN | HEART RATE: 71 BPM | DIASTOLIC BLOOD PRESSURE: 83 MMHG | WEIGHT: 216 LBS | BODY MASS INDEX: 34.72 KG/M2 | SYSTOLIC BLOOD PRESSURE: 131 MMHG

## 2025-04-30 DIAGNOSIS — Z01.419 ENCOUNTER FOR GYNECOLOGICAL EXAMINATION WITHOUT ABNORMAL FINDING: ICD-10-CM

## 2025-04-30 DIAGNOSIS — Z78.0 MENOPAUSE: ICD-10-CM

## 2025-04-30 DIAGNOSIS — Z78.0 MENOPAUSE: Primary | ICD-10-CM

## 2025-04-30 PROCEDURE — 77080 DXA BONE DENSITY AXIAL: CPT | Mod: 26,,, | Performed by: INTERNAL MEDICINE

## 2025-04-30 PROCEDURE — 3075F SYST BP GE 130 - 139MM HG: CPT | Mod: CPTII,S$GLB,, | Performed by: OBSTETRICS & GYNECOLOGY

## 2025-04-30 PROCEDURE — 1159F MED LIST DOCD IN RCRD: CPT | Mod: CPTII,S$GLB,, | Performed by: OBSTETRICS & GYNECOLOGY

## 2025-04-30 PROCEDURE — 99999 PR PBB SHADOW E&M-EST. PATIENT-LVL IV: CPT | Mod: PBBFAC,,, | Performed by: OBSTETRICS & GYNECOLOGY

## 2025-04-30 PROCEDURE — 3079F DIAST BP 80-89 MM HG: CPT | Mod: CPTII,S$GLB,, | Performed by: OBSTETRICS & GYNECOLOGY

## 2025-04-30 PROCEDURE — 99396 PREV VISIT EST AGE 40-64: CPT | Mod: S$GLB,,, | Performed by: OBSTETRICS & GYNECOLOGY

## 2025-04-30 PROCEDURE — 3008F BODY MASS INDEX DOCD: CPT | Mod: CPTII,S$GLB,, | Performed by: OBSTETRICS & GYNECOLOGY

## 2025-04-30 PROCEDURE — 77092 TBS I&R FX RSK QHP: CPT | Mod: S$GLB,,, | Performed by: INTERNAL MEDICINE

## 2025-04-30 PROCEDURE — 77091 TBS TECHL CALCULATION ONLY: CPT

## 2025-04-30 PROCEDURE — 4010F ACE/ARB THERAPY RXD/TAKEN: CPT | Mod: CPTII,S$GLB,, | Performed by: OBSTETRICS & GYNECOLOGY

## 2025-04-30 NOTE — PROGRESS NOTES
[address] [place] [address] [address] [facility] 
[phone] Patient: West Garcia MRN: [id number] Visit Information Date & Time Provider Department Dept. Phone Encounter #  
 8/3/2018 10:30 AM Wallace Rivera Florentino [phone] [id number] Follow-up Instructions Return in about 2 months (around 10/3/2018) for diabetes follow up with fasting labs. Upcoming Health Maintenance Date Due DTaP/Tdap/Td series (1 - Tdap) 10/2/2006 FOOT EXAM Q1 7/25/2017 BREAST CANCER SCRN MAMMOGRAM 11/9/2017 MEDICARE YEARLY EXAM 5/25/2018 Influenza Age 5 to Adult 8/1/2018 HEMOGLOBIN A1C Q6M 10/12/2018 EYE EXAM RETINAL OR DILATED Q1 4/6/2019 MICROALBUMIN Q1 4/9/2019 LIPID PANEL Q1 4/12/2019 GLAUCOMA SCREENING Q2Y 4/6/2020 COLONOSCOPY 7/7/2020 Allergies as of 8/3/2018  Review Complete On: 8/3/2018 By: Smiley Tim MD  
  
 Severity Noted Reaction Type Reactions Gabapentin High 08/03/2018   Intolerance Drowsiness Deserpidine  11/24/2014   Systemic Other (comments) Puts pressure around her head like a ring Current Immunizations  Reviewed on 8/3/2018 Name Date Influenza Vaccine 11/16/2015, 11/6/2012, 11/2/2011, 10/29/2010 Influenza Vaccine (Quad) PF 10/25/2016 Pneumococcal Conjugate (PCV-13) 9/10/2015 Pneumococcal Vaccine (Unspecified Type) 10/29/2010 Td 10/1/2006 Zoster Vaccine, Live 6/10/2014 Reviewed by Smiley Tim MD on 8/3/2018 at 11:29 AM  
 Reviewed by Smiley Tim MD on 8/3/2018 at 11:29 AM  
You Were Diagnosed With   
  
 Codes Comments Medicare annual wellness visit, subsequent    -  Primary ICD-10-CM: Z00.00 ICD-9-CM: V70.0 Need for shingles vaccine     ICD-10-CM: O05 ICD-9-CM: V04.89 Screening for alcoholism     ICD-10-CM: Z13.89 ICD-9-CM: V79.1  Screening for depression     ICD-10-CM: Z13.89 
 SUBJECTIVE:   65 y.o. female   for annual routine checkup. No LMP recorded (lmp unknown). Patient has had a hysterectomy..  She preop breast cancer.  She has osteopenia on her last DEXA scan and recommendation was for a repeat in 2 years .        Past Medical History:   Diagnosis Date    Allergy     BRCA1 negative     BRCA2 negative     Breast cancer 2016    Left breast low grade DCIS, ER/MI positive    Colon polyp 10/31/2018    Ischemic colitis suspected as well ( area biopsied).    GERD (gastroesophageal reflux disease)     Hiatal hernia     Hyperlipidemia     Irritable bowel syndrome     Migraine headache     PONV (postoperative nausea and vomiting)     RA (rheumatoid arthritis)     Squamous cell carcinoma excised 14    in situ, R forearm     Past Surgical History:   Procedure Laterality Date    AUGMENTATION OF BREAST  10/24/22    Implants failed and removed     BREAST BIOPSY Left 2016    DCIS    BREAST CAPSULECTOMY Bilateral 2022    Procedure: CAPSULECTOMY, BREAST;  Surgeon: Garland Gray MD;  Location: [facility] OR [address] [place] [address];  Service: Plastics;  Laterality: Bilateral;    BREAST LUMPECTOMY      BREAST RECONSTRUCTION  10/24/22    Breast implants failed and removed    ECTOPIC PREGNANCY SURGERY Right     HYSTERECTOMY      ovaries left intact    INJECTION FOR SENTINEL NODE IDENTIFICATION Left 10/24/2022    Procedure: INJECTION, FOR SENTINEL NODE IDENTIFICATION LEFT;  Surgeon: JOVANNI Jama MD;  Location: [facility] OR [address] [place] [address];  Service: General;  Laterality: Left;    INSERTION OF BREAST IMPLANT Bilateral 10/24/2022    Procedure: INSERTION, BREAST IMPLANT BILATERAL;  Surgeon: Garland Gray MD;  Location: [facility] OR [address] [place] [address];  Service: Plastics;  Laterality: Bilateral;    MASTECTOMY Bilateral 10/24/2022    Procedure: MASTECTOMY BILATERAL;  Surgeon: JOVANNI Jama MD;  Location: [facility] OR [address] [place] [address];  Service: General;  Laterality: Bilateral;    REMOVAL OF BREAST IMPLANT  Bilateral 2022    Procedure: REMOVAL, IMPLANT, BREAST;  Surgeon: Garland Gray MD;  Location: [facility] OR 2ND FLR;  Service: Plastics;  Laterality: Bilateral;    removal of ovarian cyst Right 10/1992    ovary left intact    SENTINEL LYMPH NODE BIOPSY Left 10/24/2022    Procedure: BIOPSY, LYMPH NODE, SENTINEL LEFT;  Surgeon: JOVANNI Jama MD;  Location: [facility] OR 2ND FLR;  Service: General;  Laterality: Left;    TUBAL LIGATION      tubal reversal  1991     Social History[1]  Family History   Problem Relation Name Age of Onset    Migraines Maternal Grandmother      Seizures Maternal Grandmother      Cancer Father  77        Lung cancer    Migraines Brother      Seizures Brother      Migraines Sister      Breast cancer Sister Renu 65    Cancer Maternal Uncle          prostate ca    Cancer Paternal Uncle          lung    Melanoma Neg Hx      Colon cancer Neg Hx      Ovarian cancer Neg Hx      Uterine cancer Neg Hx      Cervical cancer Neg Hx       OB History    Para Term  AB Living   3 2 2  1 2   SAB IAB Ectopic Multiple Live Births     1  2      # Outcome Date GA Lbr Garrett/2nd Weight Sex Type Anes PTL Lv   3 Ectopic     U       2 Term     F Vag-Spont   NILTON   1 Term     F Vag-Spont   NILTON           Current Medications[2]  Allergies: Pravastatin, Shellfish containing products, Amitriptyline, Codeine, Doxycycline, Erythromycin, Iodine, Macrobid  [nitrofurantoin monohyd/m-cryst], and Verapamil     The 10-year ASCVD risk score (Christal COTTO, et al., 2019) is: 6.6%    Values used to calculate the score:      Age: 65 years      Sex: Female      Is Non- : No      Diabetic: No      Tobacco smoker: No      Systolic Blood Pressure: 131 mmHg      Is BP treated: Yes      HDL Cholesterol: 62 mg/dL      Total Cholesterol: 236 mg/dL      ROS:  Constitutional: no weight loss, weight gain, fever, fatigue  Eyes:  No vision changes, glasses/contacts  ENT/Mouth: No ulcers, sinus problems,  ICD-9-CM: V79.0 Primary osteoarthritis involving multiple joints     ICD-10-CM: M15.0 ICD-9-CM: 715.09 Vitals BP Pulse Temp Resp Height(growth percentile) Weight(growth percentile) 116/62 (BP 1 Location: Left arm, BP Patient Position: Sitting) 72 97.5 °F (36.4 °C) (Oral) 20 5' 3\" (1.6 m) 176 lb 3.2 oz (79.9 kg) SpO2 BMI OB Status Smoking Status 98% 31.21 kg/m2 Postmenopausal Never Smoker BMI and BSA Data Body Mass Index Body Surface Area  
 31.21 kg/m 2 1.88 m 2 Preferred Pharmacy Pharmacy Name Phone [facility], [address] Your Updated Medication List  
  
   
This list is accurate as of 8/3/18 11:45 AM.  Always use your most recent med list.  
  
  
  
  
 aspirin delayed-release 81 mg tablet Take 1 Tab by mouth daily. atorvastatin 40 mg tablet Commonly known as:  LIPITOR  
TAKE ONE TABLET BY MOUTH DAILY  
  
 buPROPion  mg tablet Commonly known as:  Kathlean Booth Take 1 Tab by mouth daily. busPIRone 10 mg tablet Commonly known as:  BUSPAR Take 1 Tab by mouth two (2) times a day. celecoxib 200 mg capsule Commonly known as:  CeleBREX Take 1 Cap by mouth daily. Indications: OSTEOARTHRITIS  
  
 chlorhexidine 0.12 % solution Commonly known as:  PERIDEX  
  
 cpap machine kit  
by Does Not Apply route nightly. fenofibric acid 135 mg capsule Commonly known as:  TRILIPIX ER  
TAKE ONE CAPSULE BY MOUTH DAILY  
  
 folic acid 1 mg tablet Commonly known as:  Google Take  by mouth daily. JANUVIA 25 mg tablet Generic drug:  SITagliptin TAKE ONE TABLET BY MOUTH DAILY  
  
 levothyroxine 75 mcg tablet Commonly known as:  SYNTHROID Take 1 Tab by mouth Daily (before breakfast). omeprazole 40 mg capsule Commonly known as:  PRILOSEC Take 1 Cap by mouth daily. varicella-zoster recombinant (PF) 50 mcg/0.5 mL Susr injection Commonly known as:  SHINGRIX (PF)  
0.5mL by IntraMUSCular route once now and then repeat in 2-6 months * venlafaxine-SR 75 mg capsule Commonly known as:  EFFEXOR-XR Take 1 Cap by mouth daily. * venlafaxine- mg capsule Commonly known as:  EFFEXOR-XR  
TAKE ONE CAPSULE BY MOUTH DAILY. Take with 75 mg capsule. vitamin D3-vitamin K2 (MK4) 1,000-100 unit-mcg Tab Take 5,000 Int'l Units by mouth daily. * Notice: This list has 2 medication(s) that are the same as other medications prescribed for you. Read the directions carefully, and ask your doctor or other care provider to review them with you. Prescriptions Printed Refills  
 varicella-zoster recombinant, PF, (SHINGRIX, PF,) 50 mcg/0.5 mL susr injection 1 Si.5mL by IntraMUSCular route once now and then repeat in 2-6 months Class: Print Prescriptions Sent to Pharmacy Refills  
 celecoxib (CELEBREX) 200 mg capsule 1 Sig: Take 1 Cap by mouth daily. Indications: OSTEOARTHRITIS Class: Normal  
 Pharmacy: [facility] [address] [facility] #: [phone] Route: Oral  
  
We Performed the Following [place] [address] [[id number] [facility]] Follow-up Instructions Return in about 2 months (around 10/3/2018) for diabetes follow up with fasting labs. Patient Instructions Medicare Wellness Visit, Female The best way to live healthy is to have a lifestyle where you eat a well-balanced diet, exercise regularly, limit alcohol use, and quit all forms of tobacco/nicotine, if applicable. Regular preventive services are another way to keep healthy. Preventive services (vaccines, screening tests, monitoring & exams) can help personalize your care plan, which helps you manage your own care. Screening tests can find health problems at the earliest stages, when they are easiest to treat. ears ringing, headache  Cardiovascular: No inability to lie flat, chest pain, exercise intolerance, swelling, heart palpitations  Respiratory: No wheezing, coughing blood, shortness of breath, or cough  Gastrointestinal: No diarrhea, bloody stool, nausea/vomiting, constipation, gas, hemorrhoids  Genitourinary: No blood in urine, painful urination, urgency of urination, frequency of urination, incomplete emptying, incontinence, abnormal bleeding, painful periods, heavy periods, vaginal discharge, vaginal odor, painful intercourse, sexual problems, bleeding after intercourse.  Musculoskeletal: No muscle weakness  Skin/Breast: No painful breasts, nipple discharge, masses, rash, ulcers  Neurological: No passing out, seizures, numbness, headache  Endocrine: No diabetes, hypothyroid, hyperthyroid, hot flashes, hair loss, abnormal hair growth, acne, +osteopenia  Psychiatric: No depression, crying  Hematologic: No bruises, bleeding, swollen lymph nodes, anemia.      Physical Exam:   Constitutional: She is oriented to person, place, and time. She appears well-developed and well-nourished.      Neck: No tracheal deviation present. No thyromegaly present.    Cardiovascular:       Exam reveals no edema.        Pulmonary/Chest: Effort normal. She exhibits no mass, no tenderness, no deformity and no retraction. Right breast exhibits absence. Left breast exhibits absence.        Abdominal: Soft. She exhibits no distension and no mass. There is no abdominal tenderness. There is no rebound and no guarding. No hernia. Hernia confirmed negative in the left inguinal area.     Genitourinary: Rectum:      No external hemorrhoid.   There is no rash, tenderness or lesion on the right labia. There is no rash, tenderness or lesion on the left labia. No no adexnal prolapse. Right adnexum displays no mass, no tenderness and no fullness. Left adnexum displays no mass, no tenderness and no fullness. No vaginal discharge, tenderness, bleeding,  [address] follows the current, evidence-based guidelines published by the [facility] Chidi King ([facility]) when recommending preventive services for our patients. Because we follow these guidelines, sometimes recommendations change over time as research supports it. (For example, mammograms used to be recommended annually. Even though Medicare will still pay for an annual mammogram, the newer guidelines recommend a mammogram every two years for women of average risk.) Of course, you and your provider may decide to screen more often for some diseases, based on your risk and co-morbidities (chronic disease you are already diagnosed with). Preventive services for you include: - Medicare offers their members a free annual wellness visit, which is time for you and your primary care provider to discuss and plan for your preventive service needs. Take advantage of this benefit every year! 
 
-All people over age 72 should receive the recommended pneumonia vaccines. Current USPSTF guidelines recommend a series of two vaccines for the best pneumonia protection.  
 
-All adults should have a yearly flu vaccine and a tetanus vaccine every 10 years. All adults age 61 years should receive a shingles vaccine once in their lifetime.   
 
-A bone mass density test is recommended when a woman turns 65 to screen for osteoporosis. This test is only recommended once as a screening. Some providers will use this same test as a disease monitoring tool if you already have osteoporosis.  
 
-All adults age 38-68 years who are overweight should have a diabetes screening test once every three years.  
 
-Other screening tests & preventive services for persons with diabetes include: an eye exam to screen for diabetic retinopathy, a kidney function test, a foot exam, and stricter control over your cholesterol.  
 
-Cardiovascular screening for adults with routine risk involves an rectocele, cystocele or prolapse of vaginal walls in the vagina. Cervix is absent.Uterus is absent.           Musculoskeletal: Normal range of motion and moves all extremeties. No edema.       Neurological: She is alert and oriented to person, place, and time.    Skin: No rash noted. No erythema. No pallor.    Psychiatric: She has a normal mood and affect. Her behavior is normal. Judgment and thought content normal.     S/p bilateral mastectomy    ASSESSMENT:   well woman  1. Menopause  DXA Bone Density Axial Skeleton 1 or more sites      2. Encounter for gynecological examination without abnormal finding            PLAN:   return annually or prn  DEXA scan  Follow up with surgical oncology and medical oncology as scheduled         [1]   Social History  Socioeconomic History    Marital status:      Spouse name: Parrish    Number of children: 2   Tobacco Use    Smoking status: Never     Passive exposure: Never    Smokeless tobacco: Never   Substance and Sexual Activity    Alcohol use: No     Alcohol/week: 0.0 standard drinks of alcohol    Drug use: No    Sexual activity: Yes     Partners: Male     Birth control/protection: Surgical, None     Comment:  to Parrish    Other Topics Concern    Are you pregnant or think you may be? No    Breast-feeding No     Social Drivers of Health     Financial Resource Strain: Low Risk  (2/10/2025)    Overall Financial Resource Strain (CARDIA)     Difficulty of Paying Living Expenses: Not very hard   Food Insecurity: No Food Insecurity (2/10/2025)    Hunger Vital Sign     Worried About Running Out of Food in the Last Year: Never true     Ran Out of Food in the Last Year: Never true   Transportation Needs: Patient Declined (11/27/2023)    PRAPARE - Transportation     Lack of Transportation (Medical): Patient declined     Lack of Transportation (Non-Medical): Patient declined   Physical Activity: Insufficiently Active (2/10/2025)    Exercise Vital Sign     Days of Exercise per  Week: 1 day     Minutes of Exercise per Session: 30 min   Stress: No Stress Concern Present (2/10/2025)    [employer] - Occupational Stress Questionnaire     Feeling of Stress : Not at all   Housing Stability: Unknown (2/10/2025)    Housing Stability Vital Sign     Unable to Pay for Housing in the Last Year: No   [2]   Current Outpatient Medications   Medication Sig Dispense Refill    albuterol (VENTOLIN HFA) 90 mcg/actuation inhaler Inhale 2 puffs into the lungs every 6 (six) hours as needed for Wheezing. Rescue 18 g 1    ALPRAZolam (XANAX) 0.25 MG tablet Take 1 tablet (0.25 mg total) by mouth nightly as needed for Insomnia. 30 tablet 0    baclofen (LIORESAL) 10 MG tablet Take 2 tablets at bedtime 60 tablet 11    butalbital-acetaminophen-caffeine -40 mg (FIORICET, ESGIC) -40 mg per tablet Take 1 tablet by mouth every 6 to 8 hours as needed. 60 tablet 0    cholestyramine (QUESTRAN) 4 gram packet Take 1 packet by mouth once daily.      fluticasone propionate (FLONASE) 50 mcg/actuation nasal spray 1 spray by Each Nostril route 2 (two) times daily.      gabapentin (NEURONTIN) 800 MG tablet Take 800 mg by mouth 3 (three) times daily.      lansoprazole (PREVACID) 30 MG capsule Take 30 mg by mouth every morning. 1 Capsule, Delayed Release(E.C.) Oral Every day      lisinopriL (PRINIVIL,ZESTRIL) 5 MG tablet Take 1 tablet (5 mg total) by mouth once daily. 90 tablet 3    meloxicam (MOBIC) 7.5 MG tablet Take 1 tablet (7.5 mg total) by mouth once daily. 20 tablet 0    methylPREDNISolone (MEDROL) 4 MG Tab Take 4 mg by mouth once daily. Takes 6mg the first day decreasing 1 mg each day      ondansetron (ZOFRAN) 4 MG tablet Take 1 tablet (4 mg total) by mouth every 8 (eight) hours as needed for Nausea. 1 Tablet Oral Every 6 hours 12 tablet 12    sumatriptan (IMITREX) 20 mg/actuation nasal spray USE 1 SPRAY IN EACH NOSTRIL AT ONSET OF HEADACHE. MAY REPEAT ONCE IN 2 HOURS IF NO RELIEF. NO  electrocardiogram (ECG) at intervals determined by the provider.  
 
-Colorectal cancer screenings should be done for adults age 54-65 years with normal risk. There are a number of acceptable methods of screening for this type of cancer. Each test has its own benefits and drawbacks. Discuss with your provider what is most appropriate for you during your annual wellness visit. The different tests include: colonoscopy (considered the best screening method), a fecal occult blood test, a fecal DNA test, and sigmoidoscopy. -Breast cancer screenings are recommended every other year for women of normal risk age 54-69 years.  
 
-Cervical cancer screenings for women over age 72 are only recommended with certain risk factors.  
 
-All adults born between [facility] should be screened once for Hepatitis C. Here is a list of your current Health Maintenance items (your personalized list of preventive services) with a due date: 
Health Maintenance Due Topic Date Due  
 DTaP/Tdap/Td  (1 - Tdap) 10/02/2006 Ben Roca Diabetic Foot Care  07/25/2017  Breast Cancer Screening  11/09/2017 Ben Roca Annual Well Visit  05/25/2018  Flu Vaccine  08/01/2018 A Healthy Lifestyle: Care Instructions Your Care Instructions A healthy lifestyle can help you feel good, stay at a healthy weight, and have plenty of energy for both work and play. A healthy lifestyle is something you can share with your whole family. A healthy lifestyle also can lower your risk for serious health problems, such as high blood pressure, heart disease, and diabetes. You can follow a few steps listed below to improve your health and the health of your family. Follow-up care is a key part of your treatment and safety. Be sure to make and go to all appointments, and call your doctor if you are having problems. It's also a good idea to know your test results and keep a list of the medicines you take. How can you care for yourself at home? MORE THAN 2 SPRAYS PER 24 HOURS 6 each 12    triamcinolone acetonide 0.025% (KENALOG) 0.025 % Oint Apply topically 2 (two) times daily. x 1-2 wks then prn flares only 80 g 2    vitamin D 1000 units Tab Take 185 mg by mouth every morning.        No current facility-administered medications for this visit.      · Do not eat too much sugar, fat, or fast foods. You can still have dessert and treats now and then. The goal is moderation. · Start small to improve your eating habits. Pay attention to portion sizes, drink less juice and soda pop, and eat more fruits and vegetables. ¨ Eat a healthy amount of food. A 3-ounce serving of meat, for example, is about the size of a deck of cards. Fill the rest of your plate with vegetables and whole grains. ¨ Limit the amount of soda and sports drinks you have every day. Drink more water when you are thirsty. ¨ Eat at least 5 servings of fruits and vegetables every day. It may seem like a lot, but it is not hard to reach this goal. A serving or helping is 1 piece of fruit, 1 cup of vegetables, or 2 cups of leafy, raw vegetables. Have an apple or some carrot sticks as an afternoon snack instead of a candy bar. Try to have fruits and/or vegetables at every meal. 
· Make exercise part of your daily routine. You may want to start with simple activities, such as walking, bicycling, or slow swimming. Try to be active 30 to 60 minutes every day. You do not need to do all 30 to 60 minutes all at once. For example, you can exercise 3 times a day for 10 or 20 minutes. Moderate exercise is safe for most people, but it is always a good idea to talk to your doctor before starting an exercise program. 
· Keep moving. [place] the lawn, work in the garden, or Meet[employer]. Take the stairs instead of the elevator at work. · If you smoke, quit. People who smoke have an increased risk for heart attack, stroke, cancer, and other lung illnesses. Quitting is hard, but there are ways to boost your chance of quitting tobacco for good. ¨ Use nicotine gum, patches, or lozenges. ¨ Ask your doctor about stop-smoking programs and medicines. ¨ Keep trying.  
In addition to reducing your risk of diseases in the future, you will notice some benefits soon after you stop using tobacco. If you have shortness of breath or asthma symptoms, they will likely get better within a few weeks after you quit. · Limit how much alcohol you drink. Moderate amounts of alcohol (up to 2 drinks a day for men, 1 drink a day for women) are okay. But drinking too much can lead to liver problems, high blood pressure, and other health problems. Family health If you have a family, there are many things you can do together to improve your health. · Eat meals together as a family as often as possible. · Eat healthy foods. This includes fruits, vegetables, lean meats and dairy, and whole grains. · Include your family in your fitness plan. Most people think of activities such as jogging or tennis as the way to fitness, but there are many ways you and your family can be more active. Anything that makes you breathe hard and gets your heart pumping is exercise. Here are some tips: 
¨ Walk to do errands or to take your child to school or the bus. ¨ Go for a family bike ride after dinner instead of watching TV. Where can you learn more? Go to http://celestina-dontae.info/. Enter S655 in the search box to learn more about \"A Healthy Lifestyle: Care Instructions. \" Current as of: December 7, 2017 Content Version: 11.7 © 4193-9510 [employer]. Care instructions adapted under license by [employer] (which disclaims liability or warranty for this information). If you have questions about a medical condition or this instruction, always ask your healthcare professional. [place] [address] any warranty or liability for your use of this information. Preventing Falls: Care Instructions Your Care Instructions Getting around your home safely can be a challenge if you have injuries or health problems that make it easy for you to fall.  Loose rugs and furniture in walkways are among the dangers for many older people who have problems walking or who have poor eyesight. People who have conditions such as arthritis, osteoporosis, or dementia also have to be careful not to fall. You can make your home safer with a few simple measures. Follow-up care is a key part of your treatment and safety. Be sure to make and go to all appointments, and call your doctor if you are having problems. It's also a good idea to know your test results and keep a list of the medicines you take. How can you care for yourself at home? Taking care of yourself · You may get dizzy if you do not drink enough water. To prevent dehydration, drink plenty of fluids, enough so that your urine is light yellow or clear like water. Choose water and other caffeine-free clear liquids. If you have kidney, heart, or liver disease and have to limit fluids, talk with your doctor before you increase the amount of fluids you drink. · Exercise regularly to improve your strength, muscle tone, and balance. Walk if you can. Swimming may be a good choice if you cannot walk easily. · Have your vision and hearing checked each year or any time you notice a change. If you have trouble seeing and hearing, you might not be able to avoid objects and could lose your balance. · Know the side effects of the medicines you take. Ask your doctor or pharmacist whether the medicines you take can affect your balance. Sleeping pills or sedatives can affect your balance. · Limit the amount of alcohol you drink. Alcohol can impair your balance and other senses. · Ask your doctor whether calluses or corns on your feet need to be removed. If you wear loose-fitting shoes because of calluses or corns, you can lose your balance and fall. · Talk to your doctor if you have numbness in your feet. Preventing falls at home · Remove raised doorway thresholds, throw rugs, and clutter. Repair loose carpet or raised areas in the floor. · Move furniture and electrical cords to keep them out of walking paths. · Use nonskid floor wax, and wipe up spills right away, especially on ceramic tile floors. · If you use a walker or cane, put rubber tips on it. If you use crutches, clean the bottoms of them regularly with an abrasive pad, such as steel wool. · Keep your house well lit, especially [facility], and outside walkways. Use night-lights in areas such as hallways and bathrooms. Add extra light switches or use remote switches (such as switches that go on or off when you clap your hands) to make it easier to turn lights on if you have to get up during the night. · Install sturdy handrails on stairways. · Move items in your cabinets so that the things you use a lot are on the lower shelves (about waist level). · Keep a cordless phone and a flashlight with new batteries by your bed. If possible, put a phone in each of the main rooms of your house, or carry a cell phone in case you fall and cannot reach a phone. Or, you can wear a device around your neck or wrist. You push a button that sends a signal for help. · Wear low-heeled shoes that fit well and give your feet good support. Use footwear with nonskid soles. Check the heels and soles of your shoes for wear. Repair or replace worn heels or soles. · Do not wear socks without shoes on wood floors. · Walk on the grass when the sidewalks are slippery. If you live in an area that gets snow and ice in the winter, sprinkle salt on slippery steps and sidewalks. Preventing falls in the bath · Install grab bars and nonskid mats inside and outside your shower or tub and near the toilet and sinks. · Use shower chairs and bath benches. · Use a hand-held shower head that will allow you to sit while showering.  
· Get into a tub or shower by putting the weaker leg in first. Get out of a tub or shower with your strong side first. 
 · Repair loose toilet seats and consider installing a raised toilet seat to make getting on and off the toilet easier. · Keep your bathroom door unlocked while you are in the shower. Where can you learn more? Go to http://celestina-dontae.info/. Enter [phone] [id number] [phone] in the search box to learn more about \"Preventing Falls: Care Instructions. \" Current as of: May 12, 2017 Content Version: 11.7 © 5790-4407 PhoneTell. Care instructions adapted under license by [employer] (which disclaims liability or warranty for this information). If you have questions about a medical condition or this instruction, always ask your healthcare professional. [address] any warranty or liability for your use of this information. Introducing [facility]! Dear Chema Melendez: Thank you for requesting a iMER account. Our records indicate that you already have an active iMER account. You can access your account anytime at [email or web address] Did you know that you can access your hospital and ER discharge instructions at any time in iMER? You can also review all of your test results from your hospital stay or ER visit. Additional Information If you have questions, please visit the Frequently Asked Questions section of the iMER website at [email or web address] Remember, iMER is NOT to be used for urgent needs. For medical emergencies, dial 911. Now available from your iPhone and Android! Please provide this summary of care documentation to your next provider. Your primary care clinician is listed as Shivam Horton. If you have any questions after today's visit, please call [phone].

## 2025-05-07 ENCOUNTER — RESULTS FOLLOW-UP (OUTPATIENT)
Dept: HEMATOLOGY/ONCOLOGY | Facility: CLINIC | Age: 65
End: 2025-05-07

## 2025-05-07 ENCOUNTER — LAB VISIT (OUTPATIENT)
Dept: LAB | Facility: HOSPITAL | Age: 65
End: 2025-05-07
Payer: COMMERCIAL

## 2025-05-07 ENCOUNTER — OFFICE VISIT (OUTPATIENT)
Dept: HEMATOLOGY/ONCOLOGY | Facility: CLINIC | Age: 65
End: 2025-05-07
Payer: COMMERCIAL

## 2025-05-07 VITALS
SYSTOLIC BLOOD PRESSURE: 146 MMHG | WEIGHT: 216.94 LBS | OXYGEN SATURATION: 99 % | DIASTOLIC BLOOD PRESSURE: 70 MMHG | BODY MASS INDEX: 34.86 KG/M2 | HEIGHT: 66 IN | HEART RATE: 75 BPM | TEMPERATURE: 98 F | RESPIRATION RATE: 17 BRPM

## 2025-05-07 DIAGNOSIS — C50.312 MALIGNANT NEOPLASM OF LOWER-INNER QUADRANT OF LEFT BREAST IN FEMALE, ESTROGEN RECEPTOR POSITIVE: Primary | ICD-10-CM

## 2025-05-07 DIAGNOSIS — Z90.13 H/O BILATERAL MASTECTOMY: ICD-10-CM

## 2025-05-07 DIAGNOSIS — M85.80 OSTEOPENIA, UNSPECIFIED LOCATION: ICD-10-CM

## 2025-05-07 DIAGNOSIS — E55.9 VITAMIN D DEFICIENCY: Primary | ICD-10-CM

## 2025-05-07 DIAGNOSIS — Z17.0 MALIGNANT NEOPLASM OF LOWER-INNER QUADRANT OF LEFT BREAST IN FEMALE, ESTROGEN RECEPTOR POSITIVE: Primary | ICD-10-CM

## 2025-05-07 DIAGNOSIS — E55.9 VITAMIN D DEFICIENCY: ICD-10-CM

## 2025-05-07 LAB — 25(OH)D3+25(OH)D2 SERPL-MCNC: 26 NG/ML (ref 30–96)

## 2025-05-07 PROCEDURE — 99214 OFFICE O/P EST MOD 30 MIN: CPT | Mod: S$GLB,,, | Performed by: NURSE PRACTITIONER

## 2025-05-07 PROCEDURE — 36415 COLL VENOUS BLD VENIPUNCTURE: CPT

## 2025-05-07 PROCEDURE — 99999 PR PBB SHADOW E&M-EST. PATIENT-LVL IV: CPT | Mod: PBBFAC,,, | Performed by: NURSE PRACTITIONER

## 2025-05-07 PROCEDURE — 3078F DIAST BP <80 MM HG: CPT | Mod: CPTII,S$GLB,, | Performed by: NURSE PRACTITIONER

## 2025-05-07 PROCEDURE — 3077F SYST BP >= 140 MM HG: CPT | Mod: CPTII,S$GLB,, | Performed by: NURSE PRACTITIONER

## 2025-05-07 PROCEDURE — G2211 COMPLEX E/M VISIT ADD ON: HCPCS | Mod: S$GLB,,, | Performed by: NURSE PRACTITIONER

## 2025-05-07 PROCEDURE — 3008F BODY MASS INDEX DOCD: CPT | Mod: CPTII,S$GLB,, | Performed by: NURSE PRACTITIONER

## 2025-05-07 PROCEDURE — 82306 VITAMIN D 25 HYDROXY: CPT

## 2025-05-07 PROCEDURE — 1159F MED LIST DOCD IN RCRD: CPT | Mod: CPTII,S$GLB,, | Performed by: NURSE PRACTITIONER

## 2025-05-07 PROCEDURE — 4010F ACE/ARB THERAPY RXD/TAKEN: CPT | Mod: CPTII,S$GLB,, | Performed by: NURSE PRACTITIONER

## 2025-05-07 RX ORDER — ERGOCALCIFEROL 1.25 MG/1
50000 CAPSULE ORAL
Qty: 4 CAPSULE | Refills: 3 | Status: SHIPPED | OUTPATIENT
Start: 2025-05-07

## 2025-05-07 NOTE — PROGRESS NOTES
Subjective     Patient ID: Christal Posey is a 65 y.o. female.    Chief Complaint: Malignant neoplasm of lower-inner quadrant of left breast i    HPI    Overall feels well  No complaints today  Continues to have her arthritis pains - mainly in low back. Overtime seems stable but if immobile, it will hurt more. Better with movement. t  Appetite good and weight stable.   No n/v  Bowels normal.  Stays active- cuts her own grass.     Colonoscopy done last year. [facility]    Oncology History:  - 8/12/2022 Breast MRI:  Findings:  Left  Numerous scattered foci and small masses are present, most of which appear similar to eachother.    There is an 8 mm x 7 mm x 7 mm irregularly shaped, heterogeneous mass seen in the left breast at 6 o'clock, 2.4 cm from the nipple and 8 cm from the chest wall. Delayed phase is washout.   There is a 9 mm x 7 mm x 7 mm irregularly shaped, homogeneous mass with irregular margins seen in the upper inner quadrant of the right breast, 2.8 cm from the nipple and 6.7 cm from the chest wall. Delayed phase is plateau. This is best seen on Series 09735: Image 50.  This is approximately 3 cm anterior and superior to the signal void related to the 9 o'clock biopsy marker.   There is no suspicious enhancement associated with either biopsy marker.   Right  Numerous scattered foci and small masses are present, most of which appear similar to eachother.    There is a 6 mm oval, homogeneous mass with circumscribed margins seen in the lower central region of the right breast, 2.3 cm from the nipple and 9.5 cm from the chest wall. Delayed phase is washout. This is best seen in Series 85552: Image 70.   There is no internal mammary or axillary adenopathy.  Impression:  Left  Mass: Left breast 9 mm x 7 mm x 7 mm mass at the upper inner position. Assessment: 4 - Suspicious finding. Biopsy is recommended.   Mass: Left breast 8 mm x 7 mm x 7 mm mass at the 6 o'clock position. Assessment: 4 - Suspicious finding.  Biopsy is recommended.   There is no suspicious enhancement associated with either biopsy marker.   Right  Mass: Right breast  6 mm mass at the lower central position. Assessment: 4 - Suspicious finding. Biopsy is recommended.   BI-RADS Category:   Overall: 4 - Suspicious     - 9/28/2022 MRI guided breast biopsy:  BREAST, RIGHT, LOWER CENTRAL MASS, BIOPSY:   - Intraductal papilloma, benign.   - Benign breast tissue with predominantly fatty stroma and blood clot.   - Microcalcifications: Focally seen in association with benign breast ducts.   - No atypia or malignancy.   - Additional deeper sections have been examined.      - 10/24/2022 Bilateral Mastectomy:  1. BREAST, RIGHT, MASTECTOMY:   - Negative for malignancy.   - Atypical ductal hyperplasia (ADH), focal.   - No residual intraductal papilloma present.   - Previous biopsy site changes and biopsy clip.   - Benign subareolar nipple ducts.   - One benign intramammary lymph node.   2. AXILLARY SENTINEL LYMPH NODE, LEFT, # 1, HOT BLUE 1451, EXCISION:   - One benign axillary sentinel lymph node, negative for metastatic carcinoma   (0/1).   - Immunohistochemical stains for CK WSK, Cam 5.2, and CK AE/1/AE3 are   negative, supporting the diagnosis.   3. BREAST, LEFT, NIPPLE SPARING MASTECTOMY:   - Invasive ductal carcinoma of breast with extensive intraductal component,   see Tumor Synoptic.   - Size of invasive carcinoma: 9 MM (measured histologically on slide 3L).   - Trinity Histologic Score: Grade 1 of 3.                     Tubule Formation:  2                     Nuclear Pleomorphism:  2                     Mitotic Activity:  1   - Associated (DCIS), intermediate nuclear grade, cribriform type, with   central necrosis.   - Size of DCIS: at least 10 MM.   - No lymphovascular invasion.   - Margins:   ·tab Invasive carcinoma and DCIS are greater than 10 MM from all margins.   - Benign subareolar nipple ducts.   - Both biopsy clips are identified and corresponding  areas sampled.   - Microcalcifications: Seen in association with benign breast ducts.   - Pathologic staging: pT1b (sn)N0   4. BREAST, LEFT, ADDITIONAL INFERIOR LATERAL MARGIN, NIPPLE SPARING   MASTECTOMY:   - Negative for atypia or malignancy.   - Benign breast tissue with predominantly fatty stroma.   5. BREAST, LEFT, NEW SUPERIOR MARGIN, NIPPLE SPARING MASTECTOMY:   - Negative for atypia or malignancy.   - Benign breast tissue with predominantly fatty stroma.       Previously, required implants out due to infection and contracture  - 11/28/2022   Diagnosis:  Preoperative diagnosis cellulitis and capsular contracture of the right breast  Procedure performed  1. Bilateral removal of implants (tissue expanders)   2. Total bilateral capsulectomy  3. Wide excision soft tissue of right axillary region measuring 16 cm x 6 cm with layered closure final incision 16 cm  4. Wide excision soft tissue of the left axillary region measuring 16 cm x 6 cm with layered closure final incision 16 cm  Reports concerned as asked for nipples to be removed as she did not plan for additional reconstruction and this was not done     Prior history of DCIS:  Monitored by surveillance- opted out of adjuvant endocrine with low volume disease  DCIS History:  5/16/16 Screening mammography:  Calcifications in the left breast require additional evaluation.    ACR BI-RADS Category 0: Incomplete: Need Additional Imaging Evaluation  - 5/17/16 Diagnostic mammogram  Calcifications in the left breast are suspicious.  Histology using core biopsy  is recommended.  ACR BI-RADS Category 4: Suspicious Abnormality  - 5/20/16 biopsy  1, 2. LEFT BREAST, WITH CALCIFICATIONS AND WITHOUT CALCIFICATIONS, UPPER INNER QUADRANT  (NEEDLE BIOPSY):  -Low-grade ductal carcinoma in situ (DCIS)  -Nuclear grade 1 out of 3  -Cribriform pattern  -Associated with microcalcifications  %, % positive  - 6/10/2016 Lumpectomy  Pathology:  Procedure - Excision with wire  guided localization  Lymph node sampling - Not sampled  Specimen laterality - Left  Tumor site - Not specified  Size of DCIS - 2mm, including findings from the biopsy report  Histologic type - Ductal carcinoma in situ  Architectural pattern - Solid and cribriforming  Nuclear grade - Low  Necrosis - Absent  Margins - Negative, inferior is 2mm  Pathologc staging - p Tis Nx Mx  Hormone receptors (See Breast Biopsy report; MS16 - 97750)  Estrogen receptor - Positive, strong, 100%  Progesterone receptors - Positive, strong, 100%     SUPPLEMENTAL #1:   HER2 AMPLIFICATION ASSOCIATED WITH BREAST CANCER, FISH, TISSUE:   Result Summary   Negative         Started tamoxifen 11/18/2022  Unable to tolerate tamoxifen   Opts out      reported bone pain.   5/17/2023 Bone scan:  FINDINGS:  No focal pathologic uptake in the spine. Questionable mild focal uptake in a right lower lateral rib.  Uptake at the knees and left foot is likely degenerative.  There is physiologic distribution of the radiopharmaceutical throughout the skeleton.  There is normal uptake in the genitourinary system and soft tissues.  Impression:  Questionable mild focal uptake in a right lower lateral rib.  Consider CT of the chest to evaluate for fracture or metastatic lesion.     This result led to ordering of CT scan:     5/26/2023 CT Chest:  Impression:  1. There is been an interval bilateral mastectomy.  There is soft tissue thickening in the mastectomy bed.  On the right side, there is a finger-like projection of soft tissue up to 16 mm in length.  This may merely represent scarring.  However, comparison should be made with other CTs of the chest which may exist at other institutions.  If these do not exist, follow-up CT enhance with contrast recommended.  2. There are few stable left upper lobe pulmonary micro nodules (axial series 5, images 106, 175, and 212).  3. A rib fracture was not identified.  Additionally, there is no specific CT evidence of  osteolytic or osteoblastic bone disease.  However, if there is a continued suspicion for metastatic bone disease, consider nuclear medicine scan.       11/24/2023 CT chest:   Impression:  No acute intrathoracic findings and no significant detrimental changes prior study.  Grossly unchanged soft tissue thickening of the right mastectomy bed, favored to represent scarring.  Stable 4 mm left upper lobe pulmonary nodule.  Electronically signed by resident: Emily Nunes  Date:                                            11/24/2023  Time:                                           11:18         Stopped tamoxifen 12/2023 due to rash        Additional PMH:  Bilateral cataract surgery     FH:  No new cancers    Review of Systems   Constitutional:         See above   All other systems reviewed and are negative.         Objective     Physical Exam  Vitals and nursing note reviewed.   Constitutional:       General: She is not in acute distress.     Appearance: Normal appearance. She is well-developed and normal weight. She is not ill-appearing.      Comments: Presents alone  Very pleasant   HENT:      Head: Normocephalic and atraumatic.   Eyes:      General: Lids are normal. No scleral icterus.     Extraocular Movements: Extraocular movements intact.      Conjunctiva/sclera: Conjunctivae normal.      Pupils: Pupils are equal, round, and reactive to light.   Neck:      Thyroid: No thyromegaly.      Vascular: No JVD.      Trachea: Trachea normal.   Cardiovascular:      Rate and Rhythm: Normal rate and regular rhythm.      Heart sounds: Normal heart sounds. No murmur heard.     No friction rub. No gallop.   Pulmonary:      Effort: Pulmonary effort is normal. No respiratory distress.      Breath sounds: Normal breath sounds. No wheezing, rhonchi or rales.   Chest:      Chest wall: No tenderness.      Comments: Bilateral mastectomies. Chest wall negative. Nipples remain.   Incisions healed. No LAD  Abdominal:      General: Bowel  sounds are normal. There is no distension.      Palpations: Abdomen is soft. There is no mass.      Tenderness: There is no abdominal tenderness. There is no guarding or rebound.      Comments: No organomegaly.    Musculoskeletal:         General: Normal range of motion.      Cervical back: Normal range of motion and neck supple.      Comments: No spinal or paraspinal tenderness.    Lymphadenopathy:      Head:      Right side of head: No submental or submandibular adenopathy.      Left side of head: No submental or submandibular adenopathy.      Cervical: No cervical adenopathy.      Upper Body:      Right upper body: No supraclavicular or axillary adenopathy.      Left upper body: No supraclavicular or axillary adenopathy.   Skin:     General: Skin is warm and dry.      Capillary Refill: Capillary refill takes less than 2 seconds.      Coloration: Skin is not jaundiced.      Findings: No bruising or rash.      Nails: There is no clubbing.   Neurological:      Mental Status: She is alert and oriented to person, place, and time. Mental status is at baseline.      Motor: No weakness.      Gait: Gait normal.   Psychiatric:         Mood and Affect: Mood normal.         Speech: Speech normal.         Behavior: Behavior normal.         Thought Content: Thought content normal.         Judgment: Judgment normal.       Labs- reviewed most recent     Assessment and Plan     1. Malignant neoplasm of lower-inner quadrant of left breast in female, estrogen receptor positive    2. Osteopenia, unspecified location  -     Vitamin D; Future; Expected date: 05/07/2025    3. Vitamin D deficiency  -     Vitamin D; Future; Expected date: 05/07/2025    4. H/O bilateral mastectomy            Clinically negative  Opted out of endocrine therapy  BMD reveals osteopenia. Discussed weight bearing exercising.   Check Vit D level today- currently taking  Vit D 3000u daily.   RTC 3 months  Knows to call for any issues  Colonoscopy UTD- she had in  2023 at [facility]       Route Chart for Scheduling    Med Onc Chart Routing      Follow up with physician 3 months.   Follow up with [facility]    Infusion scheduling note    Injection scheduling note    Labs    Imaging    Pharmacy appointment    Other referrals                     Patient is in agreement with the proposed treatment plan. All questions were answered to the patient's satisfaction. Pt knows to call clinic for any new or worsening symptoms and if anything is needed before the next clinic visit.    Brijesh Zimmer, MSN, APRN, FNP-C  Nurse Practitioner to Dr. Latanya Julian  [facility]  [facility]  Hematology & Medical Oncology  [address] [place] [id number]  ph. [phone] ext [phone]  Fax. [phone]              30 minutes of total time spent on the encounter, which includes face to face time and non-face to face time preparing to see the patient (eg, review of tests), Obtaining and/or reviewing separately obtained history, Documenting clinical information in the electronic or other health record, Independently interpreting results (not separately reported) and communicating results to the patient/family/caregiver, or Care coordination (not separately reported).        code applied: patient requires or will require a continuous, longitudinal, and active collaborative plan of care related to this patient's health condition, high risk cancer --the management of which requires the direction of a practitioner with specialized clinical knowledge, skill, and expertise.

## 2025-05-10 ENCOUNTER — RESULTS FOLLOW-UP (OUTPATIENT)
Dept: OBSTETRICS AND GYNECOLOGY | Facility: CLINIC | Age: 65
End: 2025-05-10

## 2025-06-30 ENCOUNTER — OFFICE VISIT (OUTPATIENT)
Dept: INTERNAL MEDICINE | Facility: CLINIC | Age: 65
End: 2025-06-30
Payer: COMMERCIAL

## 2025-06-30 VITALS
OXYGEN SATURATION: 97 % | WEIGHT: 213.06 LBS | DIASTOLIC BLOOD PRESSURE: 78 MMHG | SYSTOLIC BLOOD PRESSURE: 136 MMHG | BODY MASS INDEX: 34.39 KG/M2 | HEART RATE: 91 BPM | RESPIRATION RATE: 18 BRPM

## 2025-06-30 DIAGNOSIS — M54.41 ACUTE RIGHT-SIDED LOW BACK PAIN WITH RIGHT-SIDED SCIATICA: Primary | ICD-10-CM

## 2025-06-30 DIAGNOSIS — M85.80 OSTEOPENIA, UNSPECIFIED LOCATION: ICD-10-CM

## 2025-06-30 PROCEDURE — 3075F SYST BP GE 130 - 139MM HG: CPT | Mod: CPTII,S$GLB,, | Performed by: STUDENT IN AN ORGANIZED HEALTH CARE EDUCATION/TRAINING PROGRAM

## 2025-06-30 PROCEDURE — 3008F BODY MASS INDEX DOCD: CPT | Mod: CPTII,S$GLB,, | Performed by: STUDENT IN AN ORGANIZED HEALTH CARE EDUCATION/TRAINING PROGRAM

## 2025-06-30 PROCEDURE — 99999 PR PBB SHADOW E&M-EST. PATIENT-LVL IV: CPT | Mod: PBBFAC,,, | Performed by: STUDENT IN AN ORGANIZED HEALTH CARE EDUCATION/TRAINING PROGRAM

## 2025-06-30 PROCEDURE — 1159F MED LIST DOCD IN RCRD: CPT | Mod: CPTII,S$GLB,, | Performed by: STUDENT IN AN ORGANIZED HEALTH CARE EDUCATION/TRAINING PROGRAM

## 2025-06-30 PROCEDURE — 3078F DIAST BP <80 MM HG: CPT | Mod: CPTII,S$GLB,, | Performed by: STUDENT IN AN ORGANIZED HEALTH CARE EDUCATION/TRAINING PROGRAM

## 2025-06-30 PROCEDURE — 4010F ACE/ARB THERAPY RXD/TAKEN: CPT | Mod: CPTII,S$GLB,, | Performed by: STUDENT IN AN ORGANIZED HEALTH CARE EDUCATION/TRAINING PROGRAM

## 2025-06-30 PROCEDURE — 99213 OFFICE O/P EST LOW 20 MIN: CPT | Mod: S$GLB,,, | Performed by: STUDENT IN AN ORGANIZED HEALTH CARE EDUCATION/TRAINING PROGRAM

## 2025-07-01 NOTE — ASSESSMENT & PLAN NOTE
Noted on DEXA in 04/25  Daily calcium intake 8424-5522 mg, dietary sources preferred; Vitamin D 2446-4564 IU daily recommeded

## 2025-07-01 NOTE — PROGRESS NOTES
Patient ID: Christal Posey is a 65 y.o. female.  Chief Complaint: Back Pain    Subjective:   History of Present Illness    CHIEF COMPLAINT:  Patient presents today for evaluation of worsening back pain and abdominal discomfort.    BACK PAIN:  She reports chronic back pain secondary to degenerative joint disease and arthritis, primarily located in low back with right-sided predominance. She experiences significant morning stiffness, requiring approximately two hours to improve mobility. Symptoms fluctuate throughout the day, with mornings being most challenging.  Pain peaked at 5-6/10 on Saturday and has been improving and is back to normal pain level 2-3/10.     RECENT ABDOMINAL PAIN:  She experienced abdominal/side pain starting Thursday and Friday, describing discomfort and twinges similar to previous ovarian cyst experiences from her 20s and 30s. Pain was initially persistent but improved significantly after taking OTC medications and rest. She currently reports only occasional mild twinges. Worried it is possibly her apendix.    MEDICAL HISTORY:  History of breast cancer diagnosed twice: initially six years ago and recurrence two years ago, both treated with mastectomy. Previous ovarian cyst surgery at age 32. Fatty liver identified on previous ultrasound. Bone scan from 2023 revealed low bone density.    MEDICATIONS:  Currently taking Aleve for pain management. Has Baclofen prescription for muscle relaxant though not currently using. Occasionally uses steroids for flare-ups, prescribed by rheumatologist. Recently took Tylenol and Excedrin for pain and headache relief.      ROS:  General: -fever, -chills, , -weight gain, -weight loss  Eyes: -vision changes, -redness, -discharge  ENT: -ear pain, -nasal congestion, -sore throat  Cardiovascular: -chest pain, -palpitations, -lower extremity edema  Respiratory: -cough, -shortness of breath  Gastrointestinal: +abdominal pain, -nausea, -vomiting, -diarrhea,  -constipation, -blood in stool  Genitourinary: -dysuria, -hematuria, -frequency  Musculoskeletal: -joint pain, -muscle pain, +back pain, +neck pain, +joint stiffness, +muscle stiffness, +nerve pain ( chronic)  Skin: -rash, -lesion  Neurological:-dizziness, -numbness, -tingling, +migraines  Psychiatric: -anxiety, -depression, -sleep difficulty              Labs Reviewed  Objective:   /78 (BP Location: Left arm, Patient Position: Sitting)   Pulse 91   Resp 18   Wt 96.6 kg (213 lb 1.2 oz)   LMP  (LMP Unknown)   SpO2 97%   BMI 34.39 kg/m²      Physical Exam    General: No acute distress. Well-developed. Well-nourished.  Eyes: EOMI. Sclerae anicteric.  HENT: Normocephalic. Atraumatic. Nares patent. Moist oral mucosa.  Ears: Bilateral  external ear normal.   Cardiovascular: Regular rate. Regular rhythm. No murmurs. No rubs. No gallops. Normal S1, S2.  Respiratory: Normal respiratory effort. Clear to auscultation bilaterally. No rales. No rhonchi. No wheezing.  Abdomen: Soft. Non-tender. Non-distended. Normoactive bowel sounds.  Musculoskeletal: No  obvious deformity.No spine tenderness, SLRT negative  Extremities: No lower extremity edema.  Neurological: Alert. No slurred speech. Normal gait.  Psychiatric: Normal mood. Normal affect. Good insight. Good judgment.  Skin: Warm. Dry. No rash.           Assessment:       1. Acute right-sided low back pain with right-sided sciatica    2. Osteopenia, unspecified location            Plan:         1. Acute right-sided low back pain with right-sided sciatica  Acute on chronic, improved significantly with OTC tylenol and aleve  No red flags: fevrm chills, urinary symptoms, saddle anesthesia or weakness  No spine tenderness and SLRT negative  No abdominal tenderness on exam  Patient concerned it is possibly her appendix or ovary: since symptoms have improved: no imaging indicated. Advised to go to ER if develops acute pain.  Continue tylenol alternating with Aleve or  muscle relaxant as needed    2. Osteopenia, unspecified location  Assessment & Plan:  Noted on DEXA in 04/25  Daily calcium intake 4386-0619 mg, dietary sources preferred; Vitamin D 2659-9244 IU daily recommeded          Health Maintenance     Health Maintenance Due   Topic    Mammogram         Follow up   No follow-ups on file.      This note was generated with the assistance of ambient listening technology. Verbal consent was obtained by the patient and accompanying visitor(s) for the recording of patient appointment to facilitate this note. I attest to having reviewed and edited the generated note for accuracy, though some syntax or spelling errors may persist. Please contact the author of this note for any clarification.           Zenaida Moe MD  [address] [place] [address]  Ph: [phone]

## 2025-08-07 ENCOUNTER — OFFICE VISIT (OUTPATIENT)
Dept: HEMATOLOGY/ONCOLOGY | Facility: CLINIC | Age: 65
End: 2025-08-07
Payer: COMMERCIAL

## 2025-08-07 VITALS
TEMPERATURE: 97 F | DIASTOLIC BLOOD PRESSURE: 80 MMHG | SYSTOLIC BLOOD PRESSURE: 154 MMHG | OXYGEN SATURATION: 98 % | WEIGHT: 212.31 LBS | HEIGHT: 66 IN | HEART RATE: 71 BPM | BODY MASS INDEX: 34.12 KG/M2 | RESPIRATION RATE: 17 BRPM

## 2025-08-07 DIAGNOSIS — Z76.89 SLEEP CONCERN: ICD-10-CM

## 2025-08-07 DIAGNOSIS — C50.312 MALIGNANT NEOPLASM OF LOWER-INNER QUADRANT OF LEFT BREAST IN FEMALE, ESTROGEN RECEPTOR POSITIVE: Primary | ICD-10-CM

## 2025-08-07 DIAGNOSIS — Z17.0 MALIGNANT NEOPLASM OF LOWER-INNER QUADRANT OF LEFT BREAST IN FEMALE, ESTROGEN RECEPTOR POSITIVE: Primary | ICD-10-CM

## 2025-08-07 DIAGNOSIS — D05.12 DUCTAL CARCINOMA IN SITU (DCIS) OF LEFT BREAST: Chronic | ICD-10-CM

## 2025-08-07 DIAGNOSIS — M85.80 OSTEOPENIA, UNSPECIFIED LOCATION: ICD-10-CM

## 2025-08-07 PROCEDURE — 99999 PR PBB SHADOW E&M-EST. PATIENT-LVL V: CPT | Mod: PBBFAC,,, | Performed by: INTERNAL MEDICINE

## 2025-08-07 NOTE — PROGRESS NOTES
Subjective     Patient ID: Christal Posey is a 65 y.o. female.    Chief Complaint: Ductal carcinoma in situ (DCIS) of left breast and Breast Cancer    HPI    Overall feels well  Returns for routine follow up of breast cancer and DCIS    States no new health issues  She had 2 of her grandchildren here for a few weeks and enjoyed that  Notes she has been kind of tired- states sleep not the greatest  Goes to bed at 11:00 to 11:30 PM poor sleep, crazy dreams, arthritis complaints  No pain issues other than chronic arthritis- knees, hands, low back- sees rheumatology  Weight stable  Remainder ROS below     Oncology History:  - 8/12/2022 Breast MRI:  Findings:  Left  Numerous scattered foci and small masses are present, most of which appear similar to eachother.    There is an 8 mm x 7 mm x 7 mm irregularly shaped, heterogeneous mass seen in the left breast at 6 o'clock, 2.4 cm from the nipple and 8 cm from the chest wall. Delayed phase is washout.   There is a 9 mm x 7 mm x 7 mm irregularly shaped, homogeneous mass with irregular margins seen in the upper inner quadrant of the right breast, 2.8 cm from the nipple and 6.7 cm from the chest wall. Delayed phase is plateau. This is best seen on Series 25242: Image 50.  This is approximately 3 cm anterior and superior to the signal void related to the 9 o'clock biopsy marker.   There is no suspicious enhancement associated with either biopsy marker.   Right  Numerous scattered foci and small masses are present, most of which appear similar to eachother.    There is a 6 mm oval, homogeneous mass with circumscribed margins seen in the lower central region of the right breast, 2.3 cm from the nipple and 9.5 cm from the chest wall. Delayed phase is washout. This is best seen in Series 68237: Image 70.   There is no internal mammary or axillary adenopathy.  Impression:  Left  Mass: Left breast 9 mm x 7 mm x 7 mm mass at the upper inner position. Assessment: 4 - Suspicious finding.  Biopsy is recommended.   Mass: Left breast 8 mm x 7 mm x 7 mm mass at the 6 o'clock position. Assessment: 4 - Suspicious finding. Biopsy is recommended.   There is no suspicious enhancement associated with either biopsy marker.   Right  Mass: Right breast  6 mm mass at the lower central position. Assessment: 4 - Suspicious finding. Biopsy is recommended.   BI-RADS Category:   Overall: 4 - Suspicious     - 9/28/2022 MRI guided breast biopsy:  BREAST, RIGHT, LOWER CENTRAL MASS, BIOPSY:   - Intraductal papilloma, benign.   - Benign breast tissue with predominantly fatty stroma and blood clot.   - Microcalcifications: Focally seen in association with benign breast ducts.   - No atypia or malignancy.   - Additional deeper sections have been examined.      - 10/24/2022 Bilateral Mastectomy:  1. BREAST, RIGHT, MASTECTOMY:   - Negative for malignancy.   - Atypical ductal hyperplasia (ADH), focal.   - No residual intraductal papilloma present.   - Previous biopsy site changes and biopsy clip.   - Benign subareolar nipple ducts.   - One benign intramammary lymph node.   2. AXILLARY SENTINEL LYMPH NODE, LEFT, # 1, HOT BLUE 1451, EXCISION:   - One benign axillary sentinel lymph node, negative for metastatic carcinoma   (0/1).   - Immunohistochemical stains for CK WSK, Cam 5.2, and CK AE/1/AE3 are   negative, supporting the diagnosis.   3. BREAST, LEFT, NIPPLE SPARING MASTECTOMY:   - Invasive ductal carcinoma of breast with extensive intraductal component,   see Tumor Synoptic.   - Size of invasive carcinoma: 9 MM (measured histologically on slide 3L).   - Trinity Histologic Score: Grade 1 of 3.                     Tubule Formation:  2                     Nuclear Pleomorphism:  2                     Mitotic Activity:  1   - Associated (DCIS), intermediate nuclear grade, cribriform type, with   central necrosis.   - Size of DCIS: at least 10 MM.   - No lymphovascular invasion.   - Margins:   ·tab Invasive carcinoma and DCIS  are greater than 10 MM from all margins.   - Benign subareolar nipple ducts.   - Both biopsy clips are identified and corresponding areas sampled.   - Microcalcifications: Seen in association with benign breast ducts.   - Pathologic staging: pT1b (sn)N0   4. BREAST, LEFT, ADDITIONAL INFERIOR LATERAL MARGIN, NIPPLE SPARING   MASTECTOMY:   - Negative for atypia or malignancy.   - Benign breast tissue with predominantly fatty stroma.   5. BREAST, LEFT, NEW SUPERIOR MARGIN, NIPPLE SPARING MASTECTOMY:   - Negative for atypia or malignancy.   - Benign breast tissue with predominantly fatty stroma.       Previously, required implants out due to infection and contracture  - 11/28/2022   Diagnosis:  Preoperative diagnosis cellulitis and capsular contracture of the right breast  Procedure performed  1. Bilateral removal of implants (tissue expanders)   2. Total bilateral capsulectomy  3. Wide excision soft tissue of right axillary region measuring 16 cm x 6 cm with layered closure final incision 16 cm  4. Wide excision soft tissue of the left axillary region measuring 16 cm x 6 cm with layered closure final incision 16 cm  Reports concerned as asked for nipples to be removed as she did not plan for additional reconstruction and this was not done     Prior history of DCIS:  Monitored by surveillance- opted out of adjuvant endocrine with low volume disease  DCIS History:  5/16/16 Screening mammography:  Calcifications in the left breast require additional evaluation.    ACR BI-RADS Category 0: Incomplete: Need Additional Imaging Evaluation  - 5/17/16 Diagnostic mammogram  Calcifications in the left breast are suspicious.  Histology using core biopsy  is recommended.  ACR BI-RADS Category 4: Suspicious Abnormality  - 5/20/16 biopsy  1, 2. LEFT BREAST, WITH CALCIFICATIONS AND WITHOUT CALCIFICATIONS, UPPER INNER QUADRANT  (NEEDLE BIOPSY):  -Low-grade ductal carcinoma in situ (DCIS)  -Nuclear grade 1 out of 3  -Cribriform  pattern  -Associated with microcalcifications  %, % positive  - 6/10/2016 Lumpectomy  Pathology:  Procedure - Excision with wire guided localization  Lymph node sampling - Not sampled  Specimen laterality - Left  Tumor site - Not specified  Size of DCIS - 2mm, including findings from the biopsy report  Histologic type - Ductal carcinoma in situ  Architectural pattern - Solid and cribriforming  Nuclear grade - Low  Necrosis - Absent  Margins - Negative, inferior is 2mm  Pathologc staging - p Tis Nx Mx  Hormone receptors (See Breast Biopsy report; MS16 - 33748)  Estrogen receptor - Positive, strong, 100%  Progesterone receptors - Positive, strong, 100%     SUPPLEMENTAL #1:   HER2 AMPLIFICATION ASSOCIATED WITH BREAST CANCER, FISH, TISSUE:   Result Summary   Negative      - Started Tamoxifen 11/18/2022  Unable to tolerate tamoxifen -- opted out     At one point they reported bone pain.   5/17/2023 Bone scan:  FINDINGS:  No focal pathologic uptake in the spine. Questionable mild focal uptake in a right lower lateral rib.  Uptake at the knees and left foot is likely degenerative.  There is physiologic distribution of the radiopharmaceutical throughout the skeleton.  There is normal uptake in the genitourinary system and soft tissues.  Impression:  Questionable mild focal uptake in a right lower lateral rib.  Consider CT of the chest to evaluate for fracture or metastatic lesion.     This result led to ordering of CT scan:     5/26/2023 CT Chest:  Impression:  1. There is been an interval bilateral mastectomy.  There is soft tissue thickening in the mastectomy bed.  On the right side, there is a finger-like projection of soft tissue up to 16 mm in length.  This may merely represent scarring.  However, comparison should be made with other CTs of the chest which may exist at other institutions.  If these do not exist, follow-up CT enhance with contrast recommended.  2. There are few stable left upper lobe  pulmonary micro nodules (axial series 5, images 106, 175, and 212).  3. A rib fracture was not identified.  Additionally, there is no specific CT evidence of osteolytic or osteoblastic bone disease.  However, if there is a continued suspicion for metastatic bone disease, consider nuclear medicine scan.     Stopped tamoxifen 12/2023 due to rash     HM:  Colonoscopy Jan 2024 at [facility], no biopsies or polyps- told to return in 5 years based on prior polyp history (Jan 2029)    Additional PMH:  Bilateral cataract surgery     FH:  No new cancers    Review of Systems   Constitutional:  Positive for fatigue (remains busy). Negative for activity change, appetite change and unexpected weight change.   HENT:  Negative for mouth sores.    Eyes:  Positive for visual disturbance.   Respiratory:  Negative for cough and shortness of breath.    Cardiovascular:  Negative for chest pain.   Gastrointestinal:  Negative for abdominal pain and diarrhea.   Genitourinary:  Negative for frequency.   Musculoskeletal:  Negative for back pain.   Integumentary:  Negative for rash.   Neurological:  Negative for headaches.   Hematological:  Negative for adenopathy.   Psychiatric/Behavioral:  The patient is not nervous/anxious.           Objective     Physical Exam  Vitals and nursing note reviewed.   Constitutional:       General: She is not in acute distress.     Appearance: Normal appearance. She is well-developed and normal weight. She is not ill-appearing.      Comments: Presents alone  Very pleasant   HENT:      Head: Normocephalic and atraumatic.   Eyes:      General: Lids are normal. No scleral icterus.     Extraocular Movements: Extraocular movements intact.      Conjunctiva/sclera: Conjunctivae normal.      Pupils: Pupils are equal, round, and reactive to light.   Neck:      Thyroid: No thyromegaly.      Vascular: No JVD.      Trachea: Trachea normal.   Cardiovascular:      Rate and Rhythm: Normal rate and regular rhythm.      Heart  sounds: Normal heart sounds. No murmur heard.     No friction rub. No gallop.   Pulmonary:      Effort: Pulmonary effort is normal. No respiratory distress.      Breath sounds: Normal breath sounds. No wheezing, rhonchi or rales.      Comments: Post mastectomy bilaterally  Chest wall without concerns  No axillary LAD  Chest:      Chest wall: No tenderness.          Comments: Bilateral mastectomies. Nipples remain.   Tenderness at marked area above along incision.   Incisions healed. No LAD  Abdominal:      General: Bowel sounds are normal. There is no distension.      Palpations: Abdomen is soft. There is no mass.      Tenderness: There is no abdominal tenderness. There is no guarding or rebound.      Comments: No organomegaly.    Musculoskeletal:         General: No swelling, tenderness or deformity. Normal range of motion.      Cervical back: Normal range of motion and neck supple.      Right lower leg: No edema.      Left lower leg: No edema.      Comments: No spinal or paraspinal tenderness.    Lymphadenopathy:      Head:      Right side of head: No submental or submandibular adenopathy.      Left side of head: No submental or submandibular adenopathy.      Cervical: No cervical adenopathy.      Upper Body:      Right upper body: No supraclavicular or axillary adenopathy.      Left upper body: No supraclavicular or axillary adenopathy.   Skin:     General: Skin is warm and dry.      Capillary Refill: Capillary refill takes less than 2 seconds.      Coloration: Skin is not jaundiced.      Findings: No bruising or rash.      Nails: There is no clubbing.      Comments: Multiple skin tags under bilateral arms   Neurological:      General: No focal deficit present.      Mental Status: She is alert and oriented to person, place, and time. Mental status is at baseline.      Sensory: No sensory deficit.      Motor: No weakness.      Gait: Gait normal.   Psychiatric:         Mood and Affect: Mood normal.         Speech:  Speech normal.         Behavior: Behavior normal.         Thought Content: Thought content normal.         Judgment: Judgment normal.     Labs- reviewed     Assessment and Plan     1. Malignant neoplasm of lower-inner quadrant of left breast in female, estrogen receptor positive    2. Ductal carcinoma in situ (DCIS) of left breast    3. Sleep concern    4. Osteopenia, unspecified location      Clinically negative exam and doing well  Opted out of endocrine therapy  Continue Vit D 3000u daily and calcium replacement    RTC 6 months  Knows to call for any issues    Colonoscopy UTD  Encouraged to increase exercise.   PT referral with osteopenia as well- healthy back and bone health    Route Chart for Scheduling    Med Onc Chart Routing      Follow up with physician    Follow up with [facility] 6 months. PT referral   Infusion scheduling note    Injection scheduling note    Labs    Imaging    Pharmacy appointment    Other referrals

## (undated) DEVICE — GOWN SURGICAL X-LARGE

## (undated) DEVICE — SUT MONOCRYL 3-0 PS-2 UND

## (undated) DEVICE — Device

## (undated) DEVICE — GAUGE FLUFF X-SUPER 36X36 2PLY

## (undated) DEVICE — PACK UNIVERSAL SPLIT II

## (undated) DEVICE — RETRACTOR RADIALUX LIGHTED

## (undated) DEVICE — TOWEL OR DISP STRL BLUE 4/PK

## (undated) DEVICE — SPONGE LAP 18X18 PREWASHED

## (undated) DEVICE — STAPLER SKIN ROTATING HEAD

## (undated) DEVICE — SYR 50CC LL

## (undated) DEVICE — SYS CLSR DERMABOND PRINEO 22CM

## (undated) DEVICE — SUT VICRYL 3-0 27 SH

## (undated) DEVICE — TIP YANKAUERS BULB NO VENT

## (undated) DEVICE — SEE MEDLINE ITEM 147518

## (undated) DEVICE — DRESSING XEROFORM FOIL PK 1X8

## (undated) DEVICE — GOWN AERO CHROME W/ TOWEL XL

## (undated) DEVICE — COVERS PROBE NR-48 STERILE

## (undated) DEVICE — SUT SILK 3-0 SH 18IN BLACK

## (undated) DEVICE — SOL VASHE INSTILLATION WND 16

## (undated) DEVICE — NEOGUARD COVER 4X30CM STERILE

## (undated) DEVICE — BOVIE SUCTION

## (undated) DEVICE — SKINMARKER & RULER REGULAR X-F

## (undated) DEVICE — GAUZE FLUFF XXLG 36X36 2 PLY

## (undated) DEVICE — SUT MCRYL PLUS 4-0 PS2 27IN

## (undated) DEVICE — DRAIN CHANNEL ROUND 15FR

## (undated) DEVICE — EVACUATOR WOUND BULB 100CC

## (undated) DEVICE — SUT SILK 2-0 PS 18IN BLACK

## (undated) DEVICE — GAUZE SPONGE 4X4 12PLY

## (undated) DEVICE — KIT IRR SUCTION HND PIECE

## (undated) DEVICE — APPLIER CLIP LIAGCLIP 9.375IN

## (undated) DEVICE — TRAY MINOR GEN SURG OMC

## (undated) DEVICE — ELECTRODE BLADE INSULATED 1 IN

## (undated) DEVICE — SUT PROLENE 2-0 36IN MH BLU

## (undated) DEVICE — SUT VICRYL PLUS 4-0 P3 18IN

## (undated) DEVICE — TOWEL OR BLUE STRL 16X26 4/PK

## (undated) DEVICE — SYS PRINEO SKIN CLOSURE

## (undated) DEVICE — DRAPE HALF SURGICAL 40X58IN

## (undated) DEVICE — TUBING SUC UNIV W/CONN 12FT

## (undated) DEVICE — SUT ETHILON 2-0 PSLX 30IN

## (undated) DEVICE — SOL NS 1000CC

## (undated) DEVICE — DRAPE STERI INSTRUMENT 1018

## (undated) DEVICE — CUP MEDICINE GRADUATED 1OZ

## (undated) DEVICE — PEROXIDE HYDROGEN 3% 16OZ

## (undated) DEVICE — REMOVER STAPLE SKIN STERILE

## (undated) DEVICE — ADHESIVE DERMABOND ADVANCED

## (undated) DEVICE — SUT CTD VICRYL 4-0 BR PS-2

## (undated) DEVICE — ELECTRODE REM PLYHSV RETURN 9

## (undated) DEVICE — SPONGE DERMACEA GAUZE 4X4

## (undated) DEVICE — SUT VICRYL 2-0 36 CT-1

## (undated) DEVICE — SUT CTD VICRYL 3-0 CR/SH

## (undated) DEVICE — PAD ABD 8X10 STERILE

## (undated) DEVICE — SUT 2-0 VICRYL / CT-1

## (undated) DEVICE — MANIFOLD 4 PORT

## (undated) DEVICE — NDL 18GA X1 1/2 REG BEVEL

## (undated) DEVICE — SET BLD COLL SAFETY 21G X 3/4